# Patient Record
Sex: FEMALE | Race: WHITE | NOT HISPANIC OR LATINO | Employment: PART TIME | URBAN - METROPOLITAN AREA
[De-identification: names, ages, dates, MRNs, and addresses within clinical notes are randomized per-mention and may not be internally consistent; named-entity substitution may affect disease eponyms.]

---

## 2017-12-18 PROBLEM — R73.01 IMPAIRED FASTING GLUCOSE: Chronic | Status: ACTIVE | Noted: 2017-12-18

## 2017-12-18 PROBLEM — L40.9 PSORIASIS: Status: ACTIVE | Noted: 2017-12-18

## 2017-12-18 PROBLEM — F51.01 PRIMARY INSOMNIA: Status: ACTIVE | Noted: 2017-12-18

## 2017-12-18 PROBLEM — M10.9 GOUT: Chronic | Status: ACTIVE | Noted: 2017-12-18

## 2017-12-18 PROBLEM — L40.9 PSORIASIS: Chronic | Status: ACTIVE | Noted: 2017-12-18

## 2017-12-18 PROBLEM — I10 ESSENTIAL HYPERTENSION: Chronic | Status: ACTIVE | Noted: 2017-12-18

## 2017-12-18 PROBLEM — I63.9 CEREBROVASCULAR ACCIDENT (CMS/HCC): Chronic | Status: ACTIVE | Noted: 2017-12-18

## 2017-12-18 PROBLEM — K59.00 CONSTIPATION: Chronic | Status: ACTIVE | Noted: 2017-12-18

## 2017-12-18 PROBLEM — E87.6 HYPOKALEMIA: Chronic | Status: ACTIVE | Noted: 2017-12-18

## 2017-12-18 PROBLEM — M10.9 GOUT: Status: ACTIVE | Noted: 2017-12-18

## 2017-12-18 PROBLEM — I10 HYPERTENSION: Status: ACTIVE | Noted: 2017-04-24

## 2017-12-18 PROBLEM — I10 ESSENTIAL HYPERTENSION: Status: ACTIVE | Noted: 2017-12-18

## 2017-12-18 PROBLEM — I63.9 CEREBROVASCULAR ACCIDENT (CMS/HCC): Status: ACTIVE | Noted: 2017-12-18

## 2017-12-18 PROBLEM — E87.6 HYPOKALEMIA: Status: ACTIVE | Noted: 2017-12-18

## 2017-12-18 PROBLEM — I10 HYPERTENSION: Chronic | Status: ACTIVE | Noted: 2017-04-24

## 2017-12-18 PROBLEM — K59.00 CONSTIPATION: Status: ACTIVE | Noted: 2017-12-18

## 2017-12-18 PROBLEM — E78.5 DYSLIPIDEMIA: Chronic | Status: ACTIVE | Noted: 2017-12-18

## 2017-12-18 PROBLEM — E78.5 DYSLIPIDEMIA: Status: ACTIVE | Noted: 2017-12-18

## 2017-12-18 PROBLEM — I44.30 ATRIOVENTRICULAR BLOCK: Status: ACTIVE | Noted: 2017-12-18

## 2017-12-18 PROBLEM — R51.9 HEADACHE: Status: ACTIVE | Noted: 2017-12-18

## 2017-12-18 PROBLEM — D84.9 IMMUNOCOMPROMISED (CMS/HCC): Status: ACTIVE | Noted: 2017-12-18

## 2017-12-18 PROBLEM — I44.30 ATRIOVENTRICULAR BLOCK: Chronic | Status: ACTIVE | Noted: 2017-12-18

## 2017-12-18 PROBLEM — R73.01 IMPAIRED FASTING GLUCOSE: Status: ACTIVE | Noted: 2017-12-18

## 2018-01-03 ENCOUNTER — TELEPHONE (OUTPATIENT)
Dept: DERMATOLOGY | Facility: CLINIC | Age: 73
End: 2018-01-03

## 2018-01-03 NOTE — TELEPHONE ENCOUNTER
Called patient and stated I had faxed the form to [employer] on December 27, 2017. Patient and I agree it could be due to the back to back holidays that could be affected the process, however to be safe I refaxed the original form and asked it to be rushed.

## 2018-01-15 ENCOUNTER — APPOINTMENT (OUTPATIENT)
Dept: CARDIOLOGY | Facility: CLINIC | Age: 73
End: 2018-01-15
Payer: MEDICARE

## 2018-01-15 ENCOUNTER — TELEPHONE (OUTPATIENT)
Dept: DERMATOLOGY | Facility: CLINIC | Age: 73
End: 2018-01-15

## 2018-01-15 DIAGNOSIS — I44.0 ATRIOVENTRICULAR BLOC FIRST DEGREE: Primary | ICD-10-CM

## 2018-01-15 PROCEDURE — 93294 REM INTERROG EVL PM/LDLS PM: CPT | Performed by: INTERNAL MEDICINE

## 2018-01-30 NOTE — PROGRESS NOTES
Subjective   Return Dermatology Skin examination  Sunitha Arrington is a 72 y.o. female with a history of actinic keratoses and psoriasis and LSA who presents for a full body skin exam . Lesions of concern include: rash on legs she has not been treating the area recently.  In the past this is been thought to potentially be related to underlying LS and a.   patient is currently using Humira 40 mg every other week for her psoriasis with excellent control, clobetasol BID PRN for stubborn skin lesions, she has been given a prescription for Klaron lotion in the past for the face but was unable to afford the prescription.            Review of Systems   Constitutional: Negative for fever.   Skin: Negative for rash.   Allergic/Immunologic: Negative for environmental allergies and immunocompromised state.         Past Medical History:   Diagnosis Date   • Actinic keratosis    • Psoriasis        Current Outpatient Prescriptions on File Prior to Visit   Medication Sig Dispense Refill   • acyclovir (ZOVIRAX) 800 mg tablet Take 1 tablet five times daily for 10 days 60 tablet 0   • adalimumab (HUMIRA) 40 mg/0.8 mL syringe kit Inject 1 kit every 2 weeks by subcutaneous route for 90 days. 1 mL 3   • adalimumab (HUMIRA) 40 mg/0.8 mL syringe kit Inject 0.8 mL (40 mg total) under the skin every 14 (fourteen) days. 6 each 2   • atorvastatin (LIPITOR) 40 mg tablet Take 1 tablet every day by oral route. 90 0   • clopidogrel (PLAVIX) 75 mg tablet Take 1 tablet every day by oral route for 30 days. 90 0   • losartan (COZAAR) 50 mg tablet TAKE 1 TABLET EVERY DAY 90 tablet 1   • lysine 500 mg tablet Take 1 tablet every day by oral route in the evening.     • omeprazole (PriLOSEC) 20 mg capsule Take 1 capsule every day by oral route. 90 0   • potassium chloride (KLOR-CON) 10 mEq CR tablet  90 3   • rOPINIRole (REQUIP) 1 mg tablet Take 1 tablet twice daily by mouth 180 tablet 3   • triamcinolone (KENALOG) 0.1 % cream  30 0   • zolpidem (AMBIEN) 5 mg  "tablet Take one to two tablets by mouth at bedtime. 60 tablet 5   • clobetasol (TEMOVATE) 0.05 % ointment  60 11   • gabapentin (NEURONTIN) 600 mg tablet  270 0     No current facility-administered medications on file prior to visit.        Allergies  Apricot      Physical Examination    Vital Signs  height is 1.664 m (5' 5.5\") and weight is 81.6 kg (180 lb). Her blood pressure is 153/78 and her pulse is 87.   General: normal general appearance and in no apparent distress      Physical Exam Findings:   Senior skin type:I  A full body skin examination was performed including scalp, face, lips, ears, neck, both arms, chest, back, abdomen, buttocks, both legs, both feet                                         Trunk (including chest, abdomen, back): Stuck on tan brown papules, well demarcated tan brown macules, bright red cherry papules, psoriasis well controlled.    Arms: Stuck on tan brown papules, well demarcated tan brown macules, bright red cherry papules, few guttate papules.  buttock: No suspicious lesions noted   legs: Bilateral ingulinal creases multiple violaceous papules. Biopsy done of the Right upper thigh.   feet: No suspicious lesions noted   scalp: No suspicious lesions noted   face: Central facial telangiectatic macules. See procedure note for treated lesions         Procedure Note:  Lesion Biopsy  Date/Time: 2/7/2018 3:40 PM  Performed by: DALTON REHMAN      Consent  Verbal consent was obtained from the patient. Written consent was not obtained from the patient. Risks, benefits, and alternatives were discussed. Consent given by patient. The patient states understanding of the procedure being performed. Patient ID confirmed verbally with patient.         Biopsy 1    Location Details  Body area: lower extremity  Lower extremity site: right upper thigh [id number].    Preparation Details  Adequate anesthesia is achieved using 0.3 mL of lidocaine 2% without epinephrine in a local infiltration " method. Area cleaned and prepped with Alcohol.     Procedure Details  Shave biopsy completed with dermablade. Biopsy performed to the depth of other (dermis). Hemostasis obtained with aluminium chloride and pressure.                     Post-Procedure  Specimen was sent to Pathology. Gauze and adhesive bandage applied for dressing. Patient tolerated the procedure well with no complications.       Destruction of Lesion  Date/Time: 2/7/2018 3:41 PM  Performed by: DALTON REHMAN      Consent  Verbal consent was obtained from the patient. Consent given by patient.     Location:  2 total lesions removed from head/neck.  Head/neck location(s):  Number of head/neck lesions removed: 2Head/neck location: pretip nose, right outer cheek.    Procedure Details   Local anesthesia was not used. Lesion(s) are pre-malignant. Lesion(s) destroyed with: liquid nitrogen. Lesion(s) were frozen until an ice ball extended beyond the lesion.     Post-Procedure  Patient tolerated procedure well with no complications.     Procedure Comments  Actinic keratosis            Sunitha was seen today for follow-up.    Diagnoses and all orders for this visit:    Psoriasis    Acne rosacea  -     sulfacetamide sodium, acne, 10 % suspension; Apply to face twice daily as directed    Lentigines    Hemangioma of skin    Seborrheic keratoses    Neoplasm of uncertain behavior  -     Specimen to pathology  -     Lesion Biopsy    Actinic keratosis  -     Destruction of lesion    -The rash on her inner thighs is somewhat unclear.  My differential diagnosis does include a granulomatous eruption from her biologic agent.  Other differential diagnosis includes partially treated inverse psoriasis versus granuloma annulare I recommended no further treatment until we have firm histologic diagnosis as a pathology specimen was collected today  -In regards to her psoriasis this is under excellent control continue Humira every other week and a dose of 40 mg use  clobetasol only should she experience stubborn lesions  -We will reattempt prescription for Klaron lotion as I do feel this would benefit her rosacea greatly  -  Actinic keratoses treated as above  -  Patient offered reassurance of benign nature of lesions        I emphasized daily sunscreen use with an SPF of 30-50, broad spectrum and water resistant.  I directed reapplication every two hours.  I recommended wide brimmed hats.  Finally, we discussed danger signs of changing skin lesions including sores not healing and moles changing in size, shape or color.  Should any of these lesions occur before next appointment, I instructed patent to call sooner for evaluation.    Return in about 6 months (around 8/7/2018) for Recheck.

## 2018-02-07 ENCOUNTER — OFFICE VISIT (OUTPATIENT)
Dept: DERMATOLOGY | Facility: CLINIC | Age: 73
End: 2018-02-07
Payer: MEDICARE

## 2018-02-07 VITALS
WEIGHT: 180 LBS | DIASTOLIC BLOOD PRESSURE: 78 MMHG | BODY MASS INDEX: 28.93 KG/M2 | SYSTOLIC BLOOD PRESSURE: 153 MMHG | HEART RATE: 87 BPM | HEIGHT: 66 IN

## 2018-02-07 DIAGNOSIS — L71.9 ACNE ROSACEA: ICD-10-CM

## 2018-02-07 DIAGNOSIS — D48.9 NEOPLASM OF UNCERTAIN BEHAVIOR: ICD-10-CM

## 2018-02-07 DIAGNOSIS — D18.01 HEMANGIOMA OF SKIN: ICD-10-CM

## 2018-02-07 DIAGNOSIS — L81.4 LENTIGINES: ICD-10-CM

## 2018-02-07 DIAGNOSIS — L40.9 PSORIASIS: Primary | ICD-10-CM

## 2018-02-07 DIAGNOSIS — L57.0 ACTINIC KERATOSIS: ICD-10-CM

## 2018-02-07 DIAGNOSIS — L82.1 SEBORRHEIC KERATOSES: ICD-10-CM

## 2018-02-07 PROCEDURE — 11100 LESION BIOPSY: CPT | Mod: 59 | Performed by: DERMATOLOGY

## 2018-02-07 PROCEDURE — 17000 DESTRUCT PREMALG LESION: CPT | Performed by: DERMATOLOGY

## 2018-02-07 PROCEDURE — 99213 OFFICE O/P EST LOW 20 MIN: CPT | Mod: 25 | Performed by: DERMATOLOGY

## 2018-02-07 PROCEDURE — 17003 DESTRUCT PREMALG LES 2-14: CPT | Performed by: DERMATOLOGY

## 2018-02-07 RX ORDER — SULFACETAMIDE SODIUM 100 MG/ML
LOTION TOPICAL
Qty: 1 BOTTLE | Refills: 2 | Status: SHIPPED | OUTPATIENT
Start: 2018-02-07 | End: 2018-10-30 | Stop reason: ALTCHOICE

## 2018-02-07 ASSESSMENT — PAIN SCALES - GENERAL: PAINLEVEL: 0-NO PAIN

## 2018-02-07 ASSESSMENT — ENCOUNTER SYMPTOMS: FEVER: 0

## 2018-02-07 NOTE — PATIENT INSTRUCTIONS
WOUND CARE FOLLOWING BIOPSY    After your biopsy a dressing will be placed on the site.  You may remove bandage at end of day.  This is to minimize any bleeding that can possibly occur after surgery.  To clean the area, use Dove soap, warm water and hands, no wash cloth, once daily    Next, apply a layer of POLYSPORIN ANTIBIOTIC OINTMENT or VASELINE, daily.  Do this until healed.  We will contact you with your results when available.  Please call with questions or concerns    If you have not received results in 2 weeks, please call our office and reference your biopsy number [id number].   
no difficulties

## 2018-02-09 LAB — PATHOLOGY FINAL DIAGNOSIS: NORMAL

## 2018-02-10 NOTE — PROGRESS NOTES
No further treatment, inform patient this could be related to underlying humira therapy, but she does not need to treat

## 2018-02-12 ENCOUNTER — TELEPHONE (OUTPATIENT)
Dept: DERMATOLOGY | Facility: CLINIC | Age: 73
End: 2018-02-12

## 2018-02-14 ENCOUNTER — HOSPITAL ENCOUNTER (OUTPATIENT)
Dept: RADIOLOGY | Facility: HOSPITAL | Age: 73
Discharge: HOME | End: 2018-02-14
Attending: PHYSICIAN ASSISTANT
Payer: MEDICARE

## 2018-02-14 ENCOUNTER — OFFICE VISIT (OUTPATIENT)
Dept: URGENT CARE | Facility: CLINIC | Age: 73
End: 2018-02-14
Payer: MEDICARE

## 2018-02-14 ENCOUNTER — APPOINTMENT (OUTPATIENT)
Dept: FAMILY MEDICINE | Facility: CLINIC | Age: 73
End: 2018-02-14
Payer: MEDICARE

## 2018-02-14 ENCOUNTER — TELEPHONE (OUTPATIENT)
Dept: URGENT CARE | Facility: CLINIC | Age: 73
End: 2018-02-14

## 2018-02-14 VITALS
OXYGEN SATURATION: 98 % | SYSTOLIC BLOOD PRESSURE: 142 MMHG | HEART RATE: 65 BPM | TEMPERATURE: 98 F | DIASTOLIC BLOOD PRESSURE: 84 MMHG | RESPIRATION RATE: 16 BRPM

## 2018-02-14 DIAGNOSIS — R51.9 NONINTRACTABLE HEADACHE, UNSPECIFIED CHRONICITY PATTERN, UNSPECIFIED HEADACHE TYPE: ICD-10-CM

## 2018-02-14 DIAGNOSIS — J01.90 ACUTE SINUSITIS, RECURRENCE NOT SPECIFIED, UNSPECIFIED LOCATION: ICD-10-CM

## 2018-02-14 DIAGNOSIS — R06.02 SOB (SHORTNESS OF BREATH): Primary | ICD-10-CM

## 2018-02-14 LAB
ABO GROUP (TYPE) IN BLOOD: NORMAL
ANION GAP SERPL CALC-SCNC: 7 MMOL/L (ref 3–11)
B PERT DNA SPEC QL NAA+PROBE: NEGATIVE
BASOPHILS # BLD AUTO: 0.1 10*3/UL
BASOPHILS NFR BLD AUTO: 1 % (ref 0–2)
BNP SERPL-MCNC: 25 PG/ML (ref 0–99)
BUN SERPL-MCNC: 18 MG/DL (ref 7–25)
C PNEUM DNA SPEC QL NAA+PROBE: NEGATIVE
CALCIUM SERPL-MCNC: 9.4 MG/DL (ref 8.6–10.3)
CHLORIDE SERPL-SCNC: 103 MMOL/L (ref 98–107)
CO2 SERPL-SCNC: 26 MMOL/L (ref 21–32)
CREAT SERPL-MCNC: 0.8 MG/DL (ref 0.6–1.2)
D AG BLD QL: NORMAL
D DIMER PPP FEU-MCNC: 0.27 UG/ML FEU (ref 0–0.5)
EOSINOPHIL # BLD AUTO: 0.2 10*3/UL
EOSINOPHIL NFR BLD AUTO: 2 % (ref 0–3)
ERYTHROCYTE [DISTWIDTH] IN BLOOD BY AUTOMATED COUNT: 12.6 % (ref 11.5–14)
FLUAV AG NPH QL IA: NEGATIVE
FLUAV RNA SPEC NAA+PROBE-ACNC: NEGATIVE
FLUBV AG NPH QL IA: NEGATIVE
FLUBV RNA SPEC QL NAA+PROBE: NEGATIVE
GFR SERPL CREATININE-BSD FRML MDRD: 71 ML/MIN/1.73M*2
GLUCOSE SERPL-MCNC: 120 MG/DL (ref 70–105)
HADV DNA SPEC QL NAA+PROBE: NEGATIVE
HCOV 229E RNA SPEC QL NAA+PROBE: NEGATIVE
HCOV HKU1 RNA SPEC QL NAA+PROBE: POSITIVE
HCOV NL63 RNA SPEC QL NAA+PROBE: NEGATIVE
HCOV OC43 RNA SPEC QL NAA+PROBE: NEGATIVE
HCT VFR BLD AUTO: 43.7 % (ref 34–45)
HGB BLD-MCNC: 15 G/DL (ref 11.5–15.5)
HMPV RNA ISLT QL NAA+PROBE: NEGATIVE
HPIV1 RNA SPEC QL NAA+PROBE: NEGATIVE
HPIV2 RNA SPEC QL NAA+PROBE: NEGATIVE
HPIV3 RNA SPEC QL NAA+PROBE: NEGATIVE
HPIV4 RNA SPEC QL NAA+PROBE: NEGATIVE
LYMPHOCYTES # BLD AUTO: 1.5 10*3/UL
LYMPHOCYTES NFR BLD AUTO: 22 % (ref 11–47)
M PNEUMO DNA # SPEC NAA+PROBE: NEGATIVE {COPIES}/ML
MCH RBC QN AUTO: 33 PG (ref 28–33)
MCHC RBC AUTO-ENTMCNC: 34.4 G/DL (ref 32–36)
MCV RBC AUTO: 95.9 FL (ref 81–97)
MONOCYTES # BLD AUTO: 0.6 10*3/UL
MONOCYTES NFR BLD AUTO: 8 % (ref 3–11)
NEUTROPHILS # BLD AUTO: 4.4 10*3/UL
NEUTROPHILS NFR BLD AUTO: 66 % (ref 41–81)
PLATELET # BLD AUTO: 189 10*3/UL (ref 140–350)
PMV BLD AUTO: 7.7 FL (ref 6.9–10.8)
POTASSIUM SERPL-SCNC: 4 MMOL/L (ref 3.5–5.1)
RBC # BLD AUTO: 4.56 10*6/ΜL (ref 3.7–5.3)
RSV A RNA SPEC QL NAA+PROBE: NEGATIVE
RV+EV RNA SPEC QL NAA+PROBE: NEGATIVE
SODIUM SERPL-SCNC: 136 MMOL/L (ref 135–145)
TROPONIN I SERPL-MCNC: <0.03 NG/ML
WBC # BLD AUTO: 6.7 10*3/UL (ref 4.5–10.5)

## 2018-02-14 PROCEDURE — 99214 OFFICE O/P EST MOD 30 MIN: CPT | Mod: 25 | Performed by: PHYSICIAN ASSISTANT

## 2018-02-14 PROCEDURE — 93010 ELECTROCARDIOGRAM REPORT: CPT

## 2018-02-14 PROCEDURE — 83880 ASSAY OF NATRIURETIC PEPTIDE: CPT | Performed by: PHYSICIAN ASSISTANT

## 2018-02-14 PROCEDURE — 85379 FIBRIN DEGRADATION QUANT: CPT | Performed by: PHYSICIAN ASSISTANT

## 2018-02-14 PROCEDURE — 87804 INFLUENZA ASSAY W/OPTIC: CPT | Performed by: PHYSICIAN ASSISTANT

## 2018-02-14 PROCEDURE — 80048 BASIC METABOLIC PNL TOTAL CA: CPT | Performed by: PHYSICIAN ASSISTANT

## 2018-02-14 PROCEDURE — 71046 X-RAY EXAM CHEST 2 VIEWS: CPT

## 2018-02-14 PROCEDURE — 84484 ASSAY OF TROPONIN QUANT: CPT | Performed by: PHYSICIAN ASSISTANT

## 2018-02-14 PROCEDURE — 86900 BLOOD TYPING SEROLOGIC ABO: CPT | Performed by: PHYSICIAN ASSISTANT

## 2018-02-14 PROCEDURE — 99213 OFFICE O/P EST LOW 20 MIN: CPT | Performed by: PHYSICIAN ASSISTANT

## 2018-02-14 PROCEDURE — 99212 OFFICE O/P EST SF 10 MIN: CPT

## 2018-02-14 PROCEDURE — 87798 DETECT AGENT NOS DNA AMP: CPT | Performed by: PHYSICIAN ASSISTANT

## 2018-02-14 PROCEDURE — 85025 COMPLETE CBC W/AUTO DIFF WBC: CPT | Performed by: PHYSICIAN ASSISTANT

## 2018-02-14 PROCEDURE — 36415 COLL VENOUS BLD VENIPUNCTURE: CPT | Performed by: PHYSICIAN ASSISTANT

## 2018-02-14 PROCEDURE — 93005 ELECTROCARDIOGRAM TRACING: CPT | Performed by: PHYSICIAN ASSISTANT

## 2018-02-14 NOTE — PATIENT INSTRUCTIONS
Patient Education     Shortness of Breath  Shortness of breath means you have trouble breathing. Shortness of breath needs medical care right away.  HOME CARE   · Do not smoke.  · Avoid being around chemicals or things (paint fumes, dust) that may bother your breathing.  · Rest as needed. Slowly begin your normal activities.  · Only take medicines as told by your doctor.  · Keep all doctor visits as told.  GET HELP RIGHT AWAY IF:   · Your shortness of breath gets worse.  · You feel lightheaded, pass out (faint), or have a cough that is not helped by medicine.  · You cough up blood.  · You have pain with breathing.  · You have pain in your chest, arms, shoulders, or belly (abdomen).  · You have a fever.  · You cannot walk up stairs or exercise the way you normally do.  · You do not get better in the time expected.  · You have a hard time doing normal activities even with rest.  · You have problems with your medicines.  · You have any new symptoms.  MAKE SURE YOU:  · Understand these instructions.  · Will watch your condition.  · Will get help right away if you are not doing well or get worse.  This information is not intended to replace advice given to you by your health care provider. Make sure you discuss any questions you have with your health care provider.  Document Released: 06/05/2009 Document Revised: 12/23/2014 Document Reviewed: 03/04/2013  [employer] Interactive Patient Education © 2017 [employer]       Patient Education     General Headache Without Cause  Introduction  A headache is pain or discomfort felt around the head or neck area. There are many causes and types of headaches. In some cases, the cause may not be found.  Follow these instructions at home:  Managing pain  · Take over-the-counter and prescription medicines only as told by your doctor.  · Lie down in a dark, quiet room when you have a headache.  · If directed, apply ice to the head and neck area:  ¨ Put ice in a plastic bag.  ¨ Place a  towel between your skin and the bag.  ¨ Leave the ice on for 20 minutes, 2-3 times per day.  · Use a heating pad or hot shower to apply heat to the head and neck area as told by your doctor.  · Keep lights dim if bright lights bother you or make your headaches worse.  Eating and drinking  · Eat meals on a regular schedule.  · Lessen how much alcohol you drink.  · Lessen how much caffeine you drink, or stop drinking caffeine.  General instructions  · Keep all follow-up visits as told by your doctor. This is important.  · Keep a journal to find out if certain things bring on headaches. For example, write down:  ¨ What you eat and drink.  ¨ How much sleep you get.  ¨ Any change to your diet or medicines.  · Relax by getting a massage or doing other relaxing activities.  · Lessen stress.  · Sit up straight. Do not tighten (tense) your muscles.  · Do not use tobacco products. This includes cigarettes, chewing tobacco, or e-cigarettes. If you need help quitting, ask your doctor.  · Exercise regularly as told by your doctor.  · Get enough sleep. This often means 7-9 hours of sleep.  Contact a doctor if:  · Your symptoms are not helped by medicine.  · You have a headache that feels different than the other headaches.  · You feel sick to your stomach (nauseous) or you throw up (vomit).  · You have a fever.  Get help right away if:  · Your headache becomes really bad.  · You keep throwing up.  · You have a stiff neck.  · You have trouble seeing.  · You have trouble speaking.  · You have pain in the eye or ear.  · Your muscles are weak or you lose muscle control.  · You lose your balance or have trouble walking.  · You feel like you will pass out (faint) or you pass out.  · You have confusion.  This information is not intended to replace advice given to you by your health care provider. Make sure you discuss any questions you have with your health care provider.  Document Released: 09/26/2009 Document Revised: 05/25/2017  Document Reviewed: 04/11/2016  © 2017 [employer]

## 2018-02-14 NOTE — LETTER
February 14, 2018        To Whom it May Concern:    Sunitha Arrington was seen at [facility] on 2/14/2018     Please excuse her absence from work,  on this day due to illness.    If you have any questions or concerns, please don't hesitate to call.        Sincerely,       JOHN ARMSTRONG

## 2018-02-14 NOTE — LETTER
February 14, 2018        To Whom it May Concern:    Sunitha Arrington was seen at [facility] on 2/14/2018     Please excuse her absence from work,  on this day due to illness.    If you have any questions or concerns, please don't hesitate to call.        Sincerely,       JOHN ARMSTRONG

## 2018-02-14 NOTE — LETTER
February 14, 2018      To Whom it May Concern:    Sunitha Arrington was seen at [facility] on 2/14/2018     Please excuse her absence from work, 02/14-15/2018 due to illness.    If you have any questions or concerns, please don't hesitate to call.        Sincerely,       JOHN ARMSTRONG

## 2018-02-14 NOTE — PROGRESS NOTES
Subjective     HPI    Sunitha Arrington is a 72 y.o. female who presents for shortness of breath.  Patient presents clinic today and states that she has had some shortness of breath and states she feels like she has been hit by a [place] truck.  The symptoms have been present since Thursday, February 8, 2018 and she feels it is getting worse instead of better.  Patient does voice she has some facial fullness and postnasal drainage as well.  She has had to take off work due to feeling bad.  This morning she woke up approximately 1:30 in the morning and states that it seemed like she had to work to breathe. She voices she is having some heaviness in her chest, does not radiate into her back.  Sometimes she will feel some pain down R arm. She states that she is also having a headache on her right temporal area.  She states that she had a stroke in 2015 that affected her right side and when she gets a headache this is where she gets one this headache is the same headache that she normally has usually is relieved with Tylenol she is out of Tylenol right now she took one half of her 's tramadol and that seemed to take the edge off the headache.  She denies any new vision changes.  Her nose on the right side feels plugged up she does have some body aches nasal congestion she denies any fever but has been feeling chilled like feeling hot and cold off and on.  Denies any nausea vomiting or diarrhea.  She has recently seen Dr. Burrell dermatology for some biopsies and some cryotherapy on the lesions on her face at this time everything is benign she does have a history of lichen sclerosis.  She has not had any recent travel, no longer on hormone treatment, and she has had some previous stroke that they felt was secondary to her hormone treatment.  She denies any pain in her calf no swelling in a cast but states sometimes at night she will have some muscle cramps in the calves of bilateral legs.  Patient is wanting to know what  her blood type is.     Review of Systems    As noted in HPI.      Objective   /84 (BP Location: Left arm, Patient Position: Sitting, Cuff Size: Reg)   Pulse 65   Temp 36.7 °C (98 °F) (Oral)   Resp 16   SpO2 98%     Physical Exam   Constitutional: She is oriented to person, place, and time. She appears well-developed and well-nourished. No distress.   HENT:   Head: Normocephalic.   Right Ear: External ear and ear canal normal. Tympanic membrane is retracted. Tympanic membrane is not perforated and not erythematous. No middle ear effusion.   Left Ear: External ear and ear canal normal. Tympanic membrane is retracted. Tympanic membrane is not perforated and not erythematous.  No middle ear effusion.   Nose: Mucosal edema present. Right sinus exhibits maxillary sinus tenderness and frontal sinus tenderness.   Mouth/Throat: Uvula is midline, oropharynx is clear and moist and mucous membranes are normal. No trismus in the jaw. No oropharyngeal exudate.   Nasal mucosa is boggy bilateral without purulent drainage.   Eyes: Pupils are equal, round, and reactive to light. Right eye exhibits no discharge. Left eye exhibits no discharge. No scleral icterus.   Neck: Normal range of motion. Neck supple. No tracheal deviation present.   Complaint is some tenderness with palpation along the posterior cervical chain on the right but no palpable lymph nodes are found.   Cardiovascular: Normal rate, regular rhythm, normal heart sounds and intact distal pulses.  Exam reveals no gallop and no friction rub.    No murmur heard.  Pulmonary/Chest: Effort normal and breath sounds normal. No respiratory distress. She has no wheezes. She has no rales.   Walking pulse oximeter test she did go from 98-91% walking the length from the clinic to the lab.  She did voice feeling some shortness of breath with this at the end of the walk.   Musculoskeletal: Normal range of motion. She exhibits no edema, tenderness or deformity.    Lymphadenopathy:     She has no cervical adenopathy.   Neurological: She is alert and oriented to person, place, and time. She has normal reflexes. No cranial nerve deficit. She exhibits normal muscle tone. Coordination normal.   Romberg pronator drift, heel down shin, finger to nose are all normal.  The area at the right temple is negative for any pulsations or bruits.   Skin: Skin is warm and dry. Capillary refill takes 2 to 3 seconds. Rash noted. No erythema.   Patient has a scabbed area on the top of her nose and her right cheek from her cryotherapy done previously no signs and symptoms of infection.  She has an area on her right thigh where the biopsy was performed is scabbed without any signs and symptoms of infection.  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   Psychiatric: She has a normal mood and affect. Her behavior is normal. Judgment and thought content normal.   Nursing note and vitals reviewed.      No results found for this or any previous visit (from the past 4 hour(s)).    X-ray Chest 2 Views    Result Date: 2/14/2018  Exam: Chest x-ray, 2 views 02/14/2018    Clinical History: SOB (shortness of breath); SOB with rest and exertion Comparison(s): 7/30/2015 Findings: The lungs are clear. The heart size and pulmonary vascularity are within normal limits. Pacemaker and wires are stable in position. There are no acute appearing focal bony or soft tissue abnormalities. Pleural spaces are unremarkable with no evidence for pneumothorax or effusion.     1.  Normal chest.        Assessment/Plan   Diagnoses and all orders for this visit:    SOB (shortness of breath)  -     Basic metabolic panel Blood, Venous  -     CBC w/auto differential Blood, Venous  -     ECG 12 lead  -     D-dimer, quantitative Blood, Venous  -     Rapid influenza A/B antigens  -     X-ray chest 2 views  -     ABO/Rh        Discussion:   Discussed  x-rays and labs that were available at this time with patient and explained will call later with the remaining portion of the chest at this time everything points to a viral illness and will be treated as such.  Discussed case with Dr. Donald as well and she agrees with this treatment plan.  Humidifier, normal saline nasal spray, daily antihistamine, decongestants or mucolytics as needed for congestion and push fluids. Needs to be seen for high fever, trouble breathing or if not improving in 4-5 dys Patient verbalizes understanding of above information and will contact [facility] or [facility] with any further questions or concerns.      A voice recognition program was used to aid in medical record documentation. Sometimes words are printed not exactly as they were spoken. While efforts were made to carefully edit and correct any inaccuracies, some errors may be present. Errors should be taken within the context of the discussion.  Please contact our office if you need assistance interpreting this medical record or notice any mistakes.      JOHN ARMSTRONG

## 2018-03-28 ENCOUNTER — OFFICE VISIT (OUTPATIENT)
Dept: URGENT CARE | Facility: CLINIC | Age: 73
End: 2018-03-28
Payer: MEDICARE

## 2018-03-28 ENCOUNTER — HOSPITAL ENCOUNTER (OUTPATIENT)
Dept: RADIOLOGY | Facility: HOSPITAL | Age: 73
Discharge: HOME | End: 2018-03-28
Attending: PHYSICIAN ASSISTANT
Payer: MEDICARE

## 2018-03-28 VITALS
DIASTOLIC BLOOD PRESSURE: 70 MMHG | OXYGEN SATURATION: 97 % | RESPIRATION RATE: 16 BRPM | HEART RATE: 57 BPM | SYSTOLIC BLOOD PRESSURE: 112 MMHG | TEMPERATURE: 97.7 F

## 2018-03-28 DIAGNOSIS — R06.00 DYSPNEA, UNSPECIFIED TYPE: Primary | ICD-10-CM

## 2018-03-28 DIAGNOSIS — R07.89 CHEST TIGHTNESS: ICD-10-CM

## 2018-03-28 PROCEDURE — 71046 X-RAY EXAM CHEST 2 VIEWS: CPT

## 2018-03-28 PROCEDURE — 99213 OFFICE O/P EST LOW 20 MIN: CPT | Performed by: PHYSICIAN ASSISTANT

## 2018-03-28 PROCEDURE — 99211 OFF/OP EST MAY X REQ PHY/QHP: CPT | Performed by: PHYSICIAN ASSISTANT

## 2018-03-28 NOTE — LETTER
[facility]  [address] [id number]  [phone]  Dept: [phone]  March 28, 2018     Patient: Sunitha Arrington   YOB: 1945   Date of Visit: 3/28/2018       To Whom it May Concern:    Sunitha Arrington was seen in my clinic on  3/28/2018 at [facility]. Please excuse Sunitha for her absence from work on this day to make the appointment.    If you have any questions or concerns, please don't hesitate to call.         Sincerely,         JOHN ARMSTRONG        CC: No Recipients

## 2018-03-28 NOTE — PROGRESS NOTES
Subjective         Sunitha Arrington is a 72 y.o. female who presents with Chief Complaint of complaint of feeling tired with chest tightness and feeling achy.    HPI  Patient was seen in the office approximately 1 month ago for same complaint of feeling tired with chest tightness and achy  She is not having any chest pain  Next height  History of a CVA which affected the right side  She does have a cardiologist Dr. Kaplan who she sees routinely to check her pacemaker  She states that she does not wake up at night short of breath and have to sit up to breathe  She is able to climb the stairs without any complaints of chest pain or shortness of breath  She denies any pedal edema    Patient Active Problem List   Diagnosis   • Atrioventricular block   • Cardiac pacemaker in situ   • Cerebrovascular accident (CMS/HCC)   • Constipation   • Essential hypertension   • Dyslipidemia   • Gout   • Headache   • Hypokalemia   • Hypertension   • Impaired fasting glucose   • Psoriasis   • Primary insomnia   • Immunocompromised (CMS/HCC)       Past Medical History:   Diagnosis Date   • Actinic keratosis    • Psoriasis        Social History     Social History   • Marital status:      Spouse name: N/A   • Number of children: N/A   • Years of education: N/A     Occupational History   • Not on file.     Social History Main Topics   • Smoking status: Never Smoker   • Smokeless tobacco: Never Used   • Alcohol use No   • Drug use: No   • Sexual activity: Not on file     Other Topics Concern   • Not on file     Social History Narrative   • No narrative on file       Family History   Problem Relation Age of Onset   • Other cancer Father 32     secondary malignant neoplasm of lymph node       Allergies   Allergen Reactions   • Apricot      throat pain       Current Outpatient Prescriptions   Medication Sig Dispense Refill   • acyclovir (ZOVIRAX) 800 mg tablet Take 1 tablet five times daily for 10 days 60 tablet 0   • adalimumab (HUMIRA) 40  mg/0.8 mL syringe kit Inject 1 kit every 2 weeks by subcutaneous route for 90 days. 1 mL 3   • adalimumab (HUMIRA) 40 mg/0.8 mL syringe kit Inject 0.8 mL (40 mg total) under the skin every 14 (fourteen) days. 6 each 2   • atorvastatin (LIPITOR) 40 mg tablet Take 1 tablet every day by oral route. 90 0   • clobetasol (TEMOVATE) 0.05 % ointment  60 11   • clopidogrel (PLAVIX) 75 mg tablet Take 1 tablet every day by oral route for 30 days. 90 0   • gabapentin (NEURONTIN) 600 mg tablet  270 0   • losartan (COZAAR) 50 mg tablet TAKE 1 TABLET EVERY DAY 90 tablet 1   • lysine 500 mg tablet Take 1 tablet every day by oral route in the evening.     • omeprazole (PriLOSEC) 20 mg capsule Take 1 capsule every day by oral route. 90 0   • potassium chloride (KLOR-CON) 10 mEq CR tablet  90 3   • rOPINIRole (REQUIP) 1 mg tablet Take 1 tablet twice daily by mouth 180 tablet 3   • sulfacetamide sodium, acne, 10 % suspension Apply to face twice daily as directed 1 Bottle 2   • triamcinolone (KENALOG) 0.1 % cream  30 0   • zolpidem (AMBIEN) 5 mg tablet Take one to two tablets by mouth at bedtime. 60 tablet 5     No current facility-administered medications for this visit.        Review of Systems    Negative 12 point review of systems other than listed in HPI    Objective   /70 (BP Location: Left arm, Patient Position: Sitting, Cuff Size: Reg)   Pulse 57   Temp 36.5 °C (97.7 °F) (Oral)   Resp 16   SpO2 97%     Physical Exam   Constitutional: She is oriented to person, place, and time. She appears well-developed and well-nourished. No distress.   She is a well-appearing female who is in no acute distress at this time.   HENT:   Head: Normocephalic and atraumatic.   Right Ear: External ear normal.   Left Ear: External ear normal.   Nose: Nose normal.   Mouth/Throat: Oropharynx is clear and moist. No oropharyngeal exudate.   Eyes: Conjunctivae and EOM are normal. Pupils are equal, round, and reactive to light. No scleral icterus.    Neck: Normal range of motion. Neck supple.   Cardiovascular: Normal rate, regular rhythm, normal heart sounds and intact distal pulses.    No murmur heard.  1+ pedal edema bilateral lower extremities   Pulmonary/Chest: Effort normal and breath sounds normal. No respiratory distress.   Musculoskeletal: Normal range of motion.   Lymphadenopathy:     She has no cervical adenopathy.   Neurological: She is alert and oriented to person, place, and time. She exhibits normal muscle tone.   Skin: Skin is warm and dry. Capillary refill takes less than 2 seconds.   Psychiatric: She has a normal mood and affect. Her behavior is normal. Judgment and thought content normal.   Nursing note and vitals reviewed.        No results found for this or any previous visit (from the past 6 hour(s)).    X-ray Chest 2 Views    Result Date: 3/28/2018  Exam: 2 View CXR 03/28/2018    . Clinical history: Dyspnea  unspecified type; shortness of breath  chest tightness    Comparison: 2/14/2018 Findings: Left subclavian dual-lead cardiac pacer is in good position. Cardiac size is stable. Lungs are clear without infiltrates or pleural effusions.     Impression: No acute abnormality seen.        Assessment/Plan   Diagnoses and all orders for this visit:    Dyspnea, unspecified type  -     X-ray chest 2 views    Chest tightness  -     [facility]; Future        Discussion:   Discussed and reviewed the x-ray with her today in the office and she verbalized understanding  Discussed with patient treatment plan she will get the echocardiogram and then follow-up with Dr. Donald to discuss if she should have any problems with breathing worsening of symptoms she needs to follow-up in the emergency room return to the clinic.       A voice recognition program was used to aid in medical record documentation. Sometimes words are printed not exactly as they were spoken. While efforts were made to carefully edit and correct any inaccuracies, some errors may  be present. Errors should be taken within the context of the discussion.  Please contact our office if you need assistance interpreting this medical record or notice any mistakes.      JOHN ARMSTRONG  03/28/18

## 2018-03-29 ENCOUNTER — TELEPHONE (OUTPATIENT)
Dept: FAMILY MEDICINE | Facility: CLINIC | Age: 73
End: 2018-03-29

## 2018-03-29 NOTE — TELEPHONE ENCOUNTER
I did order an echocardiogram but was can hold off on the pulmonary function test until she sees Dr. Donald.

## 2018-04-03 ENCOUNTER — TELEPHONE (OUTPATIENT)
Dept: FAMILY MEDICINE | Facility: CLINIC | Age: 73
End: 2018-04-03

## 2018-04-03 NOTE — PROGRESS NOTES
Telephone call from patient that her note from her office visit on 3/28/2018 needed to be extended for 1 more day for her employment this note was written for her today she will keep her scheduled appointment with Dr. Donald.

## 2018-04-05 ENCOUNTER — HOSPITAL ENCOUNTER (OUTPATIENT)
Dept: ULTRASOUND IMAGING | Facility: HOSPITAL | Age: 73
Discharge: HOME | End: 2018-04-05
Attending: PHYSICIAN ASSISTANT
Payer: MEDICARE

## 2018-04-05 VITALS — BODY MASS INDEX: 29.99 KG/M2 | HEIGHT: 65 IN | WEIGHT: 180 LBS

## 2018-04-05 PROCEDURE — 93321 DOPPLER ECHO F-UP/LMTD STD: CPT | Mod: 26 | Performed by: INTERNAL MEDICINE

## 2018-04-05 PROCEDURE — 93325 DOPPLER ECHO COLOR FLOW MAPG: CPT

## 2018-04-05 PROCEDURE — 93308 TTE F-UP OR LMTD: CPT | Mod: 26 | Performed by: INTERNAL MEDICINE

## 2018-04-05 PROCEDURE — 93325 DOPPLER ECHO COLOR FLOW MAPG: CPT | Mod: 26 | Performed by: INTERNAL MEDICINE

## 2018-05-01 ENCOUNTER — ANCILLARY PROCEDURE (OUTPATIENT)
Dept: CARDIOLOGY | Facility: CLINIC | Age: 73
End: 2018-05-01
Payer: MEDICARE

## 2018-05-01 ENCOUNTER — OFFICE VISIT (OUTPATIENT)
Dept: CARDIOLOGY | Facility: CLINIC | Age: 73
End: 2018-05-01
Payer: MEDICARE

## 2018-05-01 VITALS
OXYGEN SATURATION: 97 % | WEIGHT: 180 LBS | BODY MASS INDEX: 29.99 KG/M2 | HEART RATE: 80 BPM | DIASTOLIC BLOOD PRESSURE: 82 MMHG | SYSTOLIC BLOOD PRESSURE: 144 MMHG | HEIGHT: 65 IN

## 2018-05-01 DIAGNOSIS — I44.30 AV BLOCK: ICD-10-CM

## 2018-05-01 DIAGNOSIS — I44.30 ATRIOVENTRICULAR BLOCK: Primary | Chronic | ICD-10-CM

## 2018-05-01 DIAGNOSIS — R06.00 DYSPNEA, UNSPECIFIED TYPE: ICD-10-CM

## 2018-05-01 DIAGNOSIS — I10 ESSENTIAL HYPERTENSION: Chronic | ICD-10-CM

## 2018-05-01 DIAGNOSIS — Z95.0 CARDIAC PACEMAKER IN SITU: Chronic | ICD-10-CM

## 2018-05-01 DIAGNOSIS — I63.9 CEREBROVASCULAR ACCIDENT (CVA), UNSPECIFIED MECHANISM (CMS/HCC): Chronic | ICD-10-CM

## 2018-05-01 PROCEDURE — 99214 OFFICE O/P EST MOD 30 MIN: CPT | Mod: 25 | Performed by: NURSE PRACTITIONER

## 2018-05-01 PROCEDURE — 99214 OFFICE O/P EST MOD 30 MIN: CPT | Mod: PO | Performed by: NURSE PRACTITIONER

## 2018-05-01 PROCEDURE — 93280 PM DEVICE PROGR EVAL DUAL: CPT | Mod: 26 | Performed by: INTERNAL MEDICINE

## 2018-05-01 PROCEDURE — 93280 PM DEVICE PROGR EVAL DUAL: CPT | Mod: PO

## 2018-05-01 RX ORDER — PHENYLEPHRINE HCL 10 MG
500 TABLET ORAL DAILY
COMMUNITY
End: 2018-10-30 | Stop reason: ALTCHOICE

## 2018-05-01 RX ORDER — PNV NO.95/FERROUS FUM/FOLIC AC 28MG-0.8MG
1 TABLET ORAL DAILY
COMMUNITY
End: 2019-05-23 | Stop reason: ALTCHOICE

## 2018-05-01 ASSESSMENT — ENCOUNTER SYMPTOMS
DYSPNEA ON EXERTION: 0
BRUISES/BLEEDS EASILY: 0
SHORTNESS OF BREATH: 0
NAUSEA: 0
EYES NEGATIVE: 1
DIZZINESS: 0
ENDOCRINE NEGATIVE: 1
FEVER: 0
WHEEZING: 0
ORTHOPNEA: 0
HEMATOCHEZIA: 0
SYNCOPE: 0
NEAR-SYNCOPE: 0
CHILLS: 0
HEADACHES: 0
DIARRHEA: 0
LIGHT-HEADEDNESS: 0
SNORING: 0
HEMATURIA: 0
IRREGULAR HEARTBEAT: 0
VOMITING: 0
FALLS: 0
CONSTIPATION: 0
PSYCHIATRIC NEGATIVE: 1
DIAPHORESIS: 0
COUGH: 0
MYALGIAS: 0
PALPITATIONS: 0
HEARTBURN: 0
PND: 0
ABDOMINAL PAIN: 0

## 2018-05-01 ASSESSMENT — PAIN SCALES - GENERAL: PAINLEVEL: 2

## 2018-05-01 NOTE — PROGRESS NOTES
[facility] [place]  [address] [place] [address]    Electrophysiology Outpatient Follow-up Note    Subjective     Chief Complaint  Chief Complaint   Patient presents with   • Syncope   • Hypertension       HPI  Sunitha Arrington is a 72 y.o. female presenting for follow-up for heart block, hypertension, stroke, and pacemaker.  Patient had heart block with syncope and subsequently underwent dual-chamber permanent pacemaker in 2014.  Her stroke was thought to be due to estrogen replacement.  There was no concurrent atrial fibrillation at any time around her stroke and she has been taking Plavix for this.  Patient had a device interrogation today which showed no events.     Most recent echocardiogram was on 2018 and showed normal left ventricular systolic function and wall motion, EF 65%, normal pulmonary artery pressures, and no significant valvular disease.  The echocardiogram was ordered by urgent care after patient presented with dyspnea.  Chest x-ray showed no acute abnormalities.  This was thought to be possibly viral but there is mention of considering a pulmonary function test.    She is accompanied by her  today. Patients states she gained approximately 30 lbs over the past 4 years. She reports persistent fatigue. No other associated symptoms. Denies CP, dyspnea, lightheadedness, dizziness, presyncope or syncope, or edema. No PND or orthopnea. She continues to work at [employer].     Past Medical History:   Diagnosis Date   • Actinic keratosis    • CVA (cerebral vascular accident) (CMS/[facility])    • Psoriasis        Past Surgical History:   Procedure Laterality Date   • CARDIAC CATHETERIZATION     • CARDIAC PACEMAKER PLACEMENT      Dual chamber pacemaker implantation   • CARDIAC SURGERY  2014   •  SECTION  1977    [place]   • COLONOSCOPY  2016    Rare sigmoid diverticula. Physiologic internal hemorrhoids, otherwise  normal exam   • COLONOSCOPY  01/01/2008   • COLPORRHAPHY      and rectocele repair   • TOTAL ABDOMINAL HYSTERECTOMY W/ BILATERAL SALPINGOOPHORECTOMY      [id number] [facility]       No specialty comments available.    Allergies as of 05/01/2018 - Reviewed 05/01/2018   Allergen Reaction Noted   • Apricot  06/12/2015       Current Outpatient Prescriptions   Medication Sig Dispense Refill   • acyclovir (ZOVIRAX) 800 mg tablet Take 1 tablet five times daily for 10 days 60 tablet 0   • adalimumab (HUMIRA) 40 mg/0.8 mL syringe kit Inject 0.8 mL (40 mg total) under the skin every 14 (fourteen) days. 6 each 2   • atorvastatin (LIPITOR) 40 mg tablet Take 1 tablet every day by oral route. 90 0   • calcium carbonate-vitamin D3 (CALCIUM 500 WITH D) 500 mg(1,250mg) -400 unit tablet Take 1 tablet by mouth daily.     • cinnamon bark (CINNAMON) 500 mg capsule Take 500 mg by mouth daily.     • clopidogrel (PLAVIX) 75 mg tablet Take 1 tablet every day by oral route for 30 days. 90 0   • losartan (COZAAR) 50 mg tablet TAKE 1 TABLET EVERY DAY 90 tablet 1   • lysine 500 mg tablet Take 1 tablet every day by oral route in the evening.     • multivitamin with minerals tablet Take 1 tablet by mouth daily.     • omega-3 fatty acids-fish oil (FISH OIL) 340-1,000 mg capsule Take 1 capsule by mouth 2 (two) times a day.     • omeprazole (PriLOSEC) 20 mg capsule Take 1 capsule every day by oral route. 90 0   • potassium chloride (KLOR-CON) 10 mEq CR tablet Take 10 mEq by mouth 2 (two) times a day.   90 3   • rOPINIRole (REQUIP) 1 mg tablet Take 1 tablet twice daily by mouth 180 tablet 3   • sulfacetamide sodium, acne, 10 % suspension Apply to face twice daily as directed 1 Bottle 2   • zolpidem (AMBIEN) 5 mg tablet Take one to two tablets by mouth at bedtime. 60 tablet 5   • clobetasol (TEMOVATE) 0.05 % ointment  60 11   • gabapentin (NEURONTIN) 600 mg tablet  270 0   • triamcinolone (KENALOG) 0.1 % cream  30 0     No current  facility-administered medications for this visit.        Family History   Problem Relation Age of Onset   • Stroke Mother 97   • Other cancer Father 32     secondary malignant neoplasm of lymph node       Social History   Substance Use Topics   • Smoking status: Never Smoker   • Smokeless tobacco: Never Used   • Alcohol use No       Review of Systems  Review of Systems   Constitution: Positive for malaise/fatigue. Negative for chills, diaphoresis and fever.   HENT: Negative for nosebleeds.    Eyes: Negative.    Cardiovascular: Negative for chest pain, dyspnea on exertion, irregular heartbeat, leg swelling, near-syncope, orthopnea, palpitations, paroxysmal nocturnal dyspnea and syncope.   Respiratory: Negative for cough, shortness of breath, snoring and wheezing.    Endocrine: Negative.    Hematologic/Lymphatic: Does not bruise/bleed easily.   Skin: Negative.    Musculoskeletal: Negative for falls, muscle weakness and myalgias.   Gastrointestinal: Negative for abdominal pain, constipation, diarrhea, dysphagia, heartburn, hematochezia, nausea and vomiting.   Genitourinary: Negative for hematuria.   Neurological: Negative for dizziness, headaches and light-headedness.   Psychiatric/Behavioral: Negative.        Objective     Vitals:    05/01/18 1357   BP: 144/82   Pulse: 80   SpO2: 97%     Weight: 81.6 kg (180 lb)    Physical Exam   Constitutional: She is oriented to person, place, and time. Vital signs are normal. She appears well-developed and well-nourished. No distress.   HENT:   Head: Normocephalic.   Nose: No epistaxis.   Neck: No JVD present. Carotid bruit is not present.   Cardiovascular: Normal rate, regular rhythm, normal heart sounds and intact distal pulses.    No murmur heard.  Left upper chest device site without signs of infection or erosion.    Pulmonary/Chest: Effort normal and breath sounds normal. She has no wheezes. She has no rales.   Abdominal: Soft. Bowel sounds are normal.   Musculoskeletal: She  exhibits no edema.   Neurological: She is alert and oriented to person, place, and time.   Skin: Skin is warm and dry. She is not diaphoretic.   Psychiatric: She has a normal mood and affect. Her behavior is normal.   Vitals reviewed.      Data Review:   Lab Results   Component Value Date     02/14/2018    K 4.0 02/14/2018     02/14/2018    CO2 26 02/14/2018    BUN 18 02/14/2018    CREATININE 0.8 02/14/2018    GLUCOSE 120 (H) 02/14/2018    CALCIUM 9.4 02/14/2018     Lab Results   Component Value Date    WBC 6.7 02/14/2018    HGB 15.0 02/14/2018    HCT 43.7 02/14/2018    MCV 95.9 02/14/2018     02/14/2018     Lab Results   Component Value Date    CHOL 130 06/21/2016    TRIG 73 06/21/2016    HDL 61 06/21/2016    LDLDIRECT 67 05/16/2017     Lab Results   Component Value Date    TSH 1.402 12/18/2017       Assessment/Plan   Diagnoses and all orders for this visit:    Atrioventricular block    Cerebrovascular accident (CVA), unspecified mechanism (CMS/HCC)    Essential hypertension    Cardiac pacemaker in situ      Plan  1.  Patient has a h/o heart block s/p DC PPM in 2014. Device interrogation today revealed no episodes. She rarely paces. Continue to follow in the device clinic.   2.  Patient had a CVA thought to be d/t estrogen replacement. There was no concurrent AF at any time around her stroke. She has no residual. Continue Plavix.   3.  BP is borderline controlled. Will recheck at the next appt.  4.  Patient c/o persistent fatigue. Recent echo was normal. CXR was normal. No episodes detected on device interrogation.  Will order a stress test. Can consider a sleep study. Patient has gained weight. Encouraged weight loss.  5.  Follow-up in the EP clinic in 1 year or sooner for new or worsening symptoms.        Patient Instructions   Call if experiencing symptoms or issues before next visit.  Continue current medications.  Continue efforts toward routine exercise, a good goal is 150 minutes per  week.  Continue device checks with [facility] device clinic.      Return in about 1 year (around 5/1/2019).    Patient Education: Ready to learn, no apparent learning barriers were identified; learning preference includes listening.  Explained diagnosis and treatment plan; patient expressed understanding of the content.    Electronically signed by: Kathryn Mujica CNP  5/1/2018  9:41 PM

## 2018-05-01 NOTE — PATIENT INSTRUCTIONS
Call if experiencing symptoms or issues before next visit.  Continue current medications.  Continue efforts toward routine exercise, a good goal is 150 minutes per week.  Continue device checks with [facility] device clinic.

## 2018-05-18 ENCOUNTER — ANCILLARY PROCEDURE (OUTPATIENT)
Dept: CARDIOLOGY | Facility: CLINIC | Age: 73
End: 2018-05-18
Payer: MEDICARE

## 2018-05-18 VITALS — BODY MASS INDEX: 29.99 KG/M2 | WEIGHT: 180 LBS | HEIGHT: 65 IN

## 2018-05-18 PROCEDURE — 93018 CV STRESS TEST I&R ONLY: CPT | Performed by: INTERNAL MEDICINE

## 2018-05-18 PROCEDURE — 78452 HT MUSCLE IMAGE SPECT MULT: CPT | Mod: PO

## 2018-05-18 PROCEDURE — 6360000200 HC RX 636 W HCPCS (ALT 250 FOR IP): Mod: PO | Performed by: NURSE PRACTITIONER

## 2018-05-18 PROCEDURE — 93016 CV STRESS TEST SUPVJ ONLY: CPT | Performed by: INTERNAL MEDICINE

## 2018-05-18 PROCEDURE — 78452 HT MUSCLE IMAGE SPECT MULT: CPT | Mod: 26 | Performed by: INTERNAL MEDICINE

## 2018-05-18 RX ORDER — REGADENOSON 0.08 MG/ML
0.4 INJECTION, SOLUTION INTRAVENOUS ONCE
Status: COMPLETED | OUTPATIENT
Start: 2018-05-18 | End: 2018-05-18

## 2018-05-18 RX ORDER — SODIUM CHLORIDE 0.9 % (FLUSH) 0.9 %
20 SYRINGE (ML) INJECTION AS NEEDED
Status: DISCONTINUED | OUTPATIENT
Start: 2018-05-18 | End: 2020-01-15

## 2018-05-18 RX ADMIN — REGADENOSON 0.4 MG: 0.08 INJECTION, SOLUTION INTRAVENOUS at 09:48

## 2018-05-18 RX ADMIN — Medication 20 ML: at 09:48

## 2018-05-22 ENCOUNTER — TELEPHONE (OUTPATIENT)
Dept: CARDIOLOGY | Facility: CLINIC | Age: 73
End: 2018-05-22

## 2018-05-22 NOTE — TELEPHONE ENCOUNTER
Call placed to pt, results of recent Lexiscan test were reviewed with her. Pt will follow up with her PCP regarding the chest pressure she feels intermittently in the next few months.  ----- Message from Kathryn Mujica CNP sent at 5/18/2018  6:13 PM MDT -----  Please let patient know her stress test was normal.  Thank you.

## 2018-05-29 ENCOUNTER — OFFICE VISIT (OUTPATIENT)
Dept: FAMILY MEDICINE | Facility: CLINIC | Age: 73
End: 2018-05-29
Payer: MEDICARE

## 2018-05-29 VITALS
HEIGHT: 65 IN | SYSTOLIC BLOOD PRESSURE: 140 MMHG | OXYGEN SATURATION: 97 % | WEIGHT: 180 LBS | RESPIRATION RATE: 20 BRPM | HEART RATE: 83 BPM | DIASTOLIC BLOOD PRESSURE: 82 MMHG | BODY MASS INDEX: 29.99 KG/M2

## 2018-05-29 DIAGNOSIS — M10.00 ACUTE IDIOPATHIC GOUT, UNSPECIFIED SITE: Chronic | ICD-10-CM

## 2018-05-29 DIAGNOSIS — R07.89 MID STERNAL CHEST PAIN: ICD-10-CM

## 2018-05-29 DIAGNOSIS — E78.5 HYPERLIPIDEMIA, UNSPECIFIED HYPERLIPIDEMIA TYPE: Primary | ICD-10-CM

## 2018-05-29 DIAGNOSIS — R51.9 ACUTE INTRACTABLE HEADACHE, UNSPECIFIED HEADACHE TYPE: ICD-10-CM

## 2018-05-29 DIAGNOSIS — I44.30 ATRIOVENTRICULAR BLOCK: Chronic | ICD-10-CM

## 2018-05-29 DIAGNOSIS — I10 ESSENTIAL HYPERTENSION: ICD-10-CM

## 2018-05-29 DIAGNOSIS — I10 ESSENTIAL HYPERTENSION: Chronic | ICD-10-CM

## 2018-05-29 DIAGNOSIS — E78.5 DYSLIPIDEMIA: Chronic | ICD-10-CM

## 2018-05-29 DIAGNOSIS — L40.9 PSORIASIS: Chronic | ICD-10-CM

## 2018-05-29 DIAGNOSIS — R68.84 JAW PAIN: Primary | ICD-10-CM

## 2018-05-29 DIAGNOSIS — K21.9 GASTROESOPHAGEAL REFLUX DISEASE, ESOPHAGITIS PRESENCE NOT SPECIFIED: ICD-10-CM

## 2018-05-29 DIAGNOSIS — R21 SKIN ERUPTION: ICD-10-CM

## 2018-05-29 DIAGNOSIS — I63.9 CEREBROVASCULAR ACCIDENT (CVA), UNSPECIFIED MECHANISM (CMS/HCC): Chronic | ICD-10-CM

## 2018-05-29 DIAGNOSIS — D84.9 IMMUNOCOMPROMISED (CMS/HCC): ICD-10-CM

## 2018-05-29 PROCEDURE — 99211 OFF/OP EST MAY X REQ PHY/QHP: CPT | Performed by: FAMILY MEDICINE

## 2018-05-29 PROCEDURE — 99214 OFFICE O/P EST MOD 30 MIN: CPT | Performed by: FAMILY MEDICINE

## 2018-05-29 RX ORDER — LOSARTAN POTASSIUM 50 MG/1
TABLET ORAL
Qty: 90 TABLET | Refills: 1 | Status: SHIPPED | OUTPATIENT
Start: 2018-05-29 | End: 2018-10-30 | Stop reason: SDUPTHER

## 2018-05-29 RX ORDER — ATORVASTATIN CALCIUM 40 MG/1
TABLET, FILM COATED ORAL
Qty: 90 TABLET | Refills: 3 | Status: SHIPPED | OUTPATIENT
Start: 2018-05-29 | End: 2019-05-07 | Stop reason: SDUPTHER

## 2018-05-29 RX ORDER — TRIAMCINOLONE ACETONIDE 1 MG/G
CREAM TOPICAL
Qty: 30 G | Refills: 12 | Status: SHIPPED | OUTPATIENT
Start: 2018-05-29 | End: 2020-04-03 | Stop reason: SDUPTHER

## 2018-05-29 RX ORDER — ISOSORBIDE MONONITRATE 30 MG/1
30 TABLET, EXTENDED RELEASE ORAL DAILY
Qty: 90 TABLET | Refills: 0 | Status: SHIPPED | OUTPATIENT
Start: 2018-05-29 | End: 2018-08-01 | Stop reason: SDUPTHER

## 2018-05-29 RX ORDER — OMEPRAZOLE 40 MG/1
40 CAPSULE, DELAYED RELEASE ORAL DAILY
Qty: 90 CAPSULE | Refills: 1 | Status: SHIPPED | OUTPATIENT
Start: 2018-05-29 | End: 2018-10-30 | Stop reason: ALTCHOICE

## 2018-05-29 NOTE — PROGRESS NOTES
IMPRESSION AND PLAN  Diagnoses and all orders for this visit:    Jaw pain    Mid sternal chest pain  -     isosorbide mononitrate (IMDUR) 30 mg 24 hr tablet; Take 1 tablet (30 mg total) by mouth daily.  -     omeprazole (PriLOSEC) 40 mg capsule; Take 1 capsule (40 mg total) by mouth daily.    Gastroesophageal reflux disease, esophagitis presence not specified  -     omeprazole (PriLOSEC) 40 mg capsule; Take 1 capsule (40 mg total) by mouth daily.    Acute intractable headache, unspecified headache type    Cerebrovascular accident (CVA), unspecified mechanism (CMS/HCC)    Atrioventricular block    Essential hypertension    Psoriasis    Acute idiopathic gout, unspecified site    Dyslipidemia    Immunocompromised (CMS/HCC)    Take Imdur 30  Omeprazole 40 mg instead of 20 mg  See Brianna Plascencia regarding costochondral pain  Follow-up if not improving    HPI  72-year-old female  Chief complaint is follow-up chest pain  I need my medications refilled    Patient comes in today with chest pain in the center of her chest radiates to her back at times it is associated with activity and non-activity.  There is nothing in particular that sets it off.  It is not associated with fevers or breathing.  There is no associated shortness of breath or coughing.  There is no associated hemoptysis.  The patient has not had any weight loss or anorexia.  She has had a stress test chemically was negative.  She had a CT angiogram 4 years ago which was negative for any aneurysmal dilation.  She takes omeprazole 20 mg and admits to some intermittent heartburn  I suggested she change her omeprazole to 40 mg    I also suggested maybe the patient try some Imdur 30 mg    Negative physical exam she had a lot of tension in her costochondral joints  I suggested Brianna Plascencia and she wanted to go and see her but she knows expensive and may be out of what she could afford    She has had a cardiac cyst history and she takes clopidogrel and we will refill  that  She is on a statin lowering medication  The patient takes atorvastatin    For sleep she takes zolpidem    She has restless legs and takes ropinirole    Also she has a history of hyper hypokalemia and takes potassium replacement    PROBLEM LIST  Patient Active Problem List   Diagnosis   • Atrioventricular block   • Cardiac pacemaker in situ   • Cerebrovascular accident (CMS/HCC)   • Constipation   • Essential hypertension   • Dyslipidemia   • Gout   • Headache   • Hypokalemia   • Hypertension   • Impaired fasting glucose   • Psoriasis   • Primary insomnia   • Immunocompromised (CMS/HCC)   • Mid sternal chest pain   • Gastroesophageal reflux disease         SOCIAL HX  Social History     Social History   • Marital status:      Spouse name: N/A   • Number of children: N/A   • Years of education: N/A     Occupational History   • Not on file.     Social History Main Topics   • Smoking status: Never Smoker   • Smokeless tobacco: Never Used   • Alcohol use No   • Drug use: No   • Sexual activity: Defer     Other Topics Concern   • Not on file     Social History Narrative   • No narrative on file       FAMILY HX  Family History   Problem Relation Age of Onset   • Stroke Mother 97   • Other cancer Father 32     secondary malignant neoplasm of lymph node       ROS  besides the chest discomfort she is feeling  I do note that she also has some clearing of her throat and she has been switched from benazepril over to an ARB  She also complains of temporal headache, urinary frequency, changes in her nailbeds and constipation      Physical Exam  Vitals:    05/29/18 1509   BP: 140/82   Pulse: 83   Resp: 20   SpO2: 97%        Normocephalic atraumatic memories are pink moist pharynx clear neck supple her heart regular rate and rhythm lungs are clear patient has pain with palpation along the costochondral joints    LABS AND XRAYS  Lab Results   Component Value Date    GLUCOSE 120 (H) 02/14/2018    CALCIUM 9.4 02/14/2018      02/14/2018    K 4.0 02/14/2018    CO2 26 02/14/2018     02/14/2018    BUN 18 02/14/2018    CREATININE 0.8 02/14/2018    ANIONGAP 7 02/14/2018     Lab Results   Component Value Date    WBC 6.7 02/14/2018    HGB 15.0 02/14/2018    HCT 43.7 02/14/2018    MCV 95.9 02/14/2018     02/14/2018     Lab Results   Component Value Date    TSH 1.402 12/18/2017     Lab Results   Component Value Date    HGBA1C 5.9 12/18/2017    HGBA1C 5.8 07/30/2015    HGBA1C 5.4 02/06/2015     Lab Results   Component Value Date    LDLCALC 54 06/21/2016    CREATININE 0.8 02/14/2018     Lab Results   Component Value Date    SEDRATE 8 01/05/2016     No results found for: URACSRM  No results found for: AMYLASE  No results found for: LIPASE      No results found.    MEDICATIONS    Current Outpatient Prescriptions:   •  acyclovir (ZOVIRAX) 800 mg tablet, Take 1 tablet five times daily for 10 days, Disp: 60 tablet, Rfl: 0  •  adalimumab (HUMIRA) 40 mg/0.8 mL syringe kit, Inject 0.8 mL (40 mg total) under the skin every 14 (fourteen) days., Disp: 6 each, Rfl: 2  •  atorvastatin (LIPITOR) 40 mg tablet, Take 1 tablet every day by oral route., Disp: 90, Rfl: 0  •  calcium carbonate-vitamin D3 (CALCIUM 500 WITH D) 500 mg(1,250mg) -400 unit tablet, Take 1 tablet by mouth daily., Disp: , Rfl:   •  cinnamon bark (CINNAMON) 500 mg capsule, Take 500 mg by mouth daily., Disp: , Rfl:   •  clobetasol (TEMOVATE) 0.05 % ointment, , Disp: 60, Rfl: 11  •  clopidogrel (PLAVIX) 75 mg tablet, Take 1 tablet every day by oral route for 30 days., Disp: 90, Rfl: 0  •  gabapentin (NEURONTIN) 600 mg tablet, , Disp: 270, Rfl: 0  •  losartan (COZAAR) 50 mg tablet, TAKE 1 TABLET EVERY DAY, Disp: 90 tablet, Rfl: 1  •  lysine 500 mg tablet, Take 1 tablet every day by oral route in the evening., Disp: , Rfl:   •  multivitamin with minerals tablet, Take 1 tablet by mouth daily., Disp: , Rfl:   •  omega-3 fatty acids-fish oil (FISH OIL) 340-1,000 mg capsule, Take 1  capsule by mouth 2 (two) times a day., Disp: , Rfl:   •  potassium chloride (KLOR-CON) 10 mEq CR tablet, Take 10 mEq by mouth 2 (two) times a day.  , Disp: 90, Rfl: 3  •  rOPINIRole (REQUIP) 1 mg tablet, Take 1 tablet twice daily by mouth, Disp: 180 tablet, Rfl: 3  •  sulfacetamide sodium, acne, 10 % suspension, Apply to face twice daily as directed, Disp: 1 Bottle, Rfl: 2  •  triamcinolone (KENALOG) 0.1 % cream, , Disp: 30, Rfl: 0  •  zolpidem (AMBIEN) 5 mg tablet, Take one to two tablets by mouth at bedtime., Disp: 60 tablet, Rfl: 5  No current facility-administered medications for this visit.     Facility-Administered Medications Ordered in Other Visits:   •  sodium chloride flush 20 mL, 20 mL, intravenous, PRN, Kathryn Mujica CNP, 20 mL at 05/18/18 0948      SEE ABOVE FOR IMPRESSION AND PLAN    KELTON SPEARS MD  05/29/18  3:48 PM

## 2018-06-20 DIAGNOSIS — Z95.0 CARDIAC PACEMAKER IN SITU: Primary | ICD-10-CM

## 2018-06-21 RX ORDER — CLOPIDOGREL BISULFATE 75 MG/1
TABLET ORAL
Qty: 90 TABLET | Refills: 1 | Status: SHIPPED | OUTPATIENT
Start: 2018-06-21 | End: 2018-12-03 | Stop reason: SDUPTHER

## 2018-07-25 ENCOUNTER — TELEPHONE (OUTPATIENT)
Dept: FAMILY MEDICINE | Facility: CLINIC | Age: 73
End: 2018-07-25

## 2018-07-26 ENCOUNTER — TELEPHONE (OUTPATIENT)
Dept: FAMILY MEDICINE | Facility: CLINIC | Age: 73
End: 2018-07-26

## 2018-07-30 NOTE — PROGRESS NOTES
Subjective   Return Dermatology Skin examination  Sunitha Arrington is a 72 y.o. female with a history of actinic keratoses and psoriasis who presents for a full body skin exam .  She also has a history of lichen sclerosis at atrophicus which is managed by her OB/GYN.  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  She reports lesions have not seem to spread the biopsy site has been slow to heal.  Also questions dystrophy of her toenail on the right foot.  Present since last visit.  Wondering if she does have toenail fungus.            Review of Systems   Constitutional: Negative for fever.   Skin: Negative for rash.   Allergic/Immunologic: Negative for environmental allergies and immunocompromised state.         Past Medical History:   Diagnosis Date   • Actinic keratosis    • CVA (cerebral vascular accident) (CMS/[facility]) 2014   • Psoriasis        Current Outpatient Prescriptions on File Prior to Visit   Medication Sig Dispense Refill   • acyclovir (ZOVIRAX) 800 mg tablet Take 1 tablet five times daily for 10 days 60 tablet 0   • atorvastatin (LIPITOR) 40 mg tablet TAKE 1 TABLET EVERY DAY 90 tablet 3   • calcium carbonate-vitamin D3 (CALCIUM 500 WITH D) 500 mg(1,250mg) -400 unit tablet Take 1 tablet by mouth daily.     • cinnamon bark (CINNAMON) 500 mg capsule Take 500 mg by mouth daily.     • clobetasol (TEMOVATE) 0.05 % ointment  60 11   • clopidogrel (PLAVIX) 75 mg tablet TAKE 1 TABLET EVERY DAY 90 tablet 1   • gabapentin (NEURONTIN) 600 mg tablet  270 0   • isosorbide mononitrate (IMDUR) 30 mg 24 hr tablet TAKE 1 TABLET EVERY DAY 90 tablet 2   • losartan (COZAAR) 50 mg tablet TAKE 1 TABLET EVERY DAY 90 tablet 1   • lysine 500 mg tablet Take 1 tablet every day by oral route in the evening.     • multivitamin with minerals tablet Take 1 tablet by mouth daily.     • omega-3 fatty acids-fish oil (FISH OIL) 340-1,000 mg capsule Take 1 capsule by mouth 2 (two) times a  "day.     • omeprazole (PriLOSEC) 40 mg capsule Take 1 capsule (40 mg total) by mouth daily. 90 capsule 1   • potassium chloride (KLOR-CON) 10 mEq CR tablet Take 10 mEq by mouth 2 (two) times a day.   90 3   • rOPINIRole (REQUIP) 1 mg tablet Take 1 tablet twice daily by mouth 180 tablet 3   • sulfacetamide sodium, acne, 10 % suspension Apply to face twice daily as directed 1 Bottle 2   • triamcinolone (KENALOG) 0.1 % cream APPLY A THIN LAYER TO THE AFFECTED AREA(S) TOPICALLY 2 TIMES PER DAY 30 g 12   • zolpidem (AMBIEN) 5 mg tablet Take one to two tablets by mouth at bedtime. 60 tablet 5   • [DISCONTINUED] adalimumab (HUMIRA) 40 mg/0.8 mL syringe kit Inject 0.8 mL (40 mg total) under the skin every 14 (fourteen) days. 6 each 2     Current Facility-Administered Medications on File Prior to Visit   Medication Dose Route Frequency Provider Last Rate Last Dose   • sodium chloride flush 20 mL  20 mL intravenous PRN Kathryn Mujica CNP   20 mL at 05/18/18 0948       Allergies  Apricot      Physical Examination    Vital Signs  height is 1.651 m (5' 5\"). Her blood pressure is 157/74 and her pulse is 68.   General: normal general appearance and in no apparent distress      Physical Exam Findings:   Senior skin type:II  A full body skin examination was performed including scalp, face, lips, ears, neck, both arms, chest, back, abdomen, buttocks, both legs, both feet                                         Trunk (including chest, abdomen, back): Stuck on tan brown papules, well demarcated tan brown macules, bright red cherry papules     Arms: Stuck on tan brown papules, well demarcated tan brown macules, bright red cherry papules   Medial ridging on left great finger nail    buttock: No suspicious lesions noted     legs: left outer knee, verrucous papule    feet: No suspicious lesions noted , right 2nd toenail, onychodystrophy    Scalp: No suspicious lesions noted     face: See procedure note for treated lesions "       Lesion to follow:  Left inner cheek, 6mm scared papule with adjacent 7mm telangiectasia     Procedure Note:  Destruction of Lesion  Date/Time: 8/2/2018 10:12 AM  Performed by: DALTON REHMAN      Consent  Verbal consent was obtained from the patient. Written consent was not obtained from the patient. Consent given by patient. The patient states understanding of the procedure being performed. Patient ID confirmed verbally with patient.     Location:  5 total lesions removed from head/neck.  Head/neck location(s):  Number of head/neck lesions removed: 5Head/neck location: right cheek x 2, right outer forehead, right nasal sidewall, right inner cheek.    Procedure Details   Lesion(s) are pre-malignant. Lesion(s) destroyed with: liquid nitrogen. Lesion(s) were frozen until an ice ball extended beyond the lesion.     Post-Procedure  Patient tolerated procedure well with no complications.     Procedure Comments  Actinic keratosis           Sunitha was seen today for follow-up.    Diagnoses and all orders for this visit:    History of actinic keratosis    Psoriasis  -     adalimumab (HUMIRA) 40 mg/0.8 mL syringe kit; Inject 0.8 mL (40 mg total) under the skin every 14 (fourteen) days.    Hemangioma of skin and subcutaneous tissue    Seborrheic keratoses    Lentigo    Common wart    Onychodystrophy    Keratosis, actinic  -     Destruction of lesion    Melanocytic nevus of face    High risk medication use  -     TBGOLD quantiFERON Blood, Venous; Future      -Psoriasis is well controlled continue Humira 40 mg other every other week, QuantiFERON gold ordered today for surveillance  -Granuloma annulare is stable no further treatment is indicated at this time  -Reassured patient toenail is likely secondary to wearing of her shoes I see no evidence of toenail fungus today.  Reassurance was offered.  -Discussed Mediplast plaster for common wart  -Actinic keratoses treated as above  -  Reassurance given for all benign  lesions, nevi continue To be observed, patient to call if lesion should change prior to next appointment          I emphasized daily sunscreen use with an SPF of 30-50, broad spectrum and water resistant.  I directed reapplication every two hours.  I recommended wide brimmed hats.  Finally, we discussed danger signs of changing skin lesions including sores not healing and moles changing in size, shape or color.  Should any of these lesions occur before next appointment, I instructed patent to call sooner for evaluation.    Return in about 6 months (around 2/2/2019) for follow up.

## 2018-08-01 DIAGNOSIS — R07.89 MID STERNAL CHEST PAIN: ICD-10-CM

## 2018-08-01 RX ORDER — ISOSORBIDE MONONITRATE 30 MG/1
TABLET, EXTENDED RELEASE ORAL
Qty: 90 TABLET | Refills: 2 | Status: SHIPPED | OUTPATIENT
Start: 2018-08-01 | End: 2019-01-09 | Stop reason: ALTCHOICE

## 2018-08-02 ENCOUNTER — OFFICE VISIT (OUTPATIENT)
Dept: DERMATOLOGY | Facility: CLINIC | Age: 73
End: 2018-08-02
Payer: MEDICARE

## 2018-08-02 ENCOUNTER — APPOINTMENT (OUTPATIENT)
Dept: CARDIOLOGY | Facility: CLINIC | Age: 73
End: 2018-08-02
Payer: MEDICARE

## 2018-08-02 VITALS — HEIGHT: 65 IN | SYSTOLIC BLOOD PRESSURE: 157 MMHG | DIASTOLIC BLOOD PRESSURE: 74 MMHG | HEART RATE: 68 BPM

## 2018-08-02 DIAGNOSIS — L57.0 KERATOSIS, ACTINIC: ICD-10-CM

## 2018-08-02 DIAGNOSIS — L60.3 ONYCHODYSTROPHY: ICD-10-CM

## 2018-08-02 DIAGNOSIS — L82.1 SEBORRHEIC KERATOSES: ICD-10-CM

## 2018-08-02 DIAGNOSIS — Z87.2 HISTORY OF ACTINIC KERATOSIS: Primary | ICD-10-CM

## 2018-08-02 DIAGNOSIS — L92.0 GRANULOMA ANNULARE: ICD-10-CM

## 2018-08-02 DIAGNOSIS — B07.8 COMMON WART: ICD-10-CM

## 2018-08-02 DIAGNOSIS — D22.30 MELANOCYTIC NEVUS OF FACE: ICD-10-CM

## 2018-08-02 DIAGNOSIS — D18.01 HEMANGIOMA OF SKIN AND SUBCUTANEOUS TISSUE: ICD-10-CM

## 2018-08-02 DIAGNOSIS — L81.4 LENTIGO: ICD-10-CM

## 2018-08-02 DIAGNOSIS — Z79.899 HIGH RISK MEDICATION USE: ICD-10-CM

## 2018-08-02 DIAGNOSIS — Z45.018 ENCOUNTER FOR INTERROGATION OF CARDIAC PACEMAKER: Primary | ICD-10-CM

## 2018-08-02 DIAGNOSIS — L40.9 PSORIASIS: ICD-10-CM

## 2018-08-02 PROCEDURE — 99213 OFFICE O/P EST LOW 20 MIN: CPT | Mod: 25 | Performed by: DERMATOLOGY

## 2018-08-02 PROCEDURE — 17000 DESTRUCT PREMALG LESION: CPT | Performed by: DERMATOLOGY

## 2018-08-02 PROCEDURE — 17003 DESTRUCT PREMALG LES 2-14: CPT | Performed by: DERMATOLOGY

## 2018-08-02 PROCEDURE — 93294 REM INTERROG EVL PM/LDLS PM: CPT | Performed by: INTERNAL MEDICINE

## 2018-08-02 ASSESSMENT — PAIN SCALES - GENERAL: PAINLEVEL: 0-NO PAIN

## 2018-08-02 ASSESSMENT — ENCOUNTER SYMPTOMS: FEVER: 0

## 2018-08-02 NOTE — PATIENT INSTRUCTIONS
You were treated with liquid nitrogen today. You should expect these areas to burn and once the burning subsides, the area(s)may itch. You should expect some reaction anywhere from welting of the skin to larger liquid filled blisters depending on how aggressively your lesion(s) needed to be treated. If you had treatment on your forehead, eyebrow areas or cheekbones, you could have some swelling under your eyes. This is normal and nothing to worry about. Blisters should be left intact until they pop and drain on their own. You will most likely have a clear drainage much like a blister when it pops.     The areas treated should be cared for by gentle daily cleansing with Dove soap and water and your hands. You should apply Polysporin ointment or Vaseline to the areas daily as they are healing and continue this until they are completely healed. It may take anywhere from 7-14 days for areas to completely heal. If any other area than your face was treated, it may take longer for those areas to heal.    If warts were treated, they may turn to blood blisters and may appear purple, red or black. Leave the blister intact and let it pop on its own. Keep these areas dry and clean and do not use any ointment or Vaseline to warts.    IF YOU NOTICE INCREASED SURROUNDING REDNESS, TENDERNESS OR A PUS-LIKE DRAINAGE, PLEASE CONTACT OUR OFFICE IMMEDIATELY.

## 2018-08-03 ENCOUNTER — ANCILLARY PROCEDURE (OUTPATIENT)
Dept: CARDIOLOGY | Facility: CLINIC | Age: 73
End: 2018-08-03
Payer: MEDICARE

## 2018-08-03 DIAGNOSIS — R55 SYNCOPE, UNSPECIFIED SYNCOPE TYPE: ICD-10-CM

## 2018-08-03 DIAGNOSIS — Z45.018 ENCOUNTER FOR INTERROGATION OF CARDIAC PACEMAKER: Primary | ICD-10-CM

## 2018-08-03 PROCEDURE — 93296 REM INTERROG EVL PM/IDS: CPT | Mod: PO

## 2018-08-06 ENCOUNTER — APPOINTMENT (OUTPATIENT)
Dept: FAMILY MEDICINE | Facility: CLINIC | Age: 73
End: 2018-08-06
Payer: MEDICARE

## 2018-08-06 ENCOUNTER — HOSPITAL ENCOUNTER (OUTPATIENT)
Dept: MAMMOGRAPHY | Facility: HOSPITAL | Age: 73
Discharge: HOME | End: 2018-08-06
Attending: FAMILY MEDICINE
Payer: MEDICARE

## 2018-08-06 DIAGNOSIS — Z12.39 SCREENING FOR BREAST CANCER: ICD-10-CM

## 2018-08-06 DIAGNOSIS — Z79.899 HIGH RISK MEDICATION USE: ICD-10-CM

## 2018-08-06 PROCEDURE — 86480 TB TEST CELL IMMUN MEASURE: CPT

## 2018-08-06 PROCEDURE — 36415 COLL VENOUS BLD VENIPUNCTURE: CPT

## 2018-08-06 PROCEDURE — 77067 SCR MAMMO BI INCL CAD: CPT

## 2018-08-07 LAB
M TB TUBERC IFN-G BLD QL: NEGATIVE
TBGOLD AG-NIL: 0.01 IU/ML

## 2018-08-14 DIAGNOSIS — F51.01 PRIMARY INSOMNIA: ICD-10-CM

## 2018-08-14 RX ORDER — ZOLPIDEM TARTRATE 5 MG/1
TABLET ORAL
Qty: 60 TABLET | Refills: 3 | Status: SHIPPED | OUTPATIENT
Start: 2018-08-14 | End: 2018-10-30 | Stop reason: SDUPTHER

## 2018-08-16 DIAGNOSIS — F51.01 PRIMARY INSOMNIA: ICD-10-CM

## 2018-08-16 RX ORDER — ZOLPIDEM TARTRATE 5 MG/1
TABLET ORAL
Qty: 60 TABLET | OUTPATIENT
Start: 2018-08-16

## 2018-09-04 ENCOUNTER — OFFICE VISIT (OUTPATIENT)
Dept: CARDIOLOGY | Facility: CLINIC | Age: 73
End: 2018-09-04
Payer: MEDICARE

## 2018-09-04 VITALS
WEIGHT: 178 LBS | DIASTOLIC BLOOD PRESSURE: 70 MMHG | HEART RATE: 70 BPM | HEIGHT: 65 IN | SYSTOLIC BLOOD PRESSURE: 122 MMHG | BODY MASS INDEX: 29.66 KG/M2

## 2018-09-04 DIAGNOSIS — R07.89 MID STERNAL CHEST PAIN: Primary | ICD-10-CM

## 2018-09-04 DIAGNOSIS — I44.2 COMPLETE HEART BLOCK (CMS/HCC): ICD-10-CM

## 2018-09-04 DIAGNOSIS — Z95.0 CARDIAC PACEMAKER IN SITU: Chronic | ICD-10-CM

## 2018-09-04 DIAGNOSIS — I63.9 CEREBROVASCULAR ACCIDENT (CVA), UNSPECIFIED MECHANISM (CMS/HCC): Chronic | ICD-10-CM

## 2018-09-04 DIAGNOSIS — I10 ESSENTIAL HYPERTENSION: Chronic | ICD-10-CM

## 2018-09-04 PROCEDURE — 99214 OFFICE O/P EST MOD 30 MIN: CPT | Mod: 25 | Performed by: NURSE PRACTITIONER

## 2018-09-04 PROCEDURE — 93005 ELECTROCARDIOGRAM TRACING: CPT | Performed by: NURSE PRACTITIONER

## 2018-09-04 PROCEDURE — 99214 OFFICE O/P EST MOD 30 MIN: CPT | Mod: PO | Performed by: NURSE PRACTITIONER

## 2018-09-04 PROCEDURE — 93010 ELECTROCARDIOGRAM REPORT: CPT | Performed by: INTERNAL MEDICINE

## 2018-09-04 ASSESSMENT — ENCOUNTER SYMPTOMS
HEMATOCHEZIA: 0
DIAPHORESIS: 0
HEADACHES: 0
EYES NEGATIVE: 1
BACK PAIN: 1
ABDOMINAL PAIN: 0
NEAR-SYNCOPE: 0
DIARRHEA: 0
ENDOCRINE NEGATIVE: 1
HEMATURIA: 0
FALLS: 0
SYNCOPE: 0
PALPITATIONS: 0
HEARTBURN: 0
CONSTIPATION: 0
LIGHT-HEADEDNESS: 0
MYALGIAS: 0
PND: 0
SNORING: 0
FEVER: 0
VOMITING: 0
ORTHOPNEA: 0
COUGH: 0
SHORTNESS OF BREATH: 0
DYSPNEA ON EXERTION: 0
IRREGULAR HEARTBEAT: 0
CHILLS: 0
NAUSEA: 0
DIZZINESS: 0
BRUISES/BLEEDS EASILY: 0
PSYCHIATRIC NEGATIVE: 1
WHEEZING: 0

## 2018-09-04 ASSESSMENT — PAIN SCALES - GENERAL: PAINLEVEL: 6
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
## 2018-09-04 NOTE — PATIENT INSTRUCTIONS
Call if experiencing symptoms or issues before next visit.  Continue current medications.  Continue efforts toward routine exercise, a good goal is 150 minutes per week.  Continue device checks with [facility] device clinic.  You can try taking 400 mg ibuprofen every 6 hours as needed for pain (take with food).

## 2018-09-06 ENCOUNTER — ANCILLARY ORDERS (OUTPATIENT)
Dept: CARDIOLOGY | Facility: CLINIC | Age: 73
End: 2018-09-06
Payer: MEDICARE

## 2018-09-06 DIAGNOSIS — I44.2 COMPLETE HEART BLOCK (CMS/HCC): ICD-10-CM

## 2018-10-17 ENCOUNTER — IMMUNIZATION (OUTPATIENT)
Dept: FAMILY MEDICINE | Facility: CLINIC | Age: 73
End: 2018-10-17
Payer: MEDICARE

## 2018-10-17 PROCEDURE — 90662 IIV NO PRSV INCREASED AG IM: CPT

## 2018-10-30 ENCOUNTER — OFFICE VISIT (OUTPATIENT)
Dept: FAMILY MEDICINE | Facility: CLINIC | Age: 73
End: 2018-10-30
Payer: MEDICARE

## 2018-10-30 VITALS
BODY MASS INDEX: 30.49 KG/M2 | OXYGEN SATURATION: 99 % | RESPIRATION RATE: 16 BRPM | WEIGHT: 183 LBS | HEART RATE: 75 BPM | HEIGHT: 65 IN

## 2018-10-30 DIAGNOSIS — E78.5 DYSLIPIDEMIA: Chronic | ICD-10-CM

## 2018-10-30 DIAGNOSIS — R07.89 MID STERNAL CHEST PAIN: ICD-10-CM

## 2018-10-30 DIAGNOSIS — R73.01 IMPAIRED FASTING GLUCOSE: Chronic | ICD-10-CM

## 2018-10-30 DIAGNOSIS — G25.81 RLS (RESTLESS LEGS SYNDROME): ICD-10-CM

## 2018-10-30 DIAGNOSIS — Z95.0 CARDIAC PACEMAKER IN SITU: Chronic | ICD-10-CM

## 2018-10-30 DIAGNOSIS — F51.01 PRIMARY INSOMNIA: ICD-10-CM

## 2018-10-30 DIAGNOSIS — E87.6 HYPOKALEMIA: ICD-10-CM

## 2018-10-30 DIAGNOSIS — K21.9 GASTROESOPHAGEAL REFLUX DISEASE, ESOPHAGITIS PRESENCE NOT SPECIFIED: ICD-10-CM

## 2018-10-30 DIAGNOSIS — I10 ESSENTIAL HYPERTENSION: Primary | Chronic | ICD-10-CM

## 2018-10-30 DIAGNOSIS — I63.9 CEREBROVASCULAR ACCIDENT (CVA), UNSPECIFIED MECHANISM (CMS/HCC): Chronic | ICD-10-CM

## 2018-10-30 PROBLEM — L90.0 LICHEN SCLEROSUS ET ATROPHICUS: Status: ACTIVE | Noted: 2018-10-30

## 2018-10-30 PROBLEM — B02.9 HERPES ZOSTER: Status: ACTIVE | Noted: 2018-10-30

## 2018-10-30 PROBLEM — R10.30 INGUINAL PAIN: Status: ACTIVE | Noted: 2018-10-30

## 2018-10-30 PROBLEM — N81.6 FEMALE PROCTOCELE WITHOUT UTERINE PROLAPSE: Status: ACTIVE | Noted: 2018-10-30

## 2018-10-30 PROBLEM — I63.81 THALAMIC INFARCTION (CMS/HCC): Status: ACTIVE | Noted: 2018-10-30

## 2018-10-30 PROBLEM — N81.10 FEMALE BLADDER PROLAPSE: Status: ACTIVE | Noted: 2018-10-30

## 2018-10-30 PROBLEM — I44.30 ATRIOVENTRICULAR BLOCK: Status: ACTIVE | Noted: 2018-10-30

## 2018-10-30 PROBLEM — H81.10 BENIGN PAROXYSMAL POSITIONAL VERTIGO: Status: ACTIVE | Noted: 2018-10-30

## 2018-10-30 LAB
EST. AVERAGE GLUCOSE BLD GHB EST-MCNC: 122.6 MG/DL
HBA1C MFR BLD: 5.9 % (ref 4.8–6)

## 2018-10-30 PROCEDURE — 99214 OFFICE O/P EST MOD 30 MIN: CPT | Performed by: FAMILY MEDICINE

## 2018-10-30 PROCEDURE — 36415 COLL VENOUS BLD VENIPUNCTURE: CPT | Performed by: FAMILY MEDICINE

## 2018-10-30 PROCEDURE — 83036 HEMOGLOBIN GLYCOSYLATED A1C: CPT | Performed by: FAMILY MEDICINE

## 2018-10-30 PROCEDURE — 99213 OFFICE O/P EST LOW 20 MIN: CPT

## 2018-10-30 RX ORDER — CHOLECALCIFEROL (VITAMIN D3) 125 MCG
2 CAPSULE ORAL AS NEEDED
COMMUNITY

## 2018-10-30 RX ORDER — OMEPRAZOLE 40 MG/1
40 CAPSULE, DELAYED RELEASE ORAL DAILY
Qty: 90 CAPSULE | Refills: 3 | Status: CANCELLED | OUTPATIENT
Start: 2018-10-30 | End: 2019-10-30

## 2018-10-30 RX ORDER — LOSARTAN POTASSIUM 50 MG/1
50 TABLET ORAL DAILY
Qty: 90 TABLET | Refills: 3 | Status: SHIPPED | OUTPATIENT
Start: 2018-10-30 | End: 2019-03-27 | Stop reason: ALTCHOICE

## 2018-10-30 RX ORDER — ZOLPIDEM TARTRATE 5 MG/1
5 TABLET ORAL NIGHTLY PRN
Qty: 90 TABLET | Refills: 1 | Status: SHIPPED | OUTPATIENT
Start: 2018-10-30 | End: 2019-01-02 | Stop reason: SDUPTHER

## 2018-10-30 ASSESSMENT — ENCOUNTER SYMPTOMS
EYE ITCHING: 0
NERVOUS/ANXIOUS: 0
RHINORRHEA: 0
FEVER: 0
DIFFICULTY URINATING: 0
SINUS PAIN: 0
SORE THROAT: 0
CHILLS: 0
ACTIVITY CHANGE: 0
DYSPHORIC MOOD: 0
BLOOD IN STOOL: 0
STRIDOR: 0
HALLUCINATIONS: 0
CHOKING: 0
FATIGUE: 0
HEADACHES: 0
POLYPHAGIA: 0
PHOTOPHOBIA: 0
SHORTNESS OF BREATH: 0
BACK PAIN: 0
EYE PAIN: 0
POLYDIPSIA: 0
WEAKNESS: 0
PALPITATIONS: 0
SLEEP DISTURBANCE: 0
DYSURIA: 0
NECK PAIN: 0
SEIZURES: 0
TROUBLE SWALLOWING: 0
DIAPHORESIS: 0
FACIAL SWELLING: 0
COLOR CHANGE: 0
WOUND: 0
VOMITING: 0
AGITATION: 0
NECK STIFFNESS: 0
RECTAL PAIN: 0
HEMATURIA: 0
COUGH: 0
CONFUSION: 0
JOINT SWELLING: 0
FACIAL ASYMMETRY: 0
NUMBNESS: 0
WHEEZING: 0
ARTHRALGIAS: 0
FLANK PAIN: 0
ADENOPATHY: 0
EYE DISCHARGE: 0
LIGHT-HEADEDNESS: 0
APNEA: 0
BRUISES/BLEEDS EASILY: 0
TREMORS: 0
MYALGIAS: 0
HYPERACTIVE: 0
FREQUENCY: 0
ABDOMINAL DISTENTION: 0
DIARRHEA: 0
UNEXPECTED WEIGHT CHANGE: 0
DECREASED CONCENTRATION: 0
CONSTIPATION: 0
ABDOMINAL PAIN: 0
SPEECH DIFFICULTY: 0
CHEST TIGHTNESS: 0
ANAL BLEEDING: 0
NAUSEA: 0
VOICE CHANGE: 0
APPETITE CHANGE: 0
EYE REDNESS: 0
SINUS PRESSURE: 0
DIZZINESS: 0

## 2018-10-30 NOTE — PROGRESS NOTES
"Patient Instructions   Gluten free and low carb diet  Will order lab work today and refill your prescriptions.  Patient Education     Gluten-Free Diet for Celiac Disease, Adult  The gluten-free diet includes all foods that do not contain gluten. Gluten is a protein that is found in wheat, rye, barley, and some other grains. Following the gluten-free diet is the only treatment for people with celiac disease. It helps to prevent damage to the intestines and improves or eliminates the symptoms of celiac disease.  Following the gluten-free diet requires some planning. It can be challenging at first, but it gets easier with time and practice. There are more gluten-free options available today than ever before. If you need help finding gluten-free foods or if you have questions, talk with your diet and nutrition specialist (registered dietitian) or your health care provider.  What do I need to know about a gluten-free diet?  · All fruits, vegetables, and meats are safe to eat and do not contain gluten.  · When grocery shopping, start by shopping in the produce, meat, and dairy sections. These sections are more likely to contain gluten-free foods. Then move to the aisles that contain packaged foods if you need to.  · Read all food labels. Gluten is often added to foods. Always check the ingredient list and look for warnings, such as “may contain gluten.\"  · Talk with your dietitian or health care provider before taking a gluten-free multivitamin or mineral supplement.  · Be aware of gluten-free foods having contact with foods that contain gluten (cross-contamination). This can happen at home and with any processed foods.  ? Talk with your health care provider or dietitian about how to reduce the risk of cross-contamination in your home.  ? If you have questions about how a food is processed, ask the .  What key words help to identify gluten?  Foods that list any of these key words on the label usually contain " gluten:  · Wheat, flour, enriched flour, bromated flour, white flour, durum flour, vianey flour, phosphated flour, self-rising flour, semolina, farina, barley (malt), rye, and oats.  · Starch, dextrin, modified food starch, or cereal.  · Thickening, fillers, or emulsifiers.  · Malt flavoring, malt extract, or malt syrup.  · Hydrolyzed vegetable protein.    In the [place] packaged foods that are gluten-free are required to be labeled “GF.” These foods should be easy to identify and are safe to eat. In the [place] food companies are also required to list common food allergens, including wheat, on their labels.  Recommended foods  Grains  · Amaranth, bean flours, 100% buckwheat flour, corn, millet, nut flours or nut meals, GF oats, quinoa, rice, sorghum, teff, rice wafers, pure cornmeal tortillas, popcorn, and hot cereals made from cornmeal. [place], rice, wild rice. Some Asian rice noodles or bean noodles. Arrowroot starch, corn bran, corn flour, corn germ, cornmeal, corn starch, potato flour, potato starch flour, and rice bran. Plain, brown, and sweet rice flours. Rice polish, soy flour, and tapioca starch.  Vegetables  · All plain fresh, frozen, and canned vegetables.  Fruits  · All plain fresh, frozen, canned, and dried fruits, and 100% fruit juices.  Meats and other protein foods  · All fresh beef, pork, poultry, fish, seafood, and eggs. Fish canned in water, oil, brine, or vegetable broth. Plain nuts and seeds, peanut butter. Some lunch meat and some frankfurters. Dried beans, dried peas, and lentils.  Dairy  · Fresh plain, dry, evaporated, or condensed milk. Cream, butter, sour cream, whipping cream, and most yogurts. Unprocessed cheese, most processed cheeses, some cottage cheese, some cream cheeses.  Beverages  · Coffee, tea, most herbal teas. Carbonated beverages and some root beers. Wine, sake, and distilled spirits, such as gin, vodka, and whiskey. Most hard ciders.  Fats and oils  · Butter, margarine, vegetable  oil, hydrogenated butter, olive oil, shortening, lard, cream, and some mayonnaise. Some commercial salad dressings. Olives.  Sweets and desserts  · Sugar, honey, some syrups, molasses, jelly, and jam. Plain hard candy, marshmallows, and gumdrops. Pure cocoa powder. Plain chocolate. Custard and some pudding mixes. Gelatin desserts, sorbets, frozen ice pops, and sherbet. Cake, cookies, and other desserts prepared with allowed flours. Some commercial ice creams. Cornstarch, tapioca, and rice puddings.  Seasoning and other foods  · Some canned or frozen soups. Monosodium glutamate (MSG). Cider, rice, and wine vinegar. Baking soda and baking powder. Cream of tartar. Baking and nutritional yeast. Certain soy sauces made without wheat (ask your dietitian about specific brands that are allowed). Nuts, coconut, and chocolate. Salt, pepper, herbs, spices, flavoring extracts, imitation or artificial flavorings, natural flavorings, and food colorings. Some medicines and supplements. Some lip glosses and other cosmetics. Rice syrups.  The items listed may not be a complete list. Talk with your dietitian about what dietary choices are best for you.  Foods to avoid  Grains  · Barley, bran, bulgur, couscous, cracked wheat, [place], farro, vianey, malt, matzo, semolina, wheat germ, and all wheat and rye cereals including spelt and kamut. Cereals containing malt as a flavoring, such as rice cereal. Noodles, spaghetti, macaroni, most packaged rice mixes, and all mixes containing wheat, rye, barley, or triticale.  Vegetables  · Most creamed vegetables and most vegetables canned in sauces. Some commercially prepared vegetables and salads.  Fruits  · Thickened or prepared fruits and some pie fillings. Some fruit snacks and fruit roll-ups.  Meats and other protein foods  · Any meat or meat alternative containing wheat, rye, barley, or gluten stabilizers. These are often marinated or packaged meats and lunch meats. Bread-containing  products, such as Swiss steak, croquettes, meatballs, and meatloaf. Most tuna canned in vegetable broth and turkey with hydrolyzed vegetable protein (HVP) injected as part of the basting. Seitan. Imitation fish. Eggs in sauces made from ingredients to avoid.  Dairy  · Commercial chocolate milk drinks and malted milk. Some non-dairy creamers. Any cheese product containing ingredients to avoid.  Beverages  · Certain cereal beverages. Beer, jeremías, malted milk, and some root beers. Some hard ciders. Some instant flavored coffees. Some herbal teas made with barley or with barley malt added.  Fats and oils  · Some commercial salad dressings. Sour cream containing modified food starch.  Sweets and desserts  · Some toffees. Chocolate-coated nuts (may be rolled in wheat flour) and some commercial candies and candy bars. Most cakes, cookies, donuts, pastries, and other baked goods. Some commercial ice cream. Ice cream cones. Commercially prepared mixes for cakes, cookies, and other desserts. Bread pudding and other puddings thickened with flour. Products containing brown rice syrup made with barley malt enzyme. Desserts and sweets made with malt flavoring.  Seasoning and other foods  · Some green powders, some dry seasoning mixes, some gravy extracts, some meat sauces, some ketchups, some prepared mustards, and horseradish. Certain soy sauces. Malt vinegar. Bouillon and bouillon cubes that contain HVP. Some chip dips, and some chewing gum. Yeast extract. Mckeon’s yeast. Caramel color. Some medicines and supplements. Some lip glosses and other cosmetics.  The items listed may not be a complete list. Talk with your dietitian about what dietary choices are best for you.  Summary  · Gluten is a protein that is found in wheat, rye, barley, and some other grains. The gluten-free diet includes all foods that do not contain gluten.  · If you need help finding gluten-free foods or if you have questions, talk with your diet and nutrition  "specialist (registered dietitian) or your health care provider.  · Read all food labels. Gluten is often added to foods. Always check the ingredient list and look for warnings, such as “may contain gluten.\"  This information is not intended to replace advice given to you by your health care provider. Make sure you discuss any questions you have with your health care provider.  Document Released: 12/18/2006 Document Revised: 10/02/2017 Document Reviewed: 10/02/2017  [employer] Patient Education © 2018 [employer]           Visit date not found    ASSESSMENT AND PLAN   Diagnoses and all orders for this visit:    Essential hypertension  -     losartan (COZAAR) 50 mg tablet; Take 1 tablet (50 mg total) by mouth daily.    Gastroesophageal reflux disease, esophagitis presence not specified    Cerebrovascular accident (CVA), unspecified mechanism (CMS/HCC) (HCC)    Dyslipidemia    Mid sternal chest pain    Primary insomnia  -     zolpidem (AMBIEN) 5 mg tablet; Take 1 tablet (5 mg total) by mouth nightly as needed for sleep.    Impaired fasting glucose  -     Hemoglobin A1c (glycosylated) Blood, Venous    Hypokalemia    RLS (restless legs syndrome)    Cardiac pacemaker in situ        Patient overall seems to be doing well.  I am not going to change medications other than drop her reflux medicine back to 20 mg    Continue her isosorbide because it seems to be working well    Advised that she get rid of the Naprosyn and only use it on a as needed basis because of the increased risk of cardiovascular death with it    Chief Complaint  Sunitha is a 72 y.o. female who presents for    Chief Complaint   Patient presents with   • Chronic Conditions         HPI  Patient comes in today for follow-up of her essential hypertension reflux CVA dyslipidemia and history of mid central chest pain.  Pleasant female who lives here locally with her  Devendra.  He suffers from chronic health conditions.  And Sunitha has become less " active because of that.  She is noticed that her weight has become elevated.  She has poor energy.  She wants to talk about whether I think she could be gluten sensitive.  We talked about gluten we talked about celiac we talked about cutting out the carbohydrates in her diet along with gluten.  She has a history of cardiac pacemaker in situ is working well and she is not having any problems.    She had a history of midsternal chest pain which was cured with the combination of adding a long-acting nitroglycerin in addition to some intermittent Naprosyn and going up from omeprazole 20-40.  We are now going to take her off the 40 of omeprazole down to 20 and I am going to advise that she not take Naprosyn every day and I am going to see if the midsternal chest pain recurs.    Patient psoriasis is stable she is on immune modulating agent    Blood pressure is well controlled    HISTORY  Past Medical History:   Diagnosis Date   • Actinic keratosis    • CVA (cerebral vascular accident) (CMS/HCC) (HCC)    • High degree atrioventricular block     with syncope   • Hyperlipidemia    • Hypertension    • Hypertriglyceridemia    • Psoriasis      Past Surgical History:   Procedure Laterality Date   • CARDIAC CATHETERIZATION     • CARDIAC PACEMAKER PLACEMENT      Dual chamber pacemaker implantation   • CARDIAC SURGERY  2014   •  SECTION  1977    [place]   • COLONOSCOPY  2016    Rare sigmoid diverticula. Physiologic internal hemorrhoids, otherwise normal exam   • COLONOSCOPY  2008   • COLPORRHAPHY      and rectocele repair   • TOTAL ABDOMINAL HYSTERECTOMY W/ BILATERAL SALPINGOOPHORECTOMY      [id number] [facility]     Family History   Problem Relation Age of Onset   • Stroke Mother 97   • Hypertension Mother    • Dementia Mother    • Other cancer Father 32        secondary malignant neoplasm of lymph node     Social History     Socioeconomic History   • Marital status:       Spouse name: None   • Number of children: None   • Years of education: None   • Highest education level: None   Social Needs   • Financial resource strain: None   • Food insecurity - worry: None   • Food insecurity - inability: None   • Transportation needs - medical: None   • Transportation needs - non-medical: None   Occupational History     Comment: [employer]   Tobacco Use   • Smoking status: Never Smoker   • Smokeless tobacco: Never Used   Substance and Sexual Activity   • Alcohol use: No   • Drug use: No   • Sexual activity: Defer   Other Topics Concern   • None   Social History Narrative   • None       ALLERGIES AND MEDICATIONS  Allergies   Allergen Reactions   • Apricot      throat pain     Current Outpatient Medications   Medication Sig Dispense Refill   • adalimumab (HUMIRA) 40 mg/0.8 mL syringe kit Inject 0.8 mL (40 mg total) under the skin every 14 (fourteen) days. 6 each 4   • atorvastatin (LIPITOR) 40 mg tablet TAKE 1 TABLET EVERY DAY 90 tablet 3   • BIOTIN ORAL Take 1 capsule by mouth daily.     • calcium carbonate-vitamin D3 (CALCIUM 500 WITH D) 500 mg(1,250mg) -400 unit tablet Take 1 tablet by mouth daily.     • clopidogrel (PLAVIX) 75 mg tablet TAKE 1 TABLET EVERY DAY 90 tablet 1   • isosorbide mononitrate (IMDUR) 30 mg 24 hr tablet TAKE 1 TABLET EVERY DAY 90 tablet 2   • losartan (COZAAR) 50 mg tablet TAKE 1 TABLET EVERY DAY 90 tablet 1   • lysine 500 mg tablet Take 1 tablet every day by oral route in the evening.     • multivitamin with minerals tablet Take 1 tablet by mouth daily.     • naproxen sodium 220 mg capsule Take 2 tablets by mouth 2 (two) times a day.     • omega-3 fatty acids-fish oil (FISH OIL) 340-1,000 mg capsule Take 1 capsule by mouth 2 (two) times a day.     • potassium chloride (KLOR-CON) 10 mEq CR tablet Take 10 mEq by mouth 2 (two) times a day.   90 3   • rOPINIRole (REQUIP) 1 mg tablet Take 1 tablet twice daily by mouth 180 tablet 3   • triamcinolone  (KENALOG) 0.1 % cream APPLY A THIN LAYER TO THE AFFECTED AREA(S) TOPICALLY 2 TIMES PER DAY 30 g 12   • zolpidem (AMBIEN) 5 mg tablet TAKE ONE TABLET OR TAKE TWO TABLETS BY MOUTH AT BEDTIME 60 tablet 3   • acyclovir (ZOVIRAX) 800 mg tablet Take 1 tablet five times daily for 10 days (Patient not taking: Reported on 10/30/2018 ) 60 tablet 0   • clobetasol (TEMOVATE) 0.05 % ointment  60 11     No current facility-administered medications for this visit.      Facility-Administered Medications Ordered in Other Visits   Medication Dose Route Frequency Provider Last Rate Last Dose   • sodium chloride flush 20 mL  20 mL intravenous PRN Kathryn Mujica CNP   20 mL at 05/18/18 0948       Review of Systems   Constitutional: Negative for activity change, appetite change, chills, diaphoresis, fatigue, fever and unexpected weight change.   HENT: Negative for congestion, dental problem, drooling, ear discharge, ear pain, facial swelling, hearing loss, mouth sores, nosebleeds, postnasal drip, rhinorrhea, sinus pressure, sinus pain, sneezing, sore throat, tinnitus, trouble swallowing and voice change.    Eyes: Negative for photophobia, pain, discharge, redness, itching and visual disturbance.   Respiratory: Negative for apnea, cough, choking, chest tightness, shortness of breath, wheezing and stridor.    Cardiovascular: Negative for chest pain, palpitations and leg swelling.   Gastrointestinal: Negative for abdominal distention, abdominal pain, anal bleeding, blood in stool, constipation, diarrhea, nausea, rectal pain and vomiting.   Endocrine: Negative for cold intolerance, heat intolerance, polydipsia, polyphagia and polyuria.   Genitourinary: Negative for decreased urine volume, difficulty urinating, dysuria, enuresis, flank pain, frequency, genital sores, hematuria and urgency.   Musculoskeletal: Negative for arthralgias, back pain, gait problem, joint swelling, myalgias, neck pain and neck stiffness.   Skin: Negative for color  "change, pallor, rash and wound.   Allergic/Immunologic: Negative for environmental allergies, food allergies and immunocompromised state.   Neurological: Negative for dizziness, tremors, seizures, syncope, facial asymmetry, speech difficulty, weakness, light-headedness, numbness and headaches.   Hematological: Negative for adenopathy. Does not bruise/bleed easily.   Psychiatric/Behavioral: Negative for agitation, behavioral problems, confusion, decreased concentration, dysphoric mood, hallucinations, self-injury, sleep disturbance and suicidal ideas. The patient is not nervous/anxious and is not hyperactive.    All other systems reviewed and are negative.      OBJECTIVE  Vitals:    10/30/18 1343   Pulse: 75   Resp: 16   SpO2: 99%   Weight: 83 kg (183 lb)   Height: 1.651 m (5' 5\")     BP Readings from Last 3 Encounters:   09/04/18 122/70   08/02/18 157/74   05/29/18 140/82       Body mass index is 30.45 kg/m².  Wt Readings from Last 3 Encounters:   10/30/18 83 kg (183 lb)   09/04/18 80.7 kg (178 lb)   05/29/18 81.6 kg (180 lb)       Physical Exam  Normocephalic atraumatic memories pink moist pharynx clear  Heart regular rate and rhythm with no murmurs gallops rubs noted lungs clear to auscultation without wheezes rales rhonchi  The patient is alert and oriented x3  Abdomen is soft nontender without organomegaly    Lab Results   Component Value Date    GLUCOSE 120 (H) 02/14/2018    CALCIUM 9.4 02/14/2018     02/14/2018    K 4.0 02/14/2018    CO2 26 02/14/2018     02/14/2018    BUN 18 02/14/2018    CREATININE 0.8 02/14/2018    ANIONGAP 7 02/14/2018     Lab Results   Component Value Date    WBC 6.7 02/14/2018    HGB 15.0 02/14/2018    HCT 43.7 02/14/2018    MCV 95.9 02/14/2018     02/14/2018     Lab Results   Component Value Date    TSH 1.402 12/18/2017     Lab Results   Component Value Date    HGBA1C 5.9 12/18/2017    HGBA1C 5.8 07/30/2015    HGBA1C 5.4 02/06/2015      Lab Results   Component Value " "Date    LDLCALC 54 06/21/2016    CREATININE 0.8 02/14/2018     Lab Results   Component Value Date    SEDRATE 8 01/05/2016     No results found for: URACSRM  No results found for: AMYLASE  No results found for: LIPASE      No results found.             Portions of this note was documented by Cristiane Machado LPN as I performed the exam and collected the information from Sunitha Arrington. I attest that I have reviewed the information as documented, verified the accuracy of the documentation and added additional information as needed.       Leon Donahue MD wrote,   \"When you run out of doctor things to do for the sick person, see if there are any human things you can offer.\"     Thank you for intrusting us with your care. We understand this can be a very scary time and you can feel quite vulnerable.    You, and your health, are very important to me and my healthcare team.   If you have not signed up for [email or web address], please do so. It can dramatically improve your care.   I want you to update us by sending communication in [email or web address].  If you cannot do that then  call   [phone] or drop a line at [address] [facility] [place] [address]    We wish you the best.  Please let us know.    My Best,    Hilda Donald MD      Electronically signed by Hilda Donald MD on 10/30/2018 at 2:17 PM  "

## 2018-10-30 NOTE — PATIENT INSTRUCTIONS
"Gluten free and low carb diet  Will order lab work today and refill your prescriptions.  Patient Education     Gluten-Free Diet for Celiac Disease, Adult  The gluten-free diet includes all foods that do not contain gluten. Gluten is a protein that is found in wheat, rye, barley, and some other grains. Following the gluten-free diet is the only treatment for people with celiac disease. It helps to prevent damage to the intestines and improves or eliminates the symptoms of celiac disease.  Following the gluten-free diet requires some planning. It can be challenging at first, but it gets easier with time and practice. There are more gluten-free options available today than ever before. If you need help finding gluten-free foods or if you have questions, talk with your diet and nutrition specialist (registered dietitian) or your health care provider.  What do I need to know about a gluten-free diet?  · All fruits, vegetables, and meats are safe to eat and do not contain gluten.  · When grocery shopping, start by shopping in the produce, meat, and dairy sections. These sections are more likely to contain gluten-free foods. Then move to the aisles that contain packaged foods if you need to.  · Read all food labels. Gluten is often added to foods. Always check the ingredient list and look for warnings, such as “may contain gluten.\"  · Talk with your dietitian or health care provider before taking a gluten-free multivitamin or mineral supplement.  · Be aware of gluten-free foods having contact with foods that contain gluten (cross-contamination). This can happen at home and with any processed foods.  ? Talk with your health care provider or dietitian about how to reduce the risk of cross-contamination in your home.  ? If you have questions about how a food is processed, ask the .  What key words help to identify gluten?  Foods that list any of these key words on the label usually contain gluten:  · Wheat, flour, " enriched flour, bromated flour, white flour, durum flour, vianey flour, phosphated flour, self-rising flour, semolina, farina, barley (malt), rye, and oats.  · Starch, dextrin, modified food starch, or cereal.  · Thickening, fillers, or emulsifiers.  · Malt flavoring, malt extract, or malt syrup.  · Hydrolyzed vegetable protein.    In the [place] packaged foods that are gluten-free are required to be labeled “GF.” These foods should be easy to identify and are safe to eat. In the [place] food companies are also required to list common food allergens, including wheat, on their labels.  Recommended foods  Grains  · Amaranth, bean flours, 100% buckwheat flour, corn, millet, nut flours or nut meals, GF oats, quinoa, rice, sorghum, teff, rice wafers, pure cornmeal tortillas, popcorn, and hot cereals made from cornmeal. [place], rice, wild rice. Some Asian rice noodles or bean noodles. Arrowroot starch, corn bran, corn flour, corn germ, cornmeal, corn starch, potato flour, potato starch flour, and rice bran. Plain, brown, and sweet rice flours. Rice polish, soy flour, and tapioca starch.  Vegetables  · All plain fresh, frozen, and canned vegetables.  Fruits  · All plain fresh, frozen, canned, and dried fruits, and 100% fruit juices.  Meats and other protein foods  · All fresh beef, pork, poultry, fish, seafood, and eggs. Fish canned in water, oil, brine, or vegetable broth. Plain nuts and seeds, peanut butter. Some lunch meat and some frankfurters. Dried beans, dried peas, and lentils.  Dairy  · Fresh plain, dry, evaporated, or condensed milk. Cream, butter, sour cream, whipping cream, and most yogurts. Unprocessed cheese, most processed cheeses, some cottage cheese, some cream cheeses.  Beverages  · Coffee, tea, most herbal teas. Carbonated beverages and some root beers. Wine, sake, and distilled spirits, such as gin, vodka, and whiskey. Most hard ciders.  Fats and oils  · Butter, margarine, vegetable oil, hydrogenated  butter, olive oil, shortening, lard, cream, and some mayonnaise. Some commercial salad dressings. Olives.  Sweets and desserts  · Sugar, honey, some syrups, molasses, jelly, and jam. Plain hard candy, marshmallows, and gumdrops. Pure cocoa powder. Plain chocolate. Custard and some pudding mixes. Gelatin desserts, sorbets, frozen ice pops, and sherbet. Cake, cookies, and other desserts prepared with allowed flours. Some commercial ice creams. Cornstarch, tapioca, and rice puddings.  Seasoning and other foods  · Some canned or frozen soups. Monosodium glutamate (MSG). Cider, rice, and wine vinegar. Baking soda and baking powder. Cream of tartar. Baking and nutritional yeast. Certain soy sauces made without wheat (ask your dietitian about specific brands that are allowed). Nuts, coconut, and chocolate. Salt, pepper, herbs, spices, flavoring extracts, imitation or artificial flavorings, natural flavorings, and food colorings. Some medicines and supplements. Some lip glosses and other cosmetics. Rice syrups.  The items listed may not be a complete list. Talk with your dietitian about what dietary choices are best for you.  Foods to avoid  Grains  · Barley, bran, bulgur, couscous, cracked wheat, [place], farro, vianey, malt, matzo, semolina, wheat germ, and all wheat and rye cereals including spelt and kamut. Cereals containing malt as a flavoring, such as rice cereal. Noodles, spaghetti, macaroni, most packaged rice mixes, and all mixes containing wheat, rye, barley, or triticale.  Vegetables  · Most creamed vegetables and most vegetables canned in sauces. Some commercially prepared vegetables and salads.  Fruits  · Thickened or prepared fruits and some pie fillings. Some fruit snacks and fruit roll-ups.  Meats and other protein foods  · Any meat or meat alternative containing wheat, rye, barley, or gluten stabilizers. These are often marinated or packaged meats and lunch meats. Bread-containing products, such as Swiss  steak, croquettes, meatballs, and meatloaf. Most tuna canned in vegetable broth and turkey with hydrolyzed vegetable protein (HVP) injected as part of the basting. Seitan. Imitation fish. Eggs in sauces made from ingredients to avoid.  Dairy  · Commercial chocolate milk drinks and malted milk. Some non-dairy creamers. Any cheese product containing ingredients to avoid.  Beverages  · Certain cereal beverages. Beer, jeremías, malted milk, and some root beers. Some hard ciders. Some instant flavored coffees. Some herbal teas made with barley or with barley malt added.  Fats and oils  · Some commercial salad dressings. Sour cream containing modified food starch.  Sweets and desserts  · Some toffees. Chocolate-coated nuts (may be rolled in wheat flour) and some commercial candies and candy bars. Most cakes, cookies, donuts, pastries, and other baked goods. Some commercial ice cream. Ice cream cones. Commercially prepared mixes for cakes, cookies, and other desserts. Bread pudding and other puddings thickened with flour. Products containing brown rice syrup made with barley malt enzyme. Desserts and sweets made with malt flavoring.  Seasoning and other foods  · Some green powders, some dry seasoning mixes, some gravy extracts, some meat sauces, some ketchups, some prepared mustards, and horseradish. Certain soy sauces. Malt vinegar. Bouillon and bouillon cubes that contain HVP. Some chip dips, and some chewing gum. Yeast extract. Mckeon’s yeast. Caramel color. Some medicines and supplements. Some lip glosses and other cosmetics.  The items listed may not be a complete list. Talk with your dietitian about what dietary choices are best for you.  Summary  · Gluten is a protein that is found in wheat, rye, barley, and some other grains. The gluten-free diet includes all foods that do not contain gluten.  · If you need help finding gluten-free foods or if you have questions, talk with your diet and nutrition specialist (registered  "dietitian) or your health care provider.  · Read all food labels. Gluten is often added to foods. Always check the ingredient list and look for warnings, such as “may contain gluten.\"  This information is not intended to replace advice given to you by your health care provider. Make sure you discuss any questions you have with your health care provider.  Document Released: 12/18/2006 Document Revised: 10/02/2017 Document Reviewed: 10/02/2017  Elsevier Interactive Patient Education © 2018 [employer]       "

## 2018-11-05 ENCOUNTER — APPOINTMENT (OUTPATIENT)
Dept: CARDIOLOGY | Facility: CLINIC | Age: 73
End: 2018-11-05
Payer: MEDICARE

## 2018-11-05 DIAGNOSIS — Z45.018 ENCOUNTER FOR INTERROGATION OF CARDIAC PACEMAKER: Primary | ICD-10-CM

## 2018-11-05 PROCEDURE — 93296 REM INTERROG EVL PM/IDS: CPT | Mod: PO

## 2018-11-05 PROCEDURE — 93294 REM INTERROG EVL PM/LDLS PM: CPT | Performed by: INTERNAL MEDICINE

## 2018-12-01 ENCOUNTER — HOSPITAL ENCOUNTER (EMERGENCY)
Facility: HOSPITAL | Age: 73
Discharge: HOME | End: 2018-12-01
Payer: MEDICARE

## 2018-12-01 VITALS
OXYGEN SATURATION: 96 % | HEART RATE: 70 BPM | TEMPERATURE: 98 F | DIASTOLIC BLOOD PRESSURE: 90 MMHG | SYSTOLIC BLOOD PRESSURE: 160 MMHG | RESPIRATION RATE: 20 BRPM

## 2018-12-01 DIAGNOSIS — R21 RASH: Primary | ICD-10-CM

## 2018-12-01 DIAGNOSIS — I10 ESSENTIAL HYPERTENSION: ICD-10-CM

## 2018-12-01 PROCEDURE — 96372 THER/PROPH/DIAG INJ SC/IM: CPT

## 2018-12-01 PROCEDURE — 99282 EMERGENCY DEPT VISIT SF MDM: CPT

## 2018-12-01 PROCEDURE — 99283 EMERGENCY DEPT VISIT LOW MDM: CPT | Performed by: FAMILY MEDICINE

## 2018-12-01 PROCEDURE — 6360000200 HC RX 636 W HCPCS (ALT 250 FOR IP): Performed by: FAMILY MEDICINE

## 2018-12-01 RX ORDER — PREDNISONE 10 MG/1
1 TABLET ORAL ONCE
Status: COMPLETED | OUTPATIENT
Start: 2018-12-01 | End: 2018-12-01

## 2018-12-01 RX ADMIN — PREDNISONE 1 PACKAGE: 10 TABLET ORAL at 20:00

## 2018-12-01 RX ADMIN — METHYLPREDNISOLONE SODIUM SUCCINATE 80 MG: 125 INJECTION, POWDER, FOR SOLUTION INTRAMUSCULAR; INTRAVENOUS at 19:58

## 2018-12-02 NOTE — ED PROVIDER NOTES
EMERGENCY NOTE    Chief Complaint:  Chief Complaint   Patient presents with   • Rash     started yesterday, on cheek, now onto arms and back         HPI:  Sunitha Arrington is a 73 y.o. female who has a history of psoriasis on immunosuppressive therapy with Humira who presents to the emergency department for a rash that started yesterday.  Patient states this rash started on her left cheek and has subsequently spread down into her back extensor surfaces of her arms and somewhat on her thighs as well.  She denies any pain of this rash.  She denies any purulent drainage.  She states that it is very itchy.  She is not sure what could have precipitated this rash.  She does follow with Dr. Rowan Cates of dermatology.  She states that she is of an appointment with her potentially on Tuesday.    HISTORY:  Past Medical History:   Diagnosis Date   • Actinic keratosis    • CVA (cerebral vascular accident) (CMS/HCC) (HCC)    • High degree atrioventricular block     with syncope   • Hyperlipidemia    • Hypertension    • Hypertriglyceridemia    • Psoriasis        Past Surgical History:   Procedure Laterality Date   • CARDIAC CATHETERIZATION     • CARDIAC PACEMAKER PLACEMENT      Dual chamber pacemaker implantation   • CARDIAC SURGERY  2014   •  SECTION  1977    [place]   • COLONOSCOPY  2016    Rare sigmoid diverticula. Physiologic internal hemorrhoids, otherwise normal exam   • COLONOSCOPY  2008   • COLPORRHAPHY      and rectocele repair   • TOTAL ABDOMINAL HYSTERECTOMY W/ BILATERAL SALPINGOOPHORECTOMY      [id number] [facility]       Family History   Problem Relation Age of Onset   • Stroke Mother 97   • Hypertension Mother    • Dementia Mother    • Other cancer Father 32        secondary malignant neoplasm of lymph node       Social History     Tobacco Use   • Smoking status: Never Smoker   • Smokeless tobacco: Never Used   Substance Use Topics   • Alcohol use:  No   • Drug use: No       Allergies   Allergen Reactions   • Apricot      throat pain         Current Outpatient Medications:   •  acyclovir (ZOVIRAX) 800 mg tablet, Take 1 tablet five times daily for 10 days (Patient not taking: Reported on 10/30/2018 ), Disp: 60 tablet, Rfl: 0  •  adalimumab (HUMIRA) 40 mg/0.8 mL syringe kit, Inject 0.8 mL (40 mg total) under the skin every 14 (fourteen) days., Disp: 6 each, Rfl: 4  •  atorvastatin (LIPITOR) 40 mg tablet, TAKE 1 TABLET EVERY DAY, Disp: 90 tablet, Rfl: 3  •  BIOTIN ORAL, Take 1 capsule by mouth daily., Disp: , Rfl:   •  calcium carbonate-vitamin D3 (CALCIUM 500 WITH D) 500 mg(1,250mg) -400 unit tablet, Take 1 tablet by mouth daily., Disp: , Rfl:   •  clobetasol (TEMOVATE) 0.05 % ointment, , Disp: 60, Rfl: 11  •  clopidogrel (PLAVIX) 75 mg tablet, TAKE 1 TABLET EVERY DAY, Disp: 90 tablet, Rfl: 1  •  isosorbide mononitrate (IMDUR) 30 mg 24 hr tablet, TAKE 1 TABLET EVERY DAY, Disp: 90 tablet, Rfl: 2  •  losartan (COZAAR) 50 mg tablet, Take 1 tablet (50 mg total) by mouth daily., Disp: 90 tablet, Rfl: 3  •  lysine 500 mg tablet, Take 1 tablet every day by oral route in the evening., Disp: , Rfl:   •  multivitamin with minerals tablet, Take 1 tablet by mouth daily., Disp: , Rfl:   •  naproxen sodium 220 mg capsule, Take 2 tablets by mouth 2 (two) times a day., Disp: , Rfl:   •  omega-3 fatty acids-fish oil (FISH OIL) 340-1,000 mg capsule, Take 1 capsule by mouth 2 (two) times a day., Disp: , Rfl:   •  potassium chloride (KLOR-CON) 10 mEq CR tablet, Take 10 mEq by mouth 2 (two) times a day.  , Disp: 90, Rfl: 3  •  rOPINIRole (REQUIP) 1 mg tablet, Take 1 tablet twice daily by mouth, Disp: 180 tablet, Rfl: 3  •  triamcinolone (KENALOG) 0.1 % cream, APPLY A THIN LAYER TO THE AFFECTED AREA(S) TOPICALLY 2 TIMES PER DAY, Disp: 30 g, Rfl: 12  •  zolpidem (AMBIEN) 5 mg tablet, Take 1 tablet (5 mg total) by mouth nightly as needed for sleep., Disp: 90 tablet, Rfl:  1      ROS:  Review of Systems   12 point review of systems performed that was negative unless otherwise mentioned per HPI.    PE:  ED Triage Vitals   Temp Heart Rate Resp BP SpO2   12/01/18 1940 12/01/18 1933 12/01/18 1933 12/01/18 1933 12/01/18 1933   36.7 °C (98 °F) 72 20 (!) 203/98 96 %      Temp Source Heart Rate Source Patient Position BP Location FiO2 (%)   12/01/18 1933 -- 12/01/18 1933 -- --   Oral  Sitting         Physical Exam   Constitutional: She is oriented to person, place, and time. No distress.   HENT:   Head: Normocephalic and atraumatic.   Right Ear: External ear normal.   Left Ear: External ear normal.   Mouth/Throat: Oropharynx is clear and moist.   Eyes: EOM are normal.   Neck: Neck supple.   Cardiovascular: Normal rate, regular rhythm and normal heart sounds.   Pulmonary/Chest: Effort normal and breath sounds normal. No stridor. No respiratory distress.   Abdominal: Soft. Bowel sounds are normal. There is no tenderness.   Neurological: She is alert and oriented to person, place, and time.   Skin: Skin is warm. Capillary refill takes less than 2 seconds. Rash (Erythematous maculopapular rash on patient's left cheek upper mid back right below her neck, extensor surfaces of bilateral arms and dorsal surfaces of bilateral hands.  Few small scattered lesions on bilateral thighs no oral lesions) noted.   Psychiatric: She has a normal mood and affect.   Nursing note and vitals reviewed.        ED LABS:  Labs Reviewed - No data to display    ED IMAGES:  No orders to display       PROCEDURE    Procedures      ED MEDS  Medications   predniSONE (DELTASONE) tablet - ED DOSE PACK 1 Package (not administered)   methylPREDNISolone sod suc(PF) (Solu-MEDROL) injection 80 mg (80 mg intramuscular Given 12/1/18 1958)       ED DIAGNOSIS  Final diagnoses:   [R21] Rash   [I10] Essential hypertension           ED COURSE:  Sunitha is a nice 73-year-old woman who presents to the emergency department for evaluation of  rash that has progressively worsened over the last 2 days.  She has a history of psoriasis on immunosuppressive therapy with Humira.  She follows with dermatology.  Upon arrival to the emergency department, patient was in no acute distress.  Vital signs were notable for elevated blood pressure with systolic in the 200s.  She is endorsing itching or rash that is scattered throughout her body most predominantly on the left side of her cheek, upper mid back and extensor surfaces of her bilateral arms and dorsal surfaces of bilateral hands.  Does not appear to be psoriatic flare.  Given the redness and itching, I did give the patient IM steroids.  She was discharged with a steroid Dose jet.  She will follow-up with her dermatologist Tuesday.  Repeat blood pressure did improve somewhat.  Did  the patient that she should check it at home and follow-up in the clinic on Monday for blood pressure check.  She endorsed good understanding.      Aries Condon MD  12/01/18    A voice recognition program was used to aid in medical record documentation. Sometimes words are printed not exactly as they were spoken. While efforts were made to carefully edit and correct any inaccuracies, some errors may be present. Errors should be taken within the context of the discussion.  Please contact our office if you need assistance interpreting this medical record or notice any mistakes       Aries Condon MD  12/01/18 2005

## 2018-12-02 NOTE — DISCHARGE INSTRUCTIONS
You are given some IV steroids here for your rash. You will be discharged home with some oral steroids to take at home.  Starting tomorrow, you should take 4 tablets of prednisone per day for 3 days, followed by 3 tablets of prednisone per day for 3 days, then 2 tabs of prednisone per day for 3 days, and finally 1 tab of prednisone per day for 3 days.  Take this medicine with food to avoid stomach upset.  You can use 25 mg of Benadryl up to 3 times a day to help with the itching.  Benadryl can make you sleepy or drowsy.  Follow-up with your dermatologist on Tuesday  Follow-up in the clinic on Monday for a blood pressure check.  You should also check it at home to make sure it is not elevated.

## 2018-12-03 ENCOUNTER — CLINICAL SUPPORT (OUTPATIENT)
Dept: FAMILY MEDICINE | Facility: CLINIC | Age: 73
End: 2018-12-03
Payer: MEDICARE

## 2018-12-03 VITALS
SYSTOLIC BLOOD PRESSURE: 152 MMHG | RESPIRATION RATE: 16 BRPM | DIASTOLIC BLOOD PRESSURE: 70 MMHG | HEART RATE: 63 BPM | OXYGEN SATURATION: 95 %

## 2018-12-03 DIAGNOSIS — Z95.0 CARDIAC PACEMAKER IN SITU: ICD-10-CM

## 2018-12-03 DIAGNOSIS — I10 ESSENTIAL HYPERTENSION: Primary | ICD-10-CM

## 2018-12-03 PROCEDURE — G0463 HOSPITAL OUTPT CLINIC VISIT: HCPCS

## 2018-12-03 NOTE — PROGRESS NOTES
Last Visit - 8/2/2018 at which time she had a full body skin exam.  History of actinic keratoses and psoriasis.  Is here today specifically for itchy skin.   Is on Humira 40 mg every other week.    Problem: itchy skin   Location: upper back, face, arms and hands   Duration: 2 weeks   Changing/Worsening/Improving: spreading  Itching or bleeding: Yes   Other Significant History: Patient reports that the lesions began as a few itchy bumps mainly localized on her left cheek.  This started approximately last Friday.  The bumps then spread to involve more of her cheek and slowly then spread to involve her fullface.  This took approximately 2 days.  At that time she did proceed to be seen in [place] at urgent care where she was started on a Medrol Dosepak and given a prednisone injection.  She has noticed that she has now involvement of her arms and hands as well as upper back she reports that these areas seem to be improving with the prednisone Dosepak.  Prior to the improvement they were red scaly and slightly pruritic.  Other significant history includes negative history of internal malignancy.  Does report that she has had some increased fatigue recently as well as difficulty holding her pencil she has dropped it on variety of occasions.  This is new in onset.  She denies any possible photosensitivity but does report she has not been outside often enough to notice a difference in her rash.  She has continued with her Humira every other week without issues and has been on this for several years for good psoriasis management.  Previous history of skin cancer: No   Review of Systems   Constitutional: Negative for fever.   Skin: Negative for rash.   Allergic/Immunologic: Negative for environmental allergies and immunocompromised state.   All other systems reviewed and are negative.    Exam:  BP (!) 184/74 (BP Location: Right arm, Patient Position: Sitting, Cuff Size: Reg)   Pulse 70      Areas Examined: upper back,  forearms    Lesion in Question: Left upper Back, multiple erythematous , scaly papules (shave)        Lesions to follow:  Left inner cheek, 6mm scared papule with adjacent 7mm telangiectasia   - not examined today, please add to next appointment               Procedure:Lesion Biopsy  Date/Time: 12/4/2018 10:10 AM  Performed by: Rowan Estrada MD      Consent  Verbal consent was obtained from the patient. Written consent was not obtained from the patient. Risks, benefits, and alternatives were discussed. Consent given by patient. The patient states understanding of the procedure being performed. Patient ID confirmed verbally with patient.         Biopsy 1    Location Details  Body area: trunk  Trunk site: left upper back, [id number].      Preparation Details  Adequate anesthesia is achieved using 0.2 mL of lidocaine 1% with epinephrine (0.2cc's) in a local infiltration method. Area cleaned and prepped with Alcohol.     Procedure Details  Shave biopsy completed with dermablade. Biopsy performed to the depth of other (dermis). Hemostasis obtained with aluminium chloride and pressure.                     Post-Procedure  Specimen was sent to Pathology. Gauze and adhesive bandage applied for dressing. Patient tolerated the procedure well with no complications.           Sunitha was seen today for other.    Diagnoses and all orders for this visit:    History of actinic keratoses    Psoriasis    Xerosis of skin  -     YEYO IFA w/REFLEX Blood, Venous  -     Pathology specimen (Histology)  -     Lesion Biopsy    Allergic dermatitis  -     YEYO IFA w/REFLEX Blood, Venous  -     Pathology specimen (Histology)      -Given her distribution of her eruption I am concerned for possible underlying mixed connective tissue disease versus a biologic induced cutaneous appearing mixed connective tissue disease.  Also in my possible differential diagnosis includes dermatomyositis given distribution of hands upper back as well as slight  involvement of a single outer thigh.  The history of dropping her pencil could also be part of this diagnosis.  Given my concern I did perform a biopsy of the upper back to rule out underlying mixed connective tissue disease.  I will also check an underlying YEOY but expect this will likely be positive given diagnosis of psoriasis and other autoimmune issues.  I am not going to treat her skin at this point given she is currently being treated with Medrol Dosepak.  Once we do have firm histologic information we will proceed with further recommendations including possible referral to rheumatology if necessary.  In the meantime I did recommend dry skin care recommendations including cooler showers Dove soap and thick moisturizers I also recommended strict photoprotection.

## 2018-12-04 ENCOUNTER — APPOINTMENT (OUTPATIENT)
Dept: FAMILY MEDICINE | Facility: CLINIC | Age: 73
End: 2018-12-04
Payer: MEDICARE

## 2018-12-04 ENCOUNTER — OFFICE VISIT (OUTPATIENT)
Dept: DERMATOLOGY | Facility: CLINIC | Age: 73
End: 2018-12-04
Payer: MEDICARE

## 2018-12-04 VITALS — DIASTOLIC BLOOD PRESSURE: 74 MMHG | SYSTOLIC BLOOD PRESSURE: 184 MMHG | HEART RATE: 70 BPM

## 2018-12-04 DIAGNOSIS — L85.3 XEROSIS OF SKIN: ICD-10-CM

## 2018-12-04 DIAGNOSIS — Z87.2 HISTORY OF ACTINIC KERATOSES: Primary | ICD-10-CM

## 2018-12-04 DIAGNOSIS — L23.9 ALLERGIC DERMATITIS: ICD-10-CM

## 2018-12-04 DIAGNOSIS — L40.9 PSORIASIS: ICD-10-CM

## 2018-12-04 PROCEDURE — 99213 OFFICE O/P EST LOW 20 MIN: CPT | Mod: 25 | Performed by: DERMATOLOGY

## 2018-12-04 PROCEDURE — 86038 ANTINUCLEAR ANTIBODIES: CPT | Performed by: DERMATOLOGY

## 2018-12-04 PROCEDURE — 36415 COLL VENOUS BLD VENIPUNCTURE: CPT | Performed by: DERMATOLOGY

## 2018-12-04 PROCEDURE — 11100 LESION BIOPSY: CPT | Performed by: DERMATOLOGY

## 2018-12-04 RX ORDER — CLOPIDOGREL BISULFATE 75 MG/1
TABLET ORAL
Qty: 90 TABLET | Refills: 1 | Status: SHIPPED | OUTPATIENT
Start: 2018-12-04 | End: 2019-05-07 | Stop reason: SDUPTHER

## 2018-12-04 ASSESSMENT — ENCOUNTER SYMPTOMS: FEVER: 0

## 2018-12-04 ASSESSMENT — PAIN SCALES - GENERAL: PAINLEVEL: 0-NO PAIN

## 2018-12-04 NOTE — PATIENT INSTRUCTIONS
WOUND CARE FOLLOWING BIOPSY    After your biopsy a dressing will be placed on the site.  You may remove bandage at end of day.  This is to minimize any bleeding that can possibly occur after surgery.  To clean the area, use Dove soap, warm water and hands, no wash cloth, once daily    Next, apply a layer of POLYSPORIN ANTIBIOTIC OINTMENT or VASELINE, daily.  Do this until healed.  We will contact you with your results when available.  Please call with questions or concerns    If you have not received results in 2 weeks, please call our office and reference your biopsy number [id number].     Vanicream cream

## 2018-12-06 LAB
ANA PATTERN 1: ABNORMAL
ANA TITER 1: ABNORMAL
ANTI-NUCLEAR ANTIBODY (ANA): POSITIVE

## 2018-12-07 NOTE — PROGRESS NOTES
Let's place a rheumatology referral and order the following labs  DS-DNA  Antihistone antibodies  Ssa, ssb  antismith  ALYSA  Chavarria myositis panel  CK  Aldolase    Most of these should be saved in my preferences under lab.  Please patient know initial lab values suggest possible autoimmune disease but we are trying to rule out whether or not this is drug indced. Let's start her on TMC ointment for body lesions and continue 2.5% hydrocotisone ointment for face

## 2018-12-10 ENCOUNTER — TELEPHONE (OUTPATIENT)
Dept: DERMATOLOGY | Facility: CLINIC | Age: 73
End: 2018-12-10

## 2018-12-10 ENCOUNTER — LAB (OUTPATIENT)
Dept: FAMILY MEDICINE | Facility: CLINIC | Age: 73
End: 2018-12-10
Payer: MEDICARE

## 2018-12-10 DIAGNOSIS — M35.9 AUTOIMMUNE DISEASE (CMS/HCC): ICD-10-CM

## 2018-12-10 LAB — CK SERPL-CCNC: 58 U/L (ref 26–192)

## 2018-12-10 PROCEDURE — 82085 ASSAY OF ALDOLASE: CPT

## 2018-12-10 PROCEDURE — 86235 NUCLEAR ANTIGEN ANTIBODY: CPT

## 2018-12-10 PROCEDURE — 84999 UNLISTED CHEMISTRY PROCEDURE: CPT

## 2018-12-10 PROCEDURE — 36415 COLL VENOUS BLD VENIPUNCTURE: CPT

## 2018-12-10 PROCEDURE — 82550 ASSAY OF CK (CPK): CPT

## 2018-12-10 PROCEDURE — 83516 IMMUNOASSAY NONANTIBODY: CPT

## 2018-12-11 DIAGNOSIS — R76.8 POSITIVE ANA (ANTINUCLEAR ANTIBODY): Primary | ICD-10-CM

## 2018-12-11 LAB
DSDNA IGG SERPL IA-ACNC: 19 IU/ML
ENA SM+RNP AB SER-ACNC: <0.2 AI
ENA SS-A AB SER-ACNC: <0.2 AI
ENA SS-B AB SER-ACNC: <0.2 AI

## 2018-12-12 ENCOUNTER — TELEPHONE (OUTPATIENT)
Dept: DERMATOLOGY | Facility: CLINIC | Age: 73
End: 2018-12-12

## 2018-12-12 LAB
ALDOLASE SERPL-CCNC: 4.3 U/L
HISTONE AB SER IA-ACNC: <0.5 U

## 2018-12-13 NOTE — TELEPHONE ENCOUNTER
Patient called and Marlene let her know that we do not have results on blood work or biopsy yet, let her know that we will call her once we get them

## 2018-12-18 ENCOUNTER — TELEPHONE (OUTPATIENT)
Dept: DERMATOLOGY | Facility: CLINIC | Age: 73
End: 2018-12-18

## 2018-12-18 DIAGNOSIS — G25.81 RESTLESS LEGS SYNDROME: ICD-10-CM

## 2018-12-19 ENCOUNTER — TELEPHONE (OUTPATIENT)
Dept: DERMATOLOGY | Facility: CLINIC | Age: 73
End: 2018-12-19

## 2018-12-19 RX ORDER — ROPINIROLE 1 MG/1
TABLET, FILM COATED ORAL
Qty: 180 TABLET | Refills: 3 | Status: SHIPPED | OUTPATIENT
Start: 2018-12-19 | End: 2019-12-30

## 2018-12-19 NOTE — TELEPHONE ENCOUNTER
I put a note in already to let her know her results are suggestive of a possible systemic autoimmune disease.  We do not have a pure diagnosis based on the biopsies alone.  We plan to continue her topical steroids for her skin disease as well as sun protection.  A referral to rheumatology has been placed so they can further evaluate her laboratory findings and give her a firm diagnosis and possible further treatment recommendations.  I have also looped in her PCP.

## 2018-12-21 LAB — MISC MAYO RESULT(REF): NORMAL

## 2018-12-24 DIAGNOSIS — E87.6 HYPOKALEMIA: Primary | Chronic | ICD-10-CM

## 2018-12-26 RX ORDER — POTASSIUM CHLORIDE 750 MG/1
TABLET, EXTENDED RELEASE ORAL
Qty: 180 TABLET | Refills: 3 | Status: SHIPPED | OUTPATIENT
Start: 2018-12-26 | End: 2019-10-10 | Stop reason: SDUPTHER

## 2018-12-31 ENCOUNTER — TELEPHONE (OUTPATIENT)
Dept: FAMILY MEDICINE | Facility: CLINIC | Age: 73
End: 2018-12-31

## 2019-01-02 ENCOUNTER — TELEPHONE (OUTPATIENT)
Dept: DERMATOLOGY | Facility: CLINIC | Age: 74
End: 2019-01-02

## 2019-01-02 DIAGNOSIS — F51.01 PRIMARY INSOMNIA: ICD-10-CM

## 2019-01-02 NOTE — TELEPHONE ENCOUNTER
Spoke with tiana, informed her that the labs she's inquiring on are interpreted by rheumatology; Dr. Estrada just orders them based on Rheumatology's recommendation but she does not interpret the results.

## 2019-01-03 RX ORDER — ZOLPIDEM TARTRATE 5 MG/1
5 TABLET ORAL NIGHTLY PRN
Qty: 90 TABLET | Refills: 1 | Status: SHIPPED | OUTPATIENT
Start: 2019-01-03 | End: 2019-08-06 | Stop reason: SDUPTHER

## 2019-01-09 ENCOUNTER — OFFICE VISIT (OUTPATIENT)
Dept: URGENT CARE | Facility: CLINIC | Age: 74
End: 2019-01-09
Payer: MEDICARE

## 2019-01-09 VITALS
DIASTOLIC BLOOD PRESSURE: 94 MMHG | RESPIRATION RATE: 20 BRPM | OXYGEN SATURATION: 94 % | SYSTOLIC BLOOD PRESSURE: 160 MMHG | HEART RATE: 87 BPM

## 2019-01-09 DIAGNOSIS — K62.5 RECTAL BLEEDING: Primary | ICD-10-CM

## 2019-01-09 DIAGNOSIS — I63.9 CEREBROVASCULAR ACCIDENT (CVA), UNSPECIFIED MECHANISM (CMS/HCC): ICD-10-CM

## 2019-01-09 DIAGNOSIS — R11.0 NAUSEA: ICD-10-CM

## 2019-01-09 PROCEDURE — 99213 OFFICE O/P EST LOW 20 MIN: CPT | Performed by: FAMILY MEDICINE

## 2019-01-09 PROCEDURE — G0463 HOSPITAL OUTPT CLINIC VISIT: HCPCS | Performed by: FAMILY MEDICINE

## 2019-01-09 RX ORDER — ISOSORBIDE MONONITRATE 30 MG/1
30 TABLET, EXTENDED RELEASE ORAL DAILY
COMMUNITY
End: 2019-06-07 | Stop reason: SDUPTHER

## 2019-01-09 ASSESSMENT — ENCOUNTER SYMPTOMS
CONSTIPATION: 0
COLOR CHANGE: 0
BLOOD IN STOOL: 0
HALLUCINATIONS: 0
EYE DISCHARGE: 0
CHILLS: 0
NECK PAIN: 0
FREQUENCY: 0
ADENOPATHY: 0
VOMITING: 0
EYE REDNESS: 0
HEADACHES: 0
CHEST TIGHTNESS: 0
RECTAL PAIN: 0
FACIAL SWELLING: 0
ABDOMINAL PAIN: 0
EYE ITCHING: 0
VOICE CHANGE: 0
APNEA: 0
ANAL BLEEDING: 1
DIARRHEA: 0
FEVER: 0
SORE THROAT: 0
DECREASED CONCENTRATION: 0
UNEXPECTED WEIGHT CHANGE: 0
SINUS PRESSURE: 0
SEIZURES: 0
SPEECH DIFFICULTY: 0
EYE PAIN: 0
NERVOUS/ANXIOUS: 0
BACK PAIN: 0
HEMATURIA: 0
DIZZINESS: 0
NECK STIFFNESS: 0
FACIAL ASYMMETRY: 0
ARTHRALGIAS: 0
SINUS PAIN: 0
JOINT SWELLING: 0
ACTIVITY CHANGE: 0
PALPITATIONS: 0
SHORTNESS OF BREATH: 0
POLYPHAGIA: 0
RHINORRHEA: 0
CHOKING: 0
PHOTOPHOBIA: 0
TROUBLE SWALLOWING: 0
AGITATION: 0
APPETITE CHANGE: 0
TREMORS: 0
LIGHT-HEADEDNESS: 0
DIAPHORESIS: 0
NAUSEA: 1
BRUISES/BLEEDS EASILY: 0
ABDOMINAL DISTENTION: 0
COUGH: 0
FLANK PAIN: 0
FATIGUE: 0
SLEEP DISTURBANCE: 0
STRIDOR: 0
DYSPHORIC MOOD: 0
POLYDIPSIA: 0
DYSURIA: 0
HYPERACTIVE: 0
WHEEZING: 0
CONFUSION: 0
NUMBNESS: 0
WOUND: 0
MYALGIAS: 0
WEAKNESS: 0
DIFFICULTY URINATING: 0

## 2019-01-09 ASSESSMENT — PAIN SCALES - GENERAL: PAINLEVEL: 0-NO PAIN

## 2019-01-09 NOTE — PROGRESS NOTES
Patient Instructions   Stop your Plavix for 4 days  You are bleeding from was called a fissure  I will give you some information on that but keeping her bowel movements small and waiting for them rather than pushing his most important thing.    Warm baths off work today  Expect that there will be some bleeding      Visit date not found    ASSESSMENT AND PLAN   Diagnoses and all orders for this visit:    Rectal bleeding    Nausea    Cerebrovascular accident (CVA), unspecified mechanism (CMS/HCC) (HCC)            Chief Complaint  Sunitha is a 73 y.o. female who presents for    Chief Complaint   Patient presents with   • Rectal Bleeding   • Nausea         HPI  This is a 73-year-old  She reports that she has a history of rectal bleeding.  Started this morning.  She is on Plavix.  Patient reports that she had bright red blood in the stool.  She has not had a history of difficulties with this in the past.  She has had diverticulosis diagnosed on colonoscopy 2 years ago.  I am scheduling a anoscopy but even before I did that there is a very obvious fissure at the entrance  And it is the bleeding source is actively bleeding right now    She had a history of a stroke so she needs to be on the Plavix but was suspended for about 4 days    HISTORY  Past Medical History:   Diagnosis Date   • Actinic keratosis    • CVA (cerebral vascular accident) (CMS/HCC) ([facility])    • High degree atrioventricular block     with syncope   • Hyperlipidemia    • Hypertension    • Hypertriglyceridemia    • Psoriasis      Past Surgical History:   Procedure Laterality Date   • CARDIAC CATHETERIZATION     • CARDIAC PACEMAKER PLACEMENT      Dual chamber pacemaker implantation   • CARDIAC SURGERY  2014   •  SECTION  1977    [place]   • COLONOSCOPY  2016    Rare sigmoid diverticula. Physiologic internal hemorrhoids, otherwise normal exam   • COLONOSCOPY  2008   • COLPORRHAPHY      and rectocele repair   • TOTAL  ABDOMINAL HYSTERECTOMY W/ BILATERAL SALPINGOOPHORECTOMY      [id number] [facility]     Family History   Problem Relation Age of Onset   • Stroke Mother 97   • Hypertension Mother    • Dementia Mother    • Other cancer Father 32        secondary malignant neoplasm of lymph node     Social History     Socioeconomic History   • Marital status:      Spouse name: Not on file   • Number of children: Not on file   • Years of education: Not on file   • Highest education level: Not on file   Social Needs   • Financial resource strain: Not on file   • Food insecurity - worry: Not on file   • Food insecurity - inability: Not on file   • Transportation needs - medical: Not on file   • Transportation needs - non-medical: Not on file   Occupational History     Comment: [employer]   Tobacco Use   • Smoking status: Never Smoker   • Smokeless tobacco: Never Used   Substance and Sexual Activity   • Alcohol use: No   • Drug use: No   • Sexual activity: Defer   Other Topics Concern   • Not on file   Social History Narrative   • Not on file       ALLERGIES AND MEDICATIONS  Allergies   Allergen Reactions   • Apricot      throat pain     Current Outpatient Medications   Medication Sig Dispense Refill   • adalimumab (HUMIRA) 40 mg/0.8 mL syringe kit Inject 0.8 mL (40 mg total) under the skin every 14 (fourteen) days. 6 each 4   • atorvastatin (LIPITOR) 40 mg tablet TAKE 1 TABLET EVERY DAY 90 tablet 3   • BIOTIN ORAL Take 1 capsule by mouth daily.     • calcium carbonate-vitamin D3 (CALCIUM 500 WITH D) 500 mg(1,250mg) -400 unit tablet Take 1 tablet by mouth daily.     • clopidogrel (PLAVIX) 75 mg tablet TAKE 1 TABLET EVERY DAY 90 tablet 1   • losartan (COZAAR) 50 mg tablet Take 1 tablet (50 mg total) by mouth daily. 90 tablet 3   • lysine 500 mg tablet Take 1 tablet every day by oral route in the evening.     • multivitamin with minerals tablet Take 1 tablet by mouth daily.     • naproxen sodium 220 mg capsule  Take 2 tablets by mouth 2 (two) times a day.     • omega-3 fatty acids-fish oil (FISH OIL) 340-1,000 mg capsule Take 1 capsule by mouth 2 (two) times a day.     • potassium chloride (K-DUR,KLOR-CON) 10 mEq CR tablet TAKE 2 TABLETS EVERY  tablet 3   • rOPINIRole (REQUIP) 1 mg tablet TAKE 1 TABLET TWICE DAILY 180 tablet 3   • triamcinolone (KENALOG) 0.1 % cream APPLY A THIN LAYER TO THE AFFECTED AREA(S) TOPICALLY 2 TIMES PER DAY 30 g 12   • zolpidem (AMBIEN) 5 mg tablet Take 1 tablet (5 mg total) by mouth nightly as needed for sleep 90 tablet 1     No current facility-administered medications for this visit.      Facility-Administered Medications Ordered in Other Visits   Medication Dose Route Frequency Provider Last Rate Last Dose   • sodium chloride flush 20 mL  20 mL intravenous PRN Kathrynnatalee Mujica, CNP   20 mL at 05/18/18 0948       Review of Systems   Constitutional: Negative for activity change, appetite change, chills, diaphoresis, fatigue, fever and unexpected weight change.   HENT: Negative for congestion, dental problem, drooling, ear discharge, ear pain, facial swelling, hearing loss, mouth sores, nosebleeds, postnasal drip, rhinorrhea, sinus pressure, sinus pain, sneezing, sore throat, tinnitus, trouble swallowing and voice change.    Eyes: Negative for photophobia, pain, discharge, redness, itching and visual disturbance.   Respiratory: Negative for apnea, cough, choking, chest tightness, shortness of breath, wheezing and stridor.    Cardiovascular: Negative for chest pain, palpitations and leg swelling.   Gastrointestinal: Positive for anal bleeding and nausea. Negative for abdominal distention, abdominal pain, blood in stool, constipation, diarrhea, rectal pain and vomiting.   Endocrine: Negative for cold intolerance, heat intolerance, polydipsia, polyphagia and polyuria.   Genitourinary: Negative for decreased urine volume, difficulty urinating, dysuria, enuresis, flank pain, frequency, genital  sores, hematuria and urgency.   Musculoskeletal: Negative for arthralgias, back pain, gait problem, joint swelling, myalgias, neck pain and neck stiffness.   Skin: Negative for color change, pallor, rash and wound.   Allergic/Immunologic: Negative for environmental allergies, food allergies and immunocompromised state.   Neurological: Negative for dizziness, tremors, seizures, syncope, facial asymmetry, speech difficulty, weakness, light-headedness, numbness and headaches.   Hematological: Negative for adenopathy. Does not bruise/bleed easily.   Psychiatric/Behavioral: Negative for agitation, behavioral problems, confusion, decreased concentration, dysphoric mood, hallucinations, self-injury, sleep disturbance and suicidal ideas. The patient is not nervous/anxious and is not hyperactive.        OBJECTIVE  Vitals:    01/09/19 1157   BP: 160/94   BP Location: Left arm   Pulse: 87   Resp: 20   SpO2: 94%   PainSc: 0-No pain     BP Readings from Last 3 Encounters:   01/09/19 160/94   12/04/18 (!) 184/74   12/03/18 152/70       There is no height or weight on file to calculate BMI.  Wt Readings from Last 3 Encounters:   10/30/18 83 kg (183 lb)   09/04/18 80.7 kg (178 lb)   05/29/18 81.6 kg (180 lb)       Physical Exam  Normocephalic atraumatic memories pink moist pharynx clear  Heart regular rate and rhythm with no murmurs gallops rubs noted lungs clear to auscultation without wheezes rales rhonchi  The patient is alert and oriented x3  Abdomen is soft nontender without organomegaly  Anal area shows a fissure that is quite long and deep.  He is actively bleeding right now but collapses upon itself.      Lab Results   Component Value Date    GLUCOSE 120 (H) 02/14/2018    CALCIUM 9.4 02/14/2018     02/14/2018    K 4.0 02/14/2018    CO2 26 02/14/2018     02/14/2018    BUN 18 02/14/2018    CREATININE 0.8 02/14/2018    ANIONGAP 7 02/14/2018     Lab Results   Component Value Date    WBC 6.7 02/14/2018    HGB 15.0  "02/14/2018    HCT 43.7 02/14/2018    MCV 95.9 02/14/2018     02/14/2018     Lab Results   Component Value Date    TSH 1.402 12/18/2017     Lab Results   Component Value Date    HGBA1C 5.9 10/30/2018    HGBA1C 5.9 12/18/2017    HGBA1C 5.8 07/30/2015      Lab Results   Component Value Date    LDLCALC 54 06/21/2016    CREATININE 0.8 02/14/2018     Lab Results   Component Value Date    SEDRATE 8 01/05/2016            Portions of this note was documented by Yinka Villeda MA as I performed the exam and collected the information from Sunitha Arrington. I attest that I have reviewed the information as documented, verified the accuracy of the documentation and added additional information as needed.       Leon Donahue MD wrote,   \"When you run out of doctor things to do for the sick person, see if there are any human things you can offer.\"     Thank you for intrusting us with your care. We understand this can be a very scary time and you can feel quite vulnerable.    You, and your health, are very important to me and my healthcare team.   If you have not signed up for [employer], please do so. It can dramatically improve your care.   I want you to update us by sending communication in [employer].  If you cannot do that then  call   [phone] or drop a line at [address] [place] [address]    We wish you the best.  Please let us know.    My Best,    Hilda Donald MD      Electronically signed by Hilda Donald MD on 1/9/2019 at 1:05 PM  "

## 2019-01-09 NOTE — LETTER
[facility]  [address] [place] [address]  [phone]  Dept: [phone]  January 9, 2019     Hilda Donald MD  [address] [place] [address]      Patient: Sunitha Arrington   YOB: 1945   Date of Visit: 1/9/2019       To Whom it May Concern:    Sunitha Arrington was seen in my clinic on  1/9/2019 at [facility]. Please excuse Sunitha for her absence from work on this day to make the appointment.    If you have any questions or concerns, please don't hesitate to call.         Sincerely,         HILDA DONALD MD        CC: No Recipients

## 2019-01-09 NOTE — PATIENT INSTRUCTIONS
Stop your Plavix for 4 days  You are bleeding from was called a fissure  I will give you some information on that but keeping her bowel movements small and waiting for them rather than pushing his most important thing.    Warm baths off work today  Expect that there will be some bleeding

## 2019-02-04 ENCOUNTER — ANCILLARY PROCEDURE (OUTPATIENT)
Dept: CARDIOLOGY | Facility: CLINIC | Age: 74
End: 2019-02-04
Payer: MEDICARE

## 2019-02-04 DIAGNOSIS — Z45.018 ENCOUNTER FOR INTERROGATION OF CARDIAC PACEMAKER: ICD-10-CM

## 2019-02-04 PROCEDURE — 93296 REM INTERROG EVL PM/IDS: CPT | Mod: NC

## 2019-02-04 PROCEDURE — 93294 REM INTERROG EVL PM/LDLS PM: CPT | Mod: NCNR | Performed by: INTERNAL MEDICINE

## 2019-02-04 PROCEDURE — 93296 REM INTERROG EVL PM/IDS: CPT | Mod: PO

## 2019-02-07 ENCOUNTER — OFFICE VISIT (OUTPATIENT)
Dept: DERMATOLOGY | Facility: CLINIC | Age: 74
End: 2019-02-07
Payer: MEDICARE

## 2019-02-07 VITALS
HEIGHT: 65 IN | DIASTOLIC BLOOD PRESSURE: 77 MMHG | WEIGHT: 183 LBS | HEART RATE: 74 BPM | SYSTOLIC BLOOD PRESSURE: 178 MMHG | BODY MASS INDEX: 30.49 KG/M2

## 2019-02-07 DIAGNOSIS — L57.0 KERATOSIS, ACTINIC: ICD-10-CM

## 2019-02-07 DIAGNOSIS — D18.01 HEMANGIOMA OF SKIN: ICD-10-CM

## 2019-02-07 DIAGNOSIS — Z79.899 HIGH RISK MEDICATION USE: ICD-10-CM

## 2019-02-07 DIAGNOSIS — L82.1 SEBORRHEIC KERATOSES: ICD-10-CM

## 2019-02-07 DIAGNOSIS — L40.9 PSORIASIS: ICD-10-CM

## 2019-02-07 DIAGNOSIS — L81.4 LENTIGINES: ICD-10-CM

## 2019-02-07 DIAGNOSIS — Z87.2 HISTORY OF ACTINIC KERATOSES: Primary | ICD-10-CM

## 2019-02-07 PROCEDURE — 17003 DESTRUCT PREMALG LES 2-14: CPT | Performed by: DERMATOLOGY

## 2019-02-07 PROCEDURE — 99213 OFFICE O/P EST LOW 20 MIN: CPT | Mod: 25 | Performed by: DERMATOLOGY

## 2019-02-07 PROCEDURE — 17000 DESTRUCT PREMALG LESION: CPT | Performed by: DERMATOLOGY

## 2019-02-07 ASSESSMENT — PAIN SCALES - GENERAL: PAINLEVEL: 0-NO PAIN

## 2019-02-07 ASSESSMENT — ENCOUNTER SYMPTOMS: FEVER: 0

## 2019-02-07 NOTE — PATIENT INSTRUCTIONS
"You were treated with liquid nitrogen today. You should expect these areas to burn and once the burning subsides, the area(s)may itch. You should expect some reaction anywhere from welting of the skin to larger liquid filled blisters depending on how aggressively your lesion(s) needed to be treated. If you had treatment on your forehead, eyebrow areas or cheekbones, you could have some swelling under your eyes. This is normal and nothing to worry about. Blisters should be left intact until they pop and drain on their own. You will most likely have a clear drainage much like a blister when it pops.     The areas treated should be cared for by gentle daily cleansing with Dove soap and water and your hands. You should apply Polysporin ointment or Vaseline to the areas daily as they are healing and continue this until they are completely healed. It may take anywhere from 7-14 days for areas to completely heal. If any other area than your face was treated, it may take longer for those areas to heal.    If warts were treated, they may turn to blood blisters and may appear purple, red or black. Leave the blister intact and let it pop on its own. Keep these areas dry and clean and do not use any ointment or Vaseline to warts.    IF YOU NOTICE INCREASED SURROUNDING REDNESS, TENDERNESS OR A PUS-LIKE DRAINAGE, PLEASE CONTACT OUR OFFICE IMMEDIATELY.     Avoid sunlight between the hours of 10am-2pm, wear a broad spectrum, water resistant sunscreen with SPF of 30-50 year year round. Reapply sunscreen every 2 hours and after exercising or swimming. Wear a 6\" broad brimmed hat. Use a lip sunblock of SPF of 30-50 year round. Sun protective clothing suggested.    Neutrogena Ultra Sheer Dry Touch SPF 50 or higher, Neutrogena Sheer Zinc Dry Touch SPF 50, Vanicream sunblocks containing Titanium Dioxide or Zinc Oxide with SPF 50 or higher.    Vanicream sunscreen of SPF 50 is sold here in the clinic, as well as a few tinted sunscreens. We " also do have one lip balm product and lip gloss product with SPF. [employer] is a store located in [facility] that sells a brand of hat called Sunday Afternoon that is highly recommended.      [employer] is our survey company. You may be receiving a survey about your care. Your identity is kept confidential so please be honest! Your care is important to us and we are always looking for opportunities to improve your experience.

## 2019-02-07 NOTE — PROGRESS NOTES
Subjective   Return Dermatology Skin examination  Sunitha Arrington is a 73 y.o. female with a history of actinic keratoses and psoriasis who presents for a full body skin exam . Lesions of concern include: several.  History of lichen sclerosus managed by her OB/GYN.  Is on Humira 40 mg every other week.    Also has a history of an undiagnosed mixed connective tissue disease.  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  This was in a sun exposed area.  I did do a workup for possible dermatomyositis she has a negative male myositis panel and negative aldolase negative SMRNP antibodies, a normal CK level, positive double-stranded DNA, negative SSA SSB antibodies.  She was started on topical steroids and photoprotection which she has been doing with almost complete resolve of her rash.  She does have upcoming appointment with rheumatology in a couple of weeks for further evaluation of the above blood work findings.  In regards to her psoriasis this is well controlled on her Humira.    Review of Systems   Constitutional: Negative for fever.   Skin: Positive for rash.   Allergic/Immunologic: Negative for environmental allergies and immunocompromised state.   All other systems reviewed and are negative.        Past Medical History:   Diagnosis Date   • Actinic keratosis    • CVA (cerebral vascular accident) (CMS/HCC) ([facility]) 2014   • High degree atrioventricular block     with syncope   • Hyperlipidemia    • Hypertension    • Hypertriglyceridemia    • Psoriasis        Current Outpatient Medications on File Prior to Visit   Medication Sig Dispense Refill   • adalimumab (HUMIRA) 40 mg/0.8 mL syringe kit Inject 0.8 mL (40 mg total) under the skin every 14 (fourteen) days. 6 each 4   • atorvastatin (LIPITOR) 40 mg tablet TAKE 1 TABLET EVERY DAY 90 tablet 3   • calcium carbonate-vitamin D3 (CALCIUM 500 WITH D) 500 mg(1,250mg) -400 unit tablet Take 1 tablet by mouth daily.     •  "clopidogrel (PLAVIX) 75 mg tablet TAKE 1 TABLET EVERY DAY 90 tablet 1   • losartan (COZAAR) 50 mg tablet Take 1 tablet (50 mg total) by mouth daily. 90 tablet 3   • lysine 500 mg tablet Take 1 tablet every day by oral route in the evening.     • multivitamin with minerals tablet Take 1 tablet by mouth daily.     • omega-3 fatty acids-fish oil (FISH OIL) 340-1,000 mg capsule Take 1 capsule by mouth 2 (two) times a day.     • potassium chloride (K-DUR,KLOR-CON) 10 mEq CR tablet TAKE 2 TABLETS EVERY  tablet 3   • rOPINIRole (REQUIP) 1 mg tablet TAKE 1 TABLET TWICE DAILY 180 tablet 3   • triamcinolone (KENALOG) 0.1 % cream APPLY A THIN LAYER TO THE AFFECTED AREA(S) TOPICALLY 2 TIMES PER DAY 30 g 12   • zolpidem 5 mg tablet, sublingual Place 1 tab/cap under the tongue nightly as needed     • BIOTIN ORAL Take 1 capsule by mouth daily.     • isosorbide mononitrate (IMDUR) 30 mg 24 hr tablet Take 30 mg by mouth daily     • naproxen sodium 220 mg capsule Take 2 tablets by mouth 2 (two) times a day.       Current Facility-Administered Medications on File Prior to Visit   Medication Dose Route Frequency Provider Last Rate Last Dose   • sodium chloride flush 20 mL  20 mL intravenous PRN Kathryn Mujica CNP   20 mL at 05/18/18 0948       Allergies  Apricot      Physical Examination    Vital Signs  height is 1.651 m (5' 5\") and weight is 83 kg (183 lb). Her blood pressure is 178/77 and her pulse is 74.   General: normal general appearance and in no apparent distress      Physical Exam Findings:   Senior skin type:II  A full body skin examination was performed including scalp, face, lips, ears, neck, both arms, chest, back, abdomen, buttocks, both legs, both feet                                         Trunk (including chest, abdomen, back): Stuck on tan brown papules, well demarcated tan brown macules, bright red cherry papules.  Psoriasis well controlled.    Arms: Stuck on tan brown papules, well demarcated tan brown " macules, bright red cherry papules.    buttock: No suspicious lesions noted.    legs: No suspicious lesions noted.    feet: No suspicious lesions noted.    scalp: No suspicious lesions noted  face: See nevi list.  See procedure note for treated lesions.    Lesions to follow:  Left inner cheek, 6mm scared papule with adjacent 7 mm telangiectasia   -All lesions unchanged from previous exam         Procedure Note:   Destruction of Lesion  Date/Time: 2/7/2019 11:30 AM  Performed by: Rowan Estrada MD      Consent  Verbal consent was obtained from the patient. Written consent was not obtained from the patient. Consent given by patient. The patient states understanding of the procedure being performed. Patient ID confirmed verbally with patient.     Location:  2 total lesions removed from head/neck.  Head/neck location(s):  Number of head/neck lesions removed: 2Head/neck location: left forehead, right forehead.    Procedure Details   Local anesthesia was not used. Lesion(s) are pre-malignant. Lesion(s) destroyed with: liquid nitrogen. Number of freeze/thaw cycles was 1. Lesion(s) were frozen until an ice ball extended beyond the lesion.     Post-Procedure  Patient tolerated procedure well with no complications.     Procedure Comments  Actinic keratosis            Sunitha was seen today for follow-up.    Diagnoses and all orders for this visit:    History of actinic keratoses    Psoriasis    Seborrheic keratoses    Lentigines    Hemangioma of skin    High risk medication use    Keratosis, actinic  -     Destruction of Lesion          -  Actinic keratoses treated as above  -  Reassurance given for all benign lesions, nevi continue To be observed, patient to call if lesion should change prior to next appointment  -Patient psoriasis is well controlled on Humira we will continue with a dose of 40 mg every other week we will plan to recheck her TB test this summer.   -In regards to her positive double-stranded DNA and  lichenoid interface dermatitis I will appreciate rheumatology recommendations.  She does have upcoming appointment with them in a couple of weeks.  In the meantime she will continue with sun protection as well as topical steroids as needed for the associated dermatitis.    I emphasized daily sunscreen use with an SPF of 30-50, broad spectrum and water resistant.  I directed reapplication every two hours.  I recommended wide brimmed hats.  Finally, we discussed danger signs of changing skin lesions including sores not healing and moles changing in size, shape or color.  Should any of these lesions occur before next appointment, I instructed patent to call sooner for evaluation.    Return in about 6 months (around 8/7/2019).

## 2019-02-08 ENCOUNTER — HOSPITAL ENCOUNTER (OUTPATIENT)
Dept: RADIOLOGY | Facility: HOSPITAL | Age: 74
Discharge: HOME | End: 2019-02-08
Attending: PHYSICIAN ASSISTANT
Payer: MEDICARE

## 2019-02-08 ENCOUNTER — OFFICE VISIT (OUTPATIENT)
Dept: URGENT CARE | Facility: CLINIC | Age: 74
End: 2019-02-08
Payer: MEDICARE

## 2019-02-08 VITALS
WEIGHT: 182.5 LBS | TEMPERATURE: 99.4 F | BODY MASS INDEX: 30.41 KG/M2 | OXYGEN SATURATION: 97 % | DIASTOLIC BLOOD PRESSURE: 82 MMHG | SYSTOLIC BLOOD PRESSURE: 184 MMHG | HEIGHT: 65 IN | HEART RATE: 110 BPM | RESPIRATION RATE: 24 BRPM

## 2019-02-08 DIAGNOSIS — R68.89 FLU-LIKE SYMPTOMS: Primary | ICD-10-CM

## 2019-02-08 DIAGNOSIS — R51.9 CHRONIC NONINTRACTABLE HEADACHE, UNSPECIFIED HEADACHE TYPE: ICD-10-CM

## 2019-02-08 DIAGNOSIS — R06.2 WHEEZING: ICD-10-CM

## 2019-02-08 DIAGNOSIS — G89.29 CHRONIC NONINTRACTABLE HEADACHE, UNSPECIFIED HEADACHE TYPE: ICD-10-CM

## 2019-02-08 LAB
BASOPHILS # BLD AUTO: 0 10*3/UL
BASOPHILS NFR BLD AUTO: 1 % (ref 0–2)
CRP SERPL-MCNC: 1.1 MG/L
EOSINOPHIL # BLD AUTO: 0.1 10*3/UL
EOSINOPHIL NFR BLD AUTO: 1 % (ref 0–3)
ERYTHROCYTE [DISTWIDTH] IN BLOOD BY AUTOMATED COUNT: 12.6 % (ref 11.5–14)
ERYTHROCYTE [SEDIMENTATION RATE] IN BLOOD: 5 MM/HR
FLUAV AG NPH QL IA: NEGATIVE
FLUBV AG NPH QL IA: NEGATIVE
HCT VFR BLD AUTO: 42.6 % (ref 34–45)
HGB BLD-MCNC: 14.8 G/DL (ref 11.5–15.5)
LYMPHOCYTES # BLD AUTO: 0.5 10*3/UL
LYMPHOCYTES NFR BLD AUTO: 9 % (ref 11–47)
MCH RBC QN AUTO: 33.3 PG (ref 28–33)
MCHC RBC AUTO-ENTMCNC: 34.7 G/DL (ref 32–36)
MCV RBC AUTO: 96.1 FL (ref 81–97)
MONOCYTES # BLD AUTO: 0.8 10*3/UL
MONOCYTES NFR BLD AUTO: 14 % (ref 3–11)
NEUTROPHILS # BLD AUTO: 4.1 10*3/UL
NEUTROPHILS NFR BLD AUTO: 75 % (ref 41–81)
PLATELET # BLD AUTO: 165 10*3/UL (ref 140–350)
PMV BLD AUTO: 7.5 FL (ref 6.9–10.8)
RBC # BLD AUTO: 4.43 10*6/ΜL (ref 3.7–5.3)
WBC # BLD AUTO: 5.5 10*3/UL (ref 4.5–10.5)

## 2019-02-08 PROCEDURE — 71046 X-RAY EXAM CHEST 2 VIEWS: CPT

## 2019-02-08 PROCEDURE — 99213 OFFICE O/P EST LOW 20 MIN: CPT | Mod: GF | Performed by: PHYSICIAN ASSISTANT

## 2019-02-08 PROCEDURE — 36415 COLL VENOUS BLD VENIPUNCTURE: CPT | Performed by: PHYSICIAN ASSISTANT

## 2019-02-08 PROCEDURE — 86140 C-REACTIVE PROTEIN: CPT | Performed by: PHYSICIAN ASSISTANT

## 2019-02-08 PROCEDURE — 85025 COMPLETE CBC W/AUTO DIFF WBC: CPT | Performed by: PHYSICIAN ASSISTANT

## 2019-02-08 PROCEDURE — G0463 HOSPITAL OUTPT CLINIC VISIT: HCPCS

## 2019-02-08 PROCEDURE — 87804 INFLUENZA ASSAY W/OPTIC: CPT | Mod: QW | Performed by: PHYSICIAN ASSISTANT

## 2019-02-08 PROCEDURE — 85652 RBC SED RATE AUTOMATED: CPT | Performed by: PHYSICIAN ASSISTANT

## 2019-02-08 NOTE — PATIENT INSTRUCTIONS
Flonase nasal spray 2 sprays each nostril twice daily for no more than 10 days  Zyrtec or Claritin 10 mg twice daily for no more than 10 days  Increase your fluid intake you can take 75-80 ounces of water a day  Of cool mist humidifier at your bedside would be very helpful  Would recommend resting Motrin ibuprofen Tylenol for the body aches  Follow-up in the clinic if not improved in the next 4-5 days or if your condition should worsen  Patient Education     Cough, Adult  A cough helps to clear your throat and lungs. A cough may last only 2-3 weeks (acute), or it may last longer than 8 weeks (chronic). Many different things can cause a cough. A cough may be a sign of an illness or another medical condition.  Follow these instructions at home:  · Pay attention to any changes in your cough.  · Take medicines only as told by your doctor.  ? If you were prescribed an antibiotic medicine, take it as told by your doctor. Do not stop taking it even if you start to feel better.  ? Talk with your doctor before you try using a cough medicine.  · Drink enough fluid to keep your pee (urine) clear or pale yellow.  · If the air is dry, use a cold steam vaporizer or humidifier in your home.  · Stay away from things that make you cough at work or at home.  · If your cough is worse at night, try using extra pillows to raise your head up higher while you sleep.  · Do not smoke, and try not to be around smoke. If you need help quitting, ask your doctor.  · Do not have caffeine.  · Do not drink alcohol.  · Rest as needed.  Contact a doctor if:  · You have new problems (symptoms).  · You cough up yellow fluid (pus).  · Your cough does not get better after 2-3 weeks, or your cough gets worse.  · Medicine does not help your cough and you are not sleeping well.  · You have pain that gets worse or pain that is not helped with medicine.  · You have a fever.  · You are losing weight and you do not know why.  · You have night sweats.  Get help  right away if:  · You cough up blood.  · You have trouble breathing.  · Your heartbeat is very fast.  This information is not intended to replace advice given to you by your health care provider. Make sure you discuss any questions you have with your health care provider.  Document Released: 08/30/2012 Document Revised: 05/25/2017 Document Reviewed: 02/24/2016  [employer] Interactive Patient Education © 2018 [employer]       Patient Education     Viral Respiratory Infection  A viral respiratory infection is an illness that affects parts of the body used for breathing, like the lungs, nose, and throat. It is caused by a germ called a virus.  Some examples of this kind of infection are:  · A cold.  · The flu (influenza).  · A respiratory syncytial virus (RSV) infection.    How do I know if I have this infection?  Most of the time this infection causes:  · A stuffy or runny nose.  · Yellow or green fluid in the nose.  · A cough.  · Sneezing.  · Tiredness (fatigue).  · Achy muscles.  · A sore throat.  · Sweating or chills.  · A fever.  · A headache.    How is this infection treated?  If the flu is diagnosed early, it may be treated with an antiviral medicine. This medicine shortens the length of time a person has symptoms. Symptoms may be treated with over-the-counter and prescription medicines, such as:  · Expectorants. These make it easier to cough up mucus.  · Decongestant nasal sprays.    Doctors do not prescribe antibiotic medicines for viral infections. They do not work with this kind of infection.  How do I know if I should stay home?  To keep others from getting sick, stay home if you have:  · A fever.  · A lasting cough.  · A sore throat.  · A runny nose.  · Sneezing.  · Muscles aches.  · Headaches.  · Tiredness.  · Weakness.  · Chills.  · Sweating.  · An upset stomach (nausea).    Follow these instructions at home:  · Rest as much as possible.  · Take over-the-counter and prescription medicines only as told  by your doctor.  · Drink enough fluid to keep your pee (urine) clear or pale yellow.  · Gargle with salt water. Do this 3-4 times per day or as needed. To make a salt-water mixture, dissolve ½-1 tsp of salt in 1 cup of warm water. Make sure the salt dissolves all the way.  · Use nose drops made from salt water. This helps with stuffiness (congestion). It also helps soften the skin around your nose.  · Do not drink alcohol.  · Do not use tobacco products, including cigarettes, chewing tobacco, and e-cigarettes. If you need help quitting, ask your doctor.  Get help if:  · Your symptoms last for 10 days or longer.  · Your symptoms get worse over time.  · You have a fever.  · You have very bad pain in your face or forehead.  · Parts of your jaw or neck become very swollen.  Get help right away if:  · You feel pain or pressure in your chest.  · You have shortness of breath.  · You faint or feel like you will faint.  · You keep throwing up (vomiting).  · You feel confused.  This information is not intended to replace advice given to you by your health care provider. Make sure you discuss any questions you have with your health care provider.  Document Released: 11/30/2009 Document Revised: 05/25/2017 Document Reviewed: 05/25/2016  [employer] Interactive Patient Education © 2018 [employer]

## 2019-02-08 NOTE — PROGRESS NOTES
Subjective     Sunitha Arrington is a 73 y.o. female who presents with Chief Complaint of cough, shortness of breath and headache    HPI      Patient presents clinic today with a complaint of having difficulty breathing at times since she started having this cough last night  She states that sometimes she can feel her lungs make a whistling sound when she takes a deep breath in  She did not feel like she was feverish at home but does have a low-grade fever here  She is concerned because she has had this headache in her right temporal area since the summertime she is not really sure it ever goes away completely  Patient is also complaining about some body aches she has had a small amount of nausea but no vomiting and no diarrhea  She has had this chest heaviness since the summertime she did go to [place] and had a chemical stress test done which was negative but is never really changed since January 2018  Patient denies any vision changes however she did recently get some new glasses and does not feel that they work as well for her as they should  There is some concern that she may have a new autoimmune disease and she is seeing Dr. Connie Dueñas in [place] on February 22, 2019  She does have a history of red nods syndrome and she feels that is getting worse now it occurs not only outside but inside  She does have a productive cough  She has used lozenges which seems to help somewhat with the sore throat but does not get rid of the cough altogether    Patient Active Problem List   Diagnosis   • Atrioventricular block   • Cardiac pacemaker in situ   • Cerebrovascular accident (CMS/[facility]) ([facility])   • Constipation   • Essential hypertension   • Dyslipidemia   • Gout   • Headache   • Hypokalemia   • Hypertension   • Impaired fasting glucose   • Psoriasis   • Primary insomnia   • Immunocompromised (CMS/[facility]) ([facility])   • Mid sternal chest pain   • Gastroesophageal reflux disease   • Benign paroxysmal positional vertigo   •  Female bladder prolapse   • 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   • Herpes zoster   • Hyperlipidemia   • Inguinal pain   • Lichen sclerosus et atrophicus   • Atrioventricular block   • Thalamic infarction (CMS/[facility]) ([facility])   • Cerebrovascular accident (CMS/[facility]) ([facility])   • RLS (restless legs syndrome)       Past Medical History:   Diagnosis Date   • Actinic keratosis    • CVA (cerebral vascular accident) (CMS/[facility]) ([facility]) 2014   • High degree atrioventricular block     with syncope   • Hyperlipidemia    • Hypertension    • Hypertriglyceridemia    • Psoriasis        Social History     Socioeconomic History   • Marital status:      Spouse name: Not on file   • Number of children: Not on file   • Years of education: Not on file   • Highest education level: Not on file   Social Needs   • Financial resource strain: Not on file   • Food insecurity - worry: Not on file   • Food insecurity - inability: Not on file   • Transportation needs - medical: Not on file   • Transportation needs - non-medical: Not on file   Occupational History     Comment: [employer]   Tobacco Use   • Smoking status: Never Smoker   • Smokeless tobacco: Never Used   Substance and Sexual Activity   • Alcohol use: No   • Drug use: No   • Sexual activity: Defer   Other Topics Concern   • Not on file   Social History Narrative   • Not on file       Family History   Problem Relation Age of Onset   • Stroke Mother 97   • Hypertension Mother    • Dementia Mother    • Other cancer Father 32        secondary malignant neoplasm of lymph node       Allergies   Allergen Reactions   • Apricot      throat pain       Current Outpatient Medications   Medication Sig Dispense Refill   • adalimumab (HUMIRA) 40 mg/0.8 mL syringe kit Inject 0.8 mL (40 mg total) under the skin every 14 (fourteen) days. 6 each 4   • calcium carbonate-vitamin D3 (CALCIUM 500 WITH D) 500 mg(1,250mg) -400 unit tablet Take 1 tablet by mouth daily.     • clopidogrel (PLAVIX) 75 mg  "tablet TAKE 1 TABLET EVERY DAY 90 tablet 1   • isosorbide mononitrate (IMDUR) 30 mg 24 hr tablet Take 30 mg by mouth daily     • losartan (COZAAR) 50 mg tablet Take 1 tablet (50 mg total) by mouth daily. 90 tablet 3   • lysine 500 mg tablet Take 1 tablet every day by oral route in the evening.     • multivitamin with minerals tablet Take 1 tablet by mouth daily.     • omega-3 fatty acids-fish oil (FISH OIL) 340-1,000 mg capsule Take 1 capsule by mouth 2 (two) times a day.     • potassium chloride (K-DUR,KLOR-CON) 10 mEq CR tablet TAKE 2 TABLETS EVERY  tablet 3   • rOPINIRole (REQUIP) 1 mg tablet TAKE 1 TABLET TWICE DAILY 180 tablet 3   • triamcinolone (KENALOG) 0.1 % cream APPLY A THIN LAYER TO THE AFFECTED AREA(S) TOPICALLY 2 TIMES PER DAY 30 g 12   • zolpidem 5 mg tablet, sublingual Place 1 tab/cap under the tongue nightly as needed     • atorvastatin (LIPITOR) 40 mg tablet TAKE 1 TABLET EVERY DAY (Patient not taking: Reported on 2/8/2019) 90 tablet 3   • BIOTIN ORAL Take 1 capsule by mouth daily.     • naproxen sodium 220 mg capsule Take 2 tablets by mouth 2 (two) times a day.       No current facility-administered medications for this visit.      Facility-Administered Medications Ordered in Other Visits   Medication Dose Route Frequency Provider Last Rate Last Dose   • sodium chloride flush 20 mL  20 mL intravenous PRN Kathryn Mujica CNP   20 mL at 05/18/18 0948         Review of Systems  12 point review of systems is negative except as discussed in HPI    Objective   BP (!) 184/82 Comment: just took morning medications  Pulse 110   Temp 37.4 °C (99.4 °F)   Resp 24   Ht 1.651 m (5' 5\")   Wt 82.8 kg (182 lb 8 oz)   SpO2 97%   BMI 30.37 kg/m²     Patient's blood pressure is elevated because she forgot to take her morning meds this morning when she went to town she took her evening meds with her instead of her morning meds so she did not get her blood pressure medication until later this " afternoon    Physical Exam   Constitutional: She is oriented to person, place, and time. She appears well-developed and well-nourished. No distress.   HENT:   Head: Normocephalic and atraumatic.   Right Ear: Tympanic membrane, external ear and ear canal normal.   Left Ear: Tympanic membrane, external ear and ear canal normal.   Nose: Nose normal. Right sinus exhibits no maxillary sinus tenderness and no frontal sinus tenderness. Left sinus exhibits no maxillary sinus tenderness and no frontal sinus tenderness.   Mouth/Throat: Uvula is midline, oropharynx is clear and moist and mucous membranes are normal. No oral lesions. No oropharyngeal exudate, posterior oropharyngeal edema or posterior oropharyngeal erythema. Tonsils are 0 on the right. Tonsils are 0 on the left. No tonsillar exudate.   Pulsations on the temporal arteries bilaterally are equal  No tenderness with palpation over the temporal arteries  No warmth is noted  No bruits are auscultated   Eyes: Conjunctivae and EOM are normal. Pupils are equal, round, and reactive to light. No scleral icterus.   Neck: Normal range of motion. Neck supple.   No carotid bruits are noted   Cardiovascular: Normal rate, regular rhythm and normal heart sounds.   No murmur heard.  Pulmonary/Chest: Effort normal and breath sounds normal. No respiratory distress.   Abdominal: Soft. Bowel sounds are normal. She exhibits no distension and no mass. There is no tenderness.   Musculoskeletal: Normal range of motion. She exhibits no edema.   Lymphadenopathy:     She has no cervical adenopathy.   Neurological: She is alert and oriented to person, place, and time. She exhibits normal muscle tone.   Skin: Skin is warm and dry. Capillary refill takes less than 2 seconds. She is not diaphoretic.   Psychiatric: She has a normal mood and affect. Her behavior is normal. Judgment and thought content normal.   Nursing note and vitals reviewed.        Recent Results (from the past 8 hour(s))    Rapid influenza A/B    Collection Time: 02/08/19  2:01 PM   Result Value Ref Range    Influenza A Ag, EIA Negative Negative    Influenza B Ag, EIA Negative Negative   CBC w/auto differential Blood, Venous    Collection Time: 02/08/19  2:40 PM   Result Value Ref Range    WBC 5.5 4.5 - 10.5 10*3/uL    RBC 4.43 3.70 - 5.30 10*6/µL    Hemoglobin 14.8 11.5 - 15.5 g/dL    Hematocrit 42.6 34.0 - 45.0 %    MCV 96.1 81.0 - 97.0 fL    MCH 33.3 (H) 28.0 - 33.0 pg    MCHC 34.7 32.0 - 36.0 g/dL    RDW 12.6 11.5 - 14.0 %    Platelets 165 140 - 350 10*3/uL    MPV 7.5 6.9 - 10.8 fL    Neutrophils% 75 41 - 81 %    Lymphocytes% 9 (L) 11 - 47 %    Monocytes% 14 (H) 3 - 11 %    Eosinophils% 1 0 - 3 %    Basophils% 1 0 - 2 %    Neutrophils Absolute 4.10 10*3/uL    Lymphocytes Absolute 0.50 10*3/uL    Monocytes Absolute 0.80 10*3/uL    Eosinophils Absolute 0.10 10*3/uL    Basophils Absolute 0.00 10*3/uL   C-reactive protein (Inflammation) Blood, Venous    Collection Time: 02/08/19  2:40 PM   Result Value Ref Range    CRP 1.1 <=10.0 mg/L   Sedimentation rate, automated Blood, Venous    Collection Time: 02/08/19  2:40 PM   Result Value Ref Range    Sed Rate 5 <=20 mm/hr       X-ray Chest 2 Views    Result Date: 2/8/2019  EXAM: Chest X-ray, two views, Upright PA and Lateral from 02/08/2019 . CLINICAL HISTORY: Wheezing; wheezing COMPARISON: Available FINDINGS: There is clear aeration of the lungs with a normal inspiratory level. There is a normal size of the heart and pulmonary vessels. Patient has a dual chamber pacemaker which appears stable in position when compared with 3/28/2018. There is no pleural fluid.     IMPRESSION: No acute abnormality.         Assessment/Plan   Diagnoses and all orders for this visit:    Flu-like symptoms  -     Rapid influenza A/B    Chronic nonintractable headache, unspecified headache type  -     CBC w/auto differential Blood, Venous  -     C-reactive protein (Inflammation) Blood, Venous  -      Sedimentation rate, automated Blood, Venous    Wheezing  -     X-ray chest 2 views        Discussion/Plan:  Discussed x-ray and lab results with patient she verbalized understanding  Inflammatory markers are negative  Encouraged her to follow-up with her primary care provider Dr. Donald to discuss if not showing improvement  Would like for her to follow-up with Dr. Donald to discuss his right temporal artery discomfort that she seems to be having  Flonase nasal spray 2 sprays each nostril twice daily for no more than 10 days  Zyrtec or Claritin 10 mg twice daily for no more than 10 days  Increase your fluid intake you can take 75-80 ounces of water a day  Of cool mist humidifier at your bedside would be very helpful  Would recommend resting Motrin ibuprofen Tylenol for the body aches  Follow-up in the clinic if not improved in the next 4-5 days or if your condition should worsen      A voice recognition program was used to aid in medical record documentation. Sometimes words are printed not exactly as they were spoken. While efforts were made to carefully edit and correct any inaccuracies, some errors may be present. Errors should be taken within the context of the discussion.  Please contact our office if you need assistance interpreting this medical record or notice any mistakes.      JOHN Paulino  02/08/19

## 2019-02-21 NOTE — PROGRESS NOTES
Sunitha is a 73-year-old female with history of positive YEYO was found to have negative SSA and SSB.  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  CK is normal.  SMRNP was normal.  Myomarker 3 panel was normal.  Aldolase was negative.  Antihistone antibodies were also normal.  Patient did have a biopsy performed of lesion over her upper back which showed vascular interface dermatitis with increased mucin.  This could raise possibility of a connective tissue disorder.  The patient does have a history of psoriasis and had been treated with Humira.  She develops lesions initially in December with bumps along her cheek that then spread to her back.  She had good control of her symptoms with Humira and has been on it for several years. Sunitha has had raynauds symptoms for  20+ years, she does wear gloves. She has not noticed raynauds symptoms anywhere else.  She does have a chest tightness that she has had several months. She did complete a chemical stress test which came back normal.  Echocardiogram was also completed which was essentially unremarkable.  The past couple of nights she is noticing more chest pressure. This is something she notices mainly at night. Exertion does not make a difference with this at all. She does get pain in her hands along the second metacarpal in the left hand.  She is unable to describe any particular activity this occurs with.  She does not note actual joint swelling.  She has also had some difficulty with the right knee just starting today and does note some chronic pain over second and third toes after her stroke.  No troubles swallowing, she does occasionally have problems with heartburn. Constipation is a problem for her. Her right knee did start hurting the other day. No problems with pregnancy in the past or getting pregnant. It feels tight when she tries to take a deep breath. No kidney problems that she knows of. She had itching when she got this rash and was  given prednisone which she feels really helped with the itching.  She has not had previous history of blood clots other than history of stroke.  She has not noted recurrence of photosensitive rash.  She does have a history of psoriasis but has not had other rash.  She does not note mouth sores or canker sores.  Occasionally does note dry eyes.      allergies:  Apricot    History:  Past Medical History:   Diagnosis Date   • Actinic keratosis    • CVA (cerebral vascular accident) (CMS/HCC) ([facility])    • High degree atrioventricular block     with syncope   • Hyperlipidemia    • Hypertension    • Hypertriglyceridemia    • Psoriasis      Past Surgical History:   Procedure Laterality Date   • CARDIAC CATHETERIZATION     • CARDIAC PACEMAKER PLACEMENT      Dual chamber pacemaker implantation   • CARDIAC SURGERY  2014   •  SECTION  1977    [place]   • COLONOSCOPY  2016    Rare sigmoid diverticula. Physiologic internal hemorrhoids, otherwise normal exam   • COLONOSCOPY  2008   • COLPORRHAPHY      and rectocele repair   • TOTAL ABDOMINAL HYSTERECTOMY W/ BILATERAL SALPINGOOPHORECTOMY      [id number] [facility]     Social History     Socioeconomic History   • Marital status:      Spouse name: Not on file   • Number of children: Not on file   • Years of education: Not on file   • Highest education level: Not on file   Social Needs   • Financial resource strain: Not on file   • Food insecurity - worry: Not on file   • Food insecurity - inability: Not on file   • Transportation needs - medical: Not on file   • Transportation needs - non-medical: Not on file   Occupational History     Comment: [employer]   Tobacco Use   • Smoking status: Never Smoker   • Smokeless tobacco: Never Used   Substance and Sexual Activity   • Alcohol use: No   • Drug use: No   • Sexual activity: Defer   Other Topics Concern   • Not on file   Social History Narrative   • Not on file        Review of Systems   Constitutional: Positive for chills, fatigue and fever.   HENT: Positive for sore throat. Negative for ear pain.         Dry mouth    Eyes: Negative for pain and visual disturbance.        Dry eyes   Respiratory: Positive for cough, chest tightness, shortness of breath and wheezing.    Cardiovascular: Positive for chest pain. Negative for palpitations.   Gastrointestinal: Positive for abdominal distention and constipation. Negative for abdominal pain and vomiting.        Heartburn   Endocrine: Positive for cold intolerance.   Genitourinary: Positive for difficulty urinating. Negative for hematuria.   Musculoskeletal: Positive for back pain and neck stiffness. Negative for arthralgias.   Skin: Negative for color change and rash.   Allergic/Immunologic: Positive for immunocompromised state.   Neurological: Positive for weakness, numbness and headaches. Negative for seizures and syncope.   Psychiatric/Behavioral: Positive for confusion and decreased concentration.   All other systems reviewed and are negative.    14 point ROS otherwise -.  Objective:  Vitals:    02/22/19 1451   BP: 175/77   BP Location: Left arm   Patient Position: Sitting   Cuff Size: Regular   Pulse: 75   Resp: 12   Weight: 82.2 kg (181 lb 3.2 oz)       Physical Exam   Constitutional: She is oriented to person, place, and time. She appears well-developed and well-nourished.   HENT:   Head: Normocephalic.   Nose: Nose normal.   Mouth/Throat: Oropharynx is clear and moist.   No lesions, teeth,lips and gums normal.   Eyes: Pupils are equal, round, and reactive to light. Conjunctivae are normal.   Fundoscopic exam benign    Neck: Normal range of motion. Neck supple.   No thyromegaly or lymphadenopathy    Cardiovascular: Normal rate, regular rhythm, normal heart sounds and intact distal pulses.   No murmur heard.  Pulmonary/Chest: Effort normal and breath sounds normal.   Abdominal: Soft. Bowel sounds are normal.   No  hepatosplenomegaly noted.    Musculoskeletal: Normal range of motion. She exhibits edema (trace bilaterally ).   Shoulders, elbows, wrists, MCPs, PIPs were unremarkable without tenderness, synovitis or limitation range of motion.  There is no tenderness on palpation of the greater trochanters.  Hips show good range of motion.  Knees ankles and MTPs were unremarkable without synovitis, tenderness or limitation range of motion.  There is no tenderness on palpation along thoracic or lumbar spine.  There is no tenderness on palpation over SI joints.      Exceptions include- Tenderness over 1st MCP left hand. Heberdens and bouchards nodes present right hand. Tender to squeeze across MTPs left foot. Tender over medial joint line on the right. Crepitus present left knee.    Neurological: She is alert and oriented to person, place, and time.   Deep tendon reflexes present at knees and ankles.   Positive tinels and phalens right hand.   Positive tinels and phalens left hand.    Skin: Skin is warm and dry.   Couple telangiectasia present on face. Few psoriatic patches present over forearms.    Psychiatric: She has a normal mood and affect. Her behavior is normal.     Joint Exam     Not documented       Medications:  Current Outpatient Medications on File Prior to Visit   Medication Sig Dispense Refill   • adalimumab (HUMIRA) 40 mg/0.8 mL syringe kit Inject 0.8 mL (40 mg total) under the skin every 14 (fourteen) days. 6 each 4   • atorvastatin (LIPITOR) 40 mg tablet TAKE 1 TABLET EVERY DAY (Patient not taking: Reported on 2/8/2019) 90 tablet 3   • BIOTIN ORAL Take 1 capsule by mouth daily.     • calcium carbonate-vitamin D3 (CALCIUM 500 WITH D) 500 mg(1,250mg) -400 unit tablet Take 1 tablet by mouth daily.     • clopidogrel (PLAVIX) 75 mg tablet TAKE 1 TABLET EVERY DAY 90 tablet 1   • isosorbide mononitrate (IMDUR) 30 mg 24 hr tablet Take 30 mg by mouth daily     • losartan (COZAAR) 50 mg tablet Take 1 tablet (50 mg total) by  mouth daily. 90 tablet 3   • lysine 500 mg tablet Take 1 tablet every day by oral route in the evening.     • multivitamin with minerals tablet Take 1 tablet by mouth daily.     • naproxen sodium 220 mg capsule Take 2 tablets by mouth 2 (two) times a day.     • omega-3 fatty acids-fish oil (FISH OIL) 340-1,000 mg capsule Take 1 capsule by mouth 2 (two) times a day.     • potassium chloride (K-DUR,KLOR-CON) 10 mEq CR tablet TAKE 2 TABLETS EVERY  tablet 3   • rOPINIRole (REQUIP) 1 mg tablet TAKE 1 TABLET TWICE DAILY 180 tablet 3   • triamcinolone (KENALOG) 0.1 % cream APPLY A THIN LAYER TO THE AFFECTED AREA(S) TOPICALLY 2 TIMES PER DAY 30 g 12   • zolpidem 5 mg tablet, sublingual Place 1 tab/cap under the tongue nightly as needed       Current Facility-Administered Medications on File Prior to Visit   Medication Dose Route Frequency Provider Last Rate Last Dose   • sodium chloride flush 20 mL  20 mL intravenous PRN Kathryn Mujica CNP   20 mL at 05/18/18 0948         Labs:  CBC:     Lab Results   Component Value Date    WBC 5.5 02/08/2019    RBC 4.43 02/08/2019    HGB 14.8 02/08/2019    HCT 42.6 02/08/2019     02/08/2019     CMP:   Lab Results   Component Value Date     02/14/2018    K 4.0 02/14/2018     02/14/2018    CO2 26 02/14/2018    GLUCOSE 120 (H) 02/14/2018    CREATININE 0.8 02/14/2018    CALCIUM 9.4 02/14/2018    ALBUMIN 4.1 12/18/2017    ALKPHOS 67 12/18/2017    BILITOT 0.49 12/18/2017    ALT 20 12/18/2017    AST 20 12/18/2017    BUN 18 02/14/2018    ANIONGAP 7 02/14/2018     ESR/CRP:   Lab Results   Component Value Date    SEDRATE 5 02/08/2019    CRP 1.1 02/08/2019     Lab Results   Component Value Date    TBGOLD Negative 08/06/2018       Assessment/Plan:    Diagnoses and all orders for this visit:    Psoriasis    Positive YEYO (antinuclear antibody)  -     Ambulatory referral to Rheumatology  -     C3 complement Blood, Venous; Future  -     C4 complement Blood, Venous; Future  -      Urinalysis w/microscopic, reflex culture Urine, Clean Catch; Future  -     Extractable Nuclear Antigens (ALYSA) 11 PANEL Blood, Venous; Future  -     Thyroid peroxidase antibody Blood, Venous; Future  -     Phospholipid (Cardiolipin) antibody, IgG & IgM Blood, Venous; Future    Fatigue, unspecified type  -     Thyroid Stimulating Hormone, Ultrasensitive Blood, Venous; Future    Medication management    Discussed with Sunitha that with Humira a lot of times you will see a positive YEYO and even borderline positive double-stranded DNA.  This is common with this particular medication.. Reviewed recent labs done by dermatology. She does not have a lot of other symptoms that suggest she has anything else autoimmune going on in the rash that was biopsied now seems to have resolved.  We did go ahead and look for any additional labs.. At this time I think she can continue to Humira at current dose.  That Humira has been effective for her and she is not noted other adverse effects.    She does have some degenerative or wear and tear changes in her hands, hip and knee. As long as she does not have a lot of pain I would not recommend doing anything at this time. Discussed that weight reduction would be helpful. Pool therapy would also be beneficial.     She does note sometimes she feels like memory is not as good.  We discussed the importance of making sure that she has good blood flow to the brain.  We discussed the importance of exercise, controlling cholesterol, controlling blood sugar and blood pressure.  Ira blood pressure was elevated today. Would recommend she discuss this with Dr. Donald if it continues to remain elevated. She has been having problems remembering things, discussed that anything that effects blood flow to the brain can cause these problems.     I would like to get some additional labs today to include a C3, C4, ALYSA and UA.     Follow up with Dr. Dueñas as needed at this time.  We did discuss that she  has additional positive lab test we may suggest further evaluation here    Discussed all in detail patient voiced understanding and is in agreement with plan.    A voice recognition program was used to aid in documentation of this record.  Sometimes words are not printed exactly as they were spoken.  While efforts were made to carefully edit and correct any inaccuracies, some errors may be present; please take these into context.  Please contact the provider if areas are identified.    Scribed by:   Caroline Padgett LPN    3:08 PM, 2/22/2019

## 2019-02-22 ENCOUNTER — OFFICE VISIT (OUTPATIENT)
Dept: RHEUMATOLOGY | Facility: CLINIC | Age: 74
End: 2019-02-22
Payer: MEDICARE

## 2019-02-22 ENCOUNTER — APPOINTMENT (OUTPATIENT)
Dept: LAB | Facility: CLINIC | Age: 74
End: 2019-02-22
Payer: MEDICARE

## 2019-02-22 VITALS
RESPIRATION RATE: 12 BRPM | BODY MASS INDEX: 30.15 KG/M2 | SYSTOLIC BLOOD PRESSURE: 175 MMHG | WEIGHT: 181.2 LBS | HEART RATE: 75 BPM | DIASTOLIC BLOOD PRESSURE: 77 MMHG

## 2019-02-22 DIAGNOSIS — L40.9 PSORIASIS: Primary | Chronic | ICD-10-CM

## 2019-02-22 DIAGNOSIS — R53.83 FATIGUE, UNSPECIFIED TYPE: ICD-10-CM

## 2019-02-22 DIAGNOSIS — Z79.899 MEDICATION MANAGEMENT: ICD-10-CM

## 2019-02-22 DIAGNOSIS — R76.8 POSITIVE ANA (ANTINUCLEAR ANTIBODY): ICD-10-CM

## 2019-02-22 LAB
BACTERIA #/AREA URNS HPF: ABNORMAL /HPF
BILIRUB UR QL: NEGATIVE
C3 SERPL-MCNC: 119 MG/DL (ref 87–200)
C4 SERPL-MCNC: 18 MG/DL (ref 19–52)
CLARITY UR: CLEAR
COLOR UR: YELLOW
GLUCOSE UR QL: NEGATIVE MG/DL
HGB UR QL: ABNORMAL
KETONES UR-MCNC: NEGATIVE MG/DL
LEUKOCYTE ESTERASE UR QL STRIP: ABNORMAL
NITRITE UR QL: NEGATIVE
PH UR: 5.5 PH
PROT UR STRIP-MCNC: NEGATIVE MG/DL
RBC #/AREA URNS HPF: ABNORMAL /HPF
SP GR UR: 1.01 (ref 1–1.03)
SQUAMOUS #/AREA URNS HPF: ABNORMAL /HPF
THYROPEROXIDASE AB SERPL-ACNC: 1.1 IU/ML
TSH SERPL DL<=0.05 MIU/L-ACNC: 1.44 UIU/ML (ref 0.34–4.82)
UROBILINOGEN UR-MCNC: 0.2 E.U./DL
WBC #/AREA URNS HPF: ABNORMAL /HPF

## 2019-02-22 PROCEDURE — 86160 COMPLEMENT ANTIGEN: CPT

## 2019-02-22 PROCEDURE — G0463 HOSPITAL OUTPT CLINIC VISIT: HCPCS | Mod: PO | Performed by: INTERNAL MEDICINE

## 2019-02-22 PROCEDURE — 36415 COLL VENOUS BLD VENIPUNCTURE: CPT | Mod: PO

## 2019-02-22 PROCEDURE — 86147 CARDIOLIPIN ANTIBODY EA IG: CPT

## 2019-02-22 PROCEDURE — 86376 MICROSOMAL ANTIBODY EACH: CPT

## 2019-02-22 PROCEDURE — 86235 NUCLEAR ANTIGEN ANTIBODY: CPT

## 2019-02-22 PROCEDURE — 99204 OFFICE O/P NEW MOD 45 MIN: CPT | Performed by: INTERNAL MEDICINE

## 2019-02-22 PROCEDURE — 87088 URINE BACTERIA CULTURE: CPT

## 2019-02-22 PROCEDURE — 84443 ASSAY THYROID STIM HORMONE: CPT | Mod: PO

## 2019-02-22 PROCEDURE — 81001 URINALYSIS AUTO W/SCOPE: CPT | Mod: PO

## 2019-02-22 ASSESSMENT — ENCOUNTER SYMPTOMS
WHEEZING: 1
VOMITING: 0
SEIZURES: 0
EYE PAIN: 0
PALPITATIONS: 0
ABDOMINAL DISTENTION: 1
CONFUSION: 1
CONSTIPATION: 1
HEADACHES: 1
NECK STIFFNESS: 1
CHEST TIGHTNESS: 1
SHORTNESS OF BREATH: 1
FATIGUE: 1
HEMATURIA: 0
ABDOMINAL PAIN: 0
DIFFICULTY URINATING: 1
ROS GI COMMENTS: HEARTBURN
BACK PAIN: 1
CHILLS: 1
COUGH: 1
COLOR CHANGE: 0
FEVER: 1
NUMBNESS: 1
ARTHRALGIAS: 0
SORE THROAT: 1
WEAKNESS: 1
DECREASED CONCENTRATION: 1

## 2019-02-22 ASSESSMENT — PAIN SCALES - GENERAL: PAINLEVEL: 5

## 2019-02-22 NOTE — LETTER
MEDICAL CLINIC RHEUMATOLOGY  [address] [facility] [place] [id number] [phone]  Dept: [phone]  February 22, 2019     Rowan Estrada MD  [address] [place] [address]    Patient: Sunitha Arrington   YOB: 1945   Date of Visit: 2/22/2019       To:  Rowan Estrada MD    Our mutual patient, Sunitha Arrington, was seen in my office on 2/22/2019. Below are my notes.     Connie Dueñas MD  2/22/2019  5:05 PM  Signed  Sunitha is a 73-year-old female with history of positive YEYO was found to have negative SSA and SSB.  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  CK is normal.  SMRNP was normal.  Myomarker 3 panel was normal.  Aldolase was negative.  Antihistone antibodies were also normal.  Patient did have a biopsy performed of lesion over her upper back which showed vascular interface dermatitis with increased mucin.  This could raise possibility of a connective tissue disorder.  The patient does have a history of psoriasis and had been treated with Humira.  She develops lesions initially in December with bumps along her cheek that then spread to her back.  She had good control of her symptoms with Humira and has been on it for several years. Sunitha has had raynauds symptoms for  20+ years, she does wear gloves. She has not noticed raynauds symptoms anywhere else.  She does have a chest tightness that she has had several months. She did complete a chemical stress test which came back normal.  Echocardiogram was also completed which was essentially unremarkable.  The past couple of nights she is noticing more chest pressure. This is something she notices mainly at night. Exertion does not make a difference with this at all. She does get pain in her hands along the second metacarpal in the left hand.  She is unable to describe any particular activity this occurs with.  She does not note actual joint swelling.  She has also had some difficulty with the right  knee just starting today and does note some chronic pain over second and third toes after her stroke.  No troubles swallowing, she does occasionally have problems with heartburn. Constipation is a problem for her. Her right knee did start hurting the other day. No problems with pregnancy in the past or getting pregnant. It feels tight when she tries to take a deep breath. No kidney problems that she knows of. She had itching when she got this rash and was given prednisone which she feels really helped with the itching.  She has not had previous history of blood clots other than history of stroke.  She has not noted recurrence of photosensitive rash.  She does have a history of psoriasis but has not had other rash.  She does not note mouth sores or canker sores.  Occasionally does note dry eyes.      allergies:  Apricot    History:  Past Medical History:   Diagnosis Date   • Actinic keratosis    • CVA (cerebral vascular accident) (CMS/HCC) ([facility])    • High degree atrioventricular block     with syncope   • Hyperlipidemia    • Hypertension    • Hypertriglyceridemia    • Psoriasis      Past Surgical History:   Procedure Laterality Date   • CARDIAC CATHETERIZATION     • CARDIAC PACEMAKER PLACEMENT      Dual chamber pacemaker implantation   • CARDIAC SURGERY  2014   •  SECTION  1977    [place]   • COLONOSCOPY  2016    Rare sigmoid diverticula. Physiologic internal hemorrhoids, otherwise normal exam   • COLONOSCOPY  2008   • COLPORRHAPHY      and rectocele repair   • TOTAL ABDOMINAL HYSTERECTOMY W/ BILATERAL SALPINGOOPHORECTOMY      [id number] [facility]     Social History     Socioeconomic History   • Marital status:      Spouse name: Not on file   • Number of children: Not on file   • Years of education: Not on file   • Highest education level: Not on file   Social Needs   • Financial resource strain: Not on file   • Food insecurity - worry: Not on  file   • Food insecurity - inability: Not on file   • Transportation needs - medical: Not on file   • Transportation needs - non-medical: Not on file   Occupational History     Comment: [employer]   Tobacco Use   • Smoking status: Never Smoker   • Smokeless tobacco: Never Used   Substance and Sexual Activity   • Alcohol use: No   • Drug use: No   • Sexual activity: Defer   Other Topics Concern   • Not on file   Social History Narrative   • Not on file       Review of Systems   Constitutional: Positive for chills, fatigue and fever.   HENT: Positive for sore throat. Negative for ear pain.         Dry mouth    Eyes: Negative for pain and visual disturbance.        Dry eyes   Respiratory: Positive for cough, chest tightness, shortness of breath and wheezing.    Cardiovascular: Positive for chest pain. Negative for palpitations.   Gastrointestinal: Positive for abdominal distention and constipation. Negative for abdominal pain and vomiting.        Heartburn   Endocrine: Positive for cold intolerance.   Genitourinary: Positive for difficulty urinating. Negative for hematuria.   Musculoskeletal: Positive for back pain and neck stiffness. Negative for arthralgias.   Skin: Negative for color change and rash.   Allergic/Immunologic: Positive for immunocompromised state.   Neurological: Positive for weakness, numbness and headaches. Negative for seizures and syncope.   Psychiatric/Behavioral: Positive for confusion and decreased concentration.   All other systems reviewed and are negative.    14 point ROS otherwise -.  Objective:  Vitals:    02/22/19 1451   BP: 175/77   BP Location: Left arm   Patient Position: Sitting   Cuff Size: Regular   Pulse: 75   Resp: 12   Weight: 82.2 kg (181 lb 3.2 oz)       Physical Exam   Constitutional: She is oriented to person, place, and time. She appears well-developed and well-nourished.   HENT:   Head: Normocephalic.   Nose: Nose normal.   Mouth/Throat: Oropharynx is clear and moist.    No lesions, teeth,lips and gums normal.   Eyes: Pupils are equal, round, and reactive to light. Conjunctivae are normal.   Fundoscopic exam benign    Neck: Normal range of motion. Neck supple.   No thyromegaly or lymphadenopathy    Cardiovascular: Normal rate, regular rhythm, normal heart sounds and intact distal pulses.   No murmur heard.  Pulmonary/Chest: Effort normal and breath sounds normal.   Abdominal: Soft. Bowel sounds are normal.   No hepatosplenomegaly noted.    Musculoskeletal: Normal range of motion. She exhibits edema (trace bilaterally ).   Shoulders, elbows, wrists, MCPs, PIPs were unremarkable without tenderness, synovitis or limitation range of motion.  There is no tenderness on palpation of the greater trochanters.  Hips show good range of motion.  Knees ankles and MTPs were unremarkable without synovitis, tenderness or limitation range of motion.  There is no tenderness on palpation along thoracic or lumbar spine.  There is no tenderness on palpation over SI joints.      Exceptions include- Tenderness over 1st MCP left hand. Heberdens and bouchards nodes present right hand. Tender to squeeze across MTPs left foot. Tender over medial joint line on the right. Crepitus present left knee.    Neurological: She is alert and oriented to person, place, and time.   Deep tendon reflexes present at knees and ankles.   Positive tinels and phalens right hand.   Positive tinels and phalens left hand.    Skin: Skin is warm and dry.   Couple telangiectasia present on face. Few psoriatic patches present over forearms.    Psychiatric: She has a normal mood and affect. Her behavior is normal.     Joint Exam     Not documented       Medications:  Current Outpatient Medications on File Prior to Visit   Medication Sig Dispense Refill   • adalimumab (HUMIRA) 40 mg/0.8 mL syringe kit Inject 0.8 mL (40 mg total) under the skin every 14 (fourteen) days. 6 each 4   • atorvastatin (LIPITOR) 40 mg tablet TAKE 1 TABLET  EVERY DAY (Patient not taking: Reported on 2/8/2019) 90 tablet 3   • BIOTIN ORAL Take 1 capsule by mouth daily.     • calcium carbonate-vitamin D3 (CALCIUM 500 WITH D) 500 mg(1,250mg) -400 unit tablet Take 1 tablet by mouth daily.     • clopidogrel (PLAVIX) 75 mg tablet TAKE 1 TABLET EVERY DAY 90 tablet 1   • isosorbide mononitrate (IMDUR) 30 mg 24 hr tablet Take 30 mg by mouth daily     • losartan (COZAAR) 50 mg tablet Take 1 tablet (50 mg total) by mouth daily. 90 tablet 3   • lysine 500 mg tablet Take 1 tablet every day by oral route in the evening.     • multivitamin with minerals tablet Take 1 tablet by mouth daily.     • naproxen sodium 220 mg capsule Take 2 tablets by mouth 2 (two) times a day.     • omega-3 fatty acids-fish oil (FISH OIL) 340-1,000 mg capsule Take 1 capsule by mouth 2 (two) times a day.     • potassium chloride (K-DUR,KLOR-CON) 10 mEq CR tablet TAKE 2 TABLETS EVERY  tablet 3   • rOPINIRole (REQUIP) 1 mg tablet TAKE 1 TABLET TWICE DAILY 180 tablet 3   • triamcinolone (KENALOG) 0.1 % cream APPLY A THIN LAYER TO THE AFFECTED AREA(S) TOPICALLY 2 TIMES PER DAY 30 g 12   • zolpidem 5 mg tablet, sublingual Place 1 tab/cap under the tongue nightly as needed       Current Facility-Administered Medications on File Prior to Visit   Medication Dose Route Frequency Provider Last Rate Last Dose   • sodium chloride flush 20 mL  20 mL intravenous PRN Kathryn Mujica CNP   20 mL at 05/18/18 0948         Labs:  CBC:     Lab Results   Component Value Date    WBC 5.5 02/08/2019    RBC 4.43 02/08/2019    HGB 14.8 02/08/2019    HCT 42.6 02/08/2019     02/08/2019     CMP:   Lab Results   Component Value Date     02/14/2018    K 4.0 02/14/2018     02/14/2018    CO2 26 02/14/2018    GLUCOSE 120 (H) 02/14/2018    CREATININE 0.8 02/14/2018    CALCIUM 9.4 02/14/2018    ALBUMIN 4.1 12/18/2017    ALKPHOS 67 12/18/2017    BILITOT 0.49 12/18/2017    ALT 20 12/18/2017    AST 20 12/18/2017    BUN 18  02/14/2018    ANIONGAP 7 02/14/2018     ESR/CRP:   Lab Results   Component Value Date    SEDRATE 5 02/08/2019    CRP 1.1 02/08/2019     Lab Results   Component Value Date    TBGOLD Negative 08/06/2018       Assessment/Plan:    Diagnoses and all orders for this visit:    Psoriasis    Positive YEYO (antinuclear antibody)  -     Ambulatory referral to Rheumatology  -     C3 complement Blood, Venous; Future  -     C4 complement Blood, Venous; Future  -     Urinalysis w/microscopic, reflex culture Urine, Clean Catch; Future  -     Extractable Nuclear Antigens (ALYSA) 11 PANEL Blood, Venous; Future  -     Thyroid peroxidase antibody Blood, Venous; Future  -     Phospholipid (Cardiolipin) antibody, IgG & IgM Blood, Venous; Future    Fatigue, unspecified type  -     Thyroid Stimulating Hormone, Ultrasensitive Blood, Venous; Future    Medication management    Discussed with Sunitha that with Humira a lot of times you will see a positive YEYO and even borderline positive double-stranded DNA.  This is common with this particular medication.. Reviewed recent labs done by dermatology. She does not have a lot of other symptoms that suggest she has anything else autoimmune going on in the rash that was biopsied now seems to have resolved.  We did go ahead and look for any additional labs.. At this time I think she can continue to Humira at current dose.  That Humira has been effective for her and she is not noted other adverse effects.    She does have some degenerative or wear and tear changes in her hands, hip and knee. As long as she does not have a lot of pain I would not recommend doing anything at this time. Discussed that weight reduction would be helpful. Pool therapy would also be beneficial.     She does note sometimes she feels like memory is not as good.  We discussed the importance of making sure that she has good blood flow to the brain.  We discussed the importance of exercise, controlling cholesterol, controlling blood  sugar and blood pressure.  Ira blood pressure was elevated today. Would recommend she discuss this with Dr. Donald if it continues to remain elevated. She has been having problems remembering things, discussed that anything that effects blood flow to the brain can cause these problems.     I would like to get some additional labs today to include a C3, C4, ALYSA and UA.     Follow up with Dr. Dueñas as needed at this time.  We did discuss that she has additional positive lab test we may suggest further evaluation here    Discussed all in detail patient voiced understanding and is in agreement with plan.    A voice recognition program was used to aid in documentation of this record.  Sometimes words are not printed exactly as they were spoken.  While efforts were made to carefully edit and correct any inaccuracies, some errors may be present; please take these into context.  Please contact the provider if areas are identified.    Scribed by:   Caroline Padgett LPN    3:08 PM, 2/22/2019                       If you have questions, please do not hesitate to call me. I look forward to following your patient along with you.         Sincerely,        Connie Dueñas MD        CC: Hilda Donald MD

## 2019-02-22 NOTE — PATIENT INSTRUCTIONS
Continue Humira at current dose. We will get some additional labs today. Monitor blood pressure, if it remains elevated discuss with your primary care provider. At this time follow up with Dr. Dueñas as needed.

## 2019-02-23 LAB
CENTROMERE B AB SER-ACNC: <0.2 AI
CHROMATIN AB, MIA, INDEX: <0.2 AI
DSDNA IGG SERPL IA-ACNC: 24 IU/ML
ENA JO1 IGG SER-ACNC: <0.2 AI
ENA RNP IGG SER-ACNC: <0.2 AI
ENA SCL70 IGG SER IA-ACNC: <0.2 AI
ENA SM IGG SER-ACNC: <0.2 AI
ENA SM+RNP AB SER-ACNC: <0.2 AI
ENA SS-A AB SER-ACNC: <0.2 AI
ENA SS-B AB SER-ACNC: <0.2 AI
RIBOSOMAL P IGG SER-ACNC: <0.2 AI

## 2019-02-24 LAB — BACTERIA UR CULT: NORMAL

## 2019-02-25 ENCOUNTER — TELEPHONE (OUTPATIENT)
Dept: RHEUMATOLOGY | Facility: CLINIC | Age: 74
End: 2019-02-25

## 2019-02-25 DIAGNOSIS — Z51.81 MEDICATION MONITORING ENCOUNTER: ICD-10-CM

## 2019-02-25 DIAGNOSIS — L40.9 PSORIASIS: ICD-10-CM

## 2019-02-25 DIAGNOSIS — R76.8 POSITIVE ANA (ANTINUCLEAR ANTIBODY): Primary | ICD-10-CM

## 2019-02-25 NOTE — TELEPHONE ENCOUNTER
----- Message from Connie Dueñas MD sent at 2/25/2019  8:10 AM [facility] -----  Please schedule for a 6 month follow up

## 2019-02-27 LAB
CARDIOLIPIN IGG SER IA-ACNC: 12.9 GPL
CARDIOLIPIN IGM SER IA-ACNC: <9.4 MPL

## 2019-03-19 ENCOUNTER — TELEPHONE (OUTPATIENT)
Dept: FAMILY MEDICINE | Facility: CLINIC | Age: 74
End: 2019-03-19

## 2019-03-27 ENCOUNTER — OFFICE VISIT (OUTPATIENT)
Dept: URGENT CARE | Facility: CLINIC | Age: 74
End: 2019-03-27
Payer: MEDICARE

## 2019-03-27 VITALS
OXYGEN SATURATION: 98 % | RESPIRATION RATE: 16 BRPM | WEIGHT: 181 LBS | SYSTOLIC BLOOD PRESSURE: 170 MMHG | HEART RATE: 77 BPM | BODY MASS INDEX: 30.12 KG/M2 | DIASTOLIC BLOOD PRESSURE: 80 MMHG

## 2019-03-27 DIAGNOSIS — L90.0 LICHEN SCLEROSUS ET ATROPHICUS: ICD-10-CM

## 2019-03-27 DIAGNOSIS — I63.9 CEREBROVASCULAR ACCIDENT (CVA), UNSPECIFIED MECHANISM (CMS/HCC): ICD-10-CM

## 2019-03-27 DIAGNOSIS — R35.0 URINARY FREQUENCY: ICD-10-CM

## 2019-03-27 DIAGNOSIS — N81.10 FEMALE BLADDER PROLAPSE: ICD-10-CM

## 2019-03-27 DIAGNOSIS — I10 ESSENTIAL HYPERTENSION: Primary | Chronic | ICD-10-CM

## 2019-03-27 DIAGNOSIS — Z95.0 CARDIAC PACEMAKER IN SITU: Chronic | ICD-10-CM

## 2019-03-27 LAB
BACTERIA #/AREA URNS HPF: NORMAL /HPF
BILIRUB UR QL: NEGATIVE
BILIRUBIN, POC: NEGATIVE
BLOOD URINE, POC: NEGATIVE
CLARITY UR: CLEAR
COLOR UR: YELLOW
GLUCOSE UR QL: NEGATIVE MG/DL
GLUCOSE URINE, POC: NEGATIVE MG/DL
HGB UR QL: NEGATIVE
KETONES UR-MCNC: NEGATIVE MG/DL
KETONES, POC: NEGATIVE MG/DL
LEUKOCYTE EST, POC: NEGATIVE
LEUKOCYTE ESTERASE UR QL STRIP: NEGATIVE
NITRITE UR QL: NEGATIVE
NITRITE, POC: NEGATIVE
PH UR: 6 PH
POC PH URINE: 6 PH (ref 5–9)
POC SPECIFIC GRAVITY: 1.01 (ref 1–1.03)
PROT UR STRIP-MCNC: NEGATIVE MG/DL
PROTEIN, POC: NEGATIVE MG/DL
RBC #/AREA URNS HPF: NORMAL /HPF
SP GR UR: 1.01 (ref 1–1.03)
SQUAMOUS #/AREA URNS HPF: NORMAL /HPF
UROBILINOGEN UR-MCNC: 0.2 E.U./DL
UROBILINOGEN, POC: 0.2
WBC #/AREA URNS HPF: NORMAL /HPF

## 2019-03-27 PROCEDURE — 99213 OFFICE O/P EST LOW 20 MIN: CPT | Performed by: FAMILY MEDICINE

## 2019-03-27 PROCEDURE — 81001 URINALYSIS AUTO W/SCOPE: CPT | Performed by: FAMILY MEDICINE

## 2019-03-27 PROCEDURE — G0463 HOSPITAL OUTPT CLINIC VISIT: HCPCS

## 2019-03-27 PROCEDURE — 81002 URINALYSIS NONAUTO W/O SCOPE: CPT | Performed by: FAMILY MEDICINE

## 2019-03-27 RX ORDER — LOSARTAN POTASSIUM 100 MG/1
100 TABLET ORAL DAILY
Qty: 90 TABLET | Refills: 3 | Status: SHIPPED | OUTPATIENT
Start: 2019-03-27 | End: 2019-12-30

## 2019-03-27 RX ORDER — TRIAMCINOLONE ACETONIDE 1 MG/G
OINTMENT TOPICAL
COMMUNITY
Start: 2019-03-01 | End: 2019-04-03 | Stop reason: SDUPTHER

## 2019-03-27 ASSESSMENT — ENCOUNTER SYMPTOMS
BACK PAIN: 1
FREQUENCY: 1
FATIGUE: 1

## 2019-03-27 ASSESSMENT — PAIN SCALES - GENERAL: PAINLEVEL: 3

## 2019-03-27 NOTE — PROGRESS NOTES
Patient Instructions   Pay attention to your diet.  Increase your Losartan to 100mg daily  Patient Education     Interstitial Cystitis  Interstitial cystitis is a condition that causes inflammation of the bladder. The bladder is a hollow organ in the lower part of your abdomen. It stores urine after the urine is made by your kidneys. With interstitial cystitis, you may have pain in the bladder area. You may also have a frequent and urgent need to urinate.  The severity of interstitial cystitis can vary from person to person. You may have flare-ups of the condition, and then it may go away for a while. For many people who have this condition, it becomes a long-term problem.  What are the causes?  The cause of this condition is not known.  What increases the risk?  This condition is more likely to develop in women.  What are the signs or symptoms?  Symptoms of interstitial cystitis vary, and they can change over time. Symptoms may include:  · Discomfort or pain in the bladder area. This can range from mild to severe. The pain may change in intensity as the bladder fills with urine or as it empties.  · Pelvic pain.  · An urgent need to urinate.  · Frequent urination.  · Pain during sexual intercourse.  · Pinpoint bleeding on the bladder wall.    For women, the symptoms often get worse during menstruation.  How is this diagnosed?  This condition is diagnosed by evaluating your symptoms and ruling out other causes. A physical exam will be done. Various tests may be done to rule out other conditions. Common tests include:  · Urine tests.  · Cystoscopy. In this test, a tool that is like a very thin telescope is used to look into your bladder.  · Biopsy. This involves taking a sample of tissue from the bladder wall to be examined under a microscope.    How is this treated?  There is no cure for interstitial cystitis, but treatment methods are available to control your symptoms. Work closely with your health care provider to  find the treatments that will be most effective for you. Treatment options may include:  · Medicines to relieve pain and to help reduce the number of times that you feel the need to urinate.  · Bladder training. This involves learning ways to control when you urinate, such as:  ? Urinating at scheduled times.  ? Training yourself to delay urination.  ? Doing exercises (Kegel exercises) to strengthen the muscles that control urine flow.  · Lifestyle changes, such as changing your diet or taking steps to control stress.  · Use of a device that provides electrical stimulation in order to reduce pain.  · A procedure that stretches your bladder by filling it with air or fluid.  · Surgery. This is rare. It is only done for extreme cases if other treatments do not help.    Follow these instructions at home:  · Take medicines only as directed by your health care provider.  · Use bladder training techniques as directed.  ? Keep a bladder diary to find out which foods, liquids, or activities make your symptoms worse.  ? Use your bladder diary to schedule bathroom trips. If you are away from home, plan to be near a bathroom at each of your scheduled times.  ? Make sure you urinate just before you leave the house and just before you go to bed.  · Do Kegel exercises as directed by your health care provider.  · Do not drink alcohol.  · Do not use any tobacco products, including cigarettes, chewing tobacco, or electronic cigarettes. If you need help quitting, ask your health care provider.  · Make dietary changes as directed by your health care provider. You may need to avoid spicy foods and foods that contain a high amount of potassium.  · Limit your drinking of beverages that stimulate urination. These include soda, coffee, and tea.  · Keep all follow-up visits as directed by your health care provider. This is important.  Contact a health care provider if:  · Your symptoms do not get better after treatment.  · Your pain and  discomfort are getting worse.  · You have more frequent urges to urinate.  · You have a fever.  Get help right away if:  · You are not able to control your bladder at all.  This information is not intended to replace advice given to you by your health care provider. Make sure you discuss any questions you have with your health care provider.  Document Released: 08/18/2005 Document Revised: 05/25/2017 Document Reviewed: 08/25/2015  [email or web address] Interactive Patient Education © 2018 [employer]           Visit date not found    ASSESSMENT AND PLAN   Diagnoses and all orders for this visit:    Essential hypertension  -     Basic metabolic panel Blood, Venous  -     losartan (COZAAR) 100 mg tablet; Take 1 tablet (100 mg total) by mouth daily    Cardiac pacemaker in situ    Urinary frequency  -     POCT urinalysis, dipstick manual  -     Urinalysis w/microscopic, reflex culture Urine, Clean Catch  -     Urinalysis, dipstick Urine, Clean Catch  -     Urinalysis, microscopic Urine, Clean Catch    Female bladder prolapse    Lichen sclerosus et atrophicus    Cerebrovascular accident (CVA), unspecified mechanism (CMS/HCC) ([facility])        Patient's blood pressure is a bit high so were going to increase the losartan to 100 mg.  She is having urinary frequency but no evidence of a UTI and this could be diet related so we will give her a diet for interstitial cystitis type of foods that can irritate her but of asked her to cut out all her soda pops.  She has no protein in her urine.  No diagnosis of possibly having lupus positive double-stranded DNA and YEYO and the patient follows up with rheumatology in July.  Will have a follow-up before then  Previous stroke on Plavix    Chief Complaint  Sunitha is a 73 y.o. female who presents for urinary frequency and inability to empty bladder and hypertension      HPI  Patient is here because she is having bladder issues she also has high blood pressures.  Her bladder seems like it is constantly  having to go the patient reports that she has difficulties with holding her bladder she is also seeing Dr. Landin for this the patient and I discussed postmenopausal state in addition to neurogenic bladder.  In addition to that we discussed things like glucose in her urine which she does not have and we discussed infections which I do not see any evidence of    Patient has recently been working with rheumatology.  She has a positive YEYO and a positive double-stranded DNA and she is working on what medication she would take for that.    We are seeing her in the walk-in today but will get her set up for a longer appointment in my regular clinic    HISTORY  Past Medical History:   Diagnosis Date   • Actinic keratosis    • CVA (cerebral vascular accident) (CMS/HCC) ([facility])    • High degree atrioventricular block     with syncope   • Hyperlipidemia    • Hypertension    • Hypertriglyceridemia    • Psoriasis      Past Surgical History:   Procedure Laterality Date   • CARDIAC CATHETERIZATION     • CARDIAC PACEMAKER PLACEMENT      Dual chamber pacemaker implantation   • CARDIAC SURGERY  2014   •  SECTION  1977    [place]   • COLONOSCOPY  2016    Rare sigmoid diverticula. Physiologic internal hemorrhoids, otherwise normal exam   • COLONOSCOPY  2008   • COLPORRHAPHY      and rectocele repair   • TOTAL ABDOMINAL HYSTERECTOMY W/ BILATERAL SALPINGOOPHORECTOMY      [id number] [facility]     Family History   Problem Relation Age of Onset   • Stroke Mother 97   • Hypertension Mother    • Dementia Mother    • Other cancer Father 32        secondary malignant neoplasm of lymph node     Social History     Socioeconomic History   • Marital status:      Spouse name: None   • Number of children: None   • Years of education: None   • Highest education level: None   Social Needs   • Financial resource strain: None   • Food insecurity - worry: None   • Food insecurity -  inability: None   • Transportation needs - medical: None   • Transportation needs - non-medical: None   Occupational History     Comment: [employer]   Tobacco Use   • Smoking status: Never Smoker   • Smokeless tobacco: Never Used   Substance and Sexual Activity   • Alcohol use: No   • Drug use: No   • Sexual activity: Defer   Other Topics Concern   • None   Social History Narrative   • None       ALLERGIES AND MEDICATIONS  Allergies   Allergen Reactions   • Apricot      throat pain     Current Outpatient Medications   Medication Sig Dispense Refill   • triamcinolone (KENALOG) 0.1 % ointment      • zolpidem 5 mg tablet, sublingual Place 1 tab/cap under the tongue nightly as needed     • isosorbide mononitrate (IMDUR) 30 mg 24 hr tablet Take 30 mg by mouth daily     • potassium chloride (K-DUR,KLOR-CON) 10 mEq CR tablet TAKE 2 TABLETS EVERY  tablet 3   • rOPINIRole (REQUIP) 1 mg tablet TAKE 1 TABLET TWICE DAILY 180 tablet 3   • clopidogrel (PLAVIX) 75 mg tablet TAKE 1 TABLET EVERY DAY 90 tablet 1   • naproxen sodium 220 mg capsule Take 2 tablets by mouth 2 (two) times a day.     • adalimumab (HUMIRA) 40 mg/0.8 mL syringe kit Inject 0.8 mL (40 mg total) under the skin every 14 (fourteen) days. 6 each 4   • triamcinolone (KENALOG) 0.1 % cream APPLY A THIN LAYER TO THE AFFECTED AREA(S) TOPICALLY 2 TIMES PER DAY 30 g 12   • atorvastatin (LIPITOR) 40 mg tablet TAKE 1 TABLET EVERY DAY 90 tablet 3   • omega-3 fatty acids-fish oil (FISH OIL) 340-1,000 mg capsule Take 1 capsule by mouth 2 (two) times a day.     • multivitamin with minerals tablet Take 1 tablet by mouth daily.     • calcium carbonate-vitamin D3 (CALCIUM 500 WITH D) 500 mg(1,250mg) -400 unit tablet Take 1 tablet by mouth daily.     • lysine 500 mg tablet Take 1 tablet every day by oral route in the evening.     • losartan (COZAAR) 100 mg tablet Take 1 tablet (100 mg total) by mouth daily 90 tablet 3     No current facility-administered medications for  this visit.      Facility-Administered Medications Ordered in Other Visits   Medication Dose Route Frequency Provider Last Rate Last Dose   • sodium chloride flush 20 mL  20 mL intravenous PRN Kathryn Mujica CNP   20 mL at 05/18/18 0948       Review of Systems   Constitutional: Positive for fatigue.   Respiratory:        Chest discomfort   Endocrine:        Inability to regulate temperature   Genitourinary: Positive for frequency.        Night time urination and Inability to empty bladder   Musculoskeletal: Positive for back pain.       OBJECTIVE  Vitals:    03/27/19 1207   BP: 170/80   Cuff Size: Regular   Pulse: 77   Resp: 16   SpO2: 98%   Weight: 82.1 kg (181 lb)   PainSc:   3   PainLoc: Abdomen     BP Readings from Last 3 Encounters:   03/27/19 170/80   02/22/19 175/77   02/08/19 (!) 184/82       Body mass index is 30.12 kg/m².  Wt Readings from Last 3 Encounters:   03/27/19 82.1 kg (181 lb)   02/22/19 82.2 kg (181 lb 3.2 oz)   02/08/19 82.8 kg (182 lb 8 oz)       Physical Exam  Normocephalic atraumatic membranes pink moist pharynx clear  Heart regular rate and rhythm with no murmurs gallops rubs noted lungs clear to auscultation without wheezes rales rhonchi  The patient is alert and oriented x3  Abdomen is soft nontender without organomegaly    Lab Results   Component Value Date    GLUCOSE 120 (H) 02/14/2018    CALCIUM 9.4 02/14/2018     02/14/2018    K 4.0 02/14/2018    CO2 26 02/14/2018     02/14/2018    BUN 18 02/14/2018    CREATININE 0.8 02/14/2018    ANIONGAP 7 02/14/2018     Lab Results   Component Value Date    WBC 5.5 02/08/2019    HGB 14.8 02/08/2019    HCT 42.6 02/08/2019    MCV 96.1 02/08/2019     02/08/2019     Lab Results   Component Value Date    TSH 1.435 02/22/2019     Lab Results   Component Value Date    HGBA1C 5.9 10/30/2018    HGBA1C 5.9 12/18/2017    HGBA1C 5.8 07/30/2015      Lab Results   Component Value Date    LDLCALC 54 06/21/2016    CREATININE 0.8 02/14/2018  "    Lab Results   Component Value Date    SEDRATE 5 02/08/2019     Portions of this note was documented by Cristiane Machado LPN as I performed the exam and collected the information from Sunitha Arrington. I attest that I have reviewed the information as documented, verified the accuracy of the documentation and added additional information as needed.       Leon Donahue MD wrote,   \"When you run out of doctor things to do for the sick person, see if there are any human things you can offer.\"     Thank you for intrusting us with your care. We understand this can be a very scary time and you can feel quite vulnerable.    You, and your health, are very important to me and my healthcare team.   If you have not signed up for [employer], please do so. It can dramatically improve your care.   I want you to update us by sending communication in [employer].  If you cannot do that then  call   [phone] or drop a line at [address] [place] [address]    We wish you the best.  Please let us know.    My Best,    Hilda Donald MD      Electronically signed by Hilda Donald MD on 3/27/2019 at 3:48 PM  "

## 2019-03-27 NOTE — LETTER
[facility]  [address] [place] [address]  [phone]  Dept: [phone]  March 27, 2019     Hilda Donald MD  [address] [place] [address]      Patient: Sunitha Arrington   YOB: 1945   Date of Visit: 3/27/2019       To Whom it May Concern:    Sunitha Arrington was seen in my clinic on  3/27/2019 at [facility]. Please excuse Sunitha for her absence from work on this day to make the appointment.    If you have any questions or concerns, please don't hesitate to call.         Sincerely,         HILDA DONALD MD        CC: No Recipients

## 2019-03-27 NOTE — PATIENT INSTRUCTIONS
Pay attention to your diet.  Increase your Losartan to 100mg daily  Patient Education     Interstitial Cystitis  Interstitial cystitis is a condition that causes inflammation of the bladder. The bladder is a hollow organ in the lower part of your abdomen. It stores urine after the urine is made by your kidneys. With interstitial cystitis, you may have pain in the bladder area. You may also have a frequent and urgent need to urinate.  The severity of interstitial cystitis can vary from person to person. You may have flare-ups of the condition, and then it may go away for a while. For many people who have this condition, it becomes a long-term problem.  What are the causes?  The cause of this condition is not known.  What increases the risk?  This condition is more likely to develop in women.  What are the signs or symptoms?  Symptoms of interstitial cystitis vary, and they can change over time. Symptoms may include:  · Discomfort or pain in the bladder area. This can range from mild to severe. The pain may change in intensity as the bladder fills with urine or as it empties.  · Pelvic pain.  · An urgent need to urinate.  · Frequent urination.  · Pain during sexual intercourse.  · Pinpoint bleeding on the bladder wall.    For women, the symptoms often get worse during menstruation.  How is this diagnosed?  This condition is diagnosed by evaluating your symptoms and ruling out other causes. A physical exam will be done. Various tests may be done to rule out other conditions. Common tests include:  · Urine tests.  · Cystoscopy. In this test, a tool that is like a very thin telescope is used to look into your bladder.  · Biopsy. This involves taking a sample of tissue from the bladder wall to be examined under a microscope.    How is this treated?  There is no cure for interstitial cystitis, but treatment methods are available to control your symptoms. Work closely with your health care provider to find the treatments  that will be most effective for you. Treatment options may include:  · Medicines to relieve pain and to help reduce the number of times that you feel the need to urinate.  · Bladder training. This involves learning ways to control when you urinate, such as:  ? Urinating at scheduled times.  ? Training yourself to delay urination.  ? Doing exercises (Kegel exercises) to strengthen the muscles that control urine flow.  · Lifestyle changes, such as changing your diet or taking steps to control stress.  · Use of a device that provides electrical stimulation in order to reduce pain.  · A procedure that stretches your bladder by filling it with air or fluid.  · Surgery. This is rare. It is only done for extreme cases if other treatments do not help.    Follow these instructions at home:  · Take medicines only as directed by your health care provider.  · Use bladder training techniques as directed.  ? Keep a bladder diary to find out which foods, liquids, or activities make your symptoms worse.  ? Use your bladder diary to schedule bathroom trips. If you are away from home, plan to be near a bathroom at each of your scheduled times.  ? Make sure you urinate just before you leave the house and just before you go to bed.  · Do Kegel exercises as directed by your health care provider.  · Do not drink alcohol.  · Do not use any tobacco products, including cigarettes, chewing tobacco, or electronic cigarettes. If you need help quitting, ask your health care provider.  · Make dietary changes as directed by your health care provider. You may need to avoid spicy foods and foods that contain a high amount of potassium.  · Limit your drinking of beverages that stimulate urination. These include soda, coffee, and tea.  · Keep all follow-up visits as directed by your health care provider. This is important.  Contact a health care provider if:  · Your symptoms do not get better after treatment.  · Your pain and discomfort are getting  worse.  · You have more frequent urges to urinate.  · You have a fever.  Get help right away if:  · You are not able to control your bladder at all.  This information is not intended to replace advice given to you by your health care provider. Make sure you discuss any questions you have with your health care provider.  Document Released: 08/18/2005 Document Revised: 05/25/2017 Document Reviewed: 08/25/2015  [employer] Interactive Patient Education © 2018 [employer]

## 2019-04-03 ENCOUNTER — OFFICE VISIT (OUTPATIENT)
Dept: URGENT CARE | Facility: CLINIC | Age: 74
End: 2019-04-03
Payer: MEDICARE

## 2019-04-03 VITALS
RESPIRATION RATE: 16 BRPM | DIASTOLIC BLOOD PRESSURE: 82 MMHG | OXYGEN SATURATION: 97 % | SYSTOLIC BLOOD PRESSURE: 126 MMHG | HEART RATE: 75 BPM

## 2019-04-03 DIAGNOSIS — I10 ESSENTIAL HYPERTENSION: Chronic | ICD-10-CM

## 2019-04-03 DIAGNOSIS — J34.89 LESION OF NOSE: ICD-10-CM

## 2019-04-03 DIAGNOSIS — I63.9 CEREBROVASCULAR ACCIDENT (CVA), UNSPECIFIED MECHANISM (CMS/HCC): Primary | ICD-10-CM

## 2019-04-03 LAB
ANION GAP SERPL CALC-SCNC: 7 MMOL/L (ref 3–11)
BUN SERPL-MCNC: 19 MG/DL (ref 7–25)
CALCIUM SERPL-MCNC: 9.6 MG/DL (ref 8.6–10.3)
CHLORIDE SERPL-SCNC: 105 MMOL/L (ref 98–107)
CO2 SERPL-SCNC: 25 MMOL/L (ref 21–32)
CREAT SERPL-MCNC: 0.88 MG/DL (ref 0.6–1.2)
GFR SERPL CREATININE-BSD FRML MDRD: 66 ML/MIN/1.73M*2
GLUCOSE SERPL-MCNC: 120 MG/DL (ref 70–105)
POTASSIUM SERPL-SCNC: 4.6 MMOL/L (ref 3.5–5.1)
SODIUM SERPL-SCNC: 137 MMOL/L (ref 135–145)

## 2019-04-03 PROCEDURE — 99212 OFFICE O/P EST SF 10 MIN: CPT | Performed by: FAMILY MEDICINE

## 2019-04-03 PROCEDURE — 80048 BASIC METABOLIC PNL TOTAL CA: CPT | Performed by: FAMILY MEDICINE

## 2019-04-03 PROCEDURE — G0463 HOSPITAL OUTPT CLINIC VISIT: HCPCS | Performed by: FAMILY MEDICINE

## 2019-04-03 PROCEDURE — 36415 COLL VENOUS BLD VENIPUNCTURE: CPT | Performed by: FAMILY MEDICINE

## 2019-04-03 ASSESSMENT — ENCOUNTER SYMPTOMS
VOMITING: 0
SPEECH DIFFICULTY: 0
APPETITE CHANGE: 0
FLANK PAIN: 0
FATIGUE: 1
HEMATURIA: 0
DYSURIA: 0
ABDOMINAL DISTENTION: 0
DIZZINESS: 0
CONSTIPATION: 0
JOINT SWELLING: 0
ACTIVITY CHANGE: 0
RHINORRHEA: 0
FREQUENCY: 1
DIFFICULTY URINATING: 0
BLOOD IN STOOL: 0
EYE REDNESS: 0
SINUS PAIN: 0
ABDOMINAL PAIN: 0
NUMBNESS: 1
DECREASED CONCENTRATION: 0
FACIAL ASYMMETRY: 0
DIAPHORESIS: 0
BACK PAIN: 0
HEADACHES: 0
MYALGIAS: 0
PHOTOPHOBIA: 0
FEVER: 0
ANAL BLEEDING: 0
WOUND: 0
APNEA: 0
VOICE CHANGE: 0
RECTAL PAIN: 0
CHOKING: 0
HYPERACTIVE: 0
DYSPHORIC MOOD: 0
COUGH: 0
SINUS PRESSURE: 0
EYE ITCHING: 0
CHILLS: 0
WEAKNESS: 0
CHEST TIGHTNESS: 0
WHEEZING: 0
ARTHRALGIAS: 0
SEIZURES: 0
POLYPHAGIA: 0
ADENOPATHY: 0
STRIDOR: 0
EYE DISCHARGE: 0
BRUISES/BLEEDS EASILY: 0
HALLUCINATIONS: 0
POLYDIPSIA: 0
CONFUSION: 0
TREMORS: 0
LIGHT-HEADEDNESS: 0
SLEEP DISTURBANCE: 0
UNEXPECTED WEIGHT CHANGE: 0
PALPITATIONS: 0
AGITATION: 0
DIARRHEA: 0
NERVOUS/ANXIOUS: 0
FACIAL SWELLING: 0
NECK PAIN: 0
SORE THROAT: 0
EYE PAIN: 0
TROUBLE SWALLOWING: 0
NAUSEA: 0
COLOR CHANGE: 0
SHORTNESS OF BREATH: 0
NECK STIFFNESS: 0

## 2019-04-03 NOTE — PROGRESS NOTES
Patient Instructions   Nasal saline spray 2 times a day   Bacitracin ointment 3 times a day.   Humidifier daily      5/20/2019    ASSESSMENT AND PLAN   Diagnoses and all orders for this visit:    Cerebrovascular accident (CVA), unspecified mechanism (CMS/HCC) (HCC)    Essential hypertension    Lesion of nose  Patient has epistaxis from a small area that is cracked on the inside of her nose.  And rather than cauterize this I think that we should hydrated with some saline spray and then cover it with some bacitracin and get a humidifier in her room and see if we get it to stop bleeding.  If it will not stop bleeding we could chemically cauterize it I did discuss ins and outs of chemical cautery and that it can make it worse because of thinning of that area.  Patient states understanding and she will go forward with this treatment first      Chief Complaint  Sunitha is a 73 y.o. female who presents for  sore in nose on right side        HPI  This a patient with a history of CVA on antiplatelet therapy who is had some nosebleeds from the right side of her nose.  She called her rheumatologist and they advised she come in to see primary care physician  Today she has a lesion noted on the inside of her right nostril it is not bleeding right now it is well controlled and I have suggested that rather than do some chemical cautery that could stir things up and make it worse maybe we should consider doing a treatment to make it less likely to bleed.  I showed her what scabs are like when they get dry and crusted why they crack and bleed.  I advised absolutely no nose picking or moving her nose or rubbing her nose because this will crack this plaque and make it bleed.  I discussed with her hydration and humidifying the air.  And I discussed with her the importance of daily application several times a day of bacitracin.    I personally reviewed her past medical history surgical history social history and family history in the  chart.    HISTORY  Past Medical History:   Diagnosis Date   • Actinic keratosis    • CVA (cerebral vascular accident) (CMS/HCC) (HCC)    • High degree atrioventricular block     with syncope   • Hyperlipidemia    • Hypertension    • Hypertriglyceridemia    • Psoriasis      Past Surgical History:   Procedure Laterality Date   • CARDIAC CATHETERIZATION     • CARDIAC PACEMAKER PLACEMENT      Dual chamber pacemaker implantation   • CARDIAC SURGERY  2014   •  SECTION  1977    [place]   • COLONOSCOPY  2016    Rare sigmoid diverticula. Physiologic internal hemorrhoids, otherwise normal exam   • COLONOSCOPY  2008   • COLPORRHAPHY      and rectocele repair   • TOTAL ABDOMINAL HYSTERECTOMY W/ BILATERAL SALPINGOOPHORECTOMY      [id number] [facility]     Family History   Problem Relation Age of Onset   • Stroke Mother 97   • Hypertension Mother    • Dementia Mother    • Other cancer Father 32        secondary malignant neoplasm of lymph node     Social History     Socioeconomic History   • Marital status:      Spouse name: None   • Number of children: None   • Years of education: None   • Highest education level: None   Social Needs   • Financial resource strain: None   • Food insecurity - worry: None   • Food insecurity - inability: None   • Transportation needs - medical: None   • Transportation needs - non-medical: None   Occupational History     Comment: [employer]   Tobacco Use   • Smoking status: Never Smoker   • Smokeless tobacco: Never Used   Substance and Sexual Activity   • Alcohol use: No   • Drug use: No   • Sexual activity: Defer   Other Topics Concern   • None   Social History Narrative   • None       ALLERGIES AND MEDICATIONS  Allergies   Allergen Reactions   • Apricot      throat pain     Current Outpatient Medications   Medication Sig Dispense Refill   • losartan (COZAAR) 100 mg tablet Take 1 tablet (100 mg total) by mouth daily 90  tablet 3   • zolpidem 5 mg tablet, sublingual Place 1 tab/cap under the tongue nightly as needed     • isosorbide mononitrate (IMDUR) 30 mg 24 hr tablet Take 30 mg by mouth daily     • potassium chloride (K-DUR,KLOR-CON) 10 mEq CR tablet TAKE 2 TABLETS EVERY  tablet 3   • rOPINIRole (REQUIP) 1 mg tablet TAKE 1 TABLET TWICE DAILY 180 tablet 3   • clopidogrel (PLAVIX) 75 mg tablet TAKE 1 TABLET EVERY DAY 90 tablet 1   • naproxen sodium 220 mg capsule Take 2 tablets by mouth 2 (two) times a day.     • adalimumab (HUMIRA) 40 mg/0.8 mL syringe kit Inject 0.8 mL (40 mg total) under the skin every 14 (fourteen) days. 6 each 4   • triamcinolone (KENALOG) 0.1 % cream APPLY A THIN LAYER TO THE AFFECTED AREA(S) TOPICALLY 2 TIMES PER DAY 30 g 12   • atorvastatin (LIPITOR) 40 mg tablet TAKE 1 TABLET EVERY DAY 90 tablet 3   • omega-3 fatty acids-fish oil (FISH OIL) 340-1,000 mg capsule Take 1 capsule by mouth 2 (two) times a day.     • multivitamin with minerals tablet Take 1 tablet by mouth daily.     • lysine 500 mg tablet Take 1 tablet every day by oral route in the evening.     • calcium carbonate-vitamin D3 (CALCIUM 500 WITH D) 500 mg(1,250mg) -400 unit tablet Take 1 tablet by mouth daily.       No current facility-administered medications for this visit.      Facility-Administered Medications Ordered in Other Visits   Medication Dose Route Frequency Provider Last Rate Last Dose   • sodium chloride flush 20 mL  20 mL intravenous PRN Kathryn Mujica CNP   20 mL at 05/18/18 0948       Review of Systems   Constitutional: Positive for fatigue. Negative for activity change, appetite change, chills, diaphoresis, fever and unexpected weight change.   HENT: Negative for congestion, dental problem, drooling, ear discharge, ear pain, facial swelling, hearing loss, mouth sores, nosebleeds, postnasal drip, rhinorrhea, sinus pressure, sinus pain, sneezing, sore throat, tinnitus, trouble swallowing and voice change.    Eyes:  Negative for photophobia, pain, discharge, redness, itching and visual disturbance.   Respiratory: Negative for apnea, cough, choking, chest tightness, shortness of breath, wheezing and stridor.    Cardiovascular: Negative for chest pain, palpitations and leg swelling.   Gastrointestinal: Negative for abdominal distention, abdominal pain, anal bleeding, blood in stool, constipation, diarrhea, nausea, rectal pain and vomiting.   Endocrine: Negative for cold intolerance, heat intolerance, polydipsia, polyphagia and polyuria.   Genitourinary: Positive for frequency and urgency. Negative for decreased urine volume, difficulty urinating, dyspareunia, dysuria, enuresis, flank pain, genital sores, hematuria, menstrual problem, pelvic pain, vaginal bleeding, vaginal discharge and vaginal pain.   Musculoskeletal: Negative for arthralgias, back pain, gait problem, joint swelling, myalgias, neck pain and neck stiffness.   Skin: Negative for color change, pallor, rash and wound.   Allergic/Immunologic: Negative for environmental allergies, food allergies and immunocompromised state.   Neurological: Positive for numbness. Negative for dizziness, tremors, seizures, syncope, facial asymmetry, speech difficulty, weakness, light-headedness and headaches.   Hematological: Negative for adenopathy. Does not bruise/bleed easily.   Psychiatric/Behavioral: Negative for agitation, behavioral problems, confusion, decreased concentration, dysphoric mood, hallucinations, self-injury, sleep disturbance and suicidal ideas. The patient is not nervous/anxious and is not hyperactive.        OBJECTIVE  Vitals:    04/03/19 1534   BP: 126/82   Pulse: 75   Resp: 16   SpO2: 97%     BP Readings from Last 3 Encounters:   04/03/19 126/82   03/27/19 170/80   02/22/19 175/77       There is no height or weight on file to calculate BMI.  Wt Readings from Last 3 Encounters:   03/27/19 82.1 kg (181 lb)   02/22/19 82.2 kg (181 lb 3.2 oz)   02/08/19 82.8 kg (182  "lb 8 oz)       Physical Exam  Normocephalic atraumatic memories pink moist pharynx clear  Heart regular rate and rhythm with no murmurs gallops rubs noted lungs clear to auscultation without wheezes rales rhonchi  The patient is alert and oriented x3  She has a small lesion on the right inside of her nose which is just a dry cracked area but not a suspicious lesion for any type of cancer or other abnormalities.  It has dried blood on it right now    Lab Results   Component Value Date    GLUCOSE 120 (H) 04/03/2019    CALCIUM 9.6 04/03/2019     04/03/2019    K 4.6 04/03/2019    CO2 25 04/03/2019     04/03/2019    BUN 19 04/03/2019    CREATININE 0.88 04/03/2019    ANIONGAP 7 04/03/2019     Lab Results   Component Value Date    WBC 5.5 02/08/2019    HGB 14.8 02/08/2019    HCT 42.6 02/08/2019    MCV 96.1 02/08/2019     02/08/2019     Lab Results   Component Value Date    TSH 1.435 02/22/2019     Lab Results   Component Value Date    HGBA1C 5.9 10/30/2018    HGBA1C 5.9 12/18/2017    HGBA1C 5.8 07/30/2015      Lab Results   Component Value Date    LDLCALC 54 06/21/2016    CREATININE 0.88 04/03/2019     Lab Results   Component Value Date    SEDRATE 5 02/08/2019     No results found for: URACSRM  No results found for: AMYLASE  No results found for: LIPASE      No results found.      Allergies  Meds  Problems  Med Hx  Surg Hx  Fam Hx         Portions of this note was documented by Saritha Lux CMA as I performed the exam and collected the information from Sunitha Arrington. I attest that I have reviewed the information as documented, verified the accuracy of the documentation and added additional information as needed.       Leon Donahue MD wrote,   \"When you run out of doctor things to do for the sick person, see if there are any human things you can offer.\"     Thank you for intrusting us with your care. We understand this can be a very scary time and you can feel quite vulnerable.    You, and your " health, are very important to me and my healthcare team.   If you have not signed up for [employer], please do so. It can dramatically improve your care.   I want you to update us by sending communication in [employer].  If you cannot do that then  call   [phone] or drop a line at [address] [facility] [place] [address]    We wish you the best.  Please let us know.    My Best,    Hilda Donald MD      Electronically signed by Hilda Donald MD on 4/3/2019 at 6:02 PM

## 2019-05-03 ENCOUNTER — TELEPHONE (OUTPATIENT)
Dept: RHEUMATOLOGY | Facility: CLINIC | Age: 74
End: 2019-05-03

## 2019-05-03 NOTE — TELEPHONE ENCOUNTER
Please let me know if she would like us to go ahead and make a referral to see dermatology for further evaluation of the spots.  If so please send a referral for question of vitiligo in patient with long-standing psoriasis

## 2019-05-03 NOTE — TELEPHONE ENCOUNTER
Regarding: FW: Non-Urgent Medical Question  Contact: [phone]      ----- Message -----  From: Sunitha Arrington  Sent: 5/3/2019   8:59 AM  To: , #  Subject: Non-Urgent Medical Question                      ----- Message from Mychart, Generic sent at 5/3/2019  8:59 AM MDT -----    In the past few days I have noticed what I think is vitilago with two small spots: under my hair on my forehead, and on the top of my nose.  The spots are small.

## 2019-05-03 NOTE — TELEPHONE ENCOUNTER
Contacted patient and she notes that she already sees Dr. Estrada.  Advised patient to contact their office for follow up appointment.

## 2019-05-06 ENCOUNTER — OFFICE VISIT (OUTPATIENT)
Dept: DERMATOLOGY | Facility: CLINIC | Age: 74
End: 2019-05-06
Payer: MEDICARE

## 2019-05-06 ENCOUNTER — ANCILLARY PROCEDURE (OUTPATIENT)
Dept: CARDIOLOGY | Facility: CLINIC | Age: 74
End: 2019-05-06
Payer: MEDICARE

## 2019-05-06 VITALS — DIASTOLIC BLOOD PRESSURE: 75 MMHG | HEART RATE: 71 BPM | SYSTOLIC BLOOD PRESSURE: 187 MMHG

## 2019-05-06 DIAGNOSIS — L81.4 LENTIGINES: ICD-10-CM

## 2019-05-06 DIAGNOSIS — I78.1 TELANGIECTASIA: ICD-10-CM

## 2019-05-06 DIAGNOSIS — Z87.2 HISTORY OF ACTINIC KERATOSES: ICD-10-CM

## 2019-05-06 DIAGNOSIS — L71.9 ACNE ROSACEA: ICD-10-CM

## 2019-05-06 DIAGNOSIS — Z45.018 ENCOUNTER FOR INTERROGATION OF CARDIAC PACEMAKER: ICD-10-CM

## 2019-05-06 DIAGNOSIS — L94.0 MORPHEA: Primary | ICD-10-CM

## 2019-05-06 DIAGNOSIS — D48.9 NEOPLASM OF UNCERTAIN BEHAVIOR: ICD-10-CM

## 2019-05-06 DIAGNOSIS — L85.1 STUCCO KERATOSES: ICD-10-CM

## 2019-05-06 DIAGNOSIS — L81.9 DEPIGMENTATION OF SKIN: ICD-10-CM

## 2019-05-06 DIAGNOSIS — L85.3 XEROSIS OF SKIN: ICD-10-CM

## 2019-05-06 PROCEDURE — 11102 TANGNTL BX SKIN SINGLE LES: CPT | Performed by: DERMATOLOGY

## 2019-05-06 PROCEDURE — 99213 OFFICE O/P EST LOW 20 MIN: CPT | Mod: 25 | Performed by: DERMATOLOGY

## 2019-05-06 PROCEDURE — 88305 TISSUE EXAM BY PATHOLOGIST: CPT | Mod: 26 | Performed by: DERMATOLOGY

## 2019-05-06 PROCEDURE — 93294 REM INTERROG EVL PM/LDLS PM: CPT | Performed by: INTERNAL MEDICINE

## 2019-05-06 PROCEDURE — 93296 REM INTERROG EVL PM/IDS: CPT | Mod: PO

## 2019-05-06 RX ORDER — CLINDAMYCIN PHOSPHATE 10 UG/ML
LOTION TOPICAL
Qty: 60 ML | Refills: 2 | Status: SHIPPED | OUTPATIENT
Start: 2019-05-06 | End: 2019-05-26

## 2019-05-06 ASSESSMENT — ENCOUNTER SYMPTOMS: FEVER: 0

## 2019-05-06 ASSESSMENT — PAIN SCALES - GENERAL: PAINLEVEL: 0-NO PAIN

## 2019-05-06 NOTE — PATIENT INSTRUCTIONS
New medication: clindamycin 1% lotion to face twice daily    WOUND CARE FOLLOWING BIOPSY    After your biopsy a dressing will be placed on the site.  You may remove bandage at end of day.  This is to minimize any bleeding that can possibly occur after surgery.  To clean the area, use Dove soap, warm water and hands, no wash cloth, once daily    Next, apply a layer of POLYSPORIN ANTIBIOTIC OINTMENT or VASELINE, daily.  Do this until healed.  We will contact you with your results when available.  Please call with questions or concerns    If you have not received results in 2 weeks, please call our office and reference your biopsy number [phone].

## 2019-05-06 NOTE — PROGRESS NOTES
Last Visit - 2/7/2019 at which time she had a full body skin exam.  History of actinic keratoses and psoriasis.  Is here today for white spots on her nose and at the edge of hairline, near areas that were previously treated with liquid nitrogen.    Using clobetasol for lichen sclerosus       Problem: White spots on tip of nose and left forehead. Redness on left cheek. Spot also in upper back.    Location: Nose, forehead, cheek, and upper back.   Duration: Nose and cheek two days ago, Patient states the spot on back has been there for roughly 10 years.   Changing/Worsening/Improving: Patient denies any changing   Itching or bleeding: No   Other Significant History: Has been treated for liquid nitrogen on nose before.  Previous history of skin cancer: No     Review of Systems   Constitutional: Negative for fever.   Allergic/Immunologic: Negative for environmental allergies and immunocompromised state.     Exam:    Lesion in question upper back,   O: depressed hypopigmented with follicular plugging 1.0 cm x 8 mm lichen sclerosus plaque at about C6  A: lichen sclerosus  P: 0.1% triamcinolone 2-3 times daily    Lesion in question   O: depressed sclerotic scaly 3.0 cm erythematous plaque left of T1  A: questions morphea   P: 0.1% triamcinolone cream 2-3 times daily      Back, diffuse xerosis plan: Discussed good hydration measures in detail    Lesion in question forehead  O: Depigmented patch left of 's peak  A: depigmentation probably as result of liquid nitrogen treatment  P: reassurance    To the left of the above, blanchable telangiectasia left frontal scalp    Lesion in question nose  O: depigmented patch pretip nose from previous LN2  A:   P:    Right pretip nose, 4 mm telangiectatic papule  Precisely 6 mm superior to tip nose, and 6 mm to right of midline  P: shave biopsy, rsk read, See procedure note for treated lesions     Lesion in question cheek  O: Left cheek, telangiectasias   A: acne rosacea  P: cleocin  T lotion BID to all of face    Arms & hands: lentigo   Legs: lentigos, stucco keratoses      Lesions to follow:  Left inner cheek, 6mm scarred papule with adjacent 7 mm telangiectasia           Procedure:Lesion Biopsy  Date/Time: 5/6/2019 10:41 AM  Performed by: Duy Michel MD      Consent  Verbal consent was obtained from the patient. Written consent was not obtained from the patient. Risks, benefits, and alternatives were discussed. Consent given by patient. The patient states understanding of the procedure being performed. Patient ID confirmed verbally with patient.         Biopsy 1    Location Details  Body area: head/neck  Head/neck site: right pretip nose, RSK 19/250.  Lesion size: 0.4 cm.     Preparation Details  Adequate anesthesia is achieved using 0.2 mL of lidocaine 1% with epinephrine (administered by) in a local infiltration method. Area cleaned and prepped with Alcohol.     Procedure Details  Shave biopsy completed with dermablade. Biopsy performed to the depth of other (dermis). Hemostasis obtained with aluminium chloride and pressure.                     Post-Procedure  Specimen was sent to Pathology. Gauze and adhesive bandage applied for dressing. Patient tolerated the procedure well with no complications.               Sunitha was seen today for suspicious skin lesion.    Diagnoses and all orders for this visit:    Morphea    Xerosis of skin    History of actinic keratoses    Telangiectasia    Depigmentation of skin    Neoplasm of uncertain behavior  -     Lesion Biopsy  -     Dermatology Clinic Pathology    Acne rosacea  -     clindamycin (CLEOCIN T) 1 % lotion; Apply twice daily to all of face    Lentigines    Stucco keratoses

## 2019-05-06 NOTE — LETTER
[facility]  [address] [place] [id number]  [phone]  Dept: [phone]  May 6, 2019     Sunitha Arrington      Patient: Sunitha Arrington   YOB: 1945   Date of Visit: 5/6/2019       To Whom it May Concern:    Sunitha Arrington was seen in my clinic on  5/6/2019 at [facility]. Please excuse Sunitha for her absence from work on this day to make the appointment. She had a biopsy on her nose and has an increased risk of bleeding so to prevent any worsening wounds, I recommend she stay home from work all day on the 6th of May.    If you have any questions or concerns, please don't hesitate to call.         Sincerely,         EVA CLARKE MD        CC: No Recipients

## 2019-05-06 NOTE — Clinical Note
I believe the neck lesion is most consistent with lichen sclerosus and the depressed plaque inferior to the above is most consistent with morphea both of which she will be treated with the triamcinolone as discussedI will let you know the results of the biopsy once availableThank youRoger

## 2019-05-07 DIAGNOSIS — Z95.0 CARDIAC PACEMAKER IN SITU: ICD-10-CM

## 2019-05-07 DIAGNOSIS — E78.5 HYPERLIPIDEMIA, UNSPECIFIED HYPERLIPIDEMIA TYPE: ICD-10-CM

## 2019-05-08 RX ORDER — CLOPIDOGREL BISULFATE 75 MG/1
TABLET ORAL
Qty: 90 TABLET | Refills: 1 | Status: SHIPPED | OUTPATIENT
Start: 2019-05-08 | End: 2019-12-02 | Stop reason: SDUPTHER

## 2019-05-08 RX ORDER — ATORVASTATIN CALCIUM 40 MG/1
TABLET, FILM COATED ORAL
Qty: 90 TABLET | Refills: 3 | Status: SHIPPED | OUTPATIENT
Start: 2019-05-08 | End: 2020-01-15 | Stop reason: SDUPTHER

## 2019-05-10 LAB — DERM CLINIC PATH RESULTS: NORMAL

## 2019-05-15 ENCOUNTER — TELEPHONE (OUTPATIENT)
Dept: DERMATOLOGY | Facility: CLINIC | Age: 74
End: 2019-05-15

## 2019-05-15 NOTE — TELEPHONE ENCOUNTER
Returned patient's call, discussed biopsy results and recommended MOHS. Procedure scheduled. Patient has history of pacemaker and bad heart valve; she is going to contact her PCP to see if it's ok if she stops the plavix 3 days prior to procedure. Rx for duricef sent to [facility] mail delivery.

## 2019-05-17 ENCOUNTER — TELEPHONE (OUTPATIENT)
Dept: RHEUMATOLOGY | Facility: CLINIC | Age: 74
End: 2019-05-17

## 2019-05-17 ENCOUNTER — TELEPHONE (OUTPATIENT)
Dept: DERMATOLOGY | Facility: CLINIC | Age: 74
End: 2019-05-17

## 2019-05-17 NOTE — TELEPHONE ENCOUNTER
Please ask if these are the same lesions that she has recently shown to Dr. Michel?   Also appears lots of ongoing issues and I would recommend making a follow up appointment to reevaluate.

## 2019-05-17 NOTE — TELEPHONE ENCOUNTER
Sunitha returned a call back to the office requesting a call back to [phone].    I was able to reach Sunitha back at the number she left for contact. I advised her of Dr. Dueñas's request below. We scheduled to patient to see Dr. Dueñas on Thursday 5/23/19 at 2:20 pm.

## 2019-05-17 NOTE — TELEPHONE ENCOUNTER
----- Message from Aziza Krutz MA sent at 5/17/2019 10:27 AM MDT -----  Regarding: FW: Non-Urgent Medical Question  Contact: [phone]      ----- Message -----  From: Sunitha Arrington  Sent: 5/17/2019  10:17 AM  To: , #  Subject: Non-Urgent Medical Question                      I just noticed these spots.  I have been applying sunscreen.  Just thought you would like to see these. Slightly itchy

## 2019-05-17 NOTE — TELEPHONE ENCOUNTER
Attempted to call patient at [phone]. Unable to reach patient and left voicemail for patient to call back.

## 2019-05-20 ENCOUNTER — OFFICE VISIT (OUTPATIENT)
Dept: FAMILY MEDICINE | Facility: CLINIC | Age: 74
End: 2019-05-20
Payer: MEDICARE

## 2019-05-20 VITALS
DIASTOLIC BLOOD PRESSURE: 88 MMHG | RESPIRATION RATE: 16 BRPM | BODY MASS INDEX: 29.95 KG/M2 | OXYGEN SATURATION: 97 % | SYSTOLIC BLOOD PRESSURE: 128 MMHG | HEART RATE: 72 BPM | WEIGHT: 180 LBS

## 2019-05-20 DIAGNOSIS — E78.5 DYSLIPIDEMIA: ICD-10-CM

## 2019-05-20 DIAGNOSIS — I10 ESSENTIAL HYPERTENSION: ICD-10-CM

## 2019-05-20 DIAGNOSIS — D84.9 IMMUNOCOMPROMISED (CMS/HCC): ICD-10-CM

## 2019-05-20 DIAGNOSIS — R76.8 POSITIVE ANA (ANTINUCLEAR ANTIBODY): ICD-10-CM

## 2019-05-20 DIAGNOSIS — K59.04 CHRONIC IDIOPATHIC CONSTIPATION: ICD-10-CM

## 2019-05-20 DIAGNOSIS — I63.9 CEREBROVASCULAR ACCIDENT (CVA), UNSPECIFIED MECHANISM (CMS/HCC): ICD-10-CM

## 2019-05-20 DIAGNOSIS — L40.9 PSORIASIS: ICD-10-CM

## 2019-05-20 DIAGNOSIS — R07.89 MID STERNAL CHEST PAIN: ICD-10-CM

## 2019-05-20 DIAGNOSIS — L85.3 XEROSIS OF SKIN: Primary | ICD-10-CM

## 2019-05-20 DIAGNOSIS — G25.81 RLS (RESTLESS LEGS SYNDROME): ICD-10-CM

## 2019-05-20 LAB
CRP SERPL-MCNC: <1 MG/L
ERYTHROCYTE [SEDIMENTATION RATE] IN BLOOD: 6 MM/HR

## 2019-05-20 PROCEDURE — 36415 COLL VENOUS BLD VENIPUNCTURE: CPT | Performed by: FAMILY MEDICINE

## 2019-05-20 PROCEDURE — 85652 RBC SED RATE AUTOMATED: CPT | Performed by: FAMILY MEDICINE

## 2019-05-20 PROCEDURE — 86140 C-REACTIVE PROTEIN: CPT | Performed by: FAMILY MEDICINE

## 2019-05-20 PROCEDURE — 80299 QUANTITATIVE ASSAY DRUG: CPT | Performed by: FAMILY MEDICINE

## 2019-05-20 PROCEDURE — G0463 HOSPITAL OUTPT CLINIC VISIT: HCPCS | Performed by: FAMILY MEDICINE

## 2019-05-20 PROCEDURE — 99214 OFFICE O/P EST MOD 30 MIN: CPT | Performed by: FAMILY MEDICINE

## 2019-05-20 PROCEDURE — 86038 ANTINUCLEAR ANTIBODIES: CPT | Performed by: FAMILY MEDICINE

## 2019-05-20 ASSESSMENT — ENCOUNTER SYMPTOMS
NERVOUS/ANXIOUS: 0
SHORTNESS OF BREATH: 0
ACTIVITY CHANGE: 0
DYSPHORIC MOOD: 0
PHOTOPHOBIA: 0
FEVER: 0
CONFUSION: 0
LIGHT-HEADEDNESS: 0
NAUSEA: 0
NUMBNESS: 0
APPETITE CHANGE: 0
DIARRHEA: 0
BRUISES/BLEEDS EASILY: 0
DIAPHORESIS: 0
COLOR CHANGE: 1
ANAL BLEEDING: 0
POLYPHAGIA: 0
UNEXPECTED WEIGHT CHANGE: 0
ARTHRALGIAS: 0
SINUS PAIN: 0
RHINORRHEA: 0
FACIAL ASYMMETRY: 0
ABDOMINAL DISTENTION: 1
STRIDOR: 0
EYE DISCHARGE: 0
FLANK PAIN: 0
HYPERACTIVE: 0
SORE THROAT: 0
EYE REDNESS: 0
AGITATION: 0
SLEEP DISTURBANCE: 0
VOMITING: 0
MYALGIAS: 0
DIZZINESS: 0
ABDOMINAL PAIN: 1
DIFFICULTY URINATING: 0
DECREASED CONCENTRATION: 0
FACIAL SWELLING: 0
WEAKNESS: 0
DYSURIA: 0
SPEECH DIFFICULTY: 0
HALLUCINATIONS: 0
NECK STIFFNESS: 0
CONSTIPATION: 1
CHEST TIGHTNESS: 0
NECK PAIN: 0
RECTAL PAIN: 0
EYE PAIN: 0
COUGH: 0
HEMATURIA: 0
CHILLS: 0
WHEEZING: 0
VOICE CHANGE: 0
BLOOD IN STOOL: 0
TREMORS: 0
CHOKING: 0
BACK PAIN: 0
FREQUENCY: 0
POLYDIPSIA: 0
SEIZURES: 0
APNEA: 0
PALPITATIONS: 0
EYE ITCHING: 0
ADENOPATHY: 0
FATIGUE: 0
JOINT SWELLING: 0
SINUS PRESSURE: 0
TROUBLE SWALLOWING: 0
HEADACHES: 0
WOUND: 0

## 2019-05-20 NOTE — PROGRESS NOTES
There are no Patient Instructions on file for this visit.    Visit date not found    ASSESSMENT AND PLAN   Diagnoses and all orders for this visit:    Xerosis of skin    Psoriasis  -     Adalimumab Quantitative with Reflex to Antibody Blood, Venous; Future    Essential hypertension    Dyslipidemia    RLS (restless legs syndrome)    Mid sternal chest pain    Cerebrovascular accident (CVA), unspecified mechanism (CMS/HCC) (HCC)    Immunocompromised (CMS/HCC) (HCC)  -     Adalimumab Quantitative with Reflex to Antibody Blood, Venous; Future  -     Sedimentation rate, automated Blood, Venous  -     C-reactive protein (Inflammation) Blood, Venous; Future  -     EYYO Screen w/o reflex JUANIS (Bioplex) Blood, Venous    Chronic idiopathic constipation    Positive YEYO (antinuclear antibody)  -     Sedimentation rate, automated Blood, Venous  -     C-reactive protein (Inflammation) Blood, Venous; Future  -     YEYO Screen w/o reflex JUANIS (Bioplex) Blood, Venous        This is a patient with an extensive medical history who has psoriasis and is on immune modulating agents.  Recently has look like she might have some autoimmune disorders had a positive YEYO and has a referral to Dr. Dueñas that will be accomplished tomorrow.  Unfortunately she is developed macular papular rash along her cheeks but not over the bridge of her nose.  It also involves a very small area on her arms bilaterally in her legs.  These were not sun exposed areas.  She is on medications that can give lupus-like syndrome including her biologic agents.  The patient has previously had a pacemaker and a stroke which she wonders if it could be related to autoimmune disorder.    I have asked that she see what Dr. Dueñas think about her rash and I have asked her to apply some triamcinolone but held off on any type of prednisone until Dr. Dueñas assesses the patient.  Talked about the possibilities of this being related to a lupus-like syndrome.  It could also be that is  medication related.  She has been on her biologic agent for quite some time now she might of form some antibodies against that she could also have a drug reaction.  She also applied clindamycin cream that could given her allergic reaction in that area.  She seen Dr. Rowan Burrell for similar rash in the past.  At this undetermined whether this is similar to that rash enough that it could be the same thing and Dr. Burrell is 1 the requested autoimmune work-up.  It is obvious that Dr. Ashanti Dueñas and myself will need to work together in regards to just resolving this issue.  It does not look related to her psoriasis  She is considered immune compromised because of her medication she is on  Like I say she has a pacemaker which is been stable she has a history of essential hypertension and she is had a previous history of stroke with no residual deficit      Chief Complaint  Sunitha is a 73 y.o. female who presents for  chronic conditions        HPI  See above    Past medical history surgical history social history reviewed in her chart  Medication list reviewed    HISTORY  Past Medical History:   Diagnosis Date   • Actinic keratosis    • Cancer (CMS/[facility]) ([facility])     Skin CA on nose   • CVA (cerebral vascular accident) (CMS/HCC) ([facility])     Numbness/tingling of RUE but functional and large right toe numbness.   • High degree atrioventricular block     with syncope   • Hyperlipidemia    • Hypertension    • Hypertriglyceridemia    • Neurologic disorder    • Presence of heart assist device (CMS/[facility]) ([facility])     Dual chamber pacemeaker   • Psoriasis    • Sclerosis of the skin     Vaginal     Past Surgical History:   Procedure Laterality Date   • CARDIAC CATHETERIZATION     • CARDIAC PACEMAKER PLACEMENT      Dual chamber pacemaker implantation   • CARDIAC SURGERY  2014   •  SECTION  1977    [place]   • COLONOSCOPY  2016    Rare sigmoid diverticula. Physiologic internal hemorrhoids,  otherwise normal exam   • COLONOSCOPY  01/01/2008   • COLPORRHAPHY      and rectocele repair   • PERIPHERAL NERVE EVALUATION N/A 4/23/2019    Procedure: PERIPHERAL NERVE EVALUATION;  Surgeon: Jacqueline Landin MD;  Location: [facility] OR;  Service: Urology;  Laterality: N/A;   • TOTAL ABDOMINAL HYSTERECTOMY W/ BILATERAL SALPINGOOPHORECTOMY      [id number] [facility]     Family History   Problem Relation Age of Onset   • Stroke Mother 97   • Hypertension Mother    • Dementia Mother    • Other cancer Father 32        secondary malignant neoplasm of lymph node     Social History     Socioeconomic History   • Marital status:      Spouse name: Not on file   • Number of children: Not on file   • Years of education: Not on file   • Highest education level: Not on file   Occupational History     Comment: [employer]   Social Needs   • Financial resource strain: Not on file   • Food insecurity:     Worry: Not on file     Inability: Not on file   • Transportation needs:     Medical: Not on file     Non-medical: Not on file   Tobacco Use   • Smoking status: Never Smoker   • Smokeless tobacco: Never Used   Substance and Sexual Activity   • Alcohol use: No   • Drug use: No   • Sexual activity: Defer   Lifestyle   • Physical activity:     Days per week: Not on file     Minutes per session: Not on file   • Stress: Not on file   Relationships   • Social connections:     Talks on phone: Not on file     Gets together: Not on file     Attends Confucianism service: Not on file     Active member of club or organization: Not on file     Attends meetings of clubs or organizations: Not on file     Relationship status: Not on file   Other Topics Concern   • Not on file   Social History Narrative   • Not on file       ALLERGIES AND MEDICATIONS  Allergies   Allergen Reactions   • Apricot      throat pain     Current Outpatient Medications   Medication Sig Dispense Refill   • [START ON 6/18/2019] cefadroxil (DURICEF) 500 mg  capsule Morning of MOHS surgery, take 2 pills by mouth. Starting day after, take 1 pill every 12 hours until complete 14 capsule 0   • clopidogrel (PLAVIX) 75 mg tablet TAKE 1 TABLET EVERY DAY 90 tablet 1   • atorvastatin (LIPITOR) 40 mg tablet TAKE 1 TABLET EVERY DAY 90 tablet 3   • clindamycin (CLEOCIN T) 1 % lotion Apply twice daily to all of face 60 mL 2   • losartan (COZAAR) 100 mg tablet Take 1 tablet (100 mg total) by mouth daily 90 tablet 3   • isosorbide mononitrate (IMDUR) 30 mg 24 hr tablet Take 30 mg by mouth daily     • potassium chloride (K-DUR,KLOR-CON) 10 mEq CR tablet TAKE 2 TABLETS EVERY  tablet 3   • rOPINIRole (REQUIP) 1 mg tablet TAKE 1 TABLET TWICE DAILY 180 tablet 3   • naproxen sodium 220 mg capsule Take 2 tablets by mouth 2 (two) times a day.     • adalimumab (HUMIRA) 40 mg/0.8 mL syringe kit Inject 0.8 mL (40 mg total) under the skin every 14 (fourteen) days. 6 each 4   • triamcinolone (KENALOG) 0.1 % cream APPLY A THIN LAYER TO THE AFFECTED AREA(S) TOPICALLY 2 TIMES PER DAY 30 g 12   • omega-3 fatty acids-fish oil (FISH OIL) 340-1,000 mg capsule Take 1 capsule by mouth 2 (two) times a day.     • multivitamin with minerals tablet Take 1 tablet by mouth daily.     • calcium carbonate-vitamin D3 (CALCIUM 500 WITH D) 500 mg(1,250mg) -400 unit tablet Take 1 tablet by mouth daily.     • lysine 500 mg tablet Take 1 tablet every day by oral route in the evening.       No current facility-administered medications for this visit.      Facility-Administered Medications Ordered in Other Visits   Medication Dose Route Frequency Provider Last Rate Last Dose   • sodium chloride flush 20 mL  20 mL intravenous PRN Kathryn Mujica CNP   20 mL at 05/18/18 0948       Review of Systems   Constitutional: Negative for activity change, appetite change, chills, diaphoresis, fatigue, fever and unexpected weight change.   HENT: Negative for congestion, dental problem, drooling, ear discharge, ear pain, facial  swelling, hearing loss, mouth sores, nosebleeds, postnasal drip, rhinorrhea, sinus pressure, sinus pain, sneezing, sore throat, tinnitus, trouble swallowing and voice change.    Eyes: Negative for photophobia, pain, discharge, redness, itching and visual disturbance.   Respiratory: Negative for apnea, cough, choking, chest tightness, shortness of breath, wheezing and stridor.    Cardiovascular: Negative for chest pain, palpitations and leg swelling.   Gastrointestinal: Positive for abdominal distention, abdominal pain and constipation. Negative for anal bleeding, blood in stool, diarrhea, nausea, rectal pain and vomiting.   Endocrine: Negative for cold intolerance, heat intolerance, polydipsia, polyphagia and polyuria.   Genitourinary: Negative for decreased urine volume, difficulty urinating, dysuria, enuresis, flank pain, frequency, genital sores, hematuria and urgency.   Musculoskeletal: Negative for arthralgias, back pain, gait problem, joint swelling, myalgias, neck pain and neck stiffness.   Skin: Positive for color change and rash. Negative for pallor and wound.   Allergic/Immunologic: Positive for immunocompromised state. Negative for environmental allergies and food allergies.   Neurological: Negative for dizziness, tremors, seizures, syncope, facial asymmetry, speech difficulty, weakness, light-headedness, numbness and headaches.   Hematological: Negative for adenopathy. Does not bruise/bleed easily.   Psychiatric/Behavioral: Negative for agitation, behavioral problems, confusion, decreased concentration, dysphoric mood, hallucinations, self-injury, sleep disturbance and suicidal ideas. The patient is not nervous/anxious and is not hyperactive.        OBJECTIVE  Vitals:    05/20/19 1521   BP: 128/88   Pulse: 72   Resp: 16   SpO2: 97%   Weight: 81.6 kg (180 lb)     BP Readings from Last 3 Encounters:   05/20/19 128/88   05/06/19 (!) 187/75   04/23/19 141/60       Body mass index is 29.95 kg/m².  Wt Readings  from Last 3 Encounters:   05/20/19 81.6 kg (180 lb)   04/23/19 82.1 kg (181 lb)   03/27/19 82.1 kg (181 lb)       Physical Exam  Normocephalic atraumatic membranes pink moist pharynx clear  Her cheeks have these large maculopapular areas over the cheek area on each side but not over the bridge of her nose her chin and her forehead are not affected has may be 2 or 3 maculopapular lesions on her right and left arm but they look different than the ones on her face  Pictures are in the chart and available        Lab Results   Component Value Date    GLUCOSE 120 (H) 04/03/2019    CALCIUM 9.6 04/03/2019     04/03/2019    K 4.6 04/03/2019    CO2 25 04/03/2019     04/03/2019    BUN 19 04/03/2019    CREATININE 0.88 04/03/2019    ANIONGAP 7 04/03/2019     Lab Results   Component Value Date    WBC 5.5 02/08/2019    HGB 14.8 02/08/2019    HCT 42.6 02/08/2019    MCV 96.1 02/08/2019     02/08/2019     Lab Results   Component Value Date    TSH 1.435 02/22/2019     Lab Results   Component Value Date    HGBA1C 5.9 10/30/2018    HGBA1C 5.9 12/18/2017    HGBA1C 5.8 07/30/2015      Lab Results   Component Value Date    LDLCALC 54 06/21/2016    CREATININE 0.88 04/03/2019     Lab Results   Component Value Date    SEDRATE 6 05/20/2019     Tasha   is positive  Complements are essentially normal although C4 is just 1 level low  Sed rate is normal  Myositis panel looked to be normal so the dermatomyositis would be unusual  Pathology from a nose lesion showed Banks's  Double-stranded DNA was positive      Fl Fluoro Charge    Result Date: 4/23/2019  Narrative: NOTE: This study was stored without submission for formal interpretation.          Portions of this note was documented by Yinka Villeda MA as I performed the exam and collected the information from Sunitha Arrington. I attest that I have reviewed the information as documented, verified the accuracy of the documentation and added additional information as needed.       Leon  "MD Godfrey wrote,   \"When you run out of doctor things to do for the sick person, see if there are any human things you can offer.\"     Thank you for intrusting us with your care. We understand this can be a very scary time and you can feel quite vulnerable.    You, and your health, are very important to me and my healthcare team.   If you have not signed up for [facility], please do so. It can dramatically improve your care.   I want you to update us by sending communication in [facility].  If you cannot do that then  call   [phone] or drop a line at [address] [facility] [place] [address]    We wish you the best.  Please let us know.    My Best,    Hilda Donald MD      Electronically signed by Hilda Donald MD on 5/21/2019 at 5:12 PM  "

## 2019-05-21 LAB — ANTI-NUCLEAR ANTIBODY (ANA), MIA: POSITIVE

## 2019-05-21 NOTE — PROGRESS NOTES
Sunitha is a 73-year-old female with history of positive YEYO was found to have negative SSA and SSB.  Double-stranded DNA positive at 19 with normal less than 5.  CK is normal.  SMRNP was normal.  Myomarker 3 panel was normal.  Aldolase was negative.  Antihistone antibodies were also normal.  Patient did have a biopsy performed of lesion over her upper back which showed vascular interface dermatitis with increased mucin.  This could raise possibility of a connective tissue disorder.  The patient does have a history of psoriasis and had been treated with Humira.  She develops lesions initially in December with bumps along her cheek that then spread to her back.  She had good control of her symptoms with Humira and has been on it for several years. Sunitha has had raynauds symptoms for  20+ years, she does wear gloves. She has not noticed raynauds symptoms anywhere else.  She does have a chest tightness that she has had several months. She did complete a chemical stress test which came back normal.  Echocardiogram was also completed which was essentially unremarkable.  The past couple of nights she is noticing more chest pressure. This is something she notices mainly at night. Exertion does not make a difference with this at all. She does get pain in her hands along the second metacarpal in the left hand.  She is unable to describe any particular activity this occurs with.  She does not note actual joint swelling.  She has also had some difficulty with the right knee just starting today and does note some chronic pain over second and third toes after her stroke.  No troubles swallowing, she does occasionally have problems with heartburn. Constipation is a problem for her. Her right knee did start hurting the other day. No problems with pregnancy in the past or getting pregnant. It feels tight when she tries to take a deep breath. No kidney problems that she knows of. She had itching when she got this rash and was given  prednisone which she feels really helped with the itching.  She has not had previous history of blood clots other than history of stroke.  She has not noted recurrence of photosensitive rash.  She does have a history of psoriasis but has not had other rash.  She does not note mouth sores or canker sores.  Occasionally does note dry eyes.  Additional labs were drawn that showed a borderline positive double-stranded DNA and also slightly low C4 at 18.  Since her initial evaluation in February I have received multiple communications from Sunitha regarding skin lesions.  She has sent us many different pictures which are available through her communications on my chart.  She also has followed up with dermatology.  She recently has seen Dr. Michel who was questioned about the possibility of lichen sclerosis plaque of her upper back as well as possible morphea.  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 and Mohs excision recommended.    Sunitha reports that she did start to experience a rash on Friday. Denies sun exposure with start of rash. Patient reports that her PCP did recommend she use clobetasol cream on her face so she immediately stopped once rash started as this was a cream she recently started using. Sunitha reports that the Clobetasol cream was only used 3 times on her face and does think that it lightened the rash. Sunitha complains of a tightness in her back and the right side of the abdomen that does not hurt and is intermittent. She confirms that her weight is stable. Patient does complain of mouth and nose sores. Sunitha does experience discoloration in the fingers with the cold but denies ulcers on the ends of the fingers. Patient does report a small amount of swelling in bilateral lower extremities. No noting pleuritic pain. No  other arthritis complaints.Has also been given clindamycin cream to use recently. Does not feel rash related or worsened with sun exposure.Has not discussed this with dermatology although has recently seen Dr. Michel.    Allergies:  Apricot    History:  Past Medical History:   Diagnosis Date   • Actinic keratosis    • Cancer (CMS/[facility]) ([facility])     Skin CA on nose   • CVA (cerebral vascular accident) (CMS/HCC) ([facility])     Numbness/tingling of RUE but functional and large right toe numbness.   • High degree atrioventricular block     with syncope   • Hyperlipidemia    • Hypertension    • Hypertriglyceridemia    • Neurologic disorder    • Presence of heart assist device (CMS/[facility]) ([facility])     Dual chamber pacemeaker   • Psoriasis    • Sclerosis of the skin     Vaginal     Past Surgical History:   Procedure Laterality Date   • CARDIAC CATHETERIZATION     • CARDIAC PACEMAKER PLACEMENT      Dual chamber pacemaker implantation   • CARDIAC SURGERY  2014   •  SECTION  1977    [place]   • COLONOSCOPY  2016    Rare sigmoid diverticula. Physiologic internal hemorrhoids, otherwise normal exam   • COLONOSCOPY  2008   • COLPORRHAPHY      and rectocele repair   • PERIPHERAL NERVE EVALUATION N/A 2019    Procedure: PERIPHERAL NERVE EVALUATION;  Surgeon: Jacqueline Landin MD;  Location: [facility];  Service: Urology;  Laterality: N/A;   • TOTAL ABDOMINAL HYSTERECTOMY W/ BILATERAL SALPINGOOPHORECTOMY      [id number] [facility]     Social History     Socioeconomic History   • Marital status:      Spouse name: Not on file   • Number of children: Not on file   • Years of education: Not on file   • Highest education level: Not on file   Occupational History     Comment: [employer]   Social Needs   • Financial resource strain: Not on file   • Food insecurity:     Worry: Not on file     Inability: Not on file   • Transportation needs:     Medical: Not on file      Non-medical: Not on file   Tobacco Use   • Smoking status: Never Smoker   • Smokeless tobacco: Never Used   Substance and Sexual Activity   • Alcohol use: No   • Drug use: No   • Sexual activity: Defer   Lifestyle   • Physical activity:     Days per week: Not on file     Minutes per session: Not on file   • Stress: Not on file   Relationships   • Social connections:     Talks on phone: Not on file     Gets together: Not on file     Attends L[employer] day service: Not on file     Active member of club or organization: Not on file     Attends meetings of clubs or organizations: Not on file     Relationship status: Not on file   • Intimate partner violence:     Fear of current or ex partner: Not on file     Emotionally abused: Not on file     Physically abused: Not on file     Forced sexual activity: Not on file   Other Topics Concern   • Not on file   Social History Narrative   • Not on file       Review of Systems   Constitutional: Positive for fatigue. Negative for chills and fever.   HENT: Positive for mouth sores and nosebleeds. Negative for ear pain and sore throat.         Dry mouth   Eyes: Positive for pain. Negative for visual disturbance.        Dry eyes   Respiratory: Positive for chest tightness. Negative for cough and shortness of breath.    Cardiovascular: Negative for chest pain and palpitations.   Gastrointestinal: Positive for constipation. Negative for abdominal pain and vomiting.   Endocrine: Positive for cold intolerance.   Genitourinary: Negative for dysuria and hematuria.   Musculoskeletal: Negative for arthralgias and back pain.   Skin: Negative for color change and rash.   Allergic/Immunologic: Positive for immunocompromised state.   Neurological: Positive for headaches. Negative for seizures and syncope.   Hematological: Bruises/bleeds easily.   All other systems reviewed and are negative.    14 point ROS otherwise -.  Objective:  Vitals:    05/23/19 1436 05/23/19 1437   BP: 180/75    BP Location:  Left arm    Patient Position: Sitting    Cuff Size: Regular    Pulse: 78    Resp: 16    SpO2:  99%   Weight: 79.8 kg (176 lb)        Physical Exam   Constitutional: She is oriented to person, place, and time. She appears well-developed and well-nourished.   Musculoskeletal:   Shoulders, elbows, wrists, MCPs, PIPs were unremarkable without tenderness, synovitis or limitation range of motion.  There is no tenderness on palpation of the greater trochanters.  Hips show good range of motion.  Knees ankles and MTPs were unremarkable without synovitis, tenderness or limitation range of motion.  There is no tenderness on palpation along thoracic or lumbar spine.  There is no tenderness on palpation over SI joints.     Neurological: She is alert and oriented to person, place, and time.   Skin: Skin is warm and dry. Rash noted.   Erythematous macular papular rash over forearms, face and scattered along thighs. Occasional scaling   Psychiatric: She has a normal mood and affect. Her behavior is normal.     Joint Exam     Not documented       Medications:  Current Outpatient Medications on File Prior to Visit   Medication Sig Dispense Refill   • [START ON 2019] cefadroxil (DURICEF) 500 mg capsule Morning of MOHS surgery, take 2 pills by mouth. Starting day after, take 1 pill every 12 hours until complete 14 capsule 0   • clopidogrel (PLAVIX) 75 mg tablet TAKE 1 TABLET EVERY DAY 90 tablet 1   • atorvastatin (LIPITOR) 40 mg tablet TAKE 1 TABLET EVERY DAY 90 tablet 3   • clindamycin (CLEOCIN T) 1 % lotion Apply twice daily to all of face 60 mL 2   • losartan (COZAAR) 100 mg tablet Take 1 tablet (100 mg total) by mouth daily 90 tablet 3   • isosorbide mononitrate (IMDUR) 30 mg 24 hr tablet Take 30 mg by mouth daily     • [] zolpidem (AMBIEN) 5 mg tablet Take 1 tablet (5 mg total) by mouth nightly as needed for sleep 90 tablet 1   • potassium chloride (K-DUR,KLOR-CON) 10 mEq CR tablet TAKE 2 TABLETS EVERY  tablet 3    • rOPINIRole (REQUIP) 1 mg tablet TAKE 1 TABLET TWICE DAILY 180 tablet 3   • naproxen sodium 220 mg capsule Take 2 tablets by mouth 2 (two) times a day.     • adalimumab (HUMIRA) 40 mg/0.8 mL syringe kit Inject 0.8 mL (40 mg total) under the skin every 14 (fourteen) days. 6 each 4   • triamcinolone (KENALOG) 0.1 % cream APPLY A THIN LAYER TO THE AFFECTED AREA(S) TOPICALLY 2 TIMES PER DAY 30 g 12   • omega-3 fatty acids-fish oil (FISH OIL) 340-1,000 mg capsule Take 1 capsule by mouth 2 (two) times a day.     • multivitamin with minerals tablet Take 1 tablet by mouth daily.     • calcium carbonate-vitamin D3 (CALCIUM 500 WITH D) 500 mg(1,250mg) -400 unit tablet Take 1 tablet by mouth daily.     • lysine 500 mg tablet Take 1 tablet every day by oral route in the evening.       Current Facility-Administered Medications on File Prior to Visit   Medication Dose Route Frequency Provider Last Rate Last Dose   • sodium chloride flush 20 mL  20 mL intravenous PRN Kathryn Mujica CNP   20 mL at 05/18/18 0948         Labs:  CBC:     Lab Results   Component Value Date    WBC 5.5 02/08/2019    RBC 4.43 02/08/2019    HGB 14.8 02/08/2019    HCT 42.6 02/08/2019     02/08/2019     CMP:   Lab Results   Component Value Date     04/03/2019    K 4.6 04/03/2019     04/03/2019    CO2 25 04/03/2019    GLUCOSE 120 (H) 04/03/2019    CREATININE 0.88 04/03/2019    CALCIUM 9.6 04/03/2019    ALBUMIN 4.1 12/18/2017    ALKPHOS 67 12/18/2017    BILITOT 0.49 12/18/2017    ALT 20 12/18/2017    AST 20 12/18/2017    BUN 19 04/03/2019    ANIONGAP 7 04/03/2019     ESR/CRP:   Lab Results   Component Value Date    SEDRATE 6 05/20/2019    CRP <1.0 05/20/2019     Lab Results   Component Value Date    TBGOLD Negative 08/06/2018       Assessment/Plan:    Diagnoses and all orders for this visit:    YEYO positive    Psoriasis    Psoriatic arthritis (CMS/HCC) ([facility])  -     secukinumab 150 mg/mL pen injector; Inject 300 mg once weekly at weeks 0,  1, 2, 3, and 4.  -     secukinumab 150 mg/mL pen injector; Inject 300 mg every 4 weeks (28 days).    Recommended Sunitha not use the Clobetasol on her face as the cream can cause thinning of the skin and can cause scarring. She will go back to using OTC corticosteroid cream.  Recommended Sunitha discontinue Humira and Clindamycin as rash could be a possible reaction to a medication. Both exacerbation of psoriasis with a paradoxical reaction and association with autoimmune disease and drug induced lupus like illness have been reported. Reviewed and discussed previous lab results today. Advised Sunitha that it would not be recommended she try another TNF inhibitor with possible reaction to Humira. Discussed Cosentyx in detail today including benefits and side effects on the medication. Information on the Cosentyx provided in AVS today. Advised Sunitha not to start new medication until rash has resolved. Cosentyx filled to the [facility] so they can start authorization through patient insurance on new medication. Will need also to start working on patient assistance program as this is how she receives Humira. She is up to date on lab work at this time and recent labs and prebiologic workup reviewed. Sunitha will follow up as scheduled in August 2019 at this time.     Discussed all in detail patient voiced understanding and is in agreement with plan.    A voice recognition program was used to aid in documentation of this record.  Sometimes words are not printed exactly as they were spoken.  While efforts were made to carefully edit and correct any inaccuracies, some errors may be present; please take these into context.  Please contact the provider if areas are identified.    Scribed by  Eva Morton RN    2:52 PM, 5/23/2019

## 2019-05-22 DIAGNOSIS — D04.39 SQUAMOUS CELL CARCINOMA IN SITU (SCCIS) OF SKIN OF NOSE: ICD-10-CM

## 2019-05-22 LAB — ADALIMUMAB SERPL IA-MCNC: 11.8 MCG/ML

## 2019-05-22 RX ORDER — CEFADROXIL 500 MG/1
CAPSULE ORAL
Qty: 14 CAPSULE | Refills: 0 | Status: SHIPPED | OUTPATIENT
Start: 2019-05-22 | End: 2019-05-28 | Stop reason: HOSPADM

## 2019-05-23 ENCOUNTER — OFFICE VISIT (OUTPATIENT)
Dept: RHEUMATOLOGY | Facility: CLINIC | Age: 74
End: 2019-05-23
Payer: MEDICARE

## 2019-05-23 VITALS
SYSTOLIC BLOOD PRESSURE: 180 MMHG | WEIGHT: 176 LBS | RESPIRATION RATE: 16 BRPM | DIASTOLIC BLOOD PRESSURE: 75 MMHG | BODY MASS INDEX: 29.29 KG/M2 | HEART RATE: 78 BPM | OXYGEN SATURATION: 99 %

## 2019-05-23 DIAGNOSIS — L40.9 PSORIASIS: ICD-10-CM

## 2019-05-23 DIAGNOSIS — R76.8 ANA POSITIVE: Primary | ICD-10-CM

## 2019-05-23 DIAGNOSIS — L40.50 PSORIATIC ARTHRITIS (CMS/HCC): ICD-10-CM

## 2019-05-23 DIAGNOSIS — R35.0 URINARY FREQUENCY: Primary | ICD-10-CM

## 2019-05-23 PROCEDURE — 99214 OFFICE O/P EST MOD 30 MIN: CPT | Performed by: INTERNAL MEDICINE

## 2019-05-23 PROCEDURE — G0463 HOSPITAL OUTPT CLINIC VISIT: HCPCS | Mod: PO | Performed by: INTERNAL MEDICINE

## 2019-05-23 RX ORDER — CLOBETASOL PROPIONATE 0.5 MG/G
1 OINTMENT TOPICAL 2 TIMES DAILY
Status: ON HOLD | COMMUNITY
End: 2019-05-28 | Stop reason: ALTCHOICE

## 2019-05-23 ASSESSMENT — ENCOUNTER SYMPTOMS
CONSTIPATION: 1
COUGH: 0
COLOR CHANGE: 0
SEIZURES: 0
CHEST TIGHTNESS: 1
HEADACHES: 1
DYSURIA: 0
EYE PAIN: 1
BACK PAIN: 0
SORE THROAT: 0
FEVER: 0
PALPITATIONS: 0
CHILLS: 0
ABDOMINAL PAIN: 0
VOMITING: 0
BRUISES/BLEEDS EASILY: 1
FATIGUE: 1
ARTHRALGIAS: 0
SHORTNESS OF BREATH: 0
HEMATURIA: 0

## 2019-05-23 ASSESSMENT — PAIN SCALES - GENERAL: PAINLEVEL: 6

## 2019-05-23 NOTE — PATIENT INSTRUCTIONS
Patient Education   Secukinumab injection  What is this medicine?  SECUKINUMAB (sek ue KIN ue mab) is used to treat psoriasis. It is also used to treat psoriatic arthritis and ankylosing spondylitis.  This medicine may be used for other purposes; ask your health care provider or pharmacist if you have questions.  COMMON BRAND NAME(S): Cosentyx  What should I tell my health care provider before I take this medicine?  They need to know if you have any of these conditions:  -Crohn's disease, ulcerative colitis, or other inflammatory bowel disease  -infection or history of infection  -other conditions affecting the immune system  -recently received or are scheduled to receive a vaccine  -tuberculosis, a positive skin test for tuberculosis, or have recently been in close contact with someone who has tuberculosis  -an unusual or allergic reaction to secukinumab, other medicines, latex, rubber, foods, dyes, or preservatives  -pregnant or trying to get pregnant  -breast-feeding  How should I use this medicine?  This medicine is for injection under the skin. It may be administered by a healthcare professional in a hospital or clinic setting or at home. If you get this medicine at home, you will be taught how to prepare and give this medicine. Use exactly as directed. Take your medicine at regular intervals. Do not take your medicine more often than directed.  It is important that you put your used needles and syringes in a special sharps container. Do not put them in a trash can. If you do not have a sharps container, call your pharmacist or healthcare provider to get one.  A special MedGuide will be given to you by the pharmacist with each prescription and refill. Be sure to read this information carefully each time.  Talk to your pediatrician regarding the use of this medicine in children. Special care may be needed.  Overdosage: If you think you have taken too much of this medicine contact a poison control center or  emergency room at once.  NOTE: This medicine is only for you. Do not share this medicine with others.  What if I miss a dose?  It is important not to miss your dose. Call your doctor of health care professional if you are unable to keep an appointment. If you give yourself the medicine and you miss a dose, take it as soon as you can. If it is almost time for your next dose, take only that dose. Do not take double or extra doses.  What may interact with this medicine?  Do not take this medicine with any of the following medications:  -live virus vaccines  This medicine may also interact with the following medications:  -cyclosporine  -inactivated vaccines  -warfarin  This list may not describe all possible interactions. Give your health care provider a list of all the medicines, herbs, non-prescription drugs, or dietary supplements you use. Also tell them if you smoke, drink alcohol, or use illegal drugs. Some items may interact with your medicine.  What should I watch for while using this medicine?  Tell your doctor or healthcare professional if your symptoms do not start to get better or if they get worse.  You will be tested for tuberculosis (TB) before you start this medicine. If your doctor prescribes any medicine for TB, you should start taking the TB medicine before starting this medicine. Make sure to finish the full course of TB medicine.  Call your doctor or healthcare professional for advice if you get a fever, chills or sore throat, or other symptoms of a cold or flu. Do not treat yourself. This drug decreases your body's ability to fight infections. Try to avoid being around people who are sick.  This medicine can decrease the response to a vaccine. If you need to get vaccinated, tell your healthcare professional if you have received this medicine within the last 6 months. Extra booster doses may be needed. Talk to your doctor to see if a different vaccination schedule is needed.  What side effects may I  notice from receiving this medicine?  Side effects that you should report to your doctor or health care professional as soon as possible:  -allergic reactions like skin rash, itching or hives, swelling of the face, lips, or tongue  -signs and symptoms of infection like fever or chills; cough; sore throat; pain or trouble passing urine  Side effects that usually do not require medical attention (report to your doctor or health care professional if they continue or are bothersome):  -diarrhea  This list may not describe all possible side effects. Call your doctor for medical advice about side effects. You may report side effects to FDA at 2-819-DKV-3017.  Where should I keep my medicine?  Keep out of the reach of children.  Store the prefilled syringe or injection pen in a refrigerator between 2 to 8 degrees C (36 to 46 degrees F). Keep the syringe or the pen in the original carton until ready for use. Protect from light. Do not freeze. Do not shake. Prior to use, remove the syringe or pen from the refrigerator and use within 1 hour. Throw away any unused medicine after the expiration date on the label.  NOTE: This sheet is a summary. It may not cover all possible information. If you have questions about this medicine, talk to your doctor, pharmacist, or health care provider.  © 2019 [employer]vier/Gold Standard (2017-01-19 11:48:31)

## 2019-05-24 DIAGNOSIS — R21 RASH: Primary | ICD-10-CM

## 2019-05-30 RX ORDER — HYDROCORTISONE 25 MG/G
CREAM TOPICAL
Qty: 60 G | Refills: 2 | Status: SHIPPED | OUTPATIENT
Start: 2019-05-30

## 2019-06-07 DIAGNOSIS — I10 ESSENTIAL HYPERTENSION: Primary | Chronic | ICD-10-CM

## 2019-06-10 RX ORDER — ISOSORBIDE MONONITRATE 30 MG/1
TABLET, EXTENDED RELEASE ORAL
Qty: 90 TABLET | Refills: 2 | Status: SHIPPED | OUTPATIENT
Start: 2019-06-10 | End: 2019-12-30

## 2019-06-10 NOTE — PROGRESS NOTES
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  Indication: to preserve function, tissue-sparing excision required, located in area with high incidence of recurrence.  Date/Time: 6/11/2019 7:54 AM  Performed by: Duy Michel MD  Post-op diagnosis:post-op dx: squamous cell carcinoma in situ  Pre-op size: 0.4 cm x 0.4 cm  Post-op size: 1 cm x 1 cm  Procedure: Mohs surgery    Time Out  Time out occurred at 6/11/2019 8:00 AM. At this time, the correct patient, correct site, correct procedure and correct laterality was confirmed. Site was marked and visible.     Stage 1 Description  Lidocaine 1% with epinephrine ([id number]) (1.1 cc's administered by Dr. Michel) used for local anesthetic. The area was prepped with Alcohol. A specimen was excised and divided into 3 blocks utilizing standard Mohs micrographic techniques, in which the Mohs surgeon acted in two integrated, but distinct capabilities as surgeon and pathologist.   Size: 1 cm X 1 cm  Invasion depth: subcutaneous fat  Tumor free margins were obtained.       Closure Details  It was felt this defect could best be closed with an O to T advancement flap harvested from medial/lateral aspects to the defect. Therefore, an O to T closure was outlined with a marking pen and after sterile prep with 6 Betadine swabs and 3.0cc's of local anesthesia with 1% Lidocaine with Epinephrine, administered by Dr. Michel, the flaps were lifted in the subcutis plane and advanced into place. After meticulous hemostasis had been obtained with Birtcher hyfrecation, the wound was closed with 5-0 vicryl interrupted sutures deep and 5-0 nylon running and interrupted sutures in the skin, taking  great care to darin the edges. Vaseline, Telfa, and pressure dressing applied.   Flap size: 1.5cm x 2.6cm    Post-Procedure  Blood loss was minimal (< 5 cc's). Patient tolerated the procedure well with no complications. Patient was given wound care instructions.     Procedure Comments  After hemostasis was noted, vaseline was liberally applied to wound.  A pressure dressing consisting of telfa, gauze and hypafix tape was then applied.

## 2019-06-11 ENCOUNTER — PROCEDURE VISIT (OUTPATIENT)
Dept: DERMATOLOGY | Facility: CLINIC | Age: 74
End: 2019-06-11
Payer: MEDICARE

## 2019-06-11 ENCOUNTER — TELEPHONE (OUTPATIENT)
Dept: DERMATOLOGY | Facility: CLINIC | Age: 74
End: 2019-06-11

## 2019-06-11 VITALS
HEIGHT: 65 IN | DIASTOLIC BLOOD PRESSURE: 76 MMHG | BODY MASS INDEX: 29.99 KG/M2 | WEIGHT: 180 LBS | SYSTOLIC BLOOD PRESSURE: 162 MMHG | HEART RATE: 64 BPM

## 2019-06-11 DIAGNOSIS — D04.30 BOWEN'S DISEASE OF FACE: Primary | ICD-10-CM

## 2019-06-11 DIAGNOSIS — D04.39 SQUAMOUS CELL CARCINOMA IN SITU (SCCIS) OF SKIN OF NOSE: ICD-10-CM

## 2019-06-11 PROCEDURE — 14060 TIS TRNFR E/N/E/L 10 SQ CM/<: CPT | Performed by: DERMATOLOGY

## 2019-06-11 PROCEDURE — 17311 MOHS 1 STAGE H/N/HF/G: CPT | Mod: 51 | Performed by: DERMATOLOGY

## 2019-06-11 ASSESSMENT — PAIN SCALES - GENERAL
PAINLEVEL: 0-NO PAIN
PAINLEVEL: 0-NO PAIN

## 2019-06-11 NOTE — PATIENT INSTRUCTIONS
WOUND CARE AFTER A SURGERY      **IF YOU HAD A SKIN GRAFT DONE, DO NOT REMOVE THE BANDAGE, KEEP IT ON, DRY AND DO NOT CHANGE  IT. THE FOLLOWING ORDERS WILL PERTAIN ONLY TO YOUR DONOR SITE (THE AREA THE SKIN WAS TAKEN FROM FOR THE GRAFT)    IF YOUR STITCHES ARE ON YOUR FACE, ABSOLUTELY NO SHAVING!!! WHATSOEVER UNLESS THE WOUND IS COVERED WITH A BANDAGE FIRST.  ELECTRIC RAZORS WILL TAKE OUT SUTURES JUST AS THEY WILL WHISKERS!!    After your surgical procedure, a pressure dressing will be placed on your surgery site(s).  Unless otherwise instructed by your physician, you will leave this tighter pressure bandage on for 2 days (48 hours) after your procedure, OR, until you return to see us.  This is to minimize any bleeding that can possibly occur after surgery.  After the specified time, you may remove the bandage and clean the area and sutures with:      • Dove soap, warm water and hands, no wash cloth once daily. Unless otherwise instructed by your physician. DO  NOT use alcohol or hydrogen peroxide.    • Apply a thin layer or POLYSPORIN ANTIBIOTIC OINTMENT OR VASELINE with a q-tip 2-3 times daily. Massage ointment into sutures. Use only enough ointment to make wound slightly greasy and wet looking.      • For the next 48 hours, avoid ANY activity that could increase your heartrate. This will increase your risk of bleeding. Avoid housework, yardwork, exercise, climbing stairs multiple times or carrying anything heavier than a book. If surgery was done on your face, avoid bending over so your head does not go below your heart. You may sit, sleep, watch TV, read or be on a computer.     • Cover wound with a bandaid or not stick bandage such as Telfa. The wound needs to be covered at bedtime, if you leave your home, if clothing or glasses rest or rub over your surgical site. Your wound may be left uncovered during the day if you are inside your home as long as clothing or glasses do not rub or rest on your surgical  site.    • Take Tylenol for any discomfort. If Tylenol doesn’t relieve the pain, please let us know. Your physician may suggest other medications for pain control also.    ** IF REDNESS, UNEXPECTED SWELLING, DRAINAGE OR INCREASED PAIN DEVELOPS, PLEASE CALL OUR OFFICE AS SOON AS POSSIBLE. IF IT IS EMERGENT SUCH AS BLEEDING, OUR PHYSICIANS CAN BE REACHED THROUGH OUR ANSWERING SERVICE.  [facility] DERMATOLOGY  [phone]

## 2019-06-12 ENCOUNTER — TELEPHONE (OUTPATIENT)
Dept: DERMATOLOGY | Facility: CLINIC | Age: 74
End: 2019-06-12

## 2019-06-12 NOTE — TELEPHONE ENCOUNTER
Returned patient's call. She is wondering how often she is supposed to apply imiquimod and for how long. I let her know that I didn't see any documentation of that by dr. Michel so I'm going to check with him and call her back.

## 2019-06-21 NOTE — PROGRESS NOTES
SUBJECTIVE: 73 y.o. female here for removal of sutures.   Procedure performed:  Mohs Surgery   Date of procedure 06/11/2019.   Site:Right pre tip nose, [id number]  No problems since then.   OBJECTIVE: Wound healed well without signs of infection.  Suture line intact. Assessed by EZIO, Stickali taken out by Bennie LOPEZ student.   ASSESSMENT: Sutures to be removed today  PLAN: Follow up as needed.  Continue local wound care for 7 days. Call with further concerns.

## 2019-06-24 ENCOUNTER — CLINICAL SUPPORT (OUTPATIENT)
Dept: DERMATOLOGY | Facility: CLINIC | Age: 74
End: 2019-06-24
Payer: MEDICARE

## 2019-06-24 VITALS — HEART RATE: 67 BPM | SYSTOLIC BLOOD PRESSURE: 154 MMHG | DIASTOLIC BLOOD PRESSURE: 73 MMHG

## 2019-06-24 DIAGNOSIS — L40.50 PSORIATIC ARTHRITIS (CMS/HCC): ICD-10-CM

## 2019-06-24 DIAGNOSIS — Z48.02 VISIT FOR SUTURE REMOVAL: ICD-10-CM

## 2019-06-24 PROCEDURE — 99024 POSTOP FOLLOW-UP VISIT: CPT | Performed by: DERMATOLOGY

## 2019-06-24 ASSESSMENT — PAIN SCALES - GENERAL: PAINLEVEL: 0-NO PAIN

## 2019-07-05 ENCOUNTER — HOSPITAL ENCOUNTER (EMERGENCY)
Facility: HOSPITAL | Age: 74
Discharge: HOME | End: 2019-07-05
Attending: FAMILY MEDICINE
Payer: MEDICARE

## 2019-07-05 VITALS
SYSTOLIC BLOOD PRESSURE: 194 MMHG | OXYGEN SATURATION: 95 % | RESPIRATION RATE: 18 BRPM | TEMPERATURE: 99.5 F | HEART RATE: 69 BPM | DIASTOLIC BLOOD PRESSURE: 82 MMHG

## 2019-07-05 DIAGNOSIS — J00 NASOPHARYNGITIS ACUTE: Primary | ICD-10-CM

## 2019-07-05 PROCEDURE — 87798 DETECT AGENT NOS DNA AMP: CPT | Performed by: FAMILY MEDICINE

## 2019-07-05 PROCEDURE — 99283 EMERGENCY DEPT VISIT LOW MDM: CPT | Performed by: FAMILY MEDICINE

## 2019-07-05 PROCEDURE — 99283 EMERGENCY DEPT VISIT LOW MDM: CPT

## 2019-07-06 LAB
B PARAP IS1001 DNA NPH QL NAA+NON-PROBE: NEGATIVE
B PERT.PT PRMT NPH QL NAA+NON-PROBE: NEGATIVE
C PNEUM DNA NPH QL NAA+NON-PROBE: NEGATIVE
FLUAV RNA NPH QL NAA+NON-PROBE: NEGATIVE
FLUBV RNA NPH QL NAA+NON-PROBE: NEGATIVE
HADV DNA NPH QL NAA+NON-PROBE: NEGATIVE
HCOV 229E RNA NPH QL NAA+NON-PROBE: NEGATIVE
HCOV HKU1 RNA NPH QL NAA+NON-PROBE: NEGATIVE
HCOV NL63 RNA NPH QL NAA+NON-PROBE: NEGATIVE
HCOV OC43 RNA NPH QL NAA+NON-PROBE: NEGATIVE
HMPV RNA NPH QL NAA+NON-PROBE: NEGATIVE
HPIV1 RNA NPH QL NAA+NON-PROBE: NEGATIVE
HPIV2 RNA NPH QL NAA+NON-PROBE: NEGATIVE
HPIV3 RNA NPH QL NAA+NON-PROBE: POSITIVE
HPIV4 RNA NPH QL NAA+NON-PROBE: NEGATIVE
M PNEUMO DNA NPH QL NAA+NON-PROBE: NEGATIVE
RSV RNA NPH QL NAA+NON-PROBE: NEGATIVE
RV+EV RNA NPH QL NAA+NON-PROBE: NEGATIVE

## 2019-07-06 ASSESSMENT — ENCOUNTER SYMPTOMS
RHINORRHEA: 1
LIGHT-HEADEDNESS: 0
COUGH: 1
SINUS PAIN: 0
TROUBLE SWALLOWING: 0
DIZZINESS: 0
EYE DISCHARGE: 0
EYE ITCHING: 1
EYE PAIN: 0
SORE THROAT: 1
HEADACHES: 1

## 2019-07-06 NOTE — ED PROVIDER NOTES
HPI:  Chief Complaint   Patient presents with   • Cough   • Nasal Congestion       73-year-old female who presents today with acute cough and nasal congestion x 2 days.  Patient reports associated right eye pruritus.  Patient reports that her symptoms have progressively worsened.  She is unsure if this may potentially be a allergic reaction to a new medication she started called Cosentyx for her psoriasis 4 days ago.  She is also concerned if this is potentially a infectious viral disease versus seasonal allergies.  Patient reports that she does work at a high tourist attraction site as [employer], but she denies any notable sick contacts.  Patient has no significant history or issues with seasonal allergies but she has attempted over-the-counter medications with antihistamines with no significant improvement.  She currently has a headache at this time but it is common for her and not the worst headache of her life.  She has baseline numbness in the right upper extremity as well as right lower extremity.          HISTORY:  Past Medical History:   Diagnosis Date   • Actinic keratosis    • Cancer (CMS/HCC) ([facility])     Skin CA on nose   • CVA (cerebral vascular accident) (CMS/HCC) ([facility])     Numbness/tingling of RUE but functional and large right toe numbness.   • High degree atrioventricular block     with syncope   • Hyperlipidemia    • Hypertension    • Hypertriglyceridemia    • Neurologic disorder    • Presence of heart assist device (CMS/HCC) ([facility])     Dual chamber pacemaker   • Psoriasis    • Sclerosis of the skin     Vaginal   • Squamous cell skin cancer 2019    in situ right pretip nose       Past Surgical History:   Procedure Laterality Date   • CARDIAC CATHETERIZATION     • CARDIAC PACEMAKER PLACEMENT      Dual chamber pacemaker implantation   • CARDIAC SURGERY  2014   •  SECTION  1977    [place], [place]   • COLONOSCOPY  2016    Rare sigmoid diverticula. Physiologic  internal hemorrhoids, otherwise normal exam   • COLONOSCOPY  01/01/2008   • COLPORRHAPHY      and rectocele repair   • MOHS SURGERY Right 06/11/2019    right pretip nose Banks's   • PERIPHERAL NERVE EVALUATION N/A 4/23/2019    Procedure: PERIPHERAL NERVE EVALUATION;  Surgeon: Jacqueline Landin MD;  Location: [facility] OR;  Service: Urology;  Laterality: N/A;   • SACRAL NERVE STIMULATOR PLACEMENT N/A 5/28/2019    Procedure: FULL IMPLANT INTERSTIM;  Surgeon: Jacqueline Landin MD;  Location: [facility] OR;  Service: Urology;  Laterality: N/A;   • TOTAL ABDOMINAL HYSTERECTOMY W/ BILATERAL SALPINGOOPHORECTOMY      [id number] [facility]       Family History   Problem Relation Age of Onset   • Stroke Mother 97   • Hypertension Mother    • Dementia Mother    • Other cancer Father 32        secondary malignant neoplasm of lymph node       Social History     Tobacco Use   • Smoking status: Never Smoker   • Smokeless tobacco: Never Used   Substance Use Topics   • Alcohol use: No   • Drug use: No         ROS:  Review of Systems   HENT: Positive for congestion, postnasal drip, rhinorrhea, sneezing and sore throat. Negative for ear discharge, ear pain, sinus pain and trouble swallowing.    Eyes: Positive for itching. Negative for pain, discharge and visual disturbance.   Respiratory: Positive for cough.    Neurological: Positive for headaches. Negative for dizziness and light-headedness.       PE:  ED Triage Vitals   Temp Heart Rate Resp BP SpO2   07/05/19 2125 07/05/19 2125 07/05/19 2125 07/05/19 2125 07/05/19 2125   37.3 °C (99.2 °F) 80 16 (!) 185/86 94 %      Temp Source Heart Rate Source Patient Position BP Location FiO2 (%)   07/05/19 2125 -- 07/05/19 2219 -- --   Oral  Head of bed 30 degrees or higher         Physical Exam   Constitutional: She is oriented to person, place, and time. She appears well-developed and well-nourished. No distress.   HENT:   Head: Normocephalic and atraumatic.   Right Ear: External ear normal.    Left Ear: External ear normal.   Mouth/Throat: Oropharynx is clear and moist. No oropharyngeal exudate.   Eyes: Pupils are equal, round, and reactive to light. Conjunctivae and EOM are normal.   Neck: Normal range of motion.   Cardiovascular: Normal rate, regular rhythm and normal heart sounds. Exam reveals no gallop and no friction rub.   No murmur heard.  Pulmonary/Chest: Effort normal and breath sounds normal. No stridor. No respiratory distress. She has no wheezes.   Neurological: She is alert and oriented to person, place, and time. No cranial nerve deficit.   Skin: Skin is warm.   Psychiatric: She has a normal mood and affect.   Nursing note and vitals reviewed.    ED PROCEDURES:  Procedures    ED COURSE:   Patient was hemodynamically stable with no airway compromise throughout ED evaluation.  Presenting history as well as physical exam consistent with nasopharyngitis.  Unsure of etiology at this time as it could be possibly seasonal allergies versus drug reaction versus URI.  Given that patient recently started the medication and her symptoms started 1 to 2 days thereafter, could possibly be related as nasopharyngitis is listed under a common reaction.  However, given that patient just recently start this medication as an alternative for Humira, discussed with her that is not unreasonable to continue to monitor her symptoms and rule out possible seasonal allergies versus URI.  With shared decision making, patient wished to pursue further evaluation with a respiratory viral pathogen to rule out URI.  In regards to possible seasonal allergies, recommend the patient continue on with over-the-counter antihistamines as well as consideration of using a [place] pot for nasal irrigation to help her symptoms.  At this time, patient was in agreement to continue conservative therapies and to follow-up with her rheumatologist if her symptoms do not improve to discuss discontinuing her medication and finding another  alternative.  Discussed with patient that overall her exam was normal along with her vital signs other than her elevated blood pressures for which she was not concerned as she denied any new symptoms and reported that her blood pressures are typically high like this when she is being evaluated.  Patient and has been felt safe for discharge home and will follow up with her PCP and rheumatologist if symptoms do not improve.  Discussed signs and symptoms that would warrant further medical evaluation, further information was placed under patient instructions.  Patient and  were in agreement with plan and had no further questions or concerns.         MDM:  ADIS    Final diagnoses:   [J00] Nasopharyngitis acute        Priscila Dubon MD  07/06/19 0103

## 2019-07-08 ENCOUNTER — TELEPHONE (OUTPATIENT)
Dept: FAMILY MEDICINE | Facility: CLINIC | Age: 74
End: 2019-07-08

## 2019-07-11 NOTE — PROGRESS NOTES
This is similar to the common cold  Just use simple handwashing  But there is no requirement for her to be off work

## 2019-07-29 ENCOUNTER — LAB (OUTPATIENT)
Dept: FAMILY MEDICINE | Facility: CLINIC | Age: 74
End: 2019-07-29
Payer: MEDICARE

## 2019-07-29 DIAGNOSIS — R35.0 URINARY FREQUENCY: ICD-10-CM

## 2019-07-29 DIAGNOSIS — L40.9 PSORIASIS: ICD-10-CM

## 2019-07-29 DIAGNOSIS — Z51.81 MEDICATION MONITORING ENCOUNTER: ICD-10-CM

## 2019-07-29 LAB
ALBUMIN SERPL-MCNC: 4.3 G/DL (ref 3.5–5.3)
ALP SERPL-CCNC: 61 U/L (ref 55–142)
ALT SERPL-CCNC: 22 U/L (ref 0–52)
ANION GAP SERPL CALC-SCNC: 9 MMOL/L (ref 3–11)
AST SERPL-CCNC: 23 U/L (ref 0–39)
BASOPHILS # BLD AUTO: 0 10*3/UL
BASOPHILS NFR BLD AUTO: 1 % (ref 0–2)
BILIRUB SERPL-MCNC: 0.93 MG/DL (ref 0–1.4)
BUN SERPL-MCNC: 15 MG/DL (ref 7–25)
CALCIUM ALBUM COR SERPL-MCNC: 8.8 MG/DL (ref 8.6–10.3)
CALCIUM SERPL-MCNC: 9 MG/DL (ref 8.6–10.3)
CHLORIDE SERPL-SCNC: 106 MMOL/L (ref 98–107)
CO2 SERPL-SCNC: 23 MMOL/L (ref 21–32)
CREAT SERPL-MCNC: 0.93 MG/DL (ref 0.6–1.2)
CREAT UR-MCNC: 108.8 MG/DL
CRP SERPL-MCNC: <1 MG/L
EOSINOPHIL # BLD AUTO: 0.2 10*3/UL
EOSINOPHIL NFR BLD AUTO: 2 % (ref 0–3)
ERYTHROCYTE [DISTWIDTH] IN BLOOD BY AUTOMATED COUNT: 12.5 % (ref 11.5–14)
GFR SERPL CREATININE-BSD FRML MDRD: 61 ML/MIN/1.73M*2
GLUCOSE SERPL-MCNC: 90 MG/DL (ref 70–105)
HCT VFR BLD AUTO: 41.1 % (ref 34–45)
HGB BLD-MCNC: 14.2 G/DL (ref 11.5–15.5)
LYMPHOCYTES # BLD AUTO: 1.3 10*3/UL
LYMPHOCYTES NFR BLD AUTO: 21 % (ref 11–47)
MCH RBC QN AUTO: 33.7 PG (ref 28–33)
MCHC RBC AUTO-ENTMCNC: 34.5 G/DL (ref 32–36)
MCV RBC AUTO: 97.9 FL (ref 81–97)
MICROALBUMIN UR-MCNC: 15.1 MG/L (ref 0–30)
MICROALBUMIN/CREAT UR: 13.9 MG/G CREAT (ref 0–34)
MONOCYTES # BLD AUTO: 0.6 10*3/UL
MONOCYTES NFR BLD AUTO: 9 % (ref 3–11)
NEUTROPHILS # BLD AUTO: 4.3 10*3/UL
NEUTROPHILS NFR BLD AUTO: 67 % (ref 41–81)
PLATELET # BLD AUTO: 161 10*3/UL (ref 140–350)
PMV BLD AUTO: 7.3 FL (ref 6.9–10.8)
POTASSIUM SERPL-SCNC: 4.1 MMOL/L (ref 3.5–5.1)
PROT SERPL-MCNC: 7 G/DL (ref 6–8.3)
RBC # BLD AUTO: 4.2 10*6/ΜL (ref 3.7–5.3)
SODIUM SERPL-SCNC: 138 MMOL/L (ref 135–145)
WBC # BLD AUTO: 6.5 10*3/UL (ref 4.5–10.5)

## 2019-07-29 PROCEDURE — 82570 ASSAY OF URINE CREATININE: CPT

## 2019-07-29 PROCEDURE — 85025 COMPLETE CBC W/AUTO DIFF WBC: CPT

## 2019-07-29 PROCEDURE — 80053 COMPREHEN METABOLIC PANEL: CPT

## 2019-07-29 PROCEDURE — 86140 C-REACTIVE PROTEIN: CPT

## 2019-07-29 PROCEDURE — 36415 COLL VENOUS BLD VENIPUNCTURE: CPT

## 2019-08-05 ENCOUNTER — TELEPHONE (OUTPATIENT)
Dept: RHEUMATOLOGY | Facility: CLINIC | Age: 74
End: 2019-08-05

## 2019-08-05 DIAGNOSIS — R76.8 POSITIVE ANA (ANTINUCLEAR ANTIBODY): Primary | ICD-10-CM

## 2019-08-05 NOTE — TELEPHONE ENCOUNTER
----- Message from Connie Dueñas MD sent at 8/5/2019  4:31 PM MDT -----  Patient can ALYSA, CRP, C3 and C4 are to her appointment.  If she can get it done the next couple of days that should be done on Wednesday when she is seen.  Diagnosis is positive YEYO

## 2019-08-05 NOTE — PROGRESS NOTES
73-year-old female with history of positive YEYO was found to have negative SSA and SSB.  Double-stranded DNA positive at 19 with normal less than 5.  CK is normal.  SMRNP was normal.  Myomarker 3 panel was normal.  Aldolase was negative.  Antihistone antibodies were also normal.  Patient did have a biopsy performed of lesion over her upper back which showed vascular interface dermatitis with increased mucin.  This could raise possibility of a connective tissue disorder.  The patient does have a history of psoriasis and had been treated with Humira.  She develops lesions initially in December with bumps along her cheek that then spread to her back.  She had good control of her symptoms with Humira and has been on it for several years. Sunitha has had raynauds symptoms for  20+ years, she does wear gloves. She has not noticed raynauds symptoms anywhere else.  She does have a chest tightness that she has had several months. She did complete a chemical stress test which came back normal.  Echocardiogram was also completed which was essentially unremarkable.  The past couple of nights she is noticing more chest pressure. This is something she notices mainly at night. Exertion does not make a difference with this at all. She does get pain in her hands along the second metacarpal in the left hand.  She is unable to describe any particular activity this occurs with.  She does not note actual joint swelling.  She has also had some difficulty with the right knee just starting today and does note some chronic pain over second and third toes after her stroke.  No troubles swallowing, she does occasionally have problems with heartburn. Constipation is a problem for her. Her right knee did start hurting the other day. No problems with pregnancy in the past or getting pregnant. It feels tight when she tries to take a deep breath. No kidney problems that she knows of. She had itching when she got this rash and was given prednisone  which she feels really helped with the itching.  She has not had previous history of blood clots other than history of stroke.  She has not noted recurrence of photosensitive rash.  She does have a history of psoriasis but has not had other rash.  She does not note mouth sores or canker sores.  Occasionally does note dry eyes.  Additional labs were drawn that showed a borderline positive double-stranded DNA and also slightly low C4 at 18.  Since her initial evaluation in February I have received multiple communications from Sunitha regarding skin lesions.  She has sent us many different pictures which are available through her communications on my chart.  She also has followed up with dermatology.  She recently has seen Dr. Michel who was questioned about the possibility of lichen sclerosis plaque of her upper back as well as possible morphea.  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 and Mohs excision recommended.  Patient was seen in May with worsening rash and we suggested discontinue Humira and she was started with Cosentyx.    Sunitha does complain of pain today 3/10 in her right upper extremity and medial side of the right hand. Sunitha recently started Cosentyx injections and confirms that she is due for her last loading dose injection tonight. She reports that she does get a small amount of bruising with the injections but denies pain or other side effects from the medication. States is very happy with the medication and notes no rash.  She confirms that her mouth sores have all subsided. Denies fevers, chills, or infections. Patient does complain of feeling fatigued. Sunitha confirms that she does sleep well at night although does take Ambien 5 mg PRN at bedtime. Denies chest pain or shortness of breath. She does experience a  flutter every once in awhile but they do monitor her pacemaker often. Sunitha does have an elevated blood pressure medication this morning. She reports that she has not taken her blood pressure medication yet today as she wanted to fast for her appointment today. Does not monitor at home.  Patient does complain of a pain in a gland on the right side of the jaw. She complains of a pain in her temple waking her up in the morning. Sunitha does question further recommendations on treating constipation.    Allergies:  Apricot and Clindamycin phosphate    History:  Past Medical History:   Diagnosis Date   • Actinic keratosis    • Cancer (CMS/HCC) ([facility])     Skin CA on nose   • CVA (cerebral vascular accident) (CMS/HCC) ([facility])     Numbness/tingling of RUE but functional and large right toe numbness.   • High degree atrioventricular block     with syncope   • Hyperlipidemia    • Hypertension    • Hypertriglyceridemia    • Neurologic disorder    • Presence of heart assist device (CMS/HCC) ([facility])     Dual chamber pacemaker   • Psoriasis    • Sclerosis of the skin     Vaginal   • Squamous cell skin cancer 2019    in situ right pretip nose     Past Surgical History:   Procedure Laterality Date   • CARDIAC CATHETERIZATION     • CARDIAC PACEMAKER PLACEMENT      Dual chamber pacemaker implantation   • CARDIAC SURGERY  2014   •  SECTION  1977    [place], [place]   • COLONOSCOPY  2016    Rare sigmoid diverticula. Physiologic internal hemorrhoids, otherwise normal exam   • COLONOSCOPY  2008   • COLPORRHAPHY      and rectocele repair   • MOHS SURGERY Right 2019    right pretip nose Banks's   • PERIPHERAL NERVE EVALUATION N/A 2019    Procedure: PERIPHERAL NERVE EVALUATION;  Surgeon: Jacqueline Landin MD;  Location: [facility] OR;  Service: Urology;  Laterality: N/A;   • SACRAL NERVE STIMULATOR PLACEMENT N/A 2019    Procedure: FULL IMPLANT INTERSTIM;  Surgeon: Jacqueline Landin MD;   Location: [facility] OR;  Service: Urology;  Laterality: N/A;   • TOTAL ABDOMINAL HYSTERECTOMY W/ BILATERAL SALPINGOOPHORECTOMY      [id number] [facility]     Social History     Socioeconomic History   • Marital status:      Spouse name: Not on file   • Number of children: Not on file   • Years of education: Not on file   • Highest education level: Not on file   Occupational History     Comment: [employer]   Social Needs   • Financial resource strain: Not on file   • Food insecurity:     Worry: Not on file     Inability: Not on file   • Transportation needs:     Medical: Not on file     Non-medical: Not on file   Tobacco Use   • Smoking status: Never Smoker   • Smokeless tobacco: Never Used   Substance and Sexual Activity   • Alcohol use: No   • Drug use: No   • Sexual activity: Defer   Lifestyle   • Physical activity:     Days per week: Not on file     Minutes per session: Not on file   • Stress: Not on file   Relationships   • Social connections:     Talks on phone: Not on file     Gets together: Not on file     Attends [employer] service: Not on file     Active member of club or organization: Not on file     Attends meetings of clubs or organizations: Not on file     Relationship status: Not on file   • Intimate partner violence:     Fear of current or ex partner: Not on file     Emotionally abused: Not on file     Physically abused: Not on file     Forced sexual activity: Not on file   Other Topics Concern   • Not on file   Social History Narrative   • Not on file       Review of Systems   Constitutional: Positive for fatigue. Negative for chills and fever.   HENT: Negative for ear pain and sore throat.         Dry mouth     Eyes: Negative for pain and visual disturbance.        Dry eyes   Respiratory: Negative for cough and shortness of breath.    Cardiovascular: Negative for chest pain and palpitations.   Gastrointestinal: Positive for abdominal distention and constipation. Negative for  abdominal pain and vomiting.   Endocrine: Positive for cold intolerance and heat intolerance.   Genitourinary: Negative for dysuria and hematuria.   Musculoskeletal: Negative for arthralgias and back pain.   Skin: Negative for color change and rash.   Allergic/Immunologic: Positive for immunocompromised state.   Neurological: Negative for seizures and syncope.   Hematological: Bruises/bleeds easily.   All other systems reviewed and are negative.    14 point ROS otherwise -.  Objective:  Vitals:    08/07/19 0841 08/07/19 0844   BP: (!) 209/78    BP Location: Left arm    Patient Position: Sitting    Cuff Size: Regular    Pulse: 60    SpO2:  100%   Weight: 80.8 kg (178 lb 3.2 oz)        Physical Exam   Constitutional: She is oriented to person, place, and time. She appears well-developed and well-nourished.   HENT:   Mouth/Throat: Oropharynx is clear and moist.   Eyes: Conjunctivae are normal.   Cardiovascular: Normal rate, regular rhythm and normal heart sounds.   No murmur heard.  Pulmonary/Chest: Effort normal and breath sounds normal.   Musculoskeletal:   Shoulders, elbows, wrists, MCPs, PIPs were unremarkable without tenderness, synovitis or limitation range of motion.  There is no tenderness on palpation of the greater trochanters.  Hips show good range of motion.  Knees ankles and MTPs were unremarkable without synovitis, tenderness or limitation range of motion.  There is no tenderness on palpation along thoracic or lumbar spine.  There is no tenderness on palpation over SI joints.      Exceptions include crepitus over the knee.    Neurological: She is alert and oriented to person, place, and time.   Tinel's positive at the right elbow.    Skin: Skin is warm and dry. No rash noted.   Psychiatric: She has a normal mood and affect. Her behavior is normal.     Joint Exam     Not documented       Medications:  Current Outpatient Medications on File Prior to Visit   Medication Sig Dispense Refill   • zolpidem 5 mg  tablet, sublingual      • secukinumab 150 mg/mL pen injector Inject 300 mg every 4 weeks (28 days). 6 pen 3   • isosorbide mononitrate (IMDUR) 30 mg 24 hr tablet TAKE 1 TABLET EVERY DAY 90 tablet 2   • hydrocortisone 2.5 % cream Apply three times daily as needed for rash on face 60 g 2   • CLOBETASOL PROPIONATE, BULK, MISC daily     • [] HYDROcodone-acetaminophen (NORCO) 5-325 mg per tablet Take 1 tablet by mouth every 6 (six) hours as needed for pain scale 8-10/10 for up to 10 days Max Daily Amount: 4 tablets 5 tablet 0   • CALCIUM ORAL Take by mouth     • secukinumab 150 mg/mL pen injector Inject 300 mg once weekly at weeks 0, 1, 2, 3, and 4. 10 pen 0   • clopidogrel (PLAVIX) 75 mg tablet TAKE 1 TABLET EVERY DAY 90 tablet 1   • atorvastatin (LIPITOR) 40 mg tablet TAKE 1 TABLET EVERY DAY 90 tablet 3   • losartan (COZAAR) 100 mg tablet Take 1 tablet (100 mg total) by mouth daily 90 tablet 3   • [] zolpidem (AMBIEN) 5 mg tablet Take 1 tablet (5 mg total) by mouth nightly as needed for sleep 90 tablet 1   • potassium chloride (K-DUR,KLOR-CON) 10 mEq CR tablet TAKE 2 TABLETS EVERY  tablet 3   • rOPINIRole (REQUIP) 1 mg tablet TAKE 1 TABLET TWICE DAILY 180 tablet 3   • naproxen sodium 220 mg capsule Take 2 tablets by mouth 2 (two) times a day.     • triamcinolone (KENALOG) 0.1 % cream APPLY A THIN LAYER TO THE AFFECTED AREA(S) TOPICALLY 2 TIMES PER DAY 30 g 12   • omega-3 fatty acids-fish oil (FISH OIL) 340-1,000 mg capsule Take 1 capsule by mouth 2 (two) times a day.     • multivitamin with minerals tablet Take 1 tablet by mouth daily.     • lysine 500 mg tablet Take 1 tablet every day by oral route in the evening.       Current Facility-Administered Medications on File Prior to Visit   Medication Dose Route Frequency Provider Last Rate Last Dose   • sodium chloride flush 20 mL  20 mL intravenous PRN Kathryn Mujica CNP   20 mL at 18 0948         Labs:  CBC:     Lab Results   Component Value  Date    WBC 6.5 07/29/2019    RBC 4.20 07/29/2019    HGB 14.2 07/29/2019    HCT 41.1 07/29/2019     07/29/2019     CMP:   Lab Results   Component Value Date     07/29/2019    K 4.1 07/29/2019     07/29/2019    CO2 23 07/29/2019    GLUCOSE 90 07/29/2019    CREATININE 0.93 07/29/2019    CALCIUM 9.0 07/29/2019    ALBUMIN 4.3 07/29/2019    ALKPHOS 61 07/29/2019    BILITOT 0.93 07/29/2019    ALT 22 07/29/2019    AST 23 07/29/2019    BUN 15 07/29/2019    ANIONGAP 9 07/29/2019     ESR/CRP:   Lab Results   Component Value Date    SEDRATE 6 05/20/2019    CRP <1.0 07/29/2019     Lab Results   Component Value Date    TBGOLD Negative 08/06/2018       Assessment/Plan:    Diagnoses and all orders for this visit:    Psoriasis    Immunocompromised (CMS/HCC) (HCC)    YEYO positive  -     CBC w/auto differential Blood, Venous; Future  -     Comprehensive metabolic panel Blood, Venous; Future  -     C-reactive protein (Inflammation) Blood, Venous; Future  -     C3 complement Blood, Venous; Future  -     C4 complement Blood, Venous; Future  -     Extractable Nuclear Antigens (ALYSA) 11 PANEL Blood, Venous; Future  -     Urinalysis w/microscopic, reflex culture Urine, Clean Catch; Future    Medication monitoring encounter  -     CBC w/auto differential Blood, Venous; Future  -     Comprehensive metabolic panel Blood, Venous; Future  -     C-reactive protein (Inflammation) Blood, Venous; Future    Recommended Sunitha monitor blood pressure at home multiple times daily to see if blood pressure medication needs to be altered, if remains elevated she will follow up with PCP.     Advised patient that Miralax or metamucil as recommended, drinking plenty of fluids, increasing fiber in her diet, and increasing exercise will help with constipation.     Advised Sunitha that pain on the right side of the face/jaw is from a muscle--she has some tightness and tenderness over SCM muscle. Advised patient that massage, stretches, icing, and  sometimes physical therapy can help with muscle pain.     Reviewed recent lab results with Sunitha that were completed yesterday as well as lab work completed on 7/29/19.  Continues to have positive borderline double-stranded DNA. C4 continues also to be slightly low.  We had recommended continuing to monitor this.      Recommended Sunitha continue Cosentyx as prescribed.  Psoriasis is well controlled.      Recommended Sunitha try and sleep with right arm straight to help with the symptoms in her right arm.  I suspect she may have some ulnar neuropathy as well.  She describes that ever since her stroke she had some difficulty with pain over the upper arm with sensation of tightness like a blood pressure cuff.  Now she started to have occasional numbness tingling over fourth and fifth finger into the right hand and some able to let us perform.  She does have a positive Tinel's at the elbow.      Sunitha will follow up in 6 months with lab work prior. Lab orders placed.     Discussed all in detail patient voiced understanding and is in agreement with plan.    A voice recognition program was used to aid in documentation of this record.  Sometimes words are not printed exactly as they were spoken.  While efforts were made to carefully edit and correct any inaccuracies, some errors may be present; please take these into context.  Please contact the provider if areas are identified.    Scribed by  Eva Morton RN    8:53 AM, 8/7/2019

## 2019-08-05 NOTE — TELEPHONE ENCOUNTER
Staff spoke verbally with Dr. Dueñas, patient had a CRP completed on 7/29/19, this test does not need to be repeated.

## 2019-08-06 ENCOUNTER — TELEPHONE (OUTPATIENT)
Dept: FAMILY MEDICINE | Facility: CLINIC | Age: 74
End: 2019-08-06

## 2019-08-06 ENCOUNTER — APPOINTMENT (OUTPATIENT)
Dept: FAMILY MEDICINE | Facility: CLINIC | Age: 74
End: 2019-08-06
Payer: MEDICARE

## 2019-08-06 DIAGNOSIS — F51.01 PRIMARY INSOMNIA: ICD-10-CM

## 2019-08-06 DIAGNOSIS — R76.8 POSITIVE ANA (ANTINUCLEAR ANTIBODY): ICD-10-CM

## 2019-08-06 LAB
C3 SERPL-MCNC: 124 MG/DL (ref 87–200)
C4 SERPL-MCNC: 18 MG/DL (ref 19–52)
CENTROMERE B AB SER-ACNC: <0.2 AI
CHROMATIN AB, MIA, INDEX: <0.2 AI
DSDNA IGG SERPL IA-ACNC: 16 IU/ML
ENA JO1 IGG SER-ACNC: <0.2 AI
ENA RNP IGG SER-ACNC: 0.4 AI
ENA SCL70 IGG SER IA-ACNC: <0.2 AI
ENA SM IGG SER-ACNC: <0.2 AI
ENA SM+RNP AB SER-ACNC: <0.2 AI
ENA SS-A AB SER-ACNC: <0.2 AI
ENA SS-B AB SER-ACNC: <0.2 AI
RIBOSOMAL P IGG SER-ACNC: <0.2 AI

## 2019-08-06 PROCEDURE — 86160 COMPLEMENT ANTIGEN: CPT | Mod: 59

## 2019-08-06 PROCEDURE — 86235 NUCLEAR ANTIGEN ANTIBODY: CPT

## 2019-08-06 PROCEDURE — 36415 COLL VENOUS BLD VENIPUNCTURE: CPT

## 2019-08-06 RX ORDER — ZOLPIDEM TARTRATE 5 MG/1
5 TABLET ORAL NIGHTLY PRN
Qty: 90 TABLET | Refills: 1 | Status: SHIPPED | OUTPATIENT
Start: 2019-08-06 | End: 2020-01-15 | Stop reason: SDUPTHER

## 2019-08-06 NOTE — PROGRESS NOTES
SUBJECTIVE:   Sunitha Arrington is a 73 y.o. female who presents for wound check s/Lolat pre tip nose, [id number]/0250 on 6/11/2019.  Skin examination at wound site: Well healed surgical site  PLAN:   Discontinue all wound care.    An Actinic keratosis also noted on the left temple. See procedure note for treatment.    Destruction of Lesion  Date/Time: 8/7/2019 10:38 AM  Performed by: Duy Michel MD      Consent  Verbal consent was obtained from the patient. Written consent was not obtained from the patient. Consent given by patient. The patient states understanding of the procedure being performed. Patient ID confirmed verbally with patient.     Location:  1 total lesions removed from head/neck.  Head/neck location(s):  Number of head/neck lesions removed: 1Head/neck location: left temple.    Procedure Details   Local anesthesia was not used. Lesion(s) are pre-malignant. Lesion(s) destroyed with: liquid nitrogen. Number of freeze/thaw cycles was 1. Lesion(s) were frozen until an ice ball extended beyond the lesion.     Post-Procedure  Patient tolerated procedure well with no complications.     Procedure Comments  Actinic keratosis

## 2019-08-06 NOTE — TELEPHONE ENCOUNTER
Sunitha would like to get a refill on her Zolpidem and have it sent to [facility] pharmacy // If it is easier to sent to [facility], please let her know where, thank you

## 2019-08-07 ENCOUNTER — ANCILLARY PROCEDURE (OUTPATIENT)
Dept: CARDIOLOGY | Facility: CLINIC | Age: 74
End: 2019-08-07
Payer: MEDICARE

## 2019-08-07 ENCOUNTER — OFFICE VISIT (OUTPATIENT)
Dept: RHEUMATOLOGY | Facility: CLINIC | Age: 74
End: 2019-08-07
Payer: MEDICARE

## 2019-08-07 ENCOUNTER — OFFICE VISIT (OUTPATIENT)
Dept: DERMATOLOGY | Facility: CLINIC | Age: 74
End: 2019-08-07
Payer: MEDICARE

## 2019-08-07 VITALS
BODY MASS INDEX: 29.65 KG/M2 | HEART RATE: 60 BPM | DIASTOLIC BLOOD PRESSURE: 78 MMHG | OXYGEN SATURATION: 100 % | WEIGHT: 178.2 LBS | SYSTOLIC BLOOD PRESSURE: 209 MMHG

## 2019-08-07 VITALS — SYSTOLIC BLOOD PRESSURE: 159 MMHG | HEART RATE: 71 BPM | DIASTOLIC BLOOD PRESSURE: 68 MMHG

## 2019-08-07 DIAGNOSIS — L57.0 ACTINIC KERATOSIS: ICD-10-CM

## 2019-08-07 DIAGNOSIS — L40.9 PSORIASIS: ICD-10-CM

## 2019-08-07 DIAGNOSIS — R76.8 ANA POSITIVE: ICD-10-CM

## 2019-08-07 DIAGNOSIS — Z45.018 ENCOUNTER FOR INTERROGATION OF CARDIAC PACEMAKER: ICD-10-CM

## 2019-08-07 DIAGNOSIS — L40.9 PSORIASIS: Primary | Chronic | ICD-10-CM

## 2019-08-07 DIAGNOSIS — Z87.2 HISTORY OF ACTINIC KERATOSES: Primary | ICD-10-CM

## 2019-08-07 DIAGNOSIS — Z51.81 MEDICATION MONITORING ENCOUNTER: ICD-10-CM

## 2019-08-07 DIAGNOSIS — D84.9 IMMUNOCOMPROMISED (CMS/HCC): ICD-10-CM

## 2019-08-07 PROCEDURE — 93296 REM INTERROG EVL PM/IDS: CPT | Mod: PO

## 2019-08-07 PROCEDURE — G0463 HOSPITAL OUTPT CLINIC VISIT: HCPCS | Mod: PO | Performed by: INTERNAL MEDICINE

## 2019-08-07 PROCEDURE — 93294 REM INTERROG EVL PM/LDLS PM: CPT | Performed by: INTERNAL MEDICINE

## 2019-08-07 PROCEDURE — 17000 DESTRUCT PREMALG LESION: CPT | Mod: 79 | Performed by: DERMATOLOGY

## 2019-08-07 PROCEDURE — 99214 OFFICE O/P EST MOD 30 MIN: CPT | Performed by: INTERNAL MEDICINE

## 2019-08-07 ASSESSMENT — ENCOUNTER SYMPTOMS
SEIZURES: 0
COLOR CHANGE: 0
CONSTIPATION: 1
COUGH: 0
FEVER: 0
BACK PAIN: 0
FATIGUE: 1
SORE THROAT: 0
ABDOMINAL PAIN: 0
ARTHRALGIAS: 0
VOMITING: 0
ABDOMINAL DISTENTION: 1
SHORTNESS OF BREATH: 0
HEMATURIA: 0
PALPITATIONS: 0
EYE PAIN: 0
BRUISES/BLEEDS EASILY: 1
DYSURIA: 0
CHILLS: 0

## 2019-08-07 ASSESSMENT — PAIN SCALES - GENERAL
PAINLEVEL: 3
PAINLEVEL: 0-NO PAIN

## 2019-08-07 NOTE — PATIENT INSTRUCTIONS
"You were treated with liquid nitrogen today. You should expect these areas to burn and once the burning subsides, the area(s)may itch. You should expect some reaction anywhere from welting of the skin to larger liquid filled blisters depending on how aggressively your lesion(s) needed to be treated. If you had treatment on your forehead, eyebrow areas or cheekbones, you could have some swelling under your eyes. This is normal and nothing to worry about. Blisters should be left intact until they pop and drain on their own. You will most likely have a clear drainage much like a blister when it pops.     The areas treated should be cared for by gentle daily cleansing with Dove soap and water and your hands. You should apply Polysporin ointment or Vaseline to the areas daily as they are healing and continue this until they are completely healed. It may take anywhere from 7-14 days for areas to completely heal. If any other area than your face was treated, it may take longer for those areas to heal.  IF YOU NOTICE INCREASED SURROUNDING REDNESS, TENDERNESS OR A PUS-LIKE DRAINAGE, PLEASE CONTACT OUR OFFICE IMMEDIATELY.     Avoid sunlight between the hours of 10am-2pm, wear a broad spectrum, water resistant sunscreen with SPF of 30-50 year round. Reapply sunscreen every 2 hours and after exercising or swimming. Wear a 6\" broad brimmed hat. Use a lip sunblock of SPF of 30-50 year round. Sun protective clothing suggested. Should any moles change in shape or color, or itch, bleed or burn, contact our office for evaluation.     Sunscreens recommended: Neutrogena Ultra Sheer Dry Touch SPF 50 or higher, Neutrogena Sheer Zinc Dry Touch SPF 50, Vanicream sunblocks containing Titanium Dioxide or Zinc Oxide with SPF 50 or higher.    Vanicream sunscreen of SPF 50 is highly recommended however only found at [employer]. We do sell a few tinted sunscreens here in the clinic. Ask about our powdered brush on sunscreen! We also do have one " lip balm product and lip gloss product with SPF. [employer] is a store located in [facility] that sells a brand of hat called Sunday Afternoon that is highly recommended.      [employer] is our survey company. You may be receiving a survey about your care. Your identity is kept confidential so please be honest! Your care is important to us and we are always looking for opportunities to improve your experience.

## 2019-09-05 DIAGNOSIS — Z45.018 ENCOUNTER FOR INTERROGATION OF CARDIAC PACEMAKER: Primary | ICD-10-CM

## 2019-09-06 ENCOUNTER — ANCILLARY PROCEDURE (OUTPATIENT)
Dept: CARDIOLOGY | Facility: CLINIC | Age: 74
End: 2019-09-06
Payer: MEDICARE

## 2019-09-06 ENCOUNTER — OFFICE VISIT (OUTPATIENT)
Dept: CARDIOLOGY | Facility: CLINIC | Age: 74
End: 2019-09-06
Payer: MEDICARE

## 2019-09-06 VITALS
HEART RATE: 68 BPM | BODY MASS INDEX: 29.57 KG/M2 | WEIGHT: 177.5 LBS | OXYGEN SATURATION: 98 % | HEIGHT: 65 IN | SYSTOLIC BLOOD PRESSURE: 120 MMHG | DIASTOLIC BLOOD PRESSURE: 62 MMHG

## 2019-09-06 DIAGNOSIS — I10 ESSENTIAL HYPERTENSION: Chronic | ICD-10-CM

## 2019-09-06 DIAGNOSIS — Z95.0 CARDIAC PACEMAKER IN SITU: Chronic | ICD-10-CM

## 2019-09-06 DIAGNOSIS — I44.30 ATRIOVENTRICULAR BLOCK: Primary | ICD-10-CM

## 2019-09-06 DIAGNOSIS — Z45.018 ENCOUNTER FOR INTERROGATION OF CARDIAC PACEMAKER: ICD-10-CM

## 2019-09-06 PROCEDURE — 93005 ELECTROCARDIOGRAM TRACING: CPT | Mod: PO | Performed by: NURSE PRACTITIONER

## 2019-09-06 PROCEDURE — 93280 PM DEVICE PROGR EVAL DUAL: CPT | Mod: PO

## 2019-09-06 PROCEDURE — 99214 OFFICE O/P EST MOD 30 MIN: CPT | Mod: 25 | Performed by: NURSE PRACTITIONER

## 2019-09-06 PROCEDURE — G0463 HOSPITAL OUTPT CLINIC VISIT: HCPCS | Mod: PO | Performed by: NURSE PRACTITIONER

## 2019-09-06 PROCEDURE — 93010 ELECTROCARDIOGRAM REPORT: CPT | Mod: 59 | Performed by: INTERNAL MEDICINE

## 2019-09-06 PROCEDURE — 93280 PM DEVICE PROGR EVAL DUAL: CPT | Mod: 26 | Performed by: INTERNAL MEDICINE

## 2019-09-06 ASSESSMENT — ENCOUNTER SYMPTOMS
ORTHOPNEA: 0
COUGH: 0
NEAR-SYNCOPE: 0
ENDOCRINE NEGATIVE: 1
ABDOMINAL PAIN: 0
EYES NEGATIVE: 1
LIGHT-HEADEDNESS: 0
CHILLS: 0
CONSTIPATION: 0
WHEEZING: 0
DIZZINESS: 1
DIAPHORESIS: 0
SYNCOPE: 0
FEVER: 0
MYALGIAS: 0
PALPITATIONS: 0
PND: 0
FALLS: 0
HEARTBURN: 0
DIARRHEA: 0
PSYCHIATRIC NEGATIVE: 1
NAUSEA: 0
SHORTNESS OF BREATH: 0
HEMATURIA: 0
DYSPNEA ON EXERTION: 0
HEMATOCHEZIA: 0
HEADACHES: 0
IRREGULAR HEARTBEAT: 0
NUMBNESS: 1
BRUISES/BLEEDS EASILY: 0
SNORING: 0
VOMITING: 0

## 2019-09-06 ASSESSMENT — PAIN SCALES - GENERAL: PAINLEVEL: 4

## 2019-09-06 NOTE — PATIENT INSTRUCTIONS
Follow-up with your PCP regarding the numbness in your right lower arm/fingers.    Call if experiencing symptoms or issues before next visit.  Continue current medications.  Continue efforts toward routine exercise, a good goal is 150 minutes per week.  Continue device checks with [facility] device clinic.

## 2019-09-06 NOTE — PROGRESS NOTES
[facility]  [address] [place] [id number]    Electrophysiology Outpatient Follow-up Note    Subjective     Chief Complaint  Chief Complaint   Patient presents with   • Hypertension       HPI  Sunitha Arrington is a 73 y.o. female presenting for follow-up of complete heart block with syncope status post dual-chamber permanent pacemaker in 12/2014, stroke thought to be due to estrogen replacement, and hypertension.  Of note, there was no concurrent atrial fibrillation at any time around the timing of her stroke.  She was initiated on Plavix and atorvastatin.  Patient had reported a syncopal episode in July 2018 when she thought she might have passed out while driving.  No arrhythmias were noted on her device interrogation at that time.  She thought she might have fallen asleep at the wheel since she was working long hours.  Patient was last seen in the EP clinic on 9/4/2018 at which time it was recommended she continue current regimen.  Since last visit, patient followed up with her PCP on 3/27/2019 and was noted to have elevated blood pressures, so losartan was increased.    Patient states she is feeling well today.  She has not had any further syncopal or presyncopal episodes.  Her device was interrogated today which shows no arrhythmias.  She denies any chest pain, dyspnea, lightheadedness, palpitations, or edema.  She occasionally feels dizzy but this is mostly with position changes.  No PND or orthopnea.  She denies signs or symptoms of bleeding.  She does report numbness in her right arm that radiates to her ring finger and pinky finger.  She reports she has some numbness in her right arm in the upper shoulder area with her stroke in the past and is worried it could be a symptom of another stroke.  This numbness and radiation is intermittent.    Most recent echocardiogram on 4/5/2018 showed EF 65%, normal wall motion, and no significant valvular disease.  Patient had bilateral  carotid artery duplex in 2016 which showed mild plaque in both carotid bifurcations but no significant stenosis.  She had an overnight pulse oximetry test which was prescribed by her PCP and was negative for desaturations.  Most recent stress test was completed on 2018 and was negative for ischemia.  EKG today shows sinus rhythm at 60 bpm, [place] interval 160 ms, QRS duration 94 ms, and QTcB 398 ms.    Past Medical History:   Diagnosis Date   • Actinic keratosis    • Cancer (CMS/HCC) ([facility])     Skin CA on nose   • CVA (cerebral vascular accident) (CMS/HCC) ([facility])     Numbness/tingling of RUE but functional and large right toe numbness.   • High degree atrioventricular block     with syncope   • Hyperlipidemia    • Hypertension    • Hypertriglyceridemia    • Neurologic disorder    • Presence of heart assist device (CMS/HCC) ([facility])     Dual chamber pacemaker   • Psoriasis    • Sclerosis of the skin     Vaginal   • Squamous cell skin cancer 2019    in situ right pretip nose       Past Surgical History:   Procedure Laterality Date   • CARDIAC CATHETERIZATION     • CARDIAC PACEMAKER PLACEMENT      Dual chamber pacemaker implantation   • CARDIAC SURGERY  2014   •  SECTION  1977    [place]   • COLONOSCOPY  2016    Rare sigmoid diverticula. Physiologic internal hemorrhoids, otherwise normal exam   • COLONOSCOPY  2008   • COLPORRHAPHY      and rectocele repair   • MOHS SURGERY Right 2019    right pretip nose Banks's   • PERIPHERAL NERVE EVALUATION N/A 2019    Procedure: PERIPHERAL NERVE EVALUATION;  Surgeon: Jacqueline Landin MD;  Location: [facility] OR;  Service: Urology;  Laterality: N/A;   • SACRAL NERVE STIMULATOR PLACEMENT N/A 2019    Procedure: FULL IMPLANT INTERSTIM;  Surgeon: Jacqueline Landin MD;  Location: [facility] OR;  Service: Urology;  Laterality: N/A;   • TOTAL ABDOMINAL HYSTERECTOMY W/ BILATERAL SALPINGOOPHORECTOMY      [id number] [facility]       Last updated by Edwige Mujica CNP on 9/4/2018:    CARDIAC HISTORY:  ARRHYTHMIA:  1. Syncope  2. Complete Heart Block [PPM] - 12/2014    PVD:  1. Ischemic stroke    RISK FACTORS:  1. Hypertension  2. Tobacco Use: No/never    CARDIOVASCULAR PROCEDURES:  Echo (Normal EF) -4/5/2018    EKG (NSR; High degree AV block) - 12/4/2014    Carotid Duplex (both < 50%) -2016    Head CT (Negative for acute intracranial abnormalities) - 6/8/2015    Lexiscan stress test-negative for ischemia-5/18/2018    Allergies as of 09/06/2019 - Reviewed 09/06/2019   Allergen Reaction Noted   • Apricot  06/12/2015   • Clindamycin phosphate Rash 05/26/2019       Current Outpatient Medications   Medication Sig Dispense Refill   • zolpidem (AMBIEN) 5 mg tablet Take 1 tablet (5 mg total) by mouth nightly as needed for sleep 90 tablet 1   • secukinumab 150 mg/mL pen injector Inject 300 mg every 4 weeks (28 days). 6 pen 3   • isosorbide mononitrate (IMDUR) 30 mg 24 hr tablet TAKE 1 TABLET EVERY DAY 90 tablet 2   • hydrocortisone 2.5 % cream Apply three times daily as needed for rash on face 60 g 2   • CLOBETASOL PROPIONATE, BULK, MISC daily     • secukinumab 150 mg/mL pen injector Inject 300 mg once weekly at weeks 0, 1, 2, 3, and 4. 10 pen 0   • clopidogrel (PLAVIX) 75 mg tablet TAKE 1 TABLET EVERY DAY 90 tablet 1   • atorvastatin (LIPITOR) 40 mg tablet TAKE 1 TABLET EVERY DAY 90 tablet 3   • losartan (COZAAR) 100 mg tablet Take 1 tablet (100 mg total) by mouth daily 90 tablet 3   • potassium chloride (K-DUR,KLOR-CON) 10 mEq CR tablet TAKE 2 TABLETS EVERY  tablet 3   • rOPINIRole (REQUIP) 1 mg tablet TAKE 1 TABLET TWICE DAILY 180 tablet 3   • naproxen sodium 220 mg capsule Take 2 tablets by mouth 2 (two) times a day.     • triamcinolone (KENALOG) 0.1 % cream APPLY A THIN LAYER TO THE AFFECTED AREA(S) TOPICALLY 2 TIMES PER DAY 30 g 12   • omega-3 fatty acids-fish oil (FISH OIL) 340-1,000 mg capsule Take 1 capsule by mouth 2 (two)  times a day.     • multivitamin with minerals tablet Take 1 tablet by mouth daily.     • lysine 500 mg tablet Take 1 tablet every day by oral route in the evening.       No current facility-administered medications for this visit.      Facility-Administered Medications Ordered in Other Visits   Medication Dose Route Frequency Provider Last Rate Last Dose   • sodium chloride flush 20 mL  20 mL intravenous PRN Kathryn Mujica, CNP   20 mL at 05/18/18 0948       Family History   Problem Relation Age of Onset   • Stroke Mother 97   • Hypertension Mother    • Dementia Mother    • Other cancer Father 32        secondary malignant neoplasm of lymph node       Social History     Tobacco Use   • Smoking status: Never Smoker   • Smokeless tobacco: Never Used   Substance Use Topics   • Alcohol use: No   • Drug use: No       Review of Systems  Review of Systems   Constitution: Negative for chills, diaphoresis, fever and malaise/fatigue.   HENT: Negative for nosebleeds.    Eyes: Negative.    Cardiovascular: Negative for chest pain, dyspnea on exertion, irregular heartbeat, leg swelling, near-syncope, orthopnea, palpitations, paroxysmal nocturnal dyspnea and syncope.   Respiratory: Negative for cough, shortness of breath, snoring and wheezing.    Endocrine: Negative.    Hematologic/Lymphatic: Does not bruise/bleed easily.   Skin: Negative.    Musculoskeletal: Negative for falls, muscle weakness and myalgias.   Gastrointestinal: Negative for abdominal pain, constipation, diarrhea, dysphagia, heartburn, hematochezia, nausea and vomiting.   Genitourinary: Negative for hematuria.   Neurological: Positive for dizziness ( At times) and numbness. Negative for headaches and light-headedness.   Psychiatric/Behavioral: Negative.        Objective     Vitals:    09/06/19 1226   BP: 120/62   Pulse: 68   SpO2: 98%     Weight: 80.5 kg (177 lb 8 oz)    Physical Exam   Constitutional: She is oriented to person, place, and time. Vital signs are  normal. She appears well-developed and well-nourished. No distress.   HENT:   Head: Normocephalic.   Nose: No epistaxis.   Neck: No JVD present. Carotid bruit is not present.   Cardiovascular: Normal rate, regular rhythm, normal heart sounds and intact distal pulses.   No murmur heard.  Left upper chest device site without signs of infection or erosion.   Pulmonary/Chest: Effort normal and breath sounds normal. She has no wheezes. She has no rales.   Abdominal: Soft. Bowel sounds are normal. Musculoskeletal:         General: No edema.     Neurological: She is alert and oriented to person, place, and time.   Skin: Skin is warm and dry. She is not diaphoretic.   Psychiatric: She has a normal mood and affect. Her behavior is normal.   Vitals reviewed.      Data Review:   Sodium   Date Value Ref Range Status   07/29/2019 138 135 - 145 mmol/L Final     Potassium   Date Value Ref Range Status   07/29/2019 4.1 3.5 - 5.1 mmol/L Final     Chloride   Date Value Ref Range Status   07/29/2019 106 98 - 107 mmol/L Final     CO2   Date Value Ref Range Status   07/29/2019 23 21 - 32 mmol/L Final     BUN   Date Value Ref Range Status   07/29/2019 15 7 - 25 mg/dL Final     Creatinine   Date Value Ref Range Status   07/29/2019 0.93 0.60 - 1.20 mg/dL Final     Glucose   Date Value Ref Range Status   07/29/2019 90 70 - 105 mg/dL Final     Calcium   Date Value Ref Range Status   07/29/2019 9.0 8.6 - 10.3 mg/dL Final     WBC   Date Value Ref Range Status   07/29/2019 6.5 4.5 - 10.5 10*3/uL Final     Hemoglobin   Date Value Ref Range Status   07/29/2019 14.2 11.5 - 15.5 g/dL Final     Hematocrit   Date Value Ref Range Status   07/29/2019 41.1 34.0 - 45.0 % Final     MCV   Date Value Ref Range Status   07/29/2019 97.9 (H) 81.0 - 97.0 fL Final     Platelets   Date Value Ref Range Status   07/29/2019 161 140 - 350 10*3/uL Final     Cholesterol   Date Value Ref Range Status   06/21/2016 130 <200 mg/dL      Triglycerides   Date Value Ref Range  Status   06/21/2016 73 <150 mg/dL      HDL   Date Value Ref Range Status   06/21/2016 61 40 - 60 mg/dL      LDL Direct   Date Value Ref Range Status   05/16/2017 67 <100 mg/dL      Lab Results   Component Value Date    TSH 1.435 02/22/2019       Assessment/Plan   Diagnoses and all orders for this visit:    Atrioventricular block  -     ECG 12 lead -Normal, Today    Essential hypertension    Cardiac pacemaker in situ        Plan  1.  Patient has a history of complete heart block status post dual-chamber permanent pacemaker implanted in 2014.  Her device was interrogated today and shows no episodes.  She rarely paces.  Continue to follow the device clinic.  2.  The pressure is well controlled.  Continue current regimen.  3.  Patient had a CVA thought to be due to estrogen replacement.  There was no concurrent atrial fibrillation at any time around the timing of her stroke.  She has no residual.  Continue Plavix and atorvastatin.  4.  Patient reports right lower arm numbness with radiation to her ring finger and pinky finger.  This is different than when she had her stroke in the past but she did have numbness with her stroke so she is concerned.  This numbness and radiation and occurs intermittently.  I am suspicious that it is a pinched nerve based on her description of her symptoms.  It is unlikely it is a TIA/stroke since she has no other symptoms and has been routinely taking her Plavix and atorvastatin as prescribed.  I recommended she follow-up with her PCP for further evaluation.  5.  Follow-up in the EP clinic in 1 year or sooner for new or worsening symptoms.        Patient Instructions   Follow-up with your PCP regarding the numbness in your right lower arm/fingers.    Call if experiencing symptoms or issues before next visit.  Continue current medications.  Continue efforts toward routine exercise, a good goal is 150 minutes per week.  Continue device checks with [facility] device clinic.      Return in about 1  year (around 9/6/2020).    Patient Education: Ready to learn, no apparent learning barriers were identified; learning preference includes listening.  Explained diagnosis and treatment plan; patient expressed understanding of the content.    Electronically signed by: Kathryn Mujica CNP  9/6/2019  3:12 PM      A voice recognition program was used to aid in documentation of this record.  Sometimes words are not printed exactly as they were spoken.  While efforts were made to carefully edit and correct any inaccuracies, some errors may be present; please take these into context.  Please contact the provider if errors are identified.

## 2019-09-09 LAB — BSA FOR ECHO PROCEDURE: 1.92 M2

## 2019-10-10 DIAGNOSIS — E87.6 HYPOKALEMIA: Chronic | ICD-10-CM

## 2019-10-11 RX ORDER — POTASSIUM CHLORIDE 750 MG/1
TABLET, EXTENDED RELEASE ORAL
Qty: 180 TABLET | Refills: 3 | Status: SHIPPED | OUTPATIENT
Start: 2019-10-11 | End: 2020-08-10

## 2019-11-06 ENCOUNTER — ANCILLARY PROCEDURE (OUTPATIENT)
Dept: CARDIOLOGY | Facility: CLINIC | Age: 74
End: 2019-11-06
Payer: MEDICARE

## 2019-11-06 DIAGNOSIS — Z45.018 ENCOUNTER FOR INTERROGATION OF CARDIAC PACEMAKER: ICD-10-CM

## 2019-11-06 PROCEDURE — 93294 REM INTERROG EVL PM/LDLS PM: CPT | Performed by: INTERNAL MEDICINE

## 2019-11-06 PROCEDURE — 93296 REM INTERROG EVL PM/IDS: CPT | Mod: PO

## 2019-12-02 DIAGNOSIS — Z95.0 CARDIAC PACEMAKER IN SITU: ICD-10-CM

## 2019-12-03 RX ORDER — CLOPIDOGREL BISULFATE 75 MG/1
TABLET ORAL
Qty: 90 TABLET | Refills: 1 | Status: SHIPPED | OUTPATIENT
Start: 2019-12-03 | End: 2020-01-15 | Stop reason: SDUPTHER

## 2019-12-26 ENCOUNTER — MEDICATION ACCESS (OUTPATIENT)
Dept: RHEUMATOLOGY | Facility: CLINIC | Age: 74
End: 2019-12-26

## 2019-12-26 NOTE — PROGRESS NOTES
Call from pt on 01/13/2020-she received a letter [employer] was missing information, however, there were no details as to what was missing, and Nirmal was unable to tell me what was missing.  [facility] had spoken with the patient regarding her refill, and apparently everything was reviewed and all info is in place.    A refill of Cosentyx was shipped to pt on 01/10/2020.  I was unable to obtain tracking info on this shipment.      Pt is not approved yet, but her application is in review.    Medication:  Cosentyx     Prescriber:  Leana    Patient Status:  Part D with high copay  PAP paperwork prepared  [place] [facility]   Printed to RHEUM for Dr. Dueñas's signature 12.26.2019  PAP submitted to  date:  1.4.2020  [employer] [facility]   Phone:  [phone]   FAX:  [phone]   [employer] fax  PAP paperwork uploaded to patient's chart  Yes  Follow up with      Yes--All information is in place at [facility] according to Nirmal.        Approval:       Dates of approval    Denial:      Reason for denial     Patient is LIS/Extra Help eligible      Form mailed to patient to return to local DSS Office    Patient notified of outcome     Yes     No

## 2019-12-27 ENCOUNTER — HOSPITAL ENCOUNTER (OUTPATIENT)
Dept: MAMMOGRAPHY | Facility: HOSPITAL | Age: 74
Discharge: HOME | End: 2019-12-27
Attending: FAMILY MEDICINE
Payer: MEDICARE

## 2019-12-27 DIAGNOSIS — G25.81 RESTLESS LEGS SYNDROME: ICD-10-CM

## 2019-12-27 DIAGNOSIS — Z12.31 BREAST CANCER SCREENING BY MAMMOGRAM: ICD-10-CM

## 2019-12-27 DIAGNOSIS — I10 ESSENTIAL HYPERTENSION: Chronic | ICD-10-CM

## 2019-12-27 PROCEDURE — 77067 SCR MAMMO BI INCL CAD: CPT

## 2019-12-30 RX ORDER — ROPINIROLE 1 MG/1
TABLET, FILM COATED ORAL
Qty: 180 TABLET | Refills: 3 | Status: SHIPPED | OUTPATIENT
Start: 2019-12-30 | End: 2020-12-03

## 2019-12-30 RX ORDER — LOSARTAN POTASSIUM 100 MG/1
TABLET ORAL
Qty: 90 TABLET | Refills: 3 | Status: SHIPPED | OUTPATIENT
Start: 2019-12-30 | End: 2020-01-15 | Stop reason: SDUPTHER

## 2019-12-30 RX ORDER — ISOSORBIDE MONONITRATE 30 MG/1
TABLET, EXTENDED RELEASE ORAL
Qty: 90 TABLET | Refills: 2 | Status: SHIPPED | OUTPATIENT
Start: 2019-12-30 | End: 2020-07-27

## 2020-01-09 ENCOUNTER — TELEPHONE (OUTPATIENT)
Dept: FAMILY MEDICINE | Facility: CLINIC | Age: 75
End: 2020-01-09

## 2020-01-09 NOTE — TELEPHONE ENCOUNTER
Sunitha advised that she received a message wanting her to move her Monday the 13th appointment to Wednesday.  Unfortunately she is unable to since she has coverage scheduled for that day.  If you would like to talk to her please call her at work [phone] or on her cell.

## 2020-01-15 ENCOUNTER — TELEPHONE (OUTPATIENT)
Dept: FAMILY MEDICINE | Facility: CLINIC | Age: 75
End: 2020-01-15

## 2020-01-15 ENCOUNTER — OFFICE VISIT (OUTPATIENT)
Dept: FAMILY MEDICINE | Facility: CLINIC | Age: 75
End: 2020-01-15
Payer: MEDICARE

## 2020-01-15 VITALS
RESPIRATION RATE: 16 BRPM | HEART RATE: 64 BPM | WEIGHT: 180 LBS | DIASTOLIC BLOOD PRESSURE: 78 MMHG | BODY MASS INDEX: 29.95 KG/M2 | SYSTOLIC BLOOD PRESSURE: 142 MMHG

## 2020-01-15 DIAGNOSIS — B02.8 HERPES ZOSTER WITH COMPLICATION: ICD-10-CM

## 2020-01-15 DIAGNOSIS — K59.04 CHRONIC IDIOPATHIC CONSTIPATION: ICD-10-CM

## 2020-01-15 DIAGNOSIS — I63.9 CEREBROVASCULAR ACCIDENT (CVA), UNSPECIFIED MECHANISM (CMS/HCC): ICD-10-CM

## 2020-01-15 DIAGNOSIS — Z13.9 SCREENING PROCEDURE: ICD-10-CM

## 2020-01-15 DIAGNOSIS — E78.5 HYPERLIPIDEMIA, UNSPECIFIED HYPERLIPIDEMIA TYPE: ICD-10-CM

## 2020-01-15 DIAGNOSIS — R51.9 ACUTE INTRACTABLE HEADACHE, UNSPECIFIED HEADACHE TYPE: ICD-10-CM

## 2020-01-15 DIAGNOSIS — R76.8 POSITIVE ANA (ANTINUCLEAR ANTIBODY): ICD-10-CM

## 2020-01-15 DIAGNOSIS — R73.01 IMPAIRED FASTING GLUCOSE: ICD-10-CM

## 2020-01-15 DIAGNOSIS — F51.01 PRIMARY INSOMNIA: ICD-10-CM

## 2020-01-15 DIAGNOSIS — K21.9 GASTROESOPHAGEAL REFLUX DISEASE, ESOPHAGITIS PRESENCE NOT SPECIFIED: ICD-10-CM

## 2020-01-15 DIAGNOSIS — N81.6 FEMALE PROCTOCELE WITHOUT UTERINE PROLAPSE: ICD-10-CM

## 2020-01-15 DIAGNOSIS — L85.1 STUCCO KERATOSES: ICD-10-CM

## 2020-01-15 DIAGNOSIS — R71.8 MICROCYTOSIS: ICD-10-CM

## 2020-01-15 DIAGNOSIS — I44.30 ATRIOVENTRICULAR BLOCK: ICD-10-CM

## 2020-01-15 DIAGNOSIS — Z95.0 CARDIAC PACEMAKER IN SITU: ICD-10-CM

## 2020-01-15 DIAGNOSIS — Z87.2 HISTORY OF ACTINIC KERATOSES: ICD-10-CM

## 2020-01-15 DIAGNOSIS — I10 ESSENTIAL HYPERTENSION: ICD-10-CM

## 2020-01-15 DIAGNOSIS — G25.81 RLS (RESTLESS LEGS SYNDROME): ICD-10-CM

## 2020-01-15 DIAGNOSIS — D84.9 IMMUNOCOMPROMISED (CMS/HCC): ICD-10-CM

## 2020-01-15 DIAGNOSIS — I73.00 RAYNAUD'S PHENOMENON WITHOUT GANGRENE: ICD-10-CM

## 2020-01-15 DIAGNOSIS — L40.9 PSORIASIS: ICD-10-CM

## 2020-01-15 DIAGNOSIS — L85.3 XEROSIS OF SKIN: ICD-10-CM

## 2020-01-15 DIAGNOSIS — E78.5 DYSLIPIDEMIA: Primary | ICD-10-CM

## 2020-01-15 DIAGNOSIS — E87.6 HYPOKALEMIA: ICD-10-CM

## 2020-01-15 DIAGNOSIS — Z23 IMMUNIZATION DUE: ICD-10-CM

## 2020-01-15 PROBLEM — R07.89 MID STERNAL CHEST PAIN: Status: RESOLVED | Noted: 2018-05-29 | Resolved: 2020-01-15

## 2020-01-15 PROBLEM — R35.0 URINARY FREQUENCY: Status: RESOLVED | Noted: 2019-03-27 | Resolved: 2020-01-15

## 2020-01-15 PROBLEM — H81.10 BENIGN PAROXYSMAL POSITIONAL VERTIGO: Status: RESOLVED | Noted: 2018-10-30 | Resolved: 2020-01-15

## 2020-01-15 PROBLEM — D04.39 SQUAMOUS CELL CARCINOMA IN SITU (SCCIS) OF SKIN OF NOSE: Status: RESOLVED | Noted: 2019-06-11 | Resolved: 2020-01-15

## 2020-01-15 PROBLEM — R10.30 INGUINAL PAIN: Status: RESOLVED | Noted: 2018-10-30 | Resolved: 2020-01-15

## 2020-01-15 PROCEDURE — 90471 IMMUNIZATION ADMIN: CPT

## 2020-01-15 PROCEDURE — 90750 HZV VACC RECOMBINANT IM: CPT | Performed by: FAMILY MEDICINE

## 2020-01-15 PROCEDURE — G0463 HOSPITAL OUTPT CLINIC VISIT: HCPCS

## 2020-01-15 PROCEDURE — 90472 IMMUNIZATION ADMIN EACH ADD: CPT | Performed by: FAMILY MEDICINE

## 2020-01-15 PROCEDURE — 99215 OFFICE O/P EST HI 40 MIN: CPT | Performed by: FAMILY MEDICINE

## 2020-01-15 RX ORDER — BENAZEPRIL HYDROCHLORIDE AND HYDROCHLOROTHIAZIDE 20; 25 MG/1; MG/1
1 TABLET ORAL DAILY
Qty: 90 TABLET | Refills: 3 | Status: CANCELLED | OUTPATIENT
Start: 2020-01-15 | End: 2021-01-14

## 2020-01-15 RX ORDER — OMEPRAZOLE 20 MG/1
CAPSULE, DELAYED RELEASE ORAL EVERY 24 HOURS
COMMUNITY
End: 2020-01-15 | Stop reason: SDUPTHER

## 2020-01-15 RX ORDER — BENAZEPRIL HYDROCHLORIDE AND HYDROCHLOROTHIAZIDE 20; 25 MG/1; MG/1
TABLET ORAL EVERY 24 HOURS
COMMUNITY
Start: 2017-04-24 | End: 2020-01-15 | Stop reason: ALTCHOICE

## 2020-01-15 RX ORDER — OMEPRAZOLE 20 MG/1
20 CAPSULE, DELAYED RELEASE ORAL DAILY
Qty: 90 CAPSULE | Refills: 3 | Status: SHIPPED | OUTPATIENT
Start: 2020-01-15 | End: 2020-11-30

## 2020-01-15 RX ORDER — GABAPENTIN 100 MG/1
100 CAPSULE ORAL 2 TIMES DAILY
Refills: 0 | COMMUNITY
Start: 2019-10-24 | End: 2021-02-08 | Stop reason: SDUPTHER

## 2020-01-15 RX ORDER — ADALIMUMAB 40MG/0.8ML
KIT SUBCUTANEOUS
COMMUNITY
End: 2020-01-15 | Stop reason: ALTCHOICE

## 2020-01-15 RX ORDER — ACYCLOVIR 800 MG/1
800 TABLET ORAL 3 TIMES DAILY
Qty: 21 TABLET | Refills: 2 | Status: SHIPPED | OUTPATIENT
Start: 2020-01-15 | End: 2020-01-22

## 2020-01-15 RX ORDER — CLOPIDOGREL BISULFATE 75 MG/1
75 TABLET ORAL DAILY
Qty: 90 TABLET | Refills: 3 | Status: SHIPPED | OUTPATIENT
Start: 2020-01-15 | End: 2021-02-08 | Stop reason: SDUPTHER

## 2020-01-15 RX ORDER — ACYCLOVIR 800 MG/1
TABLET ORAL EVERY 4 HOURS
COMMUNITY
End: 2020-01-15 | Stop reason: SDUPTHER

## 2020-01-15 RX ORDER — ZOLPIDEM TARTRATE 5 MG/1
5 TABLET ORAL NIGHTLY PRN
Qty: 90 TABLET | Refills: 1 | Status: SHIPPED | OUTPATIENT
Start: 2020-01-15 | End: 2020-07-13

## 2020-01-15 RX ORDER — ATORVASTATIN CALCIUM 40 MG/1
40 TABLET, FILM COATED ORAL DAILY
Qty: 90 TABLET | Refills: 3 | Status: SHIPPED | OUTPATIENT
Start: 2020-01-15 | End: 2021-01-11

## 2020-01-15 RX ORDER — OMEGA-3 FATTY ACIDS/FISH OIL 340-1000MG
1 CAPSULE ORAL 2 TIMES DAILY
Qty: 240 CAPSULE | Refills: 3 | Status: SHIPPED | OUTPATIENT
Start: 2020-01-15 | End: 2021-01-14

## 2020-01-15 RX ORDER — LOSARTAN POTASSIUM 100 MG/1
100 TABLET ORAL 2 TIMES DAILY
Qty: 180 TABLET | Refills: 3 | Status: SHIPPED | OUTPATIENT
Start: 2020-01-15 | End: 2020-01-15 | Stop reason: SDUPTHER

## 2020-01-15 RX ORDER — BENAZEPRIL HYDROCHLORIDE AND HYDROCHLOROTHIAZIDE 20; 12.5 MG/1; MG/1
2 TABLET ORAL DAILY
Qty: 180 TABLET | Refills: 3 | Status: SHIPPED | OUTPATIENT
Start: 2020-01-15 | End: 2020-03-09 | Stop reason: ALTCHOICE

## 2020-01-15 RX ORDER — GLUCOSAM/CHONDRO/HERB 149/HYAL 750-100 MG
TABLET ORAL
COMMUNITY
End: 2020-01-15 | Stop reason: SDUPTHER

## 2020-01-15 ASSESSMENT — ENCOUNTER SYMPTOMS
ABDOMINAL DISTENTION: 0
EYE ITCHING: 0
POLYPHAGIA: 0
CONSTIPATION: 1
APPETITE CHANGE: 0
BRUISES/BLEEDS EASILY: 0
POLYDIPSIA: 0
HEADACHES: 0
AGITATION: 0
RHINORRHEA: 0
SPEECH DIFFICULTY: 0
VOMITING: 0
SORE THROAT: 0
BACK PAIN: 0
SINUS PRESSURE: 0
FLANK PAIN: 0
SHORTNESS OF BREATH: 0
EYE PAIN: 0
WOUND: 0
ABDOMINAL PAIN: 0
CONFUSION: 0
TREMORS: 0
BLOOD IN STOOL: 0
STRIDOR: 0
DIZZINESS: 0
DIFFICULTY URINATING: 0
SEIZURES: 0
NUMBNESS: 0
FACIAL SWELLING: 0
HEMATURIA: 0
COUGH: 0
APNEA: 0
EYE DISCHARGE: 0
DIAPHORESIS: 0
JOINT SWELLING: 0
RECTAL PAIN: 0
FACIAL ASYMMETRY: 0
ANAL BLEEDING: 0
TROUBLE SWALLOWING: 0
SINUS PAIN: 0
EYE REDNESS: 0
WHEEZING: 0
ARTHRALGIAS: 0
FATIGUE: 0
CHOKING: 0
DYSURIA: 0
CHILLS: 0
FREQUENCY: 0
ACTIVITY CHANGE: 0
PHOTOPHOBIA: 0
HYPERACTIVE: 0
PALPITATIONS: 0
VOICE CHANGE: 0
HALLUCINATIONS: 0
ADENOPATHY: 0
NAUSEA: 0
DIARRHEA: 0
DYSPHORIC MOOD: 0
SLEEP DISTURBANCE: 0
NECK PAIN: 0
COLOR CHANGE: 0
MYALGIAS: 0
DECREASED CONCENTRATION: 0
WEAKNESS: 0
NECK STIFFNESS: 0
CHEST TIGHTNESS: 0
FEVER: 0
UNEXPECTED WEIGHT CHANGE: 0
LIGHT-HEADEDNESS: 0
NERVOUS/ANXIOUS: 0

## 2020-01-15 ASSESSMENT — PAIN SCALES - GENERAL: PAINLEVEL: 0-NO PAIN

## 2020-01-15 NOTE — PROGRESS NOTES
Called to discuss pathology. Left VM with return number to call back.    Pathologic Diagnosis :     Skin, scalp, excisional biopsy:     - Nodular and cystic hidradenoma.     Patient Instructions   You benazepril/hctz will be changed from 20/25 one tab a day to 20/12.5 two tabs per day      Visit date not found    ASSESSMENT AND PLAN   Diagnoses and all orders for this visit:    Dyslipidemia    Cerebrovascular accident (CVA), unspecified mechanism (CMS/HCC) ([facility])    RLS (restless legs syndrome)    Gastroesophageal reflux disease, esophagitis presence not specified  -     omeprazole (PriLOSEC) 20 mg capsule; Take 1 capsule (20 mg total) by mouth daily    Immunocompromised (CMS/HCC) ([facility])  -     acyclovir (ZOVIRAX) 800 mg tablet; Take 1 tablet (800 mg total) by mouth 3 (three) times a day for 7 days    Primary insomnia  -     zolpidem (AMBIEN) 5 mg tablet; Take 1 tablet (5 mg total) by mouth nightly as needed for sleep    Essential hypertension    Change out of the benazepril hydrochlorothiazide to benazepril 40 and hydrochlorothiazide 25  Blood pressures need slightly better control    Cardiac pacemaker in situ  -     clopidogreL (PLAVIX) 75 mg tablet; Take 1 tablet (75 mg total) by mouth daily    Hypokalemia    Impaired fasting glucose      Chronic idiopathic constipation    Psoriasis.  Currently on Biologics    Hyperlipidemia, unspecified hyperlipidemia type  -     omega-3 fatty acids-fish oil (Fish Oil) 340-1,000 mg capsule; Take 1 capsule by mouth 2 (two) times a day  -     atorvastatin (LIPITOR) 40 mg tablet; Take 1 tablet (40 mg total) by mouth daily  -     Lipid panel Blood, Venous; Future  -     Magnesium Blood, Venous; Future    Immunization due  -     Flu Vaccine High-Dose (PF) greater than or equal to 65 years old IM  -     Varicella-zoster vaccine (Shingrix) IM  -     Varicella-zoster vaccine (Shingrix) IM; Future    Herpes zoster with complication  -     acyclovir (ZOVIRAX) 800 mg tablet; Take 1 tablet (800 mg total) by mouth 3 (three) times a day for 7 days    Atrioventricular block    Xerosis of skin    Stucco keratoses    Raynaud's phenomenon without gangrene  -      Vitamin B12 Blood, Venous  -     Folate Blood, Venous    Positive YEYO (antinuclear antibody).  I wonder about lupus in this patient with a complete heart block and several other things that could give her the diagnosis of lupus.  Dr. Dueñas is on her case.    History of actinic keratoses    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    Screening procedure  -     Hepatitis C antibody Blood, Venous; Future    Microcytosis.  Check an iron panel      Chief Complaint  Sunitha is a 74 y.o. female who presents for  Med Refill        HPI  Complex medical patient who comes in the first thing she would like to discuss is a list of things that she has questions regarding  First question she has taking in her ear.  She has no pain she does not have any discharge.  It happens intermittently is been going on since the summer she thought that there could be some bugs that he got in her ear.  I forgot to look in her ear I was can look in that today but it sounds like she has an intermittent ear effusion.  Second the patient would like to get a flu shot and shingles shot she must make sure that safe given the fact she is on biologic agents and it is and we will give those today.  She wanted to ask about her positive YEYO anti-DS and a and whether it was associate with strokes.  I explained to her that autoimmune disorders in general can be associated with strokes and hypercoagulable state but so is dyslipidemia and other hypertension which she also has so there is lots of different reasons for strokes.  Positive ANAs and autoimmune disorders like lupus can cause complete heart block which she had and needed a pacemaker for.    She has a weird dimple in the back of her neck also has 1 further down which I have no idea what they are why they are there or what they could be associated with and have told her that I would have to have dermatology opinion on that.    She finds that she is still dropping things with her right hand that did  not occur post stroke but occurred a couple of months ago she thinks is related to the arthritis in her hands which should could be a possibility we will check a B12 and a folate but again in addition to it the patient could be having some late sequela from the stroke on the right side.    Things that I would like to discuss would include that her blood pressure is not adequately controlled and it looks like she is on benazepril hydrochlorothiazide split that out and increase the benazepril to 40 and the hydrochlorothiazide should remain at 25.    She has no anginal symptoms currently continue the isosorbide    Continue her biologic agent may be aware that she is immune compromised and how that affects her illnesses or future illnesses.    Continue her lipid-lowering medication and went through electrophysiology's note and it looks like her dual-chamber pacemaker stable    Restless leg syndrome is stable we will refill her medications for the next year she did have some microcytosis we will do an iron panel because iron deficiency anemia would be the most common cause of RLS    Continue her Zovirax because she is immune compromised and gets HSV quite frequently    Reflux is well controlled with omeprazole so she can continue that    Past medical history surgical history social history is reviewed in the chart  HISTORY  Past Medical History:   Diagnosis Date   • Actinic keratosis    • Benign paroxysmal positional vertigo 10/30/2018   • Cancer (CMS/[facility]) ([facility])     Skin CA on nose   • CVA (cerebral vascular accident) (CMS/[facility]) ([facility]) 2014    Numbness/tingling of RUE but functional and large right toe numbness.   • High degree atrioventricular block     with syncope   • Hyperlipidemia    • Hypertension    • Hypertriglyceridemia    • Inguinal pain 10/30/2018   • Mid sternal chest pain 5/29/2018   • Neurologic disorder    • Presence of heart assist device (CMS/[facility]) ([facility])     Dual chamber pacemaker   • Psoriasis    •  Sclerosis of the skin     Vaginal   • Squamous cell carcinoma in situ (SCCIS) of skin of nose 2019   • Squamous cell skin cancer 2019    in situ right pretip nose   • Urinary frequency 3/27/2019     Past Surgical History:   Procedure Laterality Date   • CARDIAC CATHETERIZATION     • CARDIAC PACEMAKER PLACEMENT      Dual chamber pacemaker implantation   • CARDIAC SURGERY  2014   •  SECTION  1977    [place]   • COLONOSCOPY  2016    Rare sigmoid diverticula. Physiologic internal hemorrhoids, otherwise normal exam   • COLONOSCOPY  2008   • COLPORRHAPHY      and rectocele repair   • MOHS SURGERY Right 2019    right pretip nose Banks's   • PERIPHERAL NERVE EVALUATION N/A 2019    Procedure: PERIPHERAL NERVE EVALUATION;  Surgeon: Jacqueline Landin MD;  Location: [facility] OR;  Service: Urology;  Laterality: N/A;   • SACRAL NERVE STIMULATOR PLACEMENT N/A 2019    Procedure: FULL IMPLANT INTERSTIM;  Surgeon: Jacqueline Landin MD;  Location: [facility] OR;  Service: Urology;  Laterality: N/A;   • TOTAL ABDOMINAL HYSTERECTOMY W/ BILATERAL SALPINGOOPHORECTOMY      [id number] [facility]     Family History   Problem Relation Age of Onset   • Stroke Mother 97   • Hypertension Mother    • Dementia Mother    • Other cancer Father 32        secondary malignant neoplasm of lymph node     Social History     Occupational History     Comment: [employer]   Tobacco Use   • Smoking status: Never Smoker   • Smokeless tobacco: Never Used   Substance and Sexual Activity   • Alcohol use: No   • Drug use: No   • Sexual activity: Defer   Social History Narrative   • Not on file       ALLERGIES AND MEDICATIONS  Allergies   Allergen Reactions   • Apricot      throat pain   • Clindamycin Phosphate Rash     Ointment used for rash on face     Current Outpatient Medications   Medication Sig Dispense Refill   • calcium carbonate-vitamin D3 600 mg calcium- 200 unit capsule insert  1 tablet by oral route  twice daily     • gabapentin (NEURONTIN) 100 mg capsule   0   • omeprazole (PriLOSEC) 20 mg capsule Take 1 capsule (20 mg total) by mouth daily 90 capsule 3   • omega-3 fatty acids-fish oil (Fish Oil) 340-1,000 mg capsule Take 1 capsule by mouth 2 (two) times a day 240 capsule 3   • zolpidem (AMBIEN) 5 mg tablet Take 1 tablet (5 mg total) by mouth nightly as needed for sleep 90 tablet 1   • losartan (COZAAR) 100 mg tablet Take 1 tablet (100 mg total) by mouth 2 (two) times a day 180 tablet 3   • clopidogreL (PLAVIX) 75 mg tablet Take 1 tablet (75 mg total) by mouth daily 90 tablet 3   • atorvastatin (LIPITOR) 40 mg tablet Take 1 tablet (40 mg total) by mouth daily 90 tablet 3   • acyclovir (ZOVIRAX) 800 mg tablet Take 1 tablet (800 mg total) by mouth 3 (three) times a day for 7 days 21 tablet 2   • benazepril-hydrochlorothiazide (Lotensin HCT) 20-12.5 mg per tablet Take 2 tablets by mouth daily 180 tablet 3   • rOPINIRole (REQUIP) 1 mg tablet TAKE 1 TABLET TWICE DAILY 180 tablet 3   • isosorbide mononitrate (IMDUR) 30 mg 24 hr tablet TAKE 1 TABLET EVERY DAY 90 tablet 2   • potassium chloride (K-DUR,KLOR-CON) 10 mEq CR tablet TAKE 2 TABLETS EVERY  tablet 3   • hydrocortisone 2.5 % cream Apply three times daily as needed for rash on face 60 g 2   • CLOBETASOL PROPIONATE, BULK, MISC daily     • secukinumab 150 mg/mL pen injector Inject 300 mg once weekly at weeks 0, 1, 2, 3, and 4. 10 pen 0   • naproxen sodium 220 mg capsule Take 2 tablets by mouth 2 (two) times a day.     • triamcinolone (KENALOG) 0.1 % cream APPLY A THIN LAYER TO THE AFFECTED AREA(S) TOPICALLY 2 TIMES PER DAY 30 g 12   • multivitamin with minerals tablet Take 1 tablet by mouth daily.     • lysine 500 mg tablet Take 1 tablet every day by oral route in the evening.       No current facility-administered medications for this visit.      In reviewing her medications she cannot be on both losartan 100 and benazepril 40 and  hydrochlorothiazide so were going to stop the losartan and increase the benazepril to 40 and continue the hydrochlorothiazide 25    Review of Systems   Constitutional: Negative for activity change, appetite change, chills, diaphoresis, fatigue, fever and unexpected weight change.   HENT: Positive for ear pain and hearing loss. Negative for congestion, dental problem, drooling, ear discharge, facial swelling, mouth sores, nosebleeds, postnasal drip, rhinorrhea, sinus pressure, sinus pain, sneezing, sore throat, tinnitus, trouble swallowing and voice change.    Eyes: Negative for photophobia, pain, discharge, redness, itching and visual disturbance.   Respiratory: Negative for apnea, cough, choking, chest tightness, shortness of breath, wheezing and stridor.    Cardiovascular: Negative for chest pain, palpitations and leg swelling.   Gastrointestinal: Positive for constipation. Negative for abdominal distention, abdominal pain, anal bleeding, blood in stool, diarrhea, nausea, rectal pain and vomiting.   Endocrine: Negative for cold intolerance, heat intolerance, polydipsia, polyphagia and polyuria.   Genitourinary: Negative for decreased urine volume, difficulty urinating, dysuria, enuresis, flank pain, frequency, genital sores, hematuria and urgency.   Musculoskeletal: Negative for arthralgias, back pain, gait problem, joint swelling, myalgias, neck pain and neck stiffness.   Skin: Negative for color change, pallor, rash and wound.   Allergic/Immunologic: Negative for environmental allergies, food allergies and immunocompromised state.   Neurological: Negative for dizziness, tremors, seizures, syncope, facial asymmetry, speech difficulty, weakness, light-headedness, numbness and headaches.   Hematological: Negative for adenopathy. Does not bruise/bleed easily.   Psychiatric/Behavioral: Negative for agitation, behavioral problems, confusion, decreased concentration, dysphoric mood, hallucinations, self-injury, sleep  disturbance and suicidal ideas. The patient is not nervous/anxious and is not hyperactive.        OBJECTIVE  Vitals:    01/15/20 1111   BP: 142/78   Pulse: 64   Resp: 16   Weight: 81.6 kg (180 lb)   PainSc: 0-No pain     BP Readings from Last 3 Encounters:   01/15/20 142/78   09/06/19 120/62   08/07/19 159/68       Body mass index is 29.95 kg/m².  Wt Readings from Last 3 Encounters:   01/15/20 81.6 kg (180 lb)   09/06/19 80.5 kg (177 lb 8 oz)   08/07/19 80.8 kg (178 lb 3.2 oz)       Physical Exam  Normocephalic atraumatic membranes pink moist pharynx clear  Heart regular rate and rhythm with no murmurs gallops rubs noted lungs clear to auscultation without wheezes rales rhonchi  The patient is alert and oriented x3  Abdomen is soft nontender without organomegaly  Small 5 mm divot at the top of her neck and then a larger 10 mm x 20 mm divot that looks like lipodystrophy on her side of her back  Actinic keratosis she asked me about on her right upper extremity that she is asked to see Dr. Burrell for  Palpated dual-chamber pacemaker    Lab Results   Component Value Date    GLUCOSE 90 07/29/2019    CALCIUM 9.0 07/29/2019     07/29/2019    K 4.1 07/29/2019    CO2 23 07/29/2019     07/29/2019    BUN 15 07/29/2019    CREATININE 0.93 07/29/2019    ANIONGAP 9 07/29/2019     Lab Results   Component Value Date    WBC 6.5 07/29/2019    HGB 14.2 07/29/2019    HCT 41.1 07/29/2019    MCV 97.9 (H) 07/29/2019     07/29/2019     Lab Results   Component Value Date    TSH 1.435 02/22/2019     Lab Results   Component Value Date    HGBA1C 5.9 10/30/2018    HGBA1C 5.9 12/18/2017    HGBA1C 5.8 07/30/2015      Lab Results   Component Value Date    MICROALBUR 15.1 07/29/2019    LDLCALC 54 06/21/2016    CREATININE 0.93 07/29/2019     Lab Results   Component Value Date    SEDRATE 6 05/20/2019     No results found for: URACSRM  No results found for: AMYLASE  No results found for: LIPASE      Bi Screening Mammogram  "Bilateral    Result Date: 12/30/2019  Narrative: STUDY DATE: 12/27/2019  HISTORY:  Patient is 74 years old and is seen for screening.  The patient has no personal history of cancer.  The patient has the following family history of breast cancer:  maternal aunt, at age 54.  Procedures performed. Bilateral Digital Mammography With Image Analysis Using Computer Aided Detection (Cad).  The present examination has been compared to prior imaging studies performed at [facility] on 05/26/2017 and 08/06/2018.  BILATERAL MAMMOGRAM FINDINGS: The breasts are heterogeneously dense,which may obscure small masses.  No suspicious masses, significant calcifications or other abnormalities are seen.  There are no significant changes from the prior study.      Impression: IMPRESSION: There is no mammographic evidence of malignancy.  Screening mammogram in 1 year is recommended.  BI-RADS Category 1: Negative           Allergies  Meds  Problems         Portions of this note was documented by Yinka Villeda MA as I performed the exam and collected the information from Sunitha Arrington. I attest that I have reviewed the information as documented, verified the accuracy of the documentation and added additional information as needed.       Leon Donahue MD wrote,   \"When you run out of doctor things to do for the sick person, see if there are any human things you can offer.\"     Thank you for intrusting us with your care. We understand this can be a very scary time and you can feel quite vulnerable.    You, and your health, are very important to me and my healthcare team.   If you have not signed up for [employer], please do so. It can dramatically improve your care.   I want you to update us by sending communication in [employer].  If you cannot do that then  call   [phone] or drop a line at [address] [place] [address]    We wish you the best.  Please let us know.    My Best,    Hilda Donald MD    "

## 2020-01-23 DIAGNOSIS — I10 ESSENTIAL HYPERTENSION: ICD-10-CM

## 2020-01-23 RX ORDER — BENAZEPRIL HYDROCHLORIDE AND HYDROCHLOROTHIAZIDE 20; 12.5 MG/1; MG/1
2 TABLET ORAL DAILY
OUTPATIENT
Start: 2020-01-23 | End: 2021-01-22

## 2020-01-27 DIAGNOSIS — I10 ESSENTIAL HYPERTENSION: ICD-10-CM

## 2020-01-27 RX ORDER — LOSARTAN POTASSIUM 100 MG/1
100 TABLET ORAL DAILY
Qty: 30 TABLET | Refills: 11 | OUTPATIENT
Start: 2020-01-27 | End: 2021-01-26

## 2020-01-27 RX ORDER — BENAZEPRIL HYDROCHLORIDE AND HYDROCHLOROTHIAZIDE 20; 12.5 MG/1; MG/1
2 TABLET ORAL DAILY
OUTPATIENT
Start: 2020-01-27 | End: 2021-01-26

## 2020-01-30 ENCOUNTER — TELEPHONE (OUTPATIENT)
Dept: FAMILY MEDICINE | Facility: CLINIC | Age: 75
End: 2020-01-30

## 2020-01-30 NOTE — TELEPHONE ENCOUNTER
Patient needs some clarification on her meds. Should she be on the benazepril or the losartan?     Also, what is HCTZ?     Patient requesting call back.

## 2020-01-31 ENCOUNTER — LAB (OUTPATIENT)
Dept: FAMILY MEDICINE | Facility: CLINIC | Age: 75
End: 2020-01-31
Payer: MEDICARE

## 2020-01-31 DIAGNOSIS — R76.8 POSITIVE ANA (ANTINUCLEAR ANTIBODY): ICD-10-CM

## 2020-01-31 DIAGNOSIS — L40.9 PSORIASIS: ICD-10-CM

## 2020-01-31 DIAGNOSIS — F51.01 PRIMARY INSOMNIA: ICD-10-CM

## 2020-01-31 DIAGNOSIS — I73.00 RAYNAUD'S PHENOMENON WITHOUT GANGRENE: ICD-10-CM

## 2020-01-31 DIAGNOSIS — I63.9 CEREBROVASCULAR ACCIDENT (CVA), UNSPECIFIED MECHANISM (CMS/HCC): ICD-10-CM

## 2020-01-31 DIAGNOSIS — D84.9 IMMUNOCOMPROMISED (CMS/HCC): ICD-10-CM

## 2020-01-31 DIAGNOSIS — B02.8 HERPES ZOSTER WITH COMPLICATION: ICD-10-CM

## 2020-01-31 DIAGNOSIS — L85.1 STUCCO KERATOSES: ICD-10-CM

## 2020-01-31 DIAGNOSIS — Z51.81 MEDICATION MONITORING ENCOUNTER: ICD-10-CM

## 2020-01-31 DIAGNOSIS — Z23 IMMUNIZATION DUE: ICD-10-CM

## 2020-01-31 DIAGNOSIS — R73.01 IMPAIRED FASTING GLUCOSE: ICD-10-CM

## 2020-01-31 DIAGNOSIS — G25.81 RLS (RESTLESS LEGS SYNDROME): ICD-10-CM

## 2020-01-31 DIAGNOSIS — E78.5 DYSLIPIDEMIA: ICD-10-CM

## 2020-01-31 DIAGNOSIS — I44.30 ATRIOVENTRICULAR BLOCK: ICD-10-CM

## 2020-01-31 DIAGNOSIS — R76.8 ANA POSITIVE: ICD-10-CM

## 2020-01-31 DIAGNOSIS — I10 ESSENTIAL HYPERTENSION: ICD-10-CM

## 2020-01-31 DIAGNOSIS — Z13.9 SCREENING PROCEDURE: ICD-10-CM

## 2020-01-31 DIAGNOSIS — L85.3 XEROSIS OF SKIN: ICD-10-CM

## 2020-01-31 DIAGNOSIS — R51.9 ACUTE INTRACTABLE HEADACHE, UNSPECIFIED HEADACHE TYPE: ICD-10-CM

## 2020-01-31 DIAGNOSIS — E78.5 HYPERLIPIDEMIA, UNSPECIFIED HYPERLIPIDEMIA TYPE: ICD-10-CM

## 2020-01-31 DIAGNOSIS — K59.04 CHRONIC IDIOPATHIC CONSTIPATION: ICD-10-CM

## 2020-01-31 DIAGNOSIS — N81.6 FEMALE PROCTOCELE WITHOUT UTERINE PROLAPSE: ICD-10-CM

## 2020-01-31 DIAGNOSIS — Z95.0 CARDIAC PACEMAKER IN SITU: ICD-10-CM

## 2020-01-31 DIAGNOSIS — Z87.2 HISTORY OF ACTINIC KERATOSES: ICD-10-CM

## 2020-01-31 DIAGNOSIS — R71.8 MICROCYTOSIS: ICD-10-CM

## 2020-01-31 DIAGNOSIS — K21.9 GASTROESOPHAGEAL REFLUX DISEASE, ESOPHAGITIS PRESENCE NOT SPECIFIED: ICD-10-CM

## 2020-01-31 DIAGNOSIS — E87.6 HYPOKALEMIA: ICD-10-CM

## 2020-01-31 LAB
ALBUMIN SERPL-MCNC: 4.3 G/DL (ref 3.5–5.3)
ALP SERPL-CCNC: 65 U/L (ref 55–142)
ALT SERPL-CCNC: 21 U/L (ref 0–52)
ANION GAP SERPL CALC-SCNC: 8 MMOL/L (ref 3–11)
AST SERPL-CCNC: 21 U/L (ref 0–39)
BACTERIA #/AREA URNS HPF: NORMAL /HPF
BILIRUB SERPL-MCNC: 0.81 MG/DL (ref 0–1.4)
BILIRUB UR QL: NEGATIVE
BUN SERPL-MCNC: 17 MG/DL (ref 7–25)
C3 SERPL-MCNC: 150 MG/DL (ref 87–200)
C4 SERPL-MCNC: 24 MG/DL (ref 19–52)
CALCIUM ALBUM COR SERPL-MCNC: 9 MG/DL (ref 8.6–10.3)
CALCIUM SERPL-MCNC: 9.2 MG/DL (ref 8.6–10.3)
CENTROMERE B AB SER-ACNC: <0.2 AI
CHLORIDE SERPL-SCNC: 107 MMOL/L (ref 98–107)
CHOLEST SERPL-MCNC: 127 MG/DL (ref 0–199)
CHROMATIN AB, MIA, INDEX: <0.2 AI
CLARITY UR: CLEAR
CO2 SERPL-SCNC: 25 MMOL/L (ref 21–32)
COLOR UR: YELLOW
CREAT SERPL-MCNC: 0.86 MG/DL (ref 0.6–1.2)
CRP SERPL-MCNC: <1 MG/L
DSDNA IGG SERPL IA-ACNC: 17 IU/ML
ENA JO1 IGG SER-ACNC: <0.2 AI
ENA RNP IGG SER-ACNC: 0.4 AI
ENA SCL70 IGG SER IA-ACNC: <0.2 AI
ENA SM IGG SER-ACNC: <0.2 AI
ENA SM+RNP AB SER-ACNC: <0.2 AI
ENA SS-A AB SER-ACNC: <0.2 AI
ENA SS-B AB SER-ACNC: <0.2 AI
EOSINOPHIL # BLD MANUAL: 0.3 10*3/UL
EOSINOPHIL NFR BLD MANUAL: 4 % (ref 0–3)
ERYTHROCYTE [DISTWIDTH] IN BLOOD BY AUTOMATED COUNT: 12.8 % (ref 11.5–14)
FASTING STATUS PATIENT QL REPORTED: YES
FERRITIN SERPL-MCNC: 77.5 NG/ML (ref 11–307)
FOLATE SERPL-MCNC: >22.3 NG/ML
GFR SERPL CREATININE-BSD FRML MDRD: 67 ML/MIN/1.73M*2
GLUCOSE SERPL-MCNC: 106 MG/DL (ref 70–105)
GLUCOSE UR QL: NEGATIVE MG/DL
HCT VFR BLD AUTO: 43.3 % (ref 34–45)
HCV AB SER QL: NORMAL
HDLC SERPL-MCNC: 56 MG/DL
HGB BLD-MCNC: 15.1 G/DL (ref 11.5–15.5)
HGB UR QL: NEGATIVE
IRON SATN MFR SERPL: 45 % (ref 13–50)
IRON SERPL-MCNC: 161 UG/DL (ref 50–175)
KETONES UR-MCNC: NEGATIVE MG/DL
LDLC SERPL CALC-MCNC: 53 MG/DL (ref 20–99)
LEUKOCYTE ESTERASE UR QL STRIP: NEGATIVE
LYMPHOCYTES # BLD MANUAL: 1.5 10*3/UL
LYMPHOCYTES NFR BLD MANUAL: 23 % (ref 11–47)
MAGNESIUM SERPL-MCNC: 2.2 MG/DL (ref 1.8–2.4)
MCH RBC QN AUTO: 33.9 PG (ref 28–33)
MCHC RBC AUTO-ENTMCNC: 34.9 G/DL (ref 32–36)
MCV RBC AUTO: 97.3 FL (ref 81–97)
MONOCYTES # BLD MANUAL: 0.4 10*3/UL
MONOCYTES NFR BLD MANUAL: 6 % (ref 3–11)
NEUTROPHILS # BLD MANUAL: 4.29 10*3/UL
NEUTS SEG # BLD MANUAL: 4.3 10*3/UL
NEUTS SEG NFR BLD MANUAL: 67 % (ref 41–81)
NITRITE UR QL: NEGATIVE
PH UR: 5 PH
PLATELET # BLD AUTO: 208 10*3/UL (ref 140–350)
PLATELET # BLD EST: ADEQUATE 10*3/UL
PMV BLD AUTO: 8.4 FL (ref 6.9–10.8)
POTASSIUM SERPL-SCNC: 3.9 MMOL/L (ref 3.5–5.1)
PROT SERPL-MCNC: 7.4 G/DL (ref 6–8.3)
PROT UR STRIP-MCNC: NEGATIVE MG/DL
RBC # BLD AUTO: 4.45 10*6/ΜL (ref 3.7–5.3)
RBC #/AREA URNS HPF: NORMAL /HPF
RBC MORPH BLD: NORMAL
RIBOSOMAL P IGG SER-ACNC: <0.2 AI
SODIUM SERPL-SCNC: 140 MMOL/L (ref 135–145)
SP GR UR: 1.02 (ref 1–1.03)
SQUAMOUS #/AREA URNS HPF: NORMAL /HPF
TIBC SERPL-MCNC: 356 UG/DL (ref 250–450)
TOTAL CELLS COUNTED BLD: 100 CELLS
TRIGL SERPL-MCNC: 89 MG/DL
UIBC SERPL-MCNC: 195 UG/DL (ref 155–355)
UROBILINOGEN UR-MCNC: 0.2 E.U./DL
VIT B12 SERPL-MCNC: 675 PG/ML (ref 180–914)
WBC # BLD AUTO: 6.4 10*3/UL (ref 4.5–10.5)
WBC #/AREA URNS HPF: NORMAL /HPF
WBC NRBC COR # BLD: 6.4 10*3/UL

## 2020-01-31 PROCEDURE — 83735 ASSAY OF MAGNESIUM: CPT

## 2020-01-31 PROCEDURE — 86160 COMPLEMENT ANTIGEN: CPT

## 2020-01-31 PROCEDURE — 82746 ASSAY OF FOLIC ACID SERUM: CPT | Performed by: FAMILY MEDICINE

## 2020-01-31 PROCEDURE — 83540 ASSAY OF IRON: CPT

## 2020-01-31 PROCEDURE — 82728 ASSAY OF FERRITIN: CPT

## 2020-01-31 PROCEDURE — 82607 VITAMIN B-12: CPT | Performed by: FAMILY MEDICINE

## 2020-01-31 PROCEDURE — 36415 COLL VENOUS BLD VENIPUNCTURE: CPT

## 2020-01-31 PROCEDURE — 80053 COMPREHEN METABOLIC PANEL: CPT

## 2020-01-31 PROCEDURE — 86140 C-REACTIVE PROTEIN: CPT

## 2020-01-31 PROCEDURE — 80061 LIPID PANEL: CPT

## 2020-01-31 PROCEDURE — 81001 URINALYSIS AUTO W/SCOPE: CPT

## 2020-01-31 PROCEDURE — 86803 HEPATITIS C AB TEST: CPT

## 2020-01-31 PROCEDURE — 85025 COMPLETE CBC W/AUTO DIFF WBC: CPT

## 2020-01-31 PROCEDURE — 86235 NUCLEAR ANTIGEN ANTIBODY: CPT

## 2020-02-04 ENCOUNTER — TELEPHONE (OUTPATIENT)
Dept: FAMILY MEDICINE | Facility: CLINIC | Age: 75
End: 2020-02-04

## 2020-02-04 NOTE — TELEPHONE ENCOUNTER
She has decided to continue with takinig the Benzapril-HCTZ and see how it works for her.  She is just now switching due to getting it from the mail order pharmacy takes a while.

## 2020-02-05 ENCOUNTER — ANCILLARY PROCEDURE (OUTPATIENT)
Dept: CARDIOLOGY | Facility: CLINIC | Age: 75
End: 2020-02-05
Payer: MEDICARE

## 2020-02-05 DIAGNOSIS — Z45.018 FITTING AND ADJUSTMENT OF CARDIAC PACEMAKER: ICD-10-CM

## 2020-02-05 PROCEDURE — 93294 REM INTERROG EVL PM/LDLS PM: CPT | Performed by: INTERNAL MEDICINE

## 2020-02-05 PROCEDURE — 93296 REM INTERROG EVL PM/IDS: CPT | Mod: PO

## 2020-02-05 RX ORDER — LOSARTAN POTASSIUM 100 MG/1
100 TABLET ORAL DAILY
Qty: 30 TABLET | Refills: 11 | OUTPATIENT
Start: 2020-02-05 | End: 2021-02-04

## 2020-02-05 NOTE — PROGRESS NOTES
Sunitha is a 73-year-old female with history of positive YEYO was found to have negative SSA and SSB.  Double-stranded DNA positive at 19 with normal less than 5.  CK is normal.  SMRNP was normal.  Myomarker 3 panel was normal.  Aldolase was negative.  Antihistone antibodies were also normal.  Patient did have a biopsy performed of lesion over her upper back which showed vascular interface dermatitis with increased mucin.  This could raise possibility of a connective tissue disorder.  The patient does have a history of psoriasis and had been treated with Humira.  She develops lesions initially in December with bumps along her cheek that then spread to her back.  She had good control of her symptoms with Humira and has been on it for several years. Sunitha has had raynauds symptoms for  20+ years, she does wear gloves. She has not noticed raynauds symptoms anywhere else.  She does have a chest tightness that she has had several months. She did complete a chemical stress test which came back normal.  Echocardiogram was also completed which was essentially unremarkable.  The past couple of nights she is noticing more chest pressure. This is something she notices mainly at night. Exertion does not make a difference with this at all. She does get pain in her hands along the second metacarpal in the left hand.  She is unable to describe any particular activity this occurs with.  She does not note actual joint swelling.  She has also had some difficulty with the right knee just starting today and does note some chronic pain over second and third toes after her stroke.  No troubles swallowing, she does occasionally have problems with heartburn. Constipation is a problem for her. Her right knee did start hurting the other day. No problems with pregnancy in the past or getting pregnant. It feels tight when she tries to take a deep breath. No kidney problems that she knows of. She had itching when she got this rash and was given  "prednisone which she feels really helped with the itching.  She has not had previous history of blood clots other than history of stroke.  She has not noted recurrence of photosensitive rash.  She does have a history of psoriasis but has not had other rash.  She does not note mouth sores or canker sores.  Occasionally does note dry eyes.  Additional labs were drawn that showed a borderline positive double-stranded DNA and also slightly low C4 at 18.  I have received multiple communications from Sunitha regarding skin lesions.  She has sent us many different pictures which are available through her communications on my chart.  She also has followed up with dermatology.  She recently has seen Dr. Michel who was questioned about the possibility of lichen sclerosis plaque of her upper back as well as possible morphea.  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 and Mohs excision recommended.  Patient was seen in May 2019 with worsening rash and we suggested discontinue Humira and she was started with Cosentyx.  Recent labs from 1/31/2020 had shown C-reactive protein to be normal.  C3,C4 were normal.  Double-stranded DNA is 17.  Blood sugar mildly elevated at 106 with normals to 105.    Today Sunitha reports a pain of 3/10 in her back. She is currently maintained on Cosentyx injections every 28 days.  He has been very happy with this medicine with complete clearing of psoriasis.  She is having problems with raynauds symptoms. Sunitha does report she did have some canker sores on her lower lip. These are now gone.  No complaints of dry eyes or dry mouth. No problems with chest pain or shortness of breath. She does continue to have problems with fatigue which she has discussed with her PCP. She has noticed more \"popping\" in her fingers and " knees and questions if this is autoimmune related.  She does note sometimes some cracking of the skin over her hands over the thumbs during winter months.  She does not use rubber gloves to clean or wash dishes.  She has not had chest pains or shortness of breath.  She currently has not had GI complaints.  She has noted what she describes as a new indentation of the skin over her neck.    Allergies:  Apricot and Clindamycin phosphate    History:  Past Medical History:   Diagnosis Date   • Actinic keratosis    • Benign paroxysmal positional vertigo 10/30/2018   • Cancer (CMS/[facility]) ([facility])     Skin CA on nose   • CVA (cerebral vascular accident) (CMS/[facility]) ([facility])     Numbness/tingling of RUE but functional and large right toe numbness.   • High degree atrioventricular block     with syncope   • Hyperlipidemia    • Hypertension    • Hypertriglyceridemia    • Inguinal pain 10/30/2018   • Mid sternal chest pain 2018   • Neurologic disorder    • Presence of heart assist device (CMS/[facility]) ([facility])     Dual chamber pacemaker   • Psoriasis    • Sclerosis of the skin     Vaginal   • Squamous cell carcinoma in situ (SCCIS) of skin of nose 2019   • Squamous cell skin cancer 2019    in situ right pretip nose   • Urinary frequency 3/27/2019     Past Surgical History:   Procedure Laterality Date   • CARDIAC CATHETERIZATION     • CARDIAC PACEMAKER PLACEMENT      Dual chamber pacemaker implantation   • CARDIAC SURGERY  2014   •  SECTION  1977    [place], [place]   • COLONOSCOPY  2016    Rare sigmoid diverticula. Physiologic internal hemorrhoids, otherwise normal exam   • COLONOSCOPY  2008   • COLPORRHAPHY      and rectocele repair   • MOHS SURGERY Right 2019    right pretip nose Banks's   • PERIPHERAL NERVE EVALUATION N/A 2019    Procedure: PERIPHERAL NERVE EVALUATION;  Surgeon: Jacqueline Landin MD;  Location: [facility];  Service: Urology;  Laterality: N/A;   • SACRAL NERVE  STIMULATOR PLACEMENT N/A 5/28/2019    Procedure: FULL IMPLANT INTERSTIM;  Surgeon: Jacqueline Landin MD;  Location: [facility] OR;  Service: Urology;  Laterality: N/A;   • TOTAL ABDOMINAL HYSTERECTOMY W/ BILATERAL SALPINGOOPHORECTOMY      [id number] [facility]     Social History     Socioeconomic History   • Marital status:      Spouse name: Not on file   • Number of children: Not on file   • Years of education: Not on file   • Highest education level: Not on file   Occupational History     Comment: [employer]   Social Needs   • Financial resource strain: Not on file   • Food insecurity     Worry: Not on file     Inability: Not on file   • Transportation needs     Medical: Not on file     Non-medical: Not on file   Tobacco Use   • Smoking status: Never Smoker   • Smokeless tobacco: Never Used   Substance and Sexual Activity   • Alcohol use: No   • Drug use: No   • Sexual activity: Defer   Lifestyle   • Physical activity     Days per week: Not on file     Minutes per session: Not on file   • Stress: Not on file   Relationships   • Social connections     Talks on phone: Not on file     Gets together: Not on file     Attends [employer] service: Not on file     Active member of club or organization: Not on file     Attends meetings of clubs or organizations: Not on file     Relationship status: Not on file   • Intimate partner violence     Fear of current or ex partner: Not on file     Emotionally abused: Not on file     Physically abused: Not on file     Forced sexual activity: Not on file   Other Topics Concern   • Not on file   Social History Narrative   • Not on file       Review of Systems   Constitutional: Positive for fatigue. Negative for chills and fever.   HENT: Negative for ear pain and sore throat.    Eyes: Negative for pain and visual disturbance.   Respiratory: Negative for cough and shortness of breath.    Cardiovascular: Negative for chest pain and palpitations.   Gastrointestinal:  Positive for constipation. Negative for abdominal pain and vomiting.   Endocrine: Positive for cold intolerance.   Genitourinary: Negative for dysuria and hematuria.   Musculoskeletal: Positive for back pain. Negative for arthralgias.   Skin: Negative for color change and rash.   Allergic/Immunologic: Positive for immunocompromised state.   Neurological: Negative for seizures and syncope.   All other systems reviewed and are negative.    14 point ROS otherwise -.  Objective:  Vitals:    02/07/20 0919 02/07/20 0926   BP: (!) 183/63    BP Location: Left arm    Patient Position: Sitting    Cuff Size: Regular Adult    Pulse: 60    Resp: 14    SpO2:  100%   Weight: 82.8 kg (182 lb 9.6 oz)        Physical Exam  Constitutional:       Appearance: Normal appearance.   Cardiovascular:      Rate and Rhythm: Regular rhythm.      Heart sounds: Normal heart sounds.   Pulmonary:      Effort: Pulmonary effort is normal.      Breath sounds: Normal breath sounds.   Musculoskeletal:      Right lower leg: Edema present.      Left lower leg: Edema present.      Comments: Shoulders, elbows, wrists, MCPs, PIPs were unremarkable without tenderness, synovitis or limitation range of motion.  There is no tenderness on palpation of the greater trochanters.  Hips show good range of motion.  Knees ankles and MTPs were unremarkable without synovitis, tenderness or limitation range of motion.  There is no tenderness on palpation along thoracic or lumbar spine.  There is no tenderness on palpation over SI joints.      Exceptions include- Degenerative changes present in bilateral hands. Crepitus present over bilateral knees.    Skin:     General: Skin is warm and dry.      Comments: She does have an area of scar present over the neck which I think is the indentation she is referring to.  On further questioning she thinks she may have had a previous biopsy performed here.  She does also have an area which appears to have some fat atrophy present over  mid back which is been previously present.  This is unchanged.  Skin of her hands is very dry and she does have some cracking of the skin present over both thumbs.  Psoriasis does not appear to be present   Neurological:      Mental Status: She is alert and oriented to person, place, and time.       Joint Exam     Not documented       Medications:  Current Outpatient Medications on File Prior to Visit   Medication Sig Dispense Refill   • trazodone HCl (TRAZODONE ORAL) Take by mouth Used the last few nights as she was out of zolpidem     • gabapentin (NEURONTIN) 100 mg capsule Take 100 mg by mouth 2 (two) times a day    0   • omega-3 fatty acids-fish oil (Fish Oil) 340-1,000 mg capsule Take 1 capsule by mouth 2 (two) times a day 240 capsule 3   • zolpidem (AMBIEN) 5 mg tablet Take 1 tablet (5 mg total) by mouth nightly as needed for sleep 90 tablet 1   • clopidogreL (PLAVIX) 75 mg tablet Take 1 tablet (75 mg total) by mouth daily 90 tablet 3   • atorvastatin (LIPITOR) 40 mg tablet Take 1 tablet (40 mg total) by mouth daily 90 tablet 3   • rOPINIRole (REQUIP) 1 mg tablet TAKE 1 TABLET TWICE DAILY 180 tablet 3   • isosorbide mononitrate (IMDUR) 30 mg 24 hr tablet TAKE 1 TABLET EVERY DAY 90 tablet 2   • potassium chloride (K-DUR,KLOR-CON) 10 mEq CR tablet TAKE 2 TABLETS EVERY  tablet 3   • CLOBETASOL PROPIONATE, BULK, MISC daily     • secukinumab 150 mg/mL pen injector Inject 300 mg once weekly at weeks 0, 1, 2, 3, and 4. (Patient taking differently: Inject 300 mg under the skin every 28 (twenty-eight) days I ) 10 pen 0   • naproxen sodium 220 mg capsule Take 2 tablets by mouth 2 (two) times a day.     • multivitamin with minerals tablet Take 1 tablet by mouth daily.     • lysine 500 mg tablet Take 1 tablet every day by oral route in the evening.     • calcium carbonate-vitamin D3 600 mg calcium- 200 unit capsule insert 1 tablet by oral route  twice daily     • omeprazole (PriLOSEC) 20 mg capsule Take 1 capsule  (20 mg total) by mouth daily (Patient not taking: Reported on 2/7/2020 ) 90 capsule 3   • benazepril-hydrochlorothiazide (Lotensin HCT) 20-12.5 mg per tablet Take 2 tablets by mouth daily (Patient not taking: Reported on 2/7/2020 ) 180 tablet 3   • hydrocortisone 2.5 % cream Apply three times daily as needed for rash on face (Patient not taking: Reported on 2/7/2020 ) 60 g 2   • triamcinolone (KENALOG) 0.1 % cream APPLY A THIN LAYER TO THE AFFECTED AREA(S) TOPICALLY 2 TIMES PER DAY (Patient not taking: Reported on 2/7/2020) 30 g 12     No current facility-administered medications on file prior to visit.          Labs:  CBC:     WBC   Date Value Ref Range Status   01/31/2020 6.4 4.5 - 10.5 10*3/uL Final     RBC   Date Value Ref Range Status   01/31/2020 4.45 3.70 - 5.30 10*6/µL Final     Hemoglobin   Date Value Ref Range Status   01/31/2020 15.1 11.5 - 15.5 g/dL Final     Hematocrit   Date Value Ref Range Status   01/31/2020 43.3 34.0 - 45.0 % Final     Platelets   Date Value Ref Range Status   01/31/2020 208 140 - 350 10*3/uL Final     Comment:     No plt clumps seen.     CMP:   Sodium   Date Value Ref Range Status   01/31/2020 140 135 - 145 mmol/L Final     Potassium   Date Value Ref Range Status   01/31/2020 3.9 3.5 - 5.1 mmol/L Final     Chloride   Date Value Ref Range Status   01/31/2020 107 98 - 107 mmol/L Final     CO2   Date Value Ref Range Status   01/31/2020 25 21 - 32 mmol/L Final     Glucose   Date Value Ref Range Status   01/31/2020 106 (H) 70 - 105 mg/dL Final     Creatinine   Date Value Ref Range Status   01/31/2020 0.86 0.60 - 1.20 mg/dL Final     Calcium   Date Value Ref Range Status   01/31/2020 9.2 8.6 - 10.3 mg/dL Final     Albumin   Date Value Ref Range Status   01/31/2020 4.3 3.5 - 5.3 g/dL Final     Alkaline Phosphatase   Date Value Ref Range Status   01/31/2020 65 55 - 142 U/L Final     Total Bilirubin   Date Value Ref Range Status   01/31/2020 0.81 0.00 - 1.40 mg/dL Final     ALT (SGPT)    Date Value Ref Range Status   01/31/2020 21 0 - 52 U/L Final     AST   Date Value Ref Range Status   01/31/2020 21 0 - 39 U/L Final     BUN   Date Value Ref Range Status   01/31/2020 17 7 - 25 mg/dL Final     Anion Gap   Date Value Ref Range Status   01/31/2020 8 3 - 11 mmol/L Final     ESR/CRP:   Sed Rate   Date Value Ref Range Status   05/20/2019 6 <=20 mm/hr Final     CRP   Date Value Ref Range Status   01/31/2020 <1.0 <=10.0 mg/L Final     Lab Results   Component Value Date    HEPCAB Non-reactive 01/31/2020    TBGOLD Negative 08/06/2018       Assessment/Plan:    Diagnoses and all orders for this visit:    Psoriasis  -     CBC w/auto differential Blood, Venous; Future  -     Comprehensive metabolic panel Blood, Venous; Future  -     C-reactive protein (Inflammation) Blood, Venous; Future    Medication monitoring encounter  -     CBC w/auto differential Blood, Venous; Future  -     Comprehensive metabolic panel Blood, Venous; Future    Positive YEYO (antinuclear antibody)  -     C3 complement Blood, Venous; Future  -     C4 complement Blood, Venous; Future  -     dsDNA Antibodies Blood, Venous; Future  -     Urinalysis w/microscopic, reflex culture Urine, Clean Catch; Future    On exam today Ira psoriasis has cleared on the Cosentyx. We will have her continue the Cosentyx injections every 28 days.  She has had no adverse effects of this medicine and has had excellent control of symptoms.    From standpoint of positive double-stranded DNA and positive YEYO she is continued to have borderline positive double-stranded DNA but has not gone on to develop other signs or symptoms of autoimmune disease.  We will continue to monitor.  I have told her I feel this is most likely a false positive and possibly related to medication.  She will be due for labs again prior to her next follow up to include a CBC, CMP, CRP, C3, C4, DSDNA and UA.   From standpoint of fatigue she has already discussed this with her primary care  physician.  She has had previous stroke.  I do not have further suggestions.    Follow up with Dr. Dueñas in 6 months     Discussed all in detail patient voiced understanding and is in agreement with plan.    A voice recognition program was used to aid in documentation of this record.  Sometimes words are not printed exactly as they were spoken.  While efforts were made to carefully edit and correct any inaccuracies, some errors may be present; please take these into context.  Please contact the provider if areas are identified.    Scribed by:   Caroline Padgett LPN    9:28 AM, 2/7/2020

## 2020-02-07 ENCOUNTER — TELEPHONE (OUTPATIENT)
Dept: FAMILY MEDICINE | Facility: CLINIC | Age: 75
End: 2020-02-07

## 2020-02-07 ENCOUNTER — OFFICE VISIT (OUTPATIENT)
Dept: RHEUMATOLOGY | Facility: CLINIC | Age: 75
End: 2020-02-07
Payer: MEDICARE

## 2020-02-07 VITALS
DIASTOLIC BLOOD PRESSURE: 63 MMHG | SYSTOLIC BLOOD PRESSURE: 183 MMHG | OXYGEN SATURATION: 100 % | HEART RATE: 60 BPM | WEIGHT: 182.6 LBS | RESPIRATION RATE: 14 BRPM | BODY MASS INDEX: 30.39 KG/M2

## 2020-02-07 DIAGNOSIS — L40.9 PSORIASIS: Primary | ICD-10-CM

## 2020-02-07 DIAGNOSIS — R76.8 POSITIVE ANA (ANTINUCLEAR ANTIBODY): ICD-10-CM

## 2020-02-07 DIAGNOSIS — Z51.81 MEDICATION MONITORING ENCOUNTER: ICD-10-CM

## 2020-02-07 PROCEDURE — 99214 OFFICE O/P EST MOD 30 MIN: CPT | Performed by: INTERNAL MEDICINE

## 2020-02-07 PROCEDURE — G0463 HOSPITAL OUTPT CLINIC VISIT: HCPCS | Mod: PO | Performed by: INTERNAL MEDICINE

## 2020-02-07 ASSESSMENT — ENCOUNTER SYMPTOMS
SEIZURES: 0
HEMATURIA: 0
FEVER: 0
CONSTIPATION: 1
BACK PAIN: 1
EYE PAIN: 0
PALPITATIONS: 0
SHORTNESS OF BREATH: 0
CHILLS: 0
ABDOMINAL PAIN: 0
DYSURIA: 0
VOMITING: 0
COLOR CHANGE: 0
SORE THROAT: 0
ARTHRALGIAS: 0
COUGH: 0
FATIGUE: 1

## 2020-02-07 ASSESSMENT — PAIN SCALES - GENERAL: PAINLEVEL: 3

## 2020-02-11 ENCOUNTER — OFFICE VISIT (OUTPATIENT)
Dept: URGENT CARE | Facility: CLINIC | Age: 75
End: 2020-02-11
Payer: MEDICARE

## 2020-02-11 VITALS
DIASTOLIC BLOOD PRESSURE: 66 MMHG | WEIGHT: 175 LBS | HEART RATE: 74 BPM | SYSTOLIC BLOOD PRESSURE: 126 MMHG | HEIGHT: 65 IN | BODY MASS INDEX: 29.16 KG/M2 | OXYGEN SATURATION: 97 % | TEMPERATURE: 98.1 F | RESPIRATION RATE: 18 BRPM

## 2020-02-11 DIAGNOSIS — R25.2 LEG CRAMPS: Primary | ICD-10-CM

## 2020-02-11 DIAGNOSIS — I10 ESSENTIAL HYPERTENSION: ICD-10-CM

## 2020-02-11 LAB
ANION GAP SERPL CALC-SCNC: 9 MMOL/L (ref 3–11)
BASOPHILS # BLD AUTO: 0.1 10*3/UL
BASOPHILS NFR BLD AUTO: 1 % (ref 0–2)
BUN SERPL-MCNC: 27 MG/DL (ref 7–25)
CALCIUM SERPL-MCNC: 8.9 MG/DL (ref 8.6–10.3)
CHLORIDE SERPL-SCNC: 103 MMOL/L (ref 98–107)
CO2 SERPL-SCNC: 23 MMOL/L (ref 21–32)
CREAT SERPL-MCNC: 1.17 MG/DL (ref 0.6–1.2)
EOSINOPHIL # BLD AUTO: 0.1 10*3/UL
EOSINOPHIL NFR BLD AUTO: 2 % (ref 0–3)
ERYTHROCYTE [DISTWIDTH] IN BLOOD BY AUTOMATED COUNT: 13 % (ref 11.5–14)
GFR SERPL CREATININE-BSD FRML MDRD: 46 ML/MIN/1.73M*2
GLUCOSE SERPL-MCNC: 115 MG/DL (ref 70–105)
HCT VFR BLD AUTO: 41.9 % (ref 34–45)
HGB BLD-MCNC: 14.5 G/DL (ref 11.5–15.5)
LYMPHOCYTES # BLD AUTO: 1 10*3/UL
LYMPHOCYTES NFR BLD AUTO: 18 % (ref 11–47)
MAGNESIUM SERPL-MCNC: 2.2 MG/DL (ref 1.8–2.4)
MCH RBC QN AUTO: 33.5 PG (ref 28–33)
MCHC RBC AUTO-ENTMCNC: 34.6 G/DL (ref 32–36)
MCV RBC AUTO: 96.9 FL (ref 81–97)
MONOCYTES # BLD AUTO: 0.6 10*3/UL
MONOCYTES NFR BLD AUTO: 10 % (ref 3–11)
NEUTROPHILS # BLD AUTO: 4 10*3/UL
NEUTROPHILS NFR BLD AUTO: 70 % (ref 41–81)
PLATELET # BLD AUTO: 188 10*3/UL (ref 140–350)
PMV BLD AUTO: 7.3 FL (ref 6.9–10.8)
POTASSIUM SERPL-SCNC: 3.9 MMOL/L (ref 3.5–5.1)
RBC # BLD AUTO: 4.33 10*6/ΜL (ref 3.7–5.3)
SODIUM SERPL-SCNC: 135 MMOL/L (ref 135–145)
WBC # BLD AUTO: 5.7 10*3/UL (ref 4.5–10.5)

## 2020-02-11 PROCEDURE — 99213 OFFICE O/P EST LOW 20 MIN: CPT | Performed by: FAMILY MEDICINE

## 2020-02-11 PROCEDURE — 36415 COLL VENOUS BLD VENIPUNCTURE: CPT | Performed by: FAMILY MEDICINE

## 2020-02-11 PROCEDURE — 85025 COMPLETE CBC W/AUTO DIFF WBC: CPT | Performed by: FAMILY MEDICINE

## 2020-02-11 PROCEDURE — G0463 HOSPITAL OUTPT CLINIC VISIT: HCPCS

## 2020-02-11 PROCEDURE — 83735 ASSAY OF MAGNESIUM: CPT | Performed by: FAMILY MEDICINE

## 2020-02-11 PROCEDURE — 80048 BASIC METABOLIC PNL TOTAL CA: CPT | Performed by: FAMILY MEDICINE

## 2020-02-11 RX ORDER — AMLODIPINE BESYLATE 10 MG/1
10 TABLET ORAL DAILY
Qty: 90 TABLET | Refills: 0 | Status: SHIPPED | OUTPATIENT
Start: 2020-02-11 | End: 2020-03-09

## 2020-02-11 NOTE — PROGRESS NOTES
Clinic Note    Chief Complaint   Leg Cramps       Subjective     Sunitha Arrington is a 74 y.o. female who presents for Leg Cramps   per nursing.  Patient presents to walk-in clinic today for concern of having bilateral leg cramps that just started 3 days ago.  She states that that same day she switch from losartan to benazepril/hydrochlorothiazide because her blood pressure was uncontrolled.  She states that since that time every night she is awoken in the middle the night with bilateral leg cramps usually in the calfs but can also be up in the thighs.  Looking back in her chart it looks like she has been on benazepril/hydrochlorothiazide before and did not have leg cramps but it did cause her cough.  She is not having that now.  She does take a potassium supplement.  She has been trying to stay hydrated as best as possible.  No other recent changes to her medications.    The following have been reviewed and updated as appropriate in this visit:    Allergies  Meds  Problems  Med Hx  Surg Hx       Allergies   Allergen Reactions   • Apricot      throat pain   • Clindamycin Phosphate Rash     Ointment used for rash on face     Current Outpatient Medications   Medication Sig Dispense Refill   • trazodone HCl (TRAZODONE ORAL) Take by mouth Used the last few nights as she was out of zolpidem     • calcium carbonate-vitamin D3 600 mg calcium- 200 unit capsule insert 1 tablet by oral route  twice daily     • gabapentin (NEURONTIN) 100 mg capsule Take 100 mg by mouth 2 (two) times a day    0   • omega-3 fatty acids-fish oil (Fish Oil) 340-1,000 mg capsule Take 1 capsule by mouth 2 (two) times a day 240 capsule 3   • zolpidem (AMBIEN) 5 mg tablet Take 1 tablet (5 mg total) by mouth nightly as needed for sleep 90 tablet 1   • clopidogreL (PLAVIX) 75 mg tablet Take 1 tablet (75 mg total) by mouth daily 90 tablet 3   • atorvastatin (LIPITOR) 40 mg tablet Take 1 tablet (40 mg total) by mouth daily 90 tablet 3   •  benazepril-hydrochlorothiazide (Lotensin HCT) 20-12.5 mg per tablet Take 2 tablets by mouth daily 180 tablet 3   • rOPINIRole (REQUIP) 1 mg tablet TAKE 1 TABLET TWICE DAILY 180 tablet 3   • isosorbide mononitrate (IMDUR) 30 mg 24 hr tablet TAKE 1 TABLET EVERY DAY 90 tablet 2   • potassium chloride (K-DUR,KLOR-CON) 10 mEq CR tablet TAKE 2 TABLETS EVERY  tablet 3   • CLOBETASOL PROPIONATE, BULK, MISC daily     • secukinumab 150 mg/mL pen injector Inject 300 mg once weekly at weeks 0, 1, 2, 3, and 4. (Patient taking differently: Inject 300 mg under the skin every 28 (twenty-eight) days I ) 10 pen 0   • naproxen sodium 220 mg capsule Take 2 tablets by mouth as needed Indications: Chest pain       • multivitamin with minerals tablet Take 1 tablet by mouth daily.     • lysine 500 mg tablet Take 1 tablet every day by oral route in the evening.     • amLODIPine (NORVASC) 10 mg tablet Take 1 tablet (10 mg total) by mouth daily 90 tablet 0   • omeprazole (PriLOSEC) 20 mg capsule Take 1 capsule (20 mg total) by mouth daily (Patient not taking: Reported on 2/7/2020 ) 90 capsule 3   • hydrocortisone 2.5 % cream Apply three times daily as needed for rash on face (Patient not taking: Reported on 2/7/2020 ) 60 g 2   • triamcinolone (KENALOG) 0.1 % cream APPLY A THIN LAYER TO THE AFFECTED AREA(S) TOPICALLY 2 TIMES PER DAY (Patient not taking: Reported on 2/7/2020) 30 g 12     No current facility-administered medications for this visit.      Past Medical History:   Diagnosis Date   • Actinic keratosis    • Benign paroxysmal positional vertigo 10/30/2018   • Cancer (CMS/HCC) ([facility])     Skin CA on nose   • CVA (cerebral vascular accident) (CMS/HCC) ([facility]) 2014    Numbness/tingling of RUE but functional and large right toe numbness.   • High degree atrioventricular block     with syncope   • Hyperlipidemia    • Hypertension    • Hypertriglyceridemia    • Inguinal pain 10/30/2018   • Mid sternal chest pain 5/29/2018   • Neurologic  disorder    • Presence of heart assist device (CMS/HCC) (HCC)     Dual chamber pacemaker   • Psoriasis    • Sclerosis of the skin     Vaginal   • Squamous cell carcinoma in situ (SCCIS) of skin of nose 2019   • Squamous cell skin cancer 2019    in situ right pretip nose   • Urinary frequency 3/27/2019     Past Surgical History:   Procedure Laterality Date   • CARDIAC CATHETERIZATION     • CARDIAC PACEMAKER PLACEMENT      Dual chamber pacemaker implantation   • CARDIAC SURGERY  2014   •  SECTION  1977    [place]   • COLONOSCOPY  2016    Rare sigmoid diverticula. Physiologic internal hemorrhoids, otherwise normal exam   • COLONOSCOPY  2008   • COLPORRHAPHY      and rectocele repair   • MOHS SURGERY Right 2019    right pretip nose Banks's   • PERIPHERAL NERVE EVALUATION N/A 2019    Procedure: PERIPHERAL NERVE EVALUATION;  Surgeon: Jacqueline Landin MD;  Location: [facility] OR;  Service: Urology;  Laterality: N/A;   • SACRAL NERVE STIMULATOR PLACEMENT N/A 2019    Procedure: FULL IMPLANT INTERSTIM;  Surgeon: Jacqueline Landin MD;  Location: [facility] OR;  Service: Urology;  Laterality: N/A;   • TOTAL ABDOMINAL HYSTERECTOMY W/ BILATERAL SALPINGOOPHORECTOMY      [id number] [facility]     Family History   Problem Relation Age of Onset   • Stroke Mother 97   • Hypertension Mother    • Dementia Mother    • Other cancer Father 32        secondary malignant neoplasm of lymph node     Social History     Occupational History     Comment: [employer]   Tobacco Use   • Smoking status: Never Smoker   • Smokeless tobacco: Never Used   Substance and Sexual Activity   • Alcohol use: No   • Drug use: No   • Sexual activity: Defer   Social History Narrative   • Not on file       Review of Systems   Musculoskeletal:        Leg cramps     All other review of systems is negative    Active Problems  Patient Active Problem List   Diagnosis   • Cardiac pacemaker in  "situ   • Cerebrovascular accident (CMS/HCC) ([facility])   • Constipation   • Essential hypertension   • Gout   • Headache   • Hypokalemia   • Impaired fasting glucose   • Psoriasis   • Primary insomnia   • Immunocompromised (CMS/HCC) (HCC)   • Gastroesophageal reflux disease   • Female bladder prolapse   • 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   • Herpes zoster   • Hyperlipidemia   • Lichen sclerosus et atrophicus   • Atrioventricular block   • Thalamic infarction (CMS/HCC) (HCC)   • Cerebrovascular accident (CMS/HCC) ([facility])   • RLS (restless legs syndrome)   • Morphea   • Xerosis of skin   • History of actinic keratoses   • Telangiectasia   • Depigmentation of skin   • Acne rosacea   • Lentigines   • Stucco keratoses   • Raynaud's phenomenon without gangrene       Objective   BP Readings from Last 3 Encounters:   02/11/20 126/66   02/07/20 (!) 183/63   01/15/20 142/78       /66 (BP Location: Left arm, Patient Position: Sitting, Cuff Size: Regular Adult)   Pulse 74   Temp 36.7 °C (98.1 °F) (Oral)   Resp 18   Ht 1.651 m (5' 5\")   Wt 79.4 kg (175 lb)   SpO2 97%   BMI 29.12 kg/m²     Physical Exam  Vitals signs and nursing note reviewed.   Constitutional:       General: She is not in acute distress.     Appearance: Normal appearance. She is well-developed and normal weight. She is not ill-appearing, toxic-appearing or diaphoretic.   HENT:      Head: Normocephalic and atraumatic.      Nose: Nose normal. No congestion or rhinorrhea.      Mouth/Throat:      Mouth: Mucous membranes are moist.      Pharynx: Oropharynx is clear.   Eyes:      General: No scleral icterus.        Right eye: No discharge.         Left eye: No discharge.      Extraocular Movements: Extraocular movements intact.      Conjunctiva/sclera: Conjunctivae normal.   Neck:      Musculoskeletal: Normal range of motion and neck supple. No neck rigidity or muscular tenderness.      Thyroid: No thyromegaly.      Trachea: No tracheal deviation. "   Cardiovascular:      Rate and Rhythm: Normal rate and regular rhythm.      Pulses: Normal pulses.      Heart sounds: Normal heart sounds. No murmur.   Pulmonary:      Effort: Pulmonary effort is normal. No respiratory distress.      Breath sounds: Normal breath sounds. No stridor. No wheezing, rhonchi or rales.   Chest:      Chest wall: No tenderness.   Musculoskeletal: Normal range of motion.         General: No swelling, tenderness, deformity or signs of injury.      Right lower leg: No edema.      Left lower leg: No edema.   Lymphadenopathy:      Cervical: No cervical adenopathy.   Skin:     General: Skin is warm.      Capillary Refill: Capillary refill takes less than 2 seconds.      Coloration: Skin is not jaundiced or pale.      Findings: No bruising, erythema, lesion or rash.   Neurological:      General: No focal deficit present.      Mental Status: She is alert and oriented to person, place, and time. Mental status is at baseline.      Cranial Nerves: No cranial nerve deficit.      Motor: No weakness or abnormal muscle tone.      Coordination: Coordination normal.      Gait: Gait normal.   Psychiatric:         Mood and Affect: Mood normal.         Behavior: Behavior normal.         Thought Content: Thought content normal.         Judgment: Judgment normal.             Assessment and Plan  Leg cramps: Blood work today overall looks well.  She may be a little bit dehydrated with an elevated BUN and a decreased GFR.  We talked about seeing stain hydrated.  Discussed that we can take her off the benazepril/hydrochlorothiazide and see if that makes a difference.    Hypertension: Reviewed patient's chart as well as recent telephone encounters.  It looks like Dr. Donald second choice besides benazepril/hydrochlorothiazide was amlodipine.  We will go ahead and have her stop her combination pill and try the amlodipine 10 mg once a day.  Discussed that over the next couple days after her medicine gets out of  her system hopefully she will start to feel better.  She will continue to monitor her blood pressure intermittently.  Discussed returning as needed if her symptoms do not improve or her blood pressure is not controlled.      Diagnoses and all orders for this visit:    Leg cramps  -     Basic metabolic panel Blood, Venous; Future  -     Magnesium Blood, Venous; Future  -     CBC w/auto differential Blood, Venous; Future    Essential hypertension  -     amLODIPine (NORVASC) 10 mg tablet; Take 1 tablet (10 mg total) by mouth daily          No follow-ups on file.    Christoph Torres MD  02/11/20    A voice recognition program was used to aid in medical record documentation. Sometimes words are printed not exactly as they were spoken. While efforts were made to carefully edit and correct any inaccuracies, some errors may be present. Errors should be taken within the context of the discussion.  Please contact our office if you need assistance interpreting this medical record or notice any mistakes.

## 2020-02-12 NOTE — PROGRESS NOTES
Subjective   Return Dermatology Skin examination  Sunitha Arrington is a 74 y.o. female with a history of actinic keratoses, psoriasis and acne rosacea who presents for a above the waist skin exam . Lesions of concern include: right deltoid, left cheek.            Review of Systems   Constitutional: Positive for fever.   Skin: Positive for rash.   Allergic/Immunologic: Negative for environmental allergies and immunocompromised state.         Past Medical History:   Diagnosis Date   • Actinic keratosis    • Benign paroxysmal positional vertigo 10/30/2018   • Cancer (CMS/[facility]) ([facility])     Skin CA on nose   • CVA (cerebral vascular accident) (CMS/[facility]) ([facility]) 2014    Numbness/tingling of RUE but functional and large right toe numbness.   • High degree atrioventricular block     with syncope   • Hyperlipidemia    • Hypertension    • Hypertriglyceridemia    • Inguinal pain 10/30/2018   • Mid sternal chest pain 5/29/2018   • Neurologic disorder    • Presence of heart assist device (CMS/[facility]) ([facility])     Dual chamber pacemaker   • Psoriasis    • Sclerosis of the skin     Vaginal   • Squamous cell carcinoma in situ (SCCIS) of skin of nose 6/11/2019   • Squamous cell skin cancer 06/11/2019    in situ right pretip nose   • Urinary frequency 3/27/2019       Current Outpatient Medications on File Prior to Visit   Medication Sig Dispense Refill   • amLODIPine (NORVASC) 10 mg tablet Take 1 tablet (10 mg total) by mouth daily 90 tablet 0   • trazodone HCl (TRAZODONE ORAL) Take by mouth Used the last few nights as she was out of zolpidem     • calcium carbonate-vitamin D3 600 mg calcium- 200 unit capsule insert 1 tablet by oral route  twice daily     • gabapentin (NEURONTIN) 100 mg capsule Take 100 mg by mouth 2 (two) times a day    0   • omeprazole (PriLOSEC) 20 mg capsule Take 1 capsule (20 mg total) by mouth daily (Patient not taking: Reported on 2/7/2020 ) 90 capsule 3   • omega-3 fatty acids-fish oil (Fish Oil) 340-1,000 mg capsule Take 1  capsule by mouth 2 (two) times a day 240 capsule 3   • zolpidem (AMBIEN) 5 mg tablet Take 1 tablet (5 mg total) by mouth nightly as needed for sleep 90 tablet 1   • clopidogreL (PLAVIX) 75 mg tablet Take 1 tablet (75 mg total) by mouth daily 90 tablet 3   • atorvastatin (LIPITOR) 40 mg tablet Take 1 tablet (40 mg total) by mouth daily 90 tablet 3   • benazepril-hydrochlorothiazide (Lotensin HCT) 20-12.5 mg per tablet Take 2 tablets by mouth daily 180 tablet 3   • rOPINIRole (REQUIP) 1 mg tablet TAKE 1 TABLET TWICE DAILY 180 tablet 3   • isosorbide mononitrate (IMDUR) 30 mg 24 hr tablet TAKE 1 TABLET EVERY DAY 90 tablet 2   • potassium chloride (K-DUR,KLOR-CON) 10 mEq CR tablet TAKE 2 TABLETS EVERY  tablet 3   • hydrocortisone 2.5 % cream Apply three times daily as needed for rash on face (Patient not taking: Reported on 2/7/2020 ) 60 g 2   • CLOBETASOL PROPIONATE, BULK, MISC daily     • secukinumab 150 mg/mL pen injector Inject 300 mg once weekly at weeks 0, 1, 2, 3, and 4. (Patient taking differently: Inject 300 mg under the skin every 28 (twenty-eight) days I ) 10 pen 0   • naproxen sodium 220 mg capsule Take 2 tablets by mouth as needed Indications: Chest pain       • triamcinolone (KENALOG) 0.1 % cream APPLY A THIN LAYER TO THE AFFECTED AREA(S) TOPICALLY 2 TIMES PER DAY (Patient not taking: Reported on 2/7/2020) 30 g 12   • multivitamin with minerals tablet Take 1 tablet by mouth daily.     • lysine 500 mg tablet Take 1 tablet every day by oral route in the evening.       No current facility-administered medications on file prior to visit.        Allergies  Apricot and Clindamycin phosphate    The following have been reviewed and updated as appropriate in this visit         Physical Examination  A full body skin exam was performed today to include the scalp, head, face, ears, neck, torso, arms, hands, buttock, legs, feet.  Trunk: Stuck on tan brown papules, well demarcated tan brown macules, bright red  cherry papules.     Arms: Stuck on papules.  Erythematous scale on hands. Scattered less than 1cm erythematous patches with overlying silver scale.    Scalp: Generalized hair thinning.    Face: telangiectatic macules, stuck on papules, well demarcated tan brown macules, Gritty erythematous papule- left forehead, nose x2, right cheek    Legs: Stuck on papules. Scattered less than 1cm erythematous patches with overlying silver scale.     Buttock: No suspicious lesions noted    Feet:  Right foot second toe- thickened nail    Vital Signs  blood pressure is 167/82 and her pulse is 67.   General: normal general appearance and in no apparent distress      Physical Exam Findings:   Senior skin type:II    Lesions to follow:  Left medial cheek, 6mm scared papule with adjacent 7 mm telangiectasia     All lesions unchanged from previous exam. To be observed, patient to call if lesion should change prior to next appointment.      Procedure Note:  Destruction of Benign or Premalignant Lesion  Date/Time: 2/14/2020 9:43 AM  Performed by: JOHN Curtis      Consent  Verbal consent was obtained from the patient. Written consent was not obtained from the patient. Risks include permanent scarring, light or dark discoloration, infection, pain, bleeding, bruising, redness, blister formation and recurrence of the lesion. Consent given by patient. The patient states understanding of the procedure being performed. Patient ID confirmed verbally with patient.     Location:  4 total lesions removed   Head/neck location(s): forehead (left forhead, nose x2, right cheek)  Number of head/neck lesions removed: 4        Procedure Details   Lesion(s) are pre-malignant. Local anesthesia was not used. Lesion(s) destroyed with: liquid nitrogen. Number of freeze/thaw cycles was 1. Lesion(s) were frozen until an ice ball extended beyond the lesion.     Post-Procedure  Patient tolerated procedure well with no complications.     Procedure  Comments  Actinic keratoses      Assessment and plan:  1. History of actinic keratoses  I reviewed the importance of photo-protection, photo-avoidance and routine skin checks.      2. Psoriasis  Treated with Cosentyx.  This is followed by Dr. Dueñas.  Patient reports improvement with this treatment regimen.  She does have a few areas that are still itchy on the arms and legs.  She is to use topical triamcinolone 0.1% cream up to 3 times daily on these areas.  Stop when clear.  Repeat with flares.  Reviewed dry skin care regimen.  - triamcinolone (KENALOG) 0.1 % cream; Apply 1 application topically 2 (two) times a day as needed for rash Apply to hands, arms and legs up to three times daily.  Dispense: 80 g; Refill: 4    3. Seborrheic keratoses  The common and benign nature of the lesion was discussed. The patient was reassured.      4. Hand dermatitis  Dry skin care regimen was reviewed in detail with the patient.  Avoid wet dry cycles with hands.  Avoid hand .  Dove white bar soap and cooler water when washing hands.  Cream based moisturizer multiple times daily as needed.  Recommendations were made.  She can use topical triamcinolone 0.1% cream up to 3 times daily as needed.  Stop triamcinolone when clear and repeat with flares.  Protect hands from cold winter elements.    5. Telangiectasia  The common and benign nature of the lesion was discussed. The patient was reassured.  - we did discuss mixing 1 bottle of Afrin nose spray was Cetaphil moisturizer equal parts shaking prior to use and using daily to decrease facial redness.  I informed patient this would be a temporary effect and could not use more than once daily.      6. Lentigines  The common and benign nature of the lesion was discussed. The patient was reassured.      7.. Cherry hemangioma  The common and benign nature of the lesion was discussed. The patient was reassured.      8. Keratosis, actinic  Liquid nitrogen cryotherapy 10 second spot  treatment was performed.  Please see procedure documentation for location and number of lesions treated.  The patient was informed that redness and blistering will result and this may take a few weeks to heal.  They are to avoid rubbing, scratching or using makeup on the treated areas.'    - Destruction of Benign or Premalignant Lesion    9. Onychodystrophy  Likely secondary to footwear.  No evidence of fungal infection.  Discussed referral to podiatry.  She declines at this time however will contact the clinic if she decides to proceed with referral.        There are no diagnoses linked to this encounter.          I emphasized daily sunscreen use with an SPF of 30-50, broad spectrum and water resistant.  I directed reapplication every two hours.  I recommended wide brimmed hats.  Finally, we discussed danger signs of changing skin lesions including sores not healing and moles changing in size, shape or color.  Should any of these lesions occur before next appointment, I instructed patent to call sooner for evaluation.    Patient expresses understanding and agreement with the plan of care, and had no further questions at the end of the exam today.     No follow-ups on file.

## 2020-02-14 ENCOUNTER — OFFICE VISIT (OUTPATIENT)
Dept: DERMATOLOGY | Facility: CLINIC | Age: 75
End: 2020-02-14
Payer: MEDICARE

## 2020-02-14 VITALS — SYSTOLIC BLOOD PRESSURE: 167 MMHG | HEART RATE: 67 BPM | DIASTOLIC BLOOD PRESSURE: 82 MMHG

## 2020-02-14 DIAGNOSIS — L82.1 SEBORRHEIC KERATOSES: ICD-10-CM

## 2020-02-14 DIAGNOSIS — L40.9 PSORIASIS: ICD-10-CM

## 2020-02-14 DIAGNOSIS — D18.01 CHERRY HEMANGIOMA: ICD-10-CM

## 2020-02-14 DIAGNOSIS — L81.4 LENTIGINES: ICD-10-CM

## 2020-02-14 DIAGNOSIS — L30.9 HAND DERMATITIS: ICD-10-CM

## 2020-02-14 DIAGNOSIS — I78.1 TELANGIECTASIA: ICD-10-CM

## 2020-02-14 DIAGNOSIS — Z87.2 HISTORY OF ACTINIC KERATOSES: ICD-10-CM

## 2020-02-14 DIAGNOSIS — L60.3 ONYCHODYSTROPHY: ICD-10-CM

## 2020-02-14 DIAGNOSIS — L57.0 KERATOSIS, ACTINIC: Primary | ICD-10-CM

## 2020-02-14 PROCEDURE — 99213 OFFICE O/P EST LOW 20 MIN: CPT | Mod: 25 | Performed by: PHYSICIAN ASSISTANT

## 2020-02-14 PROCEDURE — 17000 DESTRUCT PREMALG LESION: CPT | Performed by: PHYSICIAN ASSISTANT

## 2020-02-14 PROCEDURE — G0463 HOSPITAL OUTPT CLINIC VISIT: HCPCS | Performed by: PHYSICIAN ASSISTANT

## 2020-02-14 PROCEDURE — 17003 DESTRUCT PREMALG LES 2-14: CPT | Performed by: PHYSICIAN ASSISTANT

## 2020-02-14 RX ORDER — TRIAMCINOLONE ACETONIDE 1 MG/G
1 CREAM TOPICAL 2 TIMES DAILY PRN
Qty: 80 G | Refills: 4 | Status: SHIPPED | OUTPATIENT
Start: 2020-02-14 | End: 2020-02-19 | Stop reason: SDUPTHER

## 2020-02-14 ASSESSMENT — ENCOUNTER SYMPTOMS: FEVER: 1

## 2020-02-14 ASSESSMENT — PAIN SCALES - GENERAL: PAINLEVEL: 0-NO PAIN

## 2020-02-14 NOTE — PATIENT INSTRUCTIONS
"Avoid sunlight between the hours of 10am-2pm, wear a broad spectrum, water resistant sunscreen with SPF of 30-50 year round. Reapply sunscreen every 2 hours and after exercising or swimming. Wear a 6\" broad brimmed hat. Use a lip sunblock of SPF of 30-50 year round. Sun protective clothing suggested. Monthly self-examination of moles is important. Should any moles change in shape or color, or itch, bleed or burn, contact our office for sooner evaluation.  ------------------------------------------------------------------------------------------------------  Sunscreens recommended: Neutrogena Ultra Sheer Dry Touch SPF 50 or higher, Neutrogena Sheer Zinc Dry Touch SPF 50, Vanicream sunblocks containing Titanium Dioxide or Zinc Oxide with SPF 50 or higher.    Vanicream sunscreen of SPF 50 is highly recommended however only found at [facility]. We do sell a few tinted sunscreens here in the clinic. Ask about our powdered brush on sunscreen! We also do have one lip balm product with SPF. [employer] is a store located in [facility] that sells a brand of hat called Sunday Afternoon that is highly recommended.    ---------------------------------------------------------------------------------------------------------  **[employer] is our survey company. You may be receiving a survey about your care. Your identity is kept confidential so please be honest! Your care is important to us and we are always looking for opportunities to improve your experience. If you come across questions in the survey that are not applicable to your visit, please leave these questions blank. Thank you!**    Shower no more than once daily with tepid-lukewarm water and Dove unscented soap. Do not use washcloths or loofah sponges. Apply a thick layer of moisturizing CREAM such as Eucerin Crème, Cetaphil Cream, CeraVe Cream or Vanicream within 3 minutes of getting out of shower and patting dry. This traps 50-80% of the moisture from the " shower under the cream. Lotions have water and alcohol in them and will actually evaporate more moisture from  the skin. Or you can use just plain Vaseline on your hands as well.    We did discuss mixing 1 bottle of Afrin nose spray was Cetaphil moisturizer equal parts shaking prior to use and using daily to decrease facial redness.  I informed patient this would be a temporary effect and could not use more than once daily.    You were treated with liquid nitrogen today. You should expect these areas to burn and once the burning subsides, the area(s)may itch. You should expect some reaction anywhere from welting of the skin to larger liquid filled blisters depending on how aggressively your lesion(s) needed to be treated. If you had treatment on your forehead, eyebrow areas or cheekbones, you could have some swelling under your eyes. This is normal and nothing to worry about. Blisters should be left intact until they pop and drain on their own. You will most likely have a clear drainage much like a blister when it pops.     The areas treated should be cared for by gentle daily cleansing with Dove soap and water and your hands. You should apply Polysporin ointment or Vaseline  WITH A Q-TIP to the areas daily as they are healing and continue this until they are completely healed. It may take anywhere from 7-14 days for areas to completely heal. If any other area than your face was treated, it may take longer for those areas to heal.    If warts were treated, they may turn to blood blisters and may appear purple, red or black. Leave the blister intact and let it pop on its own. Keep these areas dry and clean and do not use any ointment or Vaseline to warts.    IF YOU NOTICE INCREASED SURROUNDING REDNESS, TENDERNESS OR A PUS-LIKE DRAINAGE, PLEASE CONTACT OUR OFFICE IMMEDIATELY.

## 2020-02-17 ENCOUNTER — OFFICE VISIT (OUTPATIENT)
Dept: URGENT CARE | Facility: CLINIC | Age: 75
End: 2020-02-17
Payer: MEDICARE

## 2020-02-17 VITALS
TEMPERATURE: 99.5 F | DIASTOLIC BLOOD PRESSURE: 78 MMHG | SYSTOLIC BLOOD PRESSURE: 144 MMHG | WEIGHT: 180 LBS | BODY MASS INDEX: 29.95 KG/M2 | OXYGEN SATURATION: 97 % | HEART RATE: 90 BPM | RESPIRATION RATE: 18 BRPM

## 2020-02-17 DIAGNOSIS — R05.9 COUGH: Primary | ICD-10-CM

## 2020-02-17 DIAGNOSIS — L40.9 PSORIASIS: ICD-10-CM

## 2020-02-17 DIAGNOSIS — R21 SKIN ERUPTION: ICD-10-CM

## 2020-02-17 PROCEDURE — 99213 OFFICE O/P EST LOW 20 MIN: CPT | Performed by: FAMILY MEDICINE

## 2020-02-17 PROCEDURE — G0463 HOSPITAL OUTPT CLINIC VISIT: HCPCS

## 2020-02-17 ASSESSMENT — ENCOUNTER SYMPTOMS
NECK PAIN: 0
ARTHRALGIAS: 0
DYSPHORIC MOOD: 0
WHEEZING: 0
SINUS PRESSURE: 1
SHORTNESS OF BREATH: 1
EYE DISCHARGE: 0
FEVER: 0
SLEEP DISTURBANCE: 0
UNEXPECTED WEIGHT CHANGE: 0
PHOTOPHOBIA: 0
STRIDOR: 0
EYE REDNESS: 0
RECTAL PAIN: 0
RHINORRHEA: 1
APPETITE CHANGE: 1
TROUBLE SWALLOWING: 0
ADENOPATHY: 0
LIGHT-HEADEDNESS: 0
AGITATION: 0
BRUISES/BLEEDS EASILY: 0
BACK PAIN: 0
DYSURIA: 0
BLOOD IN STOOL: 0
CONFUSION: 0
COLOR CHANGE: 0
HALLUCINATIONS: 0
FLANK PAIN: 0
FATIGUE: 1
PALPITATIONS: 0
HEMATURIA: 0
JOINT SWELLING: 0
POLYPHAGIA: 0
DIARRHEA: 0
ABDOMINAL DISTENTION: 0
NECK STIFFNESS: 0
EYE ITCHING: 0
FREQUENCY: 0
ABDOMINAL PAIN: 0
DIZZINESS: 0
SINUS PAIN: 0
WEAKNESS: 0
SEIZURES: 0
EYE PAIN: 0
VOICE CHANGE: 0
CHOKING: 0
WOUND: 0
ANAL BLEEDING: 0
ACTIVITY CHANGE: 0
VOMITING: 0
NAUSEA: 0
COUGH: 1
TREMORS: 0
POLYDIPSIA: 0
DECREASED CONCENTRATION: 0
CHEST TIGHTNESS: 0
CHILLS: 1
DIFFICULTY URINATING: 0
CONSTIPATION: 0
DIAPHORESIS: 0
MYALGIAS: 0
FACIAL ASYMMETRY: 0
SORE THROAT: 1
HYPERACTIVE: 0
HEADACHES: 0
NERVOUS/ANXIOUS: 0
NUMBNESS: 0
SPEECH DIFFICULTY: 0
FACIAL SWELLING: 0
APNEA: 0

## 2020-02-17 NOTE — PROGRESS NOTES
There are no Patient Instructions on file for this visit.    Visit date not found    ASSESSMENT AND PLAN   Diagnoses and all orders for this visit:    Cough    Discussed a respiratory pathogen panel but advised against that this looks most like a rhinovirus or coronavirus take her out of work today explained she is contagious for period of time advised Mucinex but but no antibiotics and no further testing for influenza or Tamiflu I do not think this is influenza related  HPI   this is a 74-year-old who comes in today she has a lot of mucus production she has a cough she has a headache she does not have a fever she does not have body aching she does not have nausea she does not have dizziness she is not having any sweats or chills.  No joint abnormalities.  She has a lot of other medical illnesses including that she has had a stroke she has a history of being immune compromised because she is taking a specific biologic for psoriasis.  She has a history of restless leg syndrome and she has a pacemaker.    Chief Complaint  Sunitha is a 74 y.o. female who presents for  Cough (Symptoms began Fri pm. ) and Sinusitis    HISTORY  Past Medical History:   Diagnosis Date   • Actinic keratosis    • Benign paroxysmal positional vertigo 10/30/2018   • Cancer (CMS/[facility]) ([facility])     Skin CA on nose   • CVA (cerebral vascular accident) (CMS/[facility]) ([facility]) 2014    Numbness/tingling of RUE but functional and large right toe numbness.   • High degree atrioventricular block     with syncope   • Hyperlipidemia    • Hypertension    • Hypertriglyceridemia    • Inguinal pain 10/30/2018   • Mid sternal chest pain 5/29/2018   • Neurologic disorder    • Presence of heart assist device (CMS/[facility]) ([facility])     Dual chamber pacemaker   • Psoriasis    • Sclerosis of the skin     Vaginal   • Squamous cell carcinoma in situ (SCCIS) of skin of nose 6/11/2019   • Squamous cell skin cancer 06/11/2019    in situ right pretip nose   • Urinary frequency 3/27/2019      Past Surgical History:   Procedure Laterality Date   • CARDIAC CATHETERIZATION     • CARDIAC PACEMAKER PLACEMENT      Dual chamber pacemaker implantation   • CARDIAC SURGERY  2014   •  SECTION  1977    [place]   • COLONOSCOPY  2016    Rare sigmoid diverticula. Physiologic internal hemorrhoids, otherwise normal exam   • COLONOSCOPY  2008   • COLPORRHAPHY      and rectocele repair   • MOHS SURGERY Right 2019    right pretip nose Banks's   • PERIPHERAL NERVE EVALUATION N/A 2019    Procedure: PERIPHERAL NERVE EVALUATION;  Surgeon: Jacqueline Landin MD;  Location: [facility] OR;  Service: Urology;  Laterality: N/A;   • SACRAL NERVE STIMULATOR PLACEMENT N/A 2019    Procedure: FULL IMPLANT INTERSTIM;  Surgeon: Jacqueline Landin MD;  Location: [facility] OR;  Service: Urology;  Laterality: N/A;   • TOTAL ABDOMINAL HYSTERECTOMY W/ BILATERAL SALPINGOOPHORECTOMY      [id number] [facility]     Family History   Problem Relation Age of Onset   • Stroke Mother 97   • Hypertension Mother    • Dementia Mother    • Other cancer Father 32        secondary malignant neoplasm of lymph node     Social History     Occupational History     Comment: [employer]   Tobacco Use   • Smoking status: Never Smoker   • Smokeless tobacco: Never Used   Substance and Sexual Activity   • Alcohol use: No   • Drug use: No   • Sexual activity: Defer   Social History Narrative   • Not on file       ALLERGIES AND MEDICATIONS  Allergies   Allergen Reactions   • Apricot      throat pain   • Clindamycin Phosphate Rash     Ointment used for rash on face     Current Outpatient Medications   Medication Sig Dispense Refill   • triamcinolone (KENALOG) 0.1 % cream Apply 1 application topically 2 (two) times a day as needed for rash Apply to hands, arms and legs up to three times daily. 80 g 4   • amLODIPine (NORVASC) 10 mg tablet Take 1 tablet (10 mg total) by mouth daily 90 tablet 0   • trazodone  HCl (TRAZODONE ORAL) Take by mouth Used the last few nights as she was out of zolpidem     • calcium carbonate-vitamin D3 600 mg calcium- 200 unit capsule insert 1 tablet by oral route  twice daily     • gabapentin (NEURONTIN) 100 mg capsule Take 100 mg by mouth 2 (two) times a day    0   • omeprazole (PriLOSEC) 20 mg capsule Take 1 capsule (20 mg total) by mouth daily 90 capsule 3   • omega-3 fatty acids-fish oil (Fish Oil) 340-1,000 mg capsule Take 1 capsule by mouth 2 (two) times a day 240 capsule 3   • zolpidem (AMBIEN) 5 mg tablet Take 1 tablet (5 mg total) by mouth nightly as needed for sleep 90 tablet 1   • clopidogreL (PLAVIX) 75 mg tablet Take 1 tablet (75 mg total) by mouth daily 90 tablet 3   • atorvastatin (LIPITOR) 40 mg tablet Take 1 tablet (40 mg total) by mouth daily 90 tablet 3   • rOPINIRole (REQUIP) 1 mg tablet TAKE 1 TABLET TWICE DAILY 180 tablet 3   • isosorbide mononitrate (IMDUR) 30 mg 24 hr tablet TAKE 1 TABLET EVERY DAY 90 tablet 2   • potassium chloride (K-DUR,KLOR-CON) 10 mEq CR tablet TAKE 2 TABLETS EVERY  tablet 3   • hydrocortisone 2.5 % cream Apply three times daily as needed for rash on face 60 g 2   • CLOBETASOL PROPIONATE, BULK, MISC daily     • secukinumab 150 mg/mL pen injector Inject 300 mg once weekly at weeks 0, 1, 2, 3, and 4. (Patient taking differently: Inject 300 mg under the skin every 28 (twenty-eight) days I ) 10 pen 0   • naproxen sodium 220 mg capsule Take 2 tablets by mouth as needed Indications: Chest pain       • triamcinolone (KENALOG) 0.1 % cream APPLY A THIN LAYER TO THE AFFECTED AREA(S) TOPICALLY 2 TIMES PER DAY 30 g 12   • multivitamin with minerals tablet Take 1 tablet by mouth daily.     • lysine 500 mg tablet Take 1 tablet every day by oral route in the evening.     • benazepril-hydrochlorothiazide (Lotensin HCT) 20-12.5 mg per tablet Take 2 tablets by mouth daily (Patient not taking: Reported on 2/17/2020 ) 180 tablet 3     No current  facility-administered medications for this visit.        Review of Systems   Constitutional: Positive for appetite change, chills and fatigue. Negative for activity change, diaphoresis, fever and unexpected weight change.   HENT: Positive for congestion, postnasal drip, rhinorrhea, sinus pressure, sneezing and sore throat. Negative for dental problem, drooling, ear discharge, ear pain, facial swelling, hearing loss, mouth sores, nosebleeds, sinus pain, tinnitus, trouble swallowing and voice change.    Eyes: Negative for photophobia, pain, discharge, redness, itching and visual disturbance.   Respiratory: Positive for cough and shortness of breath. Negative for apnea, choking, chest tightness, wheezing and stridor.    Cardiovascular: Negative for chest pain, palpitations and leg swelling.   Gastrointestinal: Negative for abdominal distention, abdominal pain, anal bleeding, blood in stool, constipation, diarrhea, nausea, rectal pain and vomiting.   Endocrine: Negative for cold intolerance, heat intolerance, polydipsia, polyphagia and polyuria.   Genitourinary: Negative for decreased urine volume, difficulty urinating, dysuria, enuresis, flank pain, frequency, genital sores, hematuria and urgency.   Musculoskeletal: Negative for arthralgias, back pain, gait problem, joint swelling, myalgias, neck pain and neck stiffness.   Skin: Negative for color change, pallor, rash and wound.   Allergic/Immunologic: Negative for environmental allergies, food allergies and immunocompromised state.   Neurological: Negative for dizziness, tremors, seizures, syncope, facial asymmetry, speech difficulty, weakness, light-headedness, numbness and headaches.   Hematological: Negative for adenopathy. Does not bruise/bleed easily.   Psychiatric/Behavioral: Negative for agitation, behavioral problems, confusion, decreased concentration, dysphoric mood, hallucinations, self-injury, sleep disturbance and suicidal ideas. The patient is not  nervous/anxious and is not hyperactive.    All other systems reviewed and are negative.      OBJECTIVE  Vitals:    02/17/20 0804   BP: 144/78   Temp: 37.5 °C (99.5 °F)   TempSrc: Oral   Pulse: 90   Resp: 18   SpO2: 97%  Comment: RA   Weight: 81.6 kg (180 lb)   Patient Position: Sitting     BP Readings from Last 3 Encounters:   02/17/20 144/78   02/14/20 167/82   02/11/20 126/66       Body mass index is 29.95 kg/m².  Wt Readings from Last 3 Encounters:   02/17/20 81.6 kg (180 lb)   02/11/20 79.4 kg (175 lb)   02/07/20 82.8 kg (182 lb 9.6 oz)       Physical Exam  Normocephalic atraumatic membranes pink moist pharynx clear  Heart regular rate and rhythm with no murmurs gallops rubs noted lungs clear to auscultation without wheezes rales rhonchi  The patient is alert and oriented x3  Abdomen is soft nontender without organomegaly      Lab Results   Component Value Date    GLUCOSE 115 (H) 02/11/2020    CALCIUM 8.9 02/11/2020     02/11/2020    K 3.9 02/11/2020    CO2 23 02/11/2020     02/11/2020    BUN 27 (H) 02/11/2020    CREATININE 1.17 02/11/2020    ANIONGAP 9 02/11/2020     Lab Results   Component Value Date    WBC 5.7 02/11/2020    HGB 14.5 02/11/2020    HCT 41.9 02/11/2020    MCV 96.9 02/11/2020     02/11/2020     Lab Results   Component Value Date    TSH 1.435 02/22/2019     Lab Results   Component Value Date    HGBA1C 5.9 10/30/2018    HGBA1C 5.9 12/18/2017    HGBA1C 5.8 07/30/2015      Lab Results   Component Value Date    MICROALBUR 15.1 07/29/2019    LDLCALC 53 01/31/2020    CREATININE 1.17 02/11/2020     Lab Results   Component Value Date    SEDRATE 6 05/20/2019     No results found for: URACSRM  No results found for: AMYLASE  No results found for: LIPASE      No results found.      Allergies  Meds  Problems  Med Hx  Surg Hx  Fam Hx         Portions of this note was documented by Yinka Villeda MA as I performed the exam and collected the information from Sunitha Arrington. I attest that I  "have reviewed the information as documented, verified the accuracy of the documentation and added additional information as needed.       Leon Donahue MD wrote,   \"When you run out of doctor things to do for the sick person, see if there are any human things you can offer.\"     Thank you for intrusting us with your care. We understand this can be a very scary time and you can feel quite vulnerable.    You, and your health, are very important to me and my healthcare team.   If you have not signed up for [employer] please do so. It can dramatically improve your care.   I want you to update us by sending communication in [employer]  If you cannot do that then  call   [phone] or drop a line at [address] [place] [address]    We wish you the best.  Please let us know.    My Best,    Hilda Donald MD    "

## 2020-02-19 RX ORDER — TRIAMCINOLONE ACETONIDE 1 MG/G
CREAM TOPICAL
Status: CANCELLED | OUTPATIENT
Start: 2020-02-19

## 2020-02-19 RX ORDER — TRIAMCINOLONE ACETONIDE 1 MG/G
1 CREAM TOPICAL 2 TIMES DAILY PRN
Qty: 80 G | Refills: 4 | Status: SHIPPED | OUTPATIENT
Start: 2020-02-19 | End: 2021-02-18

## 2020-02-21 DIAGNOSIS — L40.9 PSORIASIS: ICD-10-CM

## 2020-02-21 DIAGNOSIS — R21 SKIN ERUPTION: ICD-10-CM

## 2020-02-24 ENCOUNTER — TELEPHONE (OUTPATIENT)
Dept: DERMATOLOGY | Facility: CLINIC | Age: 75
End: 2020-02-24

## 2020-02-24 RX ORDER — TRIAMCINOLONE ACETONIDE 1 MG/G
CREAM TOPICAL
Status: CANCELLED | OUTPATIENT
Start: 2020-02-24

## 2020-02-24 RX ORDER — TRIAMCINOLONE ACETONIDE 1 MG/G
1 CREAM TOPICAL 2 TIMES DAILY PRN
OUTPATIENT
Start: 2020-02-24 | End: 2021-02-23

## 2020-03-05 ENCOUNTER — TELEPHONE (OUTPATIENT)
Dept: FAMILY MEDICINE | Facility: CLINIC | Age: 75
End: 2020-03-05

## 2020-03-05 NOTE — TELEPHONE ENCOUNTER
"Sunitha says the amlodipine she was just put on has been causing feet and ankle swelling and she was wondering if you want her on a \"water pill\". Please let patient know.  She thinks the BP has been doing better but she has not checked lately.  Please advise.    "

## 2020-03-09 DIAGNOSIS — I10 ESSENTIAL HYPERTENSION: Primary | Chronic | ICD-10-CM

## 2020-03-09 RX ORDER — BENAZEPRIL HYDROCHLORIDE 40 MG/1
40 TABLET ORAL DAILY
Qty: 90 TABLET | Refills: 3 | Status: SHIPPED | OUTPATIENT
Start: 2020-03-09 | End: 2020-04-14 | Stop reason: SDUPTHER

## 2020-04-03 ENCOUNTER — OFFICE VISIT (OUTPATIENT)
Dept: URGENT CARE | Facility: CLINIC | Age: 75
End: 2020-04-03
Payer: MEDICARE

## 2020-04-03 VITALS
TEMPERATURE: 97.8 F | SYSTOLIC BLOOD PRESSURE: 140 MMHG | HEART RATE: 66 BPM | RESPIRATION RATE: 18 BRPM | BODY MASS INDEX: 29.24 KG/M2 | OXYGEN SATURATION: 98 % | DIASTOLIC BLOOD PRESSURE: 74 MMHG | HEIGHT: 65 IN | WEIGHT: 175.5 LBS

## 2020-04-03 DIAGNOSIS — N30.01 ACUTE CYSTITIS WITH HEMATURIA: ICD-10-CM

## 2020-04-03 DIAGNOSIS — R39.9 URINARY SYMPTOM OR SIGN: Primary | ICD-10-CM

## 2020-04-03 LAB
BACTERIA #/AREA URNS HPF: ABNORMAL /HPF
BILIRUB UR QL: NEGATIVE
CLARITY UR: ABNORMAL
COLOR UR: YELLOW
GLUCOSE UR QL: NEGATIVE MG/DL
HGB UR QL: ABNORMAL
KETONES UR-MCNC: NEGATIVE MG/DL
LEUKOCYTE ESTERASE UR QL STRIP: ABNORMAL
NITRITE UR QL: NEGATIVE
PH UR: 5 PH
PROT UR STRIP-MCNC: 100 MG/DL
RBC #/AREA URNS HPF: ABNORMAL /HPF
SP GR UR: 1.01 (ref 1–1.03)
SQUAMOUS #/AREA URNS HPF: ABNORMAL /HPF
UROBILINOGEN UR-MCNC: 0.2 E.U./DL
WBC #/AREA URNS HPF: ABNORMAL /HPF
WBC CLUMPS #/AREA URNS HPF: ABNORMAL /HPF

## 2020-04-03 PROCEDURE — 99213 OFFICE O/P EST LOW 20 MIN: CPT | Mod: GF | Performed by: PHYSICIAN ASSISTANT

## 2020-04-03 PROCEDURE — G0463 HOSPITAL OUTPT CLINIC VISIT: HCPCS

## 2020-04-03 PROCEDURE — 81001 URINALYSIS AUTO W/SCOPE: CPT | Performed by: PHYSICIAN ASSISTANT

## 2020-04-03 PROCEDURE — 87088 URINE BACTERIA CULTURE: CPT | Performed by: PHYSICIAN ASSISTANT

## 2020-04-03 RX ORDER — NITROFURANTOIN (MACROCRYSTALS) 100 MG/1
100 CAPSULE ORAL 2 TIMES DAILY
Qty: 14 CAPSULE | Refills: 0 | Status: SHIPPED | OUTPATIENT
Start: 2020-04-03 | End: 2020-04-10

## 2020-04-03 ASSESSMENT — ENCOUNTER SYMPTOMS
HEMATURIA: 0
DIARRHEA: 0
RESPIRATORY NEGATIVE: 1
DYSURIA: 1
FATIGUE: 0
FLANK PAIN: 1
BACK PAIN: 0
ACTIVITY CHANGE: 0
ABDOMINAL PAIN: 0
VOMITING: 0
FREQUENCY: 1
CONSTIPATION: 1
NAUSEA: 0
DIFFICULTY URINATING: 0
FEVER: 0
CARDIOVASCULAR NEGATIVE: 1
CHILLS: 0

## 2020-04-03 ASSESSMENT — PAIN SCALES - GENERAL: PAINLEVEL: 4

## 2020-04-03 NOTE — PROGRESS NOTES
Chief Complaint: Urinary frequency and burning    Subjective:    Sunitha Arrington is a 74 y.o. female with: urinary frequency and burning at the end of urination.  Denies any back pain.  Denies fever or chills.  She does have a history of constipation, has not been eating as much fiber recently so constipation has been worse.  She reports drinking several large glasses of water during the day, urine is yellow in the morning and clear throughout the day.  History of urinary frequency and incontinence but has a sacral bladder stimulator.  Also has lichen sclerosis, has been using topical steroid.  On Cosentyx for her psoriasis.    The follow have been reviewed and updated as needed:  Tobacco  Allergies  Meds  Problems  Med Hx  Surg Hx  Fam Hx         Meds:    Current Outpatient Medications:   •  benazepriL (Lotensin) 40 mg tablet, Take 1 tablet (40 mg total) by mouth daily, Disp: 90 tablet, Rfl: 3  •  triamcinolone (KENALOG) 0.1 % cream, Apply 1 application topically 2 (two) times a day as needed for rash Apply to hands, arms and legs up to three times daily. (Patient taking differently: Apply 1 application topically 2 (two) times a day as needed for rash Apply to hands, arms and legs up to three times daily. ), Disp: 80 g, Rfl: 4  •  gabapentin (NEURONTIN) 100 mg capsule, Take 300 mg by mouth nightly  , Disp: , Rfl: 0  •  omeprazole (PriLOSEC) 20 mg capsule, Take 1 capsule (20 mg total) by mouth daily, Disp: 90 capsule, Rfl: 3  •  omega-3 fatty acids-fish oil (Fish Oil) 340-1,000 mg capsule, Take 1 capsule by mouth 2 (two) times a day, Disp: 240 capsule, Rfl: 3  •  zolpidem (AMBIEN) 5 mg tablet, Take 1 tablet (5 mg total) by mouth nightly as needed for sleep (Patient taking differently: Take 5 mg by mouth nightly as needed for sleep  ), Disp: 90 tablet, Rfl: 1  •  clopidogreL (PLAVIX) 75 mg tablet, Take 1 tablet (75 mg total) by mouth daily, Disp: 90 tablet, Rfl: 3  •  atorvastatin (LIPITOR) 40 mg tablet, Take 1  tablet (40 mg total) by mouth daily, Disp: 90 tablet, Rfl: 3  •  rOPINIRole (REQUIP) 1 mg tablet, TAKE 1 TABLET TWICE DAILY, Disp: 180 tablet, Rfl: 3  •  isosorbide mononitrate (IMDUR) 30 mg 24 hr tablet, TAKE 1 TABLET EVERY DAY, Disp: 90 tablet, Rfl: 2  •  potassium chloride (K-DUR,KLOR-CON) 10 mEq CR tablet, TAKE 2 TABLETS EVERY DAY, Disp: 180 tablet, Rfl: 3  •  hydrocortisone 2.5 % cream, Apply three times daily as needed for rash on face, Disp: 60 g, Rfl: 2  •  CLOBETASOL PROPIONATE, BULK, MISC, daily, Disp: , Rfl:   •  secukinumab 150 mg/mL pen injector, Inject 300 mg once weekly at weeks 0, 1, 2, 3, and 4. (Patient taking differently: Inject 300 mg under the skin every 28 (twenty-eight) days I ), Disp: 10 pen, Rfl: 0  •  naproxen sodium 220 mg capsule, Take 2 tablets by mouth as needed Indications: Chest pain  , Disp: , Rfl:   •  multivitamin with minerals tablet, Take 1 tablet by mouth daily., Disp: , Rfl:   •  lysine 500 mg tablet, Take 1 tablet every day by oral route in the evening., Disp: , Rfl:   •  nitrofurantoin (MACRODANTIN) 100 mg capsule, Take 1 capsule (100 mg total) by mouth 2 (two) times a day for 7 days, Disp: 14 capsule, Rfl: 0  •  trazodone HCl (TRAZODONE ORAL), Take by mouth Used the last few nights as she was out of zolpidem, Disp: , Rfl:   •  calcium carbonate-vitamin D3 600 mg calcium- 200 unit capsule, insert 1 tablet by oral route  twice daily, Disp: , Rfl:     Allergies:  Allergies   Allergen Reactions   • Apricot      throat pain   • Clindamycin Phosphate Rash     Ointment used for rash on face       Review of Systems:  Review of Systems   Constitutional: Negative for activity change, chills, fatigue and fever.   Respiratory: Negative.    Cardiovascular: Negative.    Gastrointestinal: Positive for constipation. Negative for abdominal pain, diarrhea, nausea and vomiting.   Genitourinary: Positive for dysuria, flank pain, frequency, pelvic pain and urgency. Negative for difficulty  "urinating, hematuria, vaginal bleeding, vaginal discharge and vaginal pain.        Patient has been taking AZO for 4 days with no relief   Musculoskeletal: Negative for back pain.   All other systems reviewed and are negative.      Objective:  /74 (BP Location: Left arm, Patient Position: Sitting, Cuff Size: Large Adult)   Pulse 66   Temp 36.6 °C (97.8 °F) (Oral)   Resp 18   Ht 1.651 m (5' 5\")   Wt 79.6 kg (175 lb 8 oz)   SpO2 98%   Breastfeeding No   BMI 29.20 kg/m²       Physical Exam  Vitals signs and nursing note reviewed.   Constitutional:       General: She is not in acute distress.     Appearance: Normal appearance. She is well-developed. She is not ill-appearing.   HENT:      Head: Atraumatic.   Cardiovascular:      Rate and Rhythm: Normal rate and regular rhythm.   Pulmonary:      Effort: Pulmonary effort is normal.      Breath sounds: Normal breath sounds.   Abdominal:      General: Bowel sounds are normal.      Palpations: Abdomen is soft.      Tenderness: There is no abdominal tenderness. There is no right CVA tenderness or left CVA tenderness.   Musculoskeletal:      Comments: Trace pitting bilateral ankle edema    Skin:     General: Skin is warm and dry.   Neurological:      Mental Status: She is alert and oriented to person, place, and time.         Labs: Urine is cloudy.  Small amount hematuria.  Moderate leukocytes, no nitrites.  Culture pending.      Assessment/Plan:    Sunitha was seen today for pos uti.    Diagnoses and all orders for this visit:    Urinary symptom or sign  -     Urinalysis w/microscopic, reflex culture Urine, Clean Catch  -     Urinalysis, Dip (part 1 of panel) Urine, Clean Catch  -     Urinalysis, Micro (part 2 of panel) Urine, Clean Catch  -     Urine culture    Acute cystitis with hematuria  -     nitrofurantoin (MACRODANTIN) 100 mg capsule; Take 1 capsule (100 mg total) by mouth 2 (two) times a day for 7 days      Culture pending, will treat empirically with " nitrofurantoin.  This was given from [facility] here in the clinic.  Recommend she continue to push fluids, consider probiotic.  Discussed ways to increase fiber intake to regulate bowel movements.  She will follow-up if symptoms worsen or persist.  BP above goal but she has not yet taken her medication for the day.  Patient also has occasional lower extremity swelling, particularly if she stands in one spot for long periods at work.  We discussed treatment options, she elects to purchase compression stockings online.    The patient is in agreement with this plan and has no further questions at this time.     A voice recognition program was used to aid in documentation of this record.  Sometimes words are not printed exactly as they were spoken.  While efforts were made to carefully edit and correct any inaccuracies, some areas may be present; please take these into context.  Please contact the provider if areas are identified.

## 2020-04-05 LAB — BACTERIA UR CULT: ABNORMAL

## 2020-04-14 DIAGNOSIS — I10 ESSENTIAL HYPERTENSION: Chronic | ICD-10-CM

## 2020-04-14 RX ORDER — BENAZEPRIL HYDROCHLORIDE 40 MG/1
40 TABLET ORAL DAILY
Qty: 90 TABLET | Refills: 3 | Status: SHIPPED | OUTPATIENT
Start: 2020-04-14 | End: 2021-02-08

## 2020-05-06 ENCOUNTER — ANCILLARY PROCEDURE (OUTPATIENT)
Dept: CARDIOLOGY | Facility: CLINIC | Age: 75
End: 2020-05-06
Payer: MEDICARE

## 2020-05-06 DIAGNOSIS — Z45.018 ENCOUNTER FOR INTERROGATION OF CARDIAC PACEMAKER: ICD-10-CM

## 2020-05-06 PROCEDURE — 93296 REM INTERROG EVL PM/IDS: CPT | Mod: PO

## 2020-05-06 PROCEDURE — 93294 REM INTERROG EVL PM/LDLS PM: CPT | Performed by: INTERNAL MEDICINE

## 2020-07-15 ENCOUNTER — TELEPHONE (OUTPATIENT)
Dept: FAMILY MEDICINE | Facility: CLINIC | Age: 75
End: 2020-07-15

## 2020-07-15 NOTE — TELEPHONE ENCOUNTER
Sunitha needs an appt to see Dr. Melton (if possible) before her appt on 8/6/20 with Dr. Dueñas for her rhuematology;  She has requested an appt time with Dr. Stevens through My Chart.  She will do her labs around 8/1/20; would you like to see her sometime before that?  Please advise.

## 2020-07-25 DIAGNOSIS — I10 ESSENTIAL HYPERTENSION: Chronic | ICD-10-CM

## 2020-07-27 RX ORDER — ISOSORBIDE MONONITRATE 30 MG/1
TABLET, EXTENDED RELEASE ORAL
Qty: 90 TABLET | Refills: 2 | Status: SHIPPED | OUTPATIENT
Start: 2020-07-27 | End: 2021-02-08 | Stop reason: SDUPTHER

## 2020-07-28 ENCOUNTER — OFFICE VISIT (OUTPATIENT)
Dept: FAMILY MEDICINE | Facility: CLINIC | Age: 75
End: 2020-07-28
Payer: MEDICARE

## 2020-07-28 VITALS
DIASTOLIC BLOOD PRESSURE: 74 MMHG | BODY MASS INDEX: 29.95 KG/M2 | RESPIRATION RATE: 18 BRPM | SYSTOLIC BLOOD PRESSURE: 116 MMHG | WEIGHT: 180 LBS | OXYGEN SATURATION: 97 % | HEART RATE: 78 BPM

## 2020-07-28 DIAGNOSIS — L40.9 PSORIASIS: ICD-10-CM

## 2020-07-28 DIAGNOSIS — L85.1 STUCCO KERATOSES: ICD-10-CM

## 2020-07-28 DIAGNOSIS — F51.01 PRIMARY INSOMNIA: ICD-10-CM

## 2020-07-28 DIAGNOSIS — L85.3 XEROSIS OF SKIN: ICD-10-CM

## 2020-07-28 DIAGNOSIS — Z95.0 CARDIAC PACEMAKER IN SITU: ICD-10-CM

## 2020-07-28 DIAGNOSIS — I63.9 CEREBROVASCULAR ACCIDENT (CVA), UNSPECIFIED MECHANISM (CMS/HCC): ICD-10-CM

## 2020-07-28 DIAGNOSIS — E87.6 HYPOKALEMIA: ICD-10-CM

## 2020-07-28 DIAGNOSIS — D84.9 IMMUNOCOMPROMISED (CMS/HCC): ICD-10-CM

## 2020-07-28 DIAGNOSIS — G25.81 RLS (RESTLESS LEGS SYNDROME): Primary | ICD-10-CM

## 2020-07-28 DIAGNOSIS — I10 ESSENTIAL HYPERTENSION: ICD-10-CM

## 2020-07-28 PROCEDURE — G0463 HOSPITAL OUTPT CLINIC VISIT: HCPCS | Performed by: FAMILY MEDICINE

## 2020-07-28 PROCEDURE — 99214 OFFICE O/P EST MOD 30 MIN: CPT | Performed by: FAMILY MEDICINE

## 2020-07-28 RX ORDER — ZOLPIDEM TARTRATE 5 MG/1
5 TABLET ORAL NIGHTLY PRN
Qty: 90 TABLET | Refills: 1 | Status: SHIPPED | OUTPATIENT
Start: 2020-07-28 | End: 2021-01-19 | Stop reason: SDUPTHER

## 2020-07-28 ASSESSMENT — ENCOUNTER SYMPTOMS
SORE THROAT: 0
BACK PAIN: 0
SEIZURES: 0
CHILLS: 0
ABDOMINAL PAIN: 0
DYSURIA: 0
FEVER: 0
HEMATURIA: 0
ARTHRALGIAS: 0
SHORTNESS OF BREATH: 0
COLOR CHANGE: 0
PALPITATIONS: 0
COUGH: 0
EYE PAIN: 0
VOMITING: 0

## 2020-07-28 NOTE — PROGRESS NOTES
ASSESSMENT AND PLAN   Diagnoses and all orders for this visit:    RLS (restless legs syndrome)    Immunocompromised (CMS/HCC) ([facility])    Essential hypertension    Cerebrovascular accident (CVA), unspecified mechanism (CMS/HCC) ([facility])    Primary insomnia  -     zolpidem (AMBIEN) 5 mg tablet; Take 1 tablet (5 mg total) by mouth nightly as needed for sleep Max Daily Amount: 5 mg    Psoriasis    Hypokalemia    Cardiac pacemaker in situ    Stucco keratoses    Xerosis of skin      Continue her Biologics  Continue with Ambien  Medications refilled and labs are pending for this next week    Chief Complaint  Sunitha is a 74 y.o. female who presents for  6 month follow up chronic conditions        HPI  This is a pleasant 74-year-old female who has a history of psoriasis along with several other issues  She takes biologic agents.  The patient has a cardiac pacemaker she had a CVA in the past the patient has had history of hypertension and dyslipidemia.  She has insomnia and takes zolpidem for that.  She also gets restless leg syndrome and takes Requip.  Recently the patient reports that she has been doing well.  She needs a refill on her Ambien.  She is on her biologic agents.  She takes care to get her labs prior to seeing Dr. Dueñas.  She is currently on Plavix to  help prevent strokes and she continues on Requip for her restless legs and has been doing well.  Patient's blood pressure and cholesterol been well controlled we reviewed her labs through January February.  Past medical history surgical social history is reviewed in chart medications medication allergies are reviewed the chart  HISTORY  Past Medical History:   Diagnosis Date   • Actinic keratosis    • Benign paroxysmal positional vertigo 10/30/2018   • Cancer (CMS/HCC) ([facility])     Skin CA on nose   • CVA (cerebral vascular accident) (CMS/HCC) ([facility]) 2014    Numbness/tingling of RUE but functional and large right toe numbness.   • High degree atrioventricular block      with syncope   • Hyperlipidemia    • Hypertension    • Hypertriglyceridemia    • Inguinal pain 10/30/2018   • Mid sternal chest pain 2018   • Neurologic disorder    • Presence of heart assist device (CMS/HCC) (H[facility])     Dual chamber pacemaker   • Psoriasis    • Sclerosis of the skin     Vaginal   • Squamous cell carcinoma in situ (SCCIS) of skin of nose 2019   • Squamous cell skin cancer 2019    in situ right pretip nose   • Urinary frequency 3/27/2019     Past Surgical History:   Procedure Laterality Date   • CARDIAC CATHETERIZATION     • CARDIAC PACEMAKER PLACEMENT      Dual chamber pacemaker implantation   • CARDIAC SURGERY  2014   •  SECTION  1977    [place]   • COLONOSCOPY  2016    Rare sigmoid diverticula. Physiologic internal hemorrhoids, otherwise normal exam   • COLONOSCOPY  2008   • COLPORRHAPHY      and rectocele repair   • MOHS SURGERY Right 2019    right pretip nose Banks's   • PERIPHERAL NERVE EVALUATION N/A 2019    Procedure: PERIPHERAL NERVE EVALUATION;  Surgeon: Jacqueline Landin MD;  Location: [facility] OR;  Service: Urology;  Laterality: N/A;   • SACRAL NERVE STIMULATOR PLACEMENT N/A 2019    Procedure: FULL IMPLANT INTERSTIM;  Surgeon: Jacqueline Landin MD;  Location: [facility] OR;  Service: Urology;  Laterality: N/A;   • TOTAL ABDOMINAL HYSTERECTOMY W/ BILATERAL SALPINGOOPHORECTOMY      [id number] [facility]     Family History   Problem Relation Age of Onset   • Stroke Mother 97   • Hypertension Mother    • Dementia Mother    • Other cancer Father 32        secondary malignant neoplasm of lymph node     Social History     Occupational History     Comment: [employer]   Tobacco Use   • Smoking status: Never Smoker   • Smokeless tobacco: Never Used   Substance and Sexual Activity   • Alcohol use: No   • Drug use: No   • Sexual activity: Defer   Social History Narrative   • Not on file       ALLERGIES AND  MEDICATIONS  Allergies   Allergen Reactions   • Amlodipine Other (see comments)     Peripheral edema     • Apricot      throat pain   • Clindamycin Phosphate Rash     Ointment used for rash on face     Current Outpatient Medications   Medication Sig Dispense Refill   • isosorbide mononitrate (IMDUR) 30 mg 24 hr tablet TAKE 1 TABLET EVERY DAY 90 tablet 2   • benazepriL (Lotensin) 40 mg tablet Take 1 tablet (40 mg total) by mouth daily 90 tablet 3   • triamcinolone (KENALOG) 0.1 % cream Apply 1 application topically 2 (two) times a day as needed for rash Apply to hands, arms and legs up to three times daily. (Patient taking differently: Apply 1 application topically 2 (two) times a day as needed for rash Apply to hands, arms and legs up to three times daily. ) 80 g 4   • calcium carbonate-vitamin D3 600 mg calcium- 200 unit capsule insert 1 tablet by oral route  twice daily     • gabapentin (NEURONTIN) 100 mg capsule Take 300 mg by mouth nightly    0   • omeprazole (PriLOSEC) 20 mg capsule Take 1 capsule (20 mg total) by mouth daily 90 capsule 3   • omega-3 fatty acids-fish oil (Fish Oil) 340-1,000 mg capsule Take 1 capsule by mouth 2 (two) times a day 240 capsule 3   • clopidogreL (PLAVIX) 75 mg tablet Take 1 tablet (75 mg total) by mouth daily 90 tablet 3   • atorvastatin (LIPITOR) 40 mg tablet Take 1 tablet (40 mg total) by mouth daily 90 tablet 3   • rOPINIRole (REQUIP) 1 mg tablet TAKE 1 TABLET TWICE DAILY 180 tablet 3   • potassium chloride (K-DUR,KLOR-CON) 10 mEq CR tablet TAKE 2 TABLETS EVERY  tablet 3   • hydrocortisone 2.5 % cream Apply three times daily as needed for rash on face 60 g 2   • CLOBETASOL PROPIONATE, BULK, MISC daily     • secukinumab 150 mg/mL pen injector Inject 300 mg once weekly at weeks 0, 1, 2, 3, and 4. (Patient taking differently: Inject 300 mg under the skin every 28 (twenty-eight) days I ) 10 pen 0   • naproxen sodium 220 mg capsule Take 2 tablets by mouth as needed  Indications: Chest pain       • multivitamin with minerals tablet Take 1 tablet by mouth daily.     • lysine 500 mg tablet Take 1 tablet every day by oral route in the evening.     • zolpidem (AMBIEN) 5 mg tablet Take 1 tablet (5 mg total) by mouth nightly as needed for sleep Max Daily Amount: 5 mg 90 tablet 1     No current facility-administered medications for this visit.        Review of Systems   Constitutional: Negative for chills and fever.   HENT: Negative for ear pain and sore throat.    Eyes: Negative for pain and visual disturbance.   Respiratory: Negative for cough and shortness of breath.    Cardiovascular: Negative for chest pain and palpitations.   Gastrointestinal: Negative for abdominal pain and vomiting.   Genitourinary: Negative for dysuria and hematuria.   Musculoskeletal: Negative for arthralgias and back pain.   Skin: Negative for color change and rash.   Neurological: Negative for seizures and syncope.   All other systems reviewed and are negative.      OBJECTIVE  Vitals:    07/28/20 1212   BP: 116/74   Pulse: 78   Resp: 18   SpO2: 97%   Weight: 81.6 kg (180 lb)   Patient Position: Sitting     BP Readings from Last 3 Encounters:   07/28/20 116/74   04/03/20 140/74   02/17/20 144/78       Body mass index is 29.95 kg/m².  Wt Readings from Last 3 Encounters:   07/28/20 81.6 kg (180 lb)   04/03/20 79.6 kg (175 lb 8 oz)   02/17/20 81.6 kg (180 lb)       Physical Exam  Normocephalic atraumatic membranes are pink moist pharynx is clear neck supple heart regular rate and rhythm lungs are clear skin shows that she is clear of psoriasis currently    Lab Results   Component Value Date    GLUCOSE 115 (H) 02/11/2020    CALCIUM 8.9 02/11/2020     02/11/2020    K 3.9 02/11/2020    CO2 23 02/11/2020     02/11/2020    BUN 27 (H) 02/11/2020    CREATININE 1.17 02/11/2020    ANIONGAP 9 02/11/2020     Lab Results   Component Value Date    WBC 5.7 02/11/2020    HGB 14.5 02/11/2020    HCT 41.9 02/11/2020  "   MCV 96.9 02/11/2020     02/11/2020     Lab Results   Component Value Date    TSH 1.435 02/22/2019     Lab Results   Component Value Date    HGBA1C 5.9 10/30/2018    HGBA1C 5.9 12/18/2017    HGBA1C 5.8 07/30/2015      Lab Results   Component Value Date    MICROALBUR 15.1 07/29/2019    LDLCALC 53 01/31/2020    CREATININE 1.17 02/11/2020     Lab Results   Component Value Date    SEDRATE 6 05/20/2019       Portions of this note was documented by Jennie Hurst RN as I performed the exam and collected the information from Sunitha Arrington. I attest that I have reviewed the information as documented, verified the accuracy of the documentation and added additional information as needed.       Leon Donahue MD wrote,   \"When you run out of doctor things to do for the sick person, see if there are any human things you can offer.\"     Thank you for intrusting us with your care. We understand this can be a very scary time and you can feel quite vulnerable.    You, and your health, are very important to me and my healthcare team.   If you have not signed up for [employer], please do so. It can dramatically improve your care.   I want you to update us by sending communication in [employer].  If you cannot do that then  call   [phone] or drop a line at [id number] [place] [address]    We wish you the best.  Please let us know.    My Best,    Hilda Donald MD    "

## 2020-07-31 ENCOUNTER — LAB (OUTPATIENT)
Dept: FAMILY MEDICINE | Facility: CLINIC | Age: 75
End: 2020-07-31
Payer: MEDICARE

## 2020-07-31 DIAGNOSIS — Z51.81 MEDICATION MONITORING ENCOUNTER: ICD-10-CM

## 2020-07-31 DIAGNOSIS — Z11.59 SCREENING FOR VIRAL DISEASE: ICD-10-CM

## 2020-07-31 DIAGNOSIS — R76.8 POSITIVE ANA (ANTINUCLEAR ANTIBODY): ICD-10-CM

## 2020-07-31 DIAGNOSIS — L40.9 PSORIASIS: ICD-10-CM

## 2020-07-31 LAB
ALBUMIN SERPL-MCNC: 4.3 G/DL (ref 3.5–5.3)
ALP SERPL-CCNC: 66 U/L (ref 55–142)
ALT SERPL-CCNC: 20 U/L (ref 7–52)
ANION GAP SERPL CALC-SCNC: 9 MMOL/L (ref 3–11)
AST SERPL-CCNC: 22 U/L
BACTERIA #/AREA URNS HPF: NORMAL /HPF
BASOPHILS # BLD AUTO: 0 10*3/UL
BASOPHILS NFR BLD AUTO: 1 % (ref 0–2)
BILIRUB SERPL-MCNC: 0.68 MG/DL (ref 0.2–1.4)
BILIRUB UR QL: NEGATIVE
BUN SERPL-MCNC: 15 MG/DL (ref 7–25)
C3 SERPL-MCNC: 143 MG/DL (ref 87–200)
C4 SERPL-MCNC: 23 MG/DL (ref 19–52)
CALCIUM ALBUM COR SERPL-MCNC: 9.2 MG/DL (ref 8.6–10.3)
CALCIUM SERPL-MCNC: 9.4 MG/DL (ref 8.6–10.3)
CHLORIDE SERPL-SCNC: 107 MMOL/L (ref 98–107)
CLARITY UR: CLEAR
CO2 SERPL-SCNC: 24 MMOL/L (ref 21–32)
COLOR UR: YELLOW
CREAT SERPL-MCNC: 0.88 MG/DL (ref 0.6–1.1)
CRP SERPL-MCNC: <1 MG/L
DSDNA IGG SERPL IA-ACNC: 12 IU/ML
EOSINOPHIL # BLD AUTO: 0.2 10*3/UL
EOSINOPHIL NFR BLD AUTO: 4 % (ref 0–3)
ERYTHROCYTE [DISTWIDTH] IN BLOOD BY AUTOMATED COUNT: 12.5 % (ref 11.5–14)
GFR SERPL CREATININE-BSD FRML MDRD: 65 ML/MIN/1.73M*2
GLUCOSE SERPL-MCNC: 107 MG/DL (ref 70–105)
GLUCOSE UR QL: NEGATIVE MG/DL
HCT VFR BLD AUTO: 40.7 % (ref 34–45)
HGB BLD-MCNC: 14.1 G/DL (ref 11.5–15.5)
HGB UR QL: ABNORMAL
KETONES UR-MCNC: NEGATIVE MG/DL
LEUKOCYTE ESTERASE UR QL STRIP: ABNORMAL
LYMPHOCYTES # BLD AUTO: 1.2 10*3/UL
LYMPHOCYTES NFR BLD AUTO: 26 % (ref 11–47)
MCH RBC QN AUTO: 33.8 PG (ref 28–33)
MCHC RBC AUTO-ENTMCNC: 34.6 G/DL (ref 32–36)
MCV RBC AUTO: 97.7 FL (ref 81–97)
MONOCYTES # BLD AUTO: 0.4 10*3/UL
MONOCYTES NFR BLD AUTO: 9 % (ref 3–11)
NEUTROPHILS # BLD AUTO: 2.8 10*3/UL
NEUTROPHILS NFR BLD AUTO: 61 % (ref 41–81)
NITRITE UR QL: NEGATIVE
PH UR: 5 PH
PLATELET # BLD AUTO: 179 10*3/UL (ref 140–350)
PMV BLD AUTO: 7.9 FL (ref 6.9–10.8)
POTASSIUM SERPL-SCNC: 4.1 MMOL/L (ref 3.5–5.1)
PROT SERPL-MCNC: 7.3 G/DL (ref 6–8.3)
PROT UR STRIP-MCNC: NEGATIVE MG/DL
RBC # BLD AUTO: 4.16 10*6/ΜL (ref 3.7–5.3)
RBC #/AREA URNS HPF: NORMAL /HPF
SODIUM SERPL-SCNC: 140 MMOL/L (ref 135–145)
SP GR UR: 1.02 (ref 1–1.03)
SQUAMOUS #/AREA URNS HPF: NORMAL /HPF
UROBILINOGEN UR-MCNC: 0.2 E.U./DL
WBC # BLD AUTO: 4.6 10*3/UL (ref 4.5–10.5)
WBC #/AREA URNS HPF: NORMAL /HPF

## 2020-07-31 PROCEDURE — 86160 COMPLEMENT ANTIGEN: CPT

## 2020-07-31 PROCEDURE — 81001 URINALYSIS AUTO W/SCOPE: CPT

## 2020-07-31 PROCEDURE — 36415 COLL VENOUS BLD VENIPUNCTURE: CPT

## 2020-07-31 PROCEDURE — 86235 NUCLEAR ANTIGEN ANTIBODY: CPT

## 2020-07-31 PROCEDURE — 86769 SARS-COV-2 COVID-19 ANTIBODY: CPT

## 2020-07-31 PROCEDURE — 85025 COMPLETE CBC W/AUTO DIFF WBC: CPT

## 2020-07-31 PROCEDURE — 87088 URINE BACTERIA CULTURE: CPT

## 2020-07-31 PROCEDURE — 86160 COMPLEMENT ANTIGEN: CPT | Mod: 59

## 2020-07-31 PROCEDURE — 80053 COMPREHEN METABOLIC PANEL: CPT

## 2020-07-31 PROCEDURE — 86140 C-REACTIVE PROTEIN: CPT

## 2020-08-01 LAB — BACTERIA UR CULT: NORMAL

## 2020-08-03 DIAGNOSIS — Z11.59 SCREENING FOR VIRAL DISEASE: Primary | ICD-10-CM

## 2020-08-03 LAB — SARS-COV-2 IGG SERPL QL IA: NONREACTIVE

## 2020-08-05 ENCOUNTER — ANCILLARY PROCEDURE (OUTPATIENT)
Dept: CARDIOLOGY | Facility: CLINIC | Age: 75
End: 2020-08-05
Payer: MEDICARE

## 2020-08-05 DIAGNOSIS — Z45.018 ENCOUNTER FOR INTERROGATION OF CARDIAC PACEMAKER: ICD-10-CM

## 2020-08-05 PROCEDURE — 93294 REM INTERROG EVL PM/LDLS PM: CPT | Performed by: INTERNAL MEDICINE

## 2020-08-05 PROCEDURE — 93296 REM INTERROG EVL PM/IDS: CPT | Mod: PO

## 2020-08-05 NOTE — PROGRESS NOTES
Sunitha is a 73-year-old female with history of positive YEYO was found to have negative SSA and SSB.  Double-stranded DNA positive at 19 with normal less than 5.  CK is normal.  SMRNP was normal.  Myomarker 3 panel was normal.  Aldolase was negative.  Antihistone antibodies were also normal.  Patient did have a biopsy performed of lesion over her upper back which showed vascular interface dermatitis with increased mucin.  This could raise possibility of a connective tissue disorder.  The patient does have a history of psoriasis and had been treated with Humira.  She develops lesions initially in December with bumps along her cheek that then spread to her back.  She had good control of her symptoms with Humira and has been on it for several years. Sunitha has had raynauds symptoms for  20+ years, she does wear gloves. She has not noticed raynauds symptoms anywhere else.  She does have a chest tightness that she has had several months. She did complete a chemical stress test which came back normal.  Echocardiogram was also completed which was essentially unremarkable.  The past couple of nights she is noticing more chest pressure. This is something she notices mainly at night. Exertion does not make a difference with this at all. She does get pain in her hands along the second metacarpal in the left hand.  She is unable to describe any particular activity this occurs with.  She does not note actual joint swelling.  She has also had some difficulty with the right knee just starting today and does note some chronic pain over second and third toes after her stroke.  No troubles swallowing, she does occasionally have problems with heartburn. Constipation is a problem for her. Her right knee did start hurting the other day. No problems with pregnancy in the past or getting pregnant. It feels tight when she tries to take a deep breath. No kidney problems that she knows of. She had itching when she got this rash and was given  prednisone which she feels really helped with the itching.  She has not had previous history of blood clots other than history of stroke.  She has not noted recurrence of photosensitive rash.  She does have a history of psoriasis but has not had other rash.  She does not note mouth sores or canker sores.  Occasionally does note dry eyes.  Additional labs were drawn that showed a borderline positive double-stranded DNA and also slightly low C4 at 18.  I have received multiple communications from Sunitha regarding skin lesions.  She has sent us many different pictures which are available through her communications on my chart.  She also has followed up with dermatology.  She recently has seen Dr. Michel who was questioned about the possibility of lichen sclerosis plaque of her upper back as well as possible morphea.  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 and Mohs excision recommended.  Patient was seen in May 2019 with worsening rash and we suggested discontinue Humira and she was started with Cosentyx.  Her labs from 1/31/2020 had shown C-reactive protein to be normal.  C3,C4 were normal.  Double-stranded DNA is 17.  Blood sugar mildly elevated at 106 with normals to 105.  Gian completely cleared on the Cosentyx.  We had felt that she was not going on to develop other signs or symptoms of autoimmune disease.  She did complain also fatigue when she was last seen here and we had recommended she continue to follow with her primary care physician.  She had had a previous stroke.    Today Sunitha reports a pain of 3/10 in her right knee. She is currently maintained on Cosentyx 300mg every 28 days and naproxen as needed. She does use the naproxen when she has the knee pain which she feels is helpful. The pain she has in her knee is  not constant. She reports her psoriasis is much better, no other skin rash that she has noticed. No complaints of chest pain, it does not hurt to take a deep breath.  No difficulty with raynaud's.  Has had no other rash.  She has not noted photosensitivity.  Not noting dry eyes or dry mouth or mouth sores.  She has not had pleuritic pain.     Allergies:  Amlodipine; Apricot; and Clindamycin phosphate    History:  Past Medical History:   Diagnosis Date   • Actinic keratosis    • Benign paroxysmal positional vertigo 10/30/2018   • Cancer (CMS/[facility]) ([facility])     Skin CA on nose   • CVA (cerebral vascular accident) (CMS/[facility]ly [facility]) ([facility])     Numbness/tingling of RUE but functional and large right toe numbness.   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 Physiologic internal hemorrhoids, otherwise normal exam   • COLONOSCOPY  2008   • COLPORRHAPHY      and rectocele repair   • MOHS SURGERY Right 2019    right pretip nose Banks's   • PERIPHERAL NERVE EVALUATION N/A 2019    Procedure: PERIPHERAL NERVE EVALUATION;  Surgeon: Jacqueline Landin MD;  Location: [facility] OR;  Service: Urology;  Laterality: N/A;   • SACRAL NERVE STIMULATOR PLACEMENT N/A 2019     Procedure: FULL IMPLANT INTERSTIM;  Surgeon: Jacqueline Landin MD;  Location: [facility] OR;  Service: Urology;  Laterality: N/A;   • TOTAL ABDOMINAL HYSTERECTOMY W/ BILATERAL SALPINGOOPHORECTOMY      [id number] [facility]     Social History     Socioeconomic History   • Marital status:      Spouse name: Not on file   • Number of children: Not on file   • Years of education: Not on file   • Highest education level: Not on file   Occupational History     Comment: [employer]   Social Needs   • Financial resource strain: Not on file   • Food insecurity     Worry: Not on file     Inability: Not on file   • Transportation needs     Medical: Not on file     Non-medical: Not on file   Tobacco Use   • Smoking status: Never Smoker   • Smokeless tobacco: Never Used   Substance and Sexual Activity   • Alcohol use: No   • Drug use: No   • Sexual activity: Defer   Lifestyle   • Physical activity     Days per week: Not on file     Minutes per session: Not on file   • Stress: Not on file   Relationships   • Social connections     Talks on phone: Not on file     Gets together: Not on file     Attends [employer] service: Not on file     Active member of club or organization: Not on file     Attends meetings of clubs or organizations: Not on file     Relationship status: Not on file   • Intimate partner violence     Fear of current or ex partner: Not on file     Emotionally abused: Not on file     Physically abused: Not on file     Forced sexual activity: Not on file   Other Topics Concern   • Not on file   Social History Narrative   • Not on file       Review of Systems   Constitutional: Negative for chills and fever.   HENT: Negative for ear pain and sore throat.    Eyes: Negative for pain and visual disturbance.   Respiratory: Negative for cough and shortness of breath.    Cardiovascular: Negative for chest pain and palpitations.   Gastrointestinal: Negative for abdominal pain and vomiting.   Endocrine: Positive  for cold intolerance.   Genitourinary: Negative for dysuria and hematuria.   Musculoskeletal: Negative for arthralgias and back pain.   Skin: Negative for color change and rash.   Neurological: Negative for seizures and syncope.   Hematological: Bruises/bleeds easily.   All other systems reviewed and are negative.    14 point ROS otherwise -.  Objective:  Vitals:    08/06/20 1034 08/06/20 1036   BP: 126/61    BP Location: Left arm    Patient Position: Sitting    Cuff Size: Regular Adult    Pulse: 70    SpO2:  96%   Weight: 81.2 kg (179 lb)        Physical Exam  Constitutional:       Appearance: Normal appearance.   Cardiovascular:      Rate and Rhythm: Regular rhythm.   Pulmonary:      Effort: Pulmonary effort is normal.      Breath sounds: Normal breath sounds.   Musculoskeletal:      Right lower leg: No edema.      Left lower leg: No edema.      Comments: Shoulders, elbows, wrists, MCPs, PIPs were unremarkable without tenderness, synovitis or limitation range of motion.  There is no tenderness on palpation of the greater trochanters.  Hips show good range of motion.  Knees ankles and MTPs were unremarkable without synovitis, tenderness or limitation range of motion.  There is no tenderness on palpation along thoracic or lumbar spine.  There is no tenderness on palpation over SI joints.    Exceptions include- Crepitus present over the right knee.    Skin:     Findings: No rash.   Neurological:      Mental Status: She is alert and oriented to person, place, and time.       Joint Exam     No joint exam has been documented for this visit       Medications:  Current Outpatient Medications on File Prior to Visit   Medication Sig Dispense Refill   • losartan (COZAAR) 25 mg tablet Take 25 mg by mouth daily Patient unsure of dosage     • zolpidem (AMBIEN) 5 mg tablet Take 1 tablet (5 mg total) by mouth nightly as needed for sleep Max Daily Amount: 5 mg 90 tablet 1   • isosorbide mononitrate (IMDUR) 30 mg 24 hr tablet TAKE  1 TABLET EVERY DAY 90 tablet 2   • triamcinolone (KENALOG) 0.1 % cream Apply 1 application topically 2 (two) times a day as needed for rash Apply to hands, arms and legs up to three times daily. (Patient taking differently: Apply 1 application topically 2 (two) times a day as needed for rash Apply to hands, arms and legs up to three times daily. ) 80 g 4   • calcium carbonate-vitamin D3 600 mg calcium- 200 unit capsule Taking once daily      • gabapentin (NEURONTIN) 100 mg capsule Take 200 mg by mouth 2 (two) times a day Patient taking one in the morning and one at night   0   • omeprazole (PriLOSEC) 20 mg capsule Take 1 capsule (20 mg total) by mouth daily 90 capsule 3   • omega-3 fatty acids-fish oil (Fish Oil) 340-1,000 mg capsule Take 1 capsule by mouth 2 (two) times a day 240 capsule 3   • clopidogreL (PLAVIX) 75 mg tablet Take 1 tablet (75 mg total) by mouth daily 90 tablet 3   • atorvastatin (LIPITOR) 40 mg tablet Take 1 tablet (40 mg total) by mouth daily 90 tablet 3   • rOPINIRole (REQUIP) 1 mg tablet TAKE 1 TABLET TWICE DAILY 180 tablet 3   • potassium chloride (K-DUR,KLOR-CON) 10 mEq CR tablet TAKE 2 TABLETS EVERY  tablet 3   • hydrocortisone 2.5 % cream Apply three times daily as needed for rash on face 60 g 2   • CLOBETASOL PROPIONATE, BULK, MISC daily     • secukinumab 150 mg/mL pen injector Inject 300 mg once weekly at weeks 0, 1, 2, 3, and 4. (Patient taking differently: Inject 300 mg under the skin every 28 (twenty-eight) days I ) 10 pen 0   • naproxen sodium 220 mg capsule Take 2 tablets by mouth as needed Indications: Chest pain       • multivitamin with minerals tablet Take 1 tablet by mouth daily.     • lysine 500 mg tablet Take 1 tablet every day by oral route in the evening.     • benazepriL (Lotensin) 40 mg tablet Take 1 tablet (40 mg total) by mouth daily (Patient not taking: Reported on 8/6/2020 ) 90 tablet 3     No current facility-administered medications on file prior to visit.           Labs:  CBC:     WBC   Date Value Ref Range Status   07/31/2020 4.6 4.5 - 10.5 10*3/uL Final     RBC   Date Value Ref Range Status   07/31/2020 4.16 3.70 - 5.30 10*6/µL Final     Hemoglobin   Date Value Ref Range Status   07/31/2020 14.1 11.5 - 15.5 g/dL Final     Hematocrit   Date Value Ref Range Status   07/31/2020 40.7 34.0 - 45.0 % Final     Platelets   Date Value Ref Range Status   07/31/2020 179 140 - 350 10*3/uL Final     CMP:   Sodium   Date Value Ref Range Status   07/31/2020 140 135 - 145 mmol/L Final     Potassium   Date Value Ref Range Status   07/31/2020 4.1 3.5 - 5.1 mmol/L Final     Chloride   Date Value Ref Range Status   07/31/2020 107 98 - 107 mmol/L Final     CO2   Date Value Ref Range Status   07/31/2020 24 21 - 32 mmol/L Final     Glucose   Date Value Ref Range Status   07/31/2020 107 (H) 70 - 105 mg/dL Final     Creatinine   Date Value Ref Range Status   07/31/2020 0.88 0.60 - 1.10 mg/dL Final     Calcium   Date Value Ref Range Status   07/31/2020 9.4 8.6 - 10.3 mg/dL Final     Albumin   Date Value Ref Range Status   07/31/2020 4.3 3.5 - 5.3 g/dL Final     Alkaline Phosphatase   Date Value Ref Range Status   07/31/2020 66 55 - 142 U/L Final     Total Bilirubin   Date Value Ref Range Status   07/31/2020 0.68 0.20 - 1.40 mg/dL Final     ALT (SGPT)   Date Value Ref Range Status   07/31/2020 20 7 - 52 U/L Final     AST   Date Value Ref Range Status   07/31/2020 22 <40 U/L Final     BUN   Date Value Ref Range Status   07/31/2020 15 7 - 25 mg/dL Final     Anion Gap   Date Value Ref Range Status   07/31/2020 9 3 - 11 mmol/L Final     ESR/CRP:   Sed Rate   Date Value Ref Range Status   05/20/2019 6 <=20 mm/hr Final     CRP   Date Value Ref Range Status   07/31/2020 <1.0 <=10.0 mg/L Final     Lab Results   Component Value Date    HEPCAB Non-reactive 01/31/2020    TBGOLD Negative 08/06/2018       Assessment/Plan:    Diagnoses and all orders for this visit:    Psoriasis  -     CBC w/auto  differential Blood, Venous; Future  -     Comprehensive metabolic panel Blood, Venous; Future  -     C-reactive protein (Inflammation) Blood, Venous; Future  -     C3 complement Blood, Venous; Future  -     C4 complement Blood, Venous; Future  -     dsDNA Antibodies Blood, Venous; Future  -     Urinalysis w/microscopic, reflex culture Urine, Clean Catch; Future    Medication management  -     CBC w/auto differential Blood, Venous; Future  -     Comprehensive metabolic panel Blood, Venous; Future    YEYO positive        On exam today her symptoms are managed well on current medications with the exception of her right knee. I do think weight reduction would be beneficial for her and help with the knee pain.  Psoriasis seems to be under excellent control and she has had no other joint complaints.  We will continue current medications to include Cosentyx 300mg every 28 days and naproxen as needed.     The past she has had positive double-stranded DNA and YEYO.  There have been a question also as to whether her symptoms could be related to TNF inhibitors with a drug-induced lupus symptoms.  She is now off this medication has not had further symptoms.  Labs are improved.  Reviewed labs from 7-31-20, her DSDNA was 12, complement levels are normal. Liver and kidney tests are normal. Blood counts look good, she is not anemic. Her blood sugar was slightly elevated at 107 wit normal to 105. We will repeat labs again in 6 months.     Follow up with Dr. Dueñas in 6 months with labs prior     Discussed all in detail patient voiced understanding and is in agreement with plan.    A voice recognition program was used to aid in documentation of this record.  Sometimes words are not printed exactly as they were spoken.  While efforts were made to carefully edit and correct any inaccuracies, some errors may be present; please take these into context.  Please contact the provider if areas are identified.    Scribed by:   Caroline NGUYEN  HOMERO Padgett    10:41 AM, 8/6/2020

## 2020-08-06 ENCOUNTER — OFFICE VISIT (OUTPATIENT)
Dept: RHEUMATOLOGY | Facility: CLINIC | Age: 75
End: 2020-08-06
Payer: MEDICARE

## 2020-08-06 ENCOUNTER — ANCILLARY PROCEDURE (OUTPATIENT)
Dept: CARDIOLOGY | Facility: CLINIC | Age: 75
End: 2020-08-06
Payer: MEDICARE

## 2020-08-06 VITALS
DIASTOLIC BLOOD PRESSURE: 61 MMHG | SYSTOLIC BLOOD PRESSURE: 126 MMHG | BODY MASS INDEX: 29.79 KG/M2 | OXYGEN SATURATION: 96 % | WEIGHT: 179 LBS | HEART RATE: 70 BPM

## 2020-08-06 DIAGNOSIS — Z79.899 MEDICATION MANAGEMENT: ICD-10-CM

## 2020-08-06 DIAGNOSIS — R76.8 ANA POSITIVE: ICD-10-CM

## 2020-08-06 DIAGNOSIS — L40.9 PSORIASIS: Primary | ICD-10-CM

## 2020-08-06 DIAGNOSIS — Z45.018 ENCOUNTER FOR INTERROGATION OF CARDIAC PACEMAKER: ICD-10-CM

## 2020-08-06 PROCEDURE — G0463 HOSPITAL OUTPT CLINIC VISIT: HCPCS | Mod: PO | Performed by: INTERNAL MEDICINE

## 2020-08-06 PROCEDURE — 93280 PM DEVICE PROGR EVAL DUAL: CPT | Mod: PO

## 2020-08-06 PROCEDURE — 99214 OFFICE O/P EST MOD 30 MIN: CPT | Performed by: INTERNAL MEDICINE

## 2020-08-06 RX ORDER — LOSARTAN POTASSIUM 25 MG/1
25 TABLET ORAL DAILY
COMMUNITY
End: 2020-08-21 | Stop reason: ALTCHOICE

## 2020-08-06 ASSESSMENT — ENCOUNTER SYMPTOMS
COLOR CHANGE: 0
ARTHRALGIAS: 0
EYE PAIN: 0
COUGH: 0
DYSURIA: 0
ABDOMINAL PAIN: 0
VOMITING: 0
SHORTNESS OF BREATH: 0
BACK PAIN: 0
PALPITATIONS: 0
BRUISES/BLEEDS EASILY: 1
HEMATURIA: 0
FEVER: 0
SEIZURES: 0
SORE THROAT: 0
CHILLS: 0

## 2020-08-06 ASSESSMENT — PAIN SCALES - GENERAL: PAINLEVEL: 3

## 2020-08-06 NOTE — LETTER
[facility] RHE[facility]  [address] [address]  [phone]  Dept: [phone]  08/06/20      Hilda Donald MD  [address] [address]        To: Hilda Donald MD      Thank you for referring your patient, Sunitha Arrington, to receive care in my office. I have enclosed a summary of the care provided to Sunitha on 08/06/20.    Please contact me with any questions you may have regarding the visit.    Sincerely,         Connie Dueñas MD  [address] [id number] [phone]    CC: No Recipients

## 2020-08-07 PROCEDURE — 93280 PM DEVICE PROGR EVAL DUAL: CPT | Mod: 26 | Performed by: INTERNAL MEDICINE

## 2020-08-08 DIAGNOSIS — E87.6 HYPOKALEMIA: Chronic | ICD-10-CM

## 2020-08-10 RX ORDER — POTASSIUM CHLORIDE 750 MG/1
TABLET, EXTENDED RELEASE ORAL
Qty: 180 TABLET | Refills: 3 | Status: SHIPPED | OUTPATIENT
Start: 2020-08-10 | End: 2021-02-08 | Stop reason: SDUPTHER

## 2020-08-14 ENCOUNTER — OFFICE VISIT (OUTPATIENT)
Dept: URGENT CARE | Facility: CLINIC | Age: 75
End: 2020-08-14
Payer: MEDICARE

## 2020-08-14 VITALS
OXYGEN SATURATION: 97 % | TEMPERATURE: 97.9 F | RESPIRATION RATE: 20 BRPM | DIASTOLIC BLOOD PRESSURE: 82 MMHG | WEIGHT: 176 LBS | BODY MASS INDEX: 29.29 KG/M2 | HEART RATE: 59 BPM | SYSTOLIC BLOOD PRESSURE: 148 MMHG

## 2020-08-14 DIAGNOSIS — N30.00 ACUTE CYSTITIS WITHOUT HEMATURIA: Primary | ICD-10-CM

## 2020-08-14 LAB
BACTERIA #/AREA URNS HPF: ABNORMAL /HPF
BILIRUB UR QL: NEGATIVE
CLARITY UR: ABNORMAL
COLOR UR: ABNORMAL
GLUCOSE UR QL: NEGATIVE MG/DL
HGB UR QL: ABNORMAL
KETONES UR-MCNC: NEGATIVE MG/DL
LEUKOCYTE ESTERASE UR QL STRIP: ABNORMAL
NITRITE UR QL: POSITIVE
PH UR: 5 PH
PROT UR STRIP-MCNC: NEGATIVE MG/DL
RBC #/AREA URNS HPF: ABNORMAL /HPF
SP GR UR: 1.02 (ref 1–1.03)
SQUAMOUS #/AREA URNS HPF: ABNORMAL /HPF
UROBILINOGEN UR-MCNC: 0.2 E.U./DL
WBC #/AREA URNS HPF: ABNORMAL /HPF

## 2020-08-14 PROCEDURE — 90750 HZV VACC RECOMBINANT IM: CPT | Performed by: FAMILY MEDICINE

## 2020-08-14 PROCEDURE — G0463 HOSPITAL OUTPT CLINIC VISIT: HCPCS

## 2020-08-14 PROCEDURE — 90471 IMMUNIZATION ADMIN: CPT | Performed by: FAMILY MEDICINE

## 2020-08-14 PROCEDURE — 99213 OFFICE O/P EST LOW 20 MIN: CPT | Performed by: FAMILY MEDICINE

## 2020-08-14 PROCEDURE — 81001 URINALYSIS AUTO W/SCOPE: CPT | Performed by: FAMILY MEDICINE

## 2020-08-14 PROCEDURE — 87088 URINE BACTERIA CULTURE: CPT | Performed by: FAMILY MEDICINE

## 2020-08-14 RX ORDER — NITROFURANTOIN 25; 75 MG/1; MG/1
100 CAPSULE ORAL 2 TIMES DAILY
Qty: 10 CAPSULE | Refills: 0 | Status: SHIPPED | OUTPATIENT
Start: 2020-08-14 | End: 2020-08-19

## 2020-08-14 ASSESSMENT — PAIN SCALES - GENERAL: PAINLEVEL: 0-NO PAIN

## 2020-08-14 ASSESSMENT — ENCOUNTER SYMPTOMS
FREQUENCY: 1
DYSURIA: 1

## 2020-08-14 NOTE — PROGRESS NOTES
Chief Complaint: UTI    Subjective:    Sunitha Arrington is a 74 y.o. female presents today for concern of a possible UTI.  She reports that she developed symptoms 3 days ago that include urinary urgency/frequency and dysuria.  She denies any symptoms of fever, chills, nausea, vomiting, leg pain, hematuria.   Attempted to take azotemia yesterday with no alleviation of her symptoms.  Last known urinary tract infection was in April and she was treated with a 5-day course of nitrofurantoin with resolution of her symptoms.    The following have been reviewed and updated as needed:  Tobacco  Allergies  Meds  Problems  Med Hx  Surg Hx  Fam Hx         Meds:    Current Outpatient Medications:   •  potassium chloride 10 mEq CR tablet, TAKE 2 TABLETS EVERY DAY, Disp: 180 tablet, Rfl: 3  •  zolpidem (AMBIEN) 5 mg tablet, Take 1 tablet (5 mg total) by mouth nightly as needed for sleep Max Daily Amount: 5 mg, Disp: 90 tablet, Rfl: 1  •  isosorbide mononitrate (IMDUR) 30 mg 24 hr tablet, TAKE 1 TABLET EVERY DAY, Disp: 90 tablet, Rfl: 2  •  benazepriL (Lotensin) 40 mg tablet, Take 1 tablet (40 mg total) by mouth daily, Disp: 90 tablet, Rfl: 3  •  triamcinolone (KENALOG) 0.1 % cream, Apply 1 application topically 2 (two) times a day as needed for rash Apply to hands, arms and legs up to three times daily. (Patient taking differently: Apply 1 application topically 2 (two) times a day as needed for rash Apply to hands, arms and legs up to three times daily. ), Disp: 80 g, Rfl: 4  •  calcium carbonate-vitamin D3 600 mg calcium- 200 unit capsule, Taking once daily , Disp: , Rfl:   •  gabapentin (NEURONTIN) 100 mg capsule, Take 200 mg by mouth 2 (two) times a day Patient taking one in the morning and one at night , Disp: , Rfl: 0  •  omeprazole (PriLOSEC) 20 mg capsule, Take 1 capsule (20 mg total) by mouth daily, Disp: 90 capsule, Rfl: 3  •  omega-3 fatty acids-fish oil (Fish Oil) 340-1,000 mg capsule, Take 1 capsule by mouth 2 (two)  times a day, Disp: 240 capsule, Rfl: 3  •  clopidogreL (PLAVIX) 75 mg tablet, Take 1 tablet (75 mg total) by mouth daily, Disp: 90 tablet, Rfl: 3  •  atorvastatin (LIPITOR) 40 mg tablet, Take 1 tablet (40 mg total) by mouth daily, Disp: 90 tablet, Rfl: 3  •  rOPINIRole (REQUIP) 1 mg tablet, TAKE 1 TABLET TWICE DAILY, Disp: 180 tablet, Rfl: 3  •  CLOBETASOL PROPIONATE, BULK, MISC, daily, Disp: , Rfl:   •  secukinumab 150 mg/mL pen injector, Inject 300 mg once weekly at weeks 0, 1, 2, 3, and 4. (Patient taking differently: Inject 300 mg under the skin every 28 (twenty-eight) days I ), Disp: 10 pen, Rfl: 0  •  naproxen sodium 220 mg capsule, Take 2 tablets by mouth as needed Indications: Chest pain  , Disp: , Rfl:   •  multivitamin with minerals tablet, Take 1 tablet by mouth daily., Disp: , Rfl:   •  lysine 500 mg tablet, Take 1 tablet every day by oral route in the evening., Disp: , Rfl:   •  nitrofurantoin, macrocrystal-monohydrate, (Macrobid) 100 mg capsule, Take 1 capsule (100 mg total) by mouth 2 (two) times a day for 5 days, Disp: 10 capsule, Rfl: 0  •  losartan (COZAAR) 25 mg tablet, Take 25 mg by mouth daily Patient unsure of dosage, Disp: , Rfl:   •  hydrocortisone 2.5 % cream, Apply three times daily as needed for rash on face, Disp: 60 g, Rfl: 2    Allergies:  Allergies   Allergen Reactions   • Amlodipine Other (see comments)     Peripheral edema     • Apricot      throat pain   • Clindamycin Phosphate Rash     Ointment used for rash on face       Review of Systems:  Review of Systems   Genitourinary: Positive for dysuria, frequency and urgency.       Objective:  /82 (BP Location: Left arm, Patient Position: Sitting)   Pulse 59   Temp 36.6 °C (97.9 °F)   Resp 20   Wt 79.8 kg (176 lb)   SpO2 97%   BMI 29.29 kg/m²     Physical Exam  Vitals signs and nursing note reviewed.   Constitutional:       General: She is not in acute distress.     Appearance: She is normal weight. She is not ill-appearing.    Cardiovascular:      Rate and Rhythm: Normal rate and regular rhythm.      Heart sounds: Normal heart sounds.   Pulmonary:      Effort: Pulmonary effort is normal.      Breath sounds: Normal breath sounds.   Abdominal:      General: Abdomen is flat. Bowel sounds are normal.      Palpations: Abdomen is soft.      Tenderness: There is no abdominal tenderness. There is no right CVA tenderness or left CVA tenderness.   Skin:     General: Skin is warm.      Capillary Refill: Capillary refill takes 2 to 3 seconds.   Neurological:      Mental Status: She is alert and oriented to person, place, and time.         Assessment/Plan:    Sunitha was seen today for urinary problem.    Diagnoses and all orders for this visit:    Acute cystitis without hematuria: Presenting history consistent with likely acute cystitis.  Urinalysis will be pending.  However, last urinalysis and culture showed growth of E. coli and pan susceptibilities. Will treat patient with 5-day course of nitrofurantoin.  -     Urinalysis w/microscopic, reflex culture Urine, Clean Catch; Future  -     nitrofurantoin, macrocrystal-monohydrate, (Macrobid) 100 mg capsule; Take 1 capsule (100 mg total) by mouth 2 (two) times a day for 5 days  -     Urinalysis w/microscopic, reflex culture Urine, Clean Catch  -     Urinalysis, Dip (part 1 of panel) Urine, Clean Catch  -     Urinalysis, Micro (part 2 of panel) Urine, Clean Catch    Follow Up: PRN    The patient is in agreement with this plan and has no further questions at this time.     A voice recognition program was used to aid in documentation of this record.  Sometimes words are not printed exactly as they were spoken.  While efforts were made to carefully edit and correct any inaccuracies, some areas may be present; please take these into context.  Please contact the provider if areas are identified.    Priscila Dubon MD

## 2020-08-16 LAB — BACTERIA UR CULT: ABNORMAL

## 2020-08-18 DIAGNOSIS — N39.0 URINARY TRACT INFECTION WITH HEMATURIA, SITE UNSPECIFIED: Primary | ICD-10-CM

## 2020-08-18 DIAGNOSIS — R31.9 URINARY TRACT INFECTION WITH HEMATURIA, SITE UNSPECIFIED: Primary | ICD-10-CM

## 2020-08-18 RX ORDER — CIPROFLOXACIN 500 MG/1
500 TABLET ORAL 2 TIMES DAILY
Qty: 14 TABLET | Refills: 0 | Status: SHIPPED | OUTPATIENT
Start: 2020-08-18 | End: 2020-08-25

## 2020-08-21 ENCOUNTER — TELEPHONE - BILLABLE (OUTPATIENT)
Dept: CARDIOLOGY | Facility: CLINIC | Age: 75
End: 2020-08-21
Payer: MEDICARE

## 2020-08-21 DIAGNOSIS — I44.30 ATRIOVENTRICULAR BLOCK: ICD-10-CM

## 2020-08-21 DIAGNOSIS — I10 ESSENTIAL HYPERTENSION: Primary | Chronic | ICD-10-CM

## 2020-08-21 DIAGNOSIS — Z95.0 CARDIAC PACEMAKER IN SITU: Chronic | ICD-10-CM

## 2020-08-21 PROCEDURE — 99443 *INACTIVE DO NOT USE* PR PHYS/QHP TELEPHONE EVALUATION 21-30 MIN: CPT | Performed by: NURSE PRACTITIONER

## 2020-08-21 SDOH — ECONOMIC STABILITY: FOOD INSECURITY: WITHIN THE PAST 12 MONTHS, THE FOOD YOU BOUGHT JUST DIDN'T LAST AND YOU DIDN'T HAVE MONEY TO GET MORE.: PATIENT DECLINED

## 2020-08-21 SDOH — ECONOMIC STABILITY: TRANSPORTATION INSECURITY
IN THE PAST 12 MONTHS, HAS LACK OF TRANSPORTATION KEPT YOU FROM MEDICAL APPOINTMENTS OR FROM GETTING MEDICATIONS?: PATIENT DECLINED

## 2020-08-21 SDOH — ECONOMIC STABILITY: FOOD INSECURITY
WITHIN THE PAST 12 MONTHS, YOU WORRIED THAT YOUR FOOD WOULD RUN OUT BEFORE YOU GOT THE MONEY TO BUY MORE.: PATIENT DECLINED

## 2020-08-21 SDOH — ECONOMIC STABILITY: FOOD INSECURITY: HOW HARD IS IT FOR YOU TO PAY FOR THE VERY BASICS LIKE FOOD, HOUSING, MEDICAL CARE, AND HEATING?: PATIENT DECLINED

## 2020-08-21 ASSESSMENT — ENCOUNTER SYMPTOMS
FALLS: 0
NAUSEA: 0
BRUISES/BLEEDS EASILY: 0
SHORTNESS OF BREATH: 0
WHEEZING: 0
NERVOUS/ANXIOUS: 1
NEAR-SYNCOPE: 0
CHILLS: 0
LIGHT-HEADEDNESS: 0
IRREGULAR HEARTBEAT: 0
ABDOMINAL PAIN: 0
PND: 0
EYES NEGATIVE: 1
DIARRHEA: 0
COUGH: 0
PALPITATIONS: 0
ENDOCRINE NEGATIVE: 1
HEARTBURN: 0
CONSTIPATION: 0
ORTHOPNEA: 0
SNORING: 0
MYALGIAS: 0
DIZZINESS: 0
DYSPNEA ON EXERTION: 0
FEVER: 0
SYNCOPE: 0
VOMITING: 0
DIAPHORESIS: 0
HEMATOCHEZIA: 0
HEADACHES: 0
HEMATURIA: 0

## 2020-08-21 ASSESSMENT — ACTIVITIES OF DAILY LIVING (ADL): LACK_OF_TRANSPORTATION: PATIENT DECLINED

## 2020-08-21 NOTE — PROGRESS NOTES
"  [facility]  [address] [place] [address]    Electrophysiology Outpatient Follow-up Note       Subjective   Per discussion with Kathryn Mujica CNP, Sunitha, has verbally consented to be treated via a telephone based visit: Yes. A total of 30 minutes were required for this telephone based visit.   Patient Location: Home  Provider Location: Clinic  Technology used by Provider: Phone      Chief Complaint  Chief Complaint   Patient presents with   • Annual F/U   • Hypertension   • HLD   • Cerebrovascular Accident       HPI  Sunitha Arrington is a 74 y.o. female with past medical history significant for complete heart block status post dual-chamber permanent pacemaker implanted in 12/2014, stroke thought to be due to estrogen replacement, and hypertension.  Of note, there was no concurrent atrial fibrillation at any time around the timing of her stroke.  Patient reported a syncopal episode in July 2018 when she thought she passed out while driving.  No arrhythmias were noted on her device interrogation.  She thought she might have fallen asleep since she was working long hours.  She was last seen in the EP clinic on 9/6/2019 at which time she was to continue current regimen.    Patient states she is feeling well and has no complaints.  She occasionally feels her pacemaker \"kicking in\" but it is not bothersome to her.  She denies chest pain, dyspnea, lightheadedness, dizziness, palpitations, presyncopal or syncopal episodes, or edema.  She has not had any further syncopal episodes.  No PND or orthopnea.  She denies signs or symptoms of bleeding.  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    Most recent echocardiogram on 4/5/2018 showed EF 65%, normal wall motion, and no significant valvular disease.  Patient had bilateral carotid artery duplex in 2016 which showed " mild plaque in both carotid bifurcations but no significant stenosis.  She had an overnight pulse oximetry test which was prescribed by her PCP and was negative for desaturations.  Most recent stress test was completed on 2018 and was negative for ischemia.  Most recent device check on 2020 showed no significant arrhythmias, right atrial pacing 19% and right ventricular pacing less than 1%.      Past Medical History:   Diagnosis Date   • Actinic keratosis    • Benign paroxysmal positional vertigo 10/30/2018   • Cancer (CMS/[facility]) ([facility])     Skin CA on nose   • CVA (cerebral vascular accident) (CMS/HCC) ([facility])     Numbness/tingling of RUE but functional and large right toe numbness.   • High degree atrioventricular block     with syncope   • Hyperlipidemia    • Hypertension    • Hypertriglyceridemia    • Inguinal pain 10/30/2018   • Mid sternal chest pain 2018   • Neurologic disorder    • Presence of heart assist device (CMS/[facility]) ([facility])     Dual chamber pacemaker   • Psoriasis    • Sclerosis of the skin     Vaginal   • Squamous cell carcinoma in situ (SCCIS) of skin of nose 2019   • Squamous cell skin cancer 2019    in situ right pretip nose   • Urinary frequency 3/27/2019       Past Surgical History:   Procedure Laterality Date   • CARDIAC CATHETERIZATION     • CARDIAC PACEMAKER PLACEMENT      Dual chamber pacemaker implantation   • CARDIAC SURGERY  2014   •  SECTION  1977    [place]   • COLONOSCOPY  2016    Rare sigmoid diverticula. Physiologic internal hemorrhoids, otherwise normal exam   • COLONOSCOPY  2008   • COLPORRHAPHY      and rectocele repair   • MOHS SURGERY Right 2019    right pretip nose Banks's   • PERIPHERAL NERVE EVALUATION N/A 2019    Procedure: PERIPHERAL NERVE EVALUATION;  Surgeon: Jacqueline Landin MD;  Location: [facility] OR;  Service: Urology;  Laterality: N/A;   • SACRAL NERVE STIMULATOR PLACEMENT N/A 2019     Procedure: FULL IMPLANT INTERSTIM;  Surgeon: Jacqueline Landin MD;  Location: [facility] OR;  Service: Urology;  Laterality: N/A;   • TOTAL ABDOMINAL HYSTERECTOMY W/ BILATERAL SALPINGOOPHORECTOMY      [id number] [facility]       Last updated by Edwige Mujica CNP on 9/4/2018:    CARDIAC HISTORY:  ARRHYTHMIA:  1. Syncope  2. Complete Heart Block [PPM] - 12/2014    PVD:  1. Ischemic stroke    RISK FACTORS:  1. Hypertension  2. Tobacco Use: No/never    CARDIOVASCULAR PROCEDURES:  Echo (Normal EF) -4/5/2018    EKG (NSR; High degree AV block) - 12/4/2014    Carotid Duplex (both < 50%) -2016    Head CT (Negative for acute intracranial abnormalities) - 6/8/2015    Lexiscan stress test-negative for ischemia-5/18/2018    Allergies as of 08/21/2020 - Reviewed 08/21/2020   Allergen Reaction Noted   • Amlodipine Other (see comments) 07/28/2020   • Apricot  06/12/2015   • Clindamycin phosphate Rash 05/26/2019       Current Outpatient Medications   Medication Sig Dispense Refill   • ciprofloxacin (Cipro) 500 mg tablet Take 1 tablet (500 mg total) by mouth 2 (two) times a day for 7 days 14 tablet 0   • potassium chloride 10 mEq CR tablet TAKE 2 TABLETS EVERY DAY (Patient taking differently: Take 10 mEq by mouth 2 (two) times a day  ) 180 tablet 3   • zolpidem (AMBIEN) 5 mg tablet Take 1 tablet (5 mg total) by mouth nightly as needed for sleep Max Daily Amount: 5 mg 90 tablet 1   • isosorbide mononitrate (IMDUR) 30 mg 24 hr tablet TAKE 1 TABLET EVERY DAY 90 tablet 2   • benazepriL (Lotensin) 40 mg tablet Take 1 tablet (40 mg total) by mouth daily 90 tablet 3   • triamcinolone (KENALOG) 0.1 % cream Apply 1 application topically 2 (two) times a day as needed for rash Apply to hands, arms and legs up to three times daily. (Patient taking differently: Apply 1 application topically 2 (two) times a day as needed for rash Apply to hands, arms and legs up to three times daily. ) 80 g 4   • calcium carbonate-vitamin D3 600 mg  calcium- 200 unit capsule Taking once daily      • gabapentin (NEURONTIN) 100 mg capsule Take 100 mg by mouth 2 (two) times a day Patient taking one in the morning and one at night Total of 200mg daily   0   • omeprazole (PriLOSEC) 20 mg capsule Take 1 capsule (20 mg total) by mouth daily 90 capsule 3   • omega-3 fatty acids-fish oil (Fish Oil) 340-1,000 mg capsule Take 1 capsule by mouth 2 (two) times a day 240 capsule 3   • clopidogreL (PLAVIX) 75 mg tablet Take 1 tablet (75 mg total) by mouth daily 90 tablet 3   • atorvastatin (LIPITOR) 40 mg tablet Take 1 tablet (40 mg total) by mouth daily 90 tablet 3   • rOPINIRole (REQUIP) 1 mg tablet TAKE 1 TABLET TWICE DAILY 180 tablet 3   • hydrocortisone 2.5 % cream Apply three times daily as needed for rash on face 60 g 2   • CLOBETASOL PROPIONATE, BULK, MISC daily     • secukinumab 150 mg/mL pen injector Inject 300 mg once weekly at weeks 0, 1, 2, 3, and 4. (Patient taking differently: Inject 300 mg under the skin every 28 (twenty-eight) days I ) 10 pen 0   • naproxen sodium 220 mg capsule Take 2 tablets by mouth as needed Indications: Chest pain       • multivitamin with minerals tablet Take 1 tablet by mouth daily.     • lysine 500 mg tablet Take 1 tablet every day by oral route in the evening.       No current facility-administered medications for this visit.        Family History   Problem Relation Age of Onset   • Stroke Mother 97   • Hypertension Mother    • Dementia Mother    • Other cancer Father 32        secondary malignant neoplasm of lymph node       Social History     Tobacco Use   • Smoking status: Never Smoker   • Smokeless tobacco: Never Used   Substance Use Topics   • Alcohol use: No   • Drug use: No       Review of Systems  Review of Systems   Constitution: Negative for chills, diaphoresis, fever and malaise/fatigue.   HENT: Negative for nosebleeds.    Eyes: Negative.    Cardiovascular: Negative for chest pain, dyspnea on exertion, irregular heartbeat,  leg swelling, near-syncope, orthopnea, palpitations, paroxysmal nocturnal dyspnea and syncope.   Respiratory: Negative for cough, shortness of breath, snoring and wheezing.    Endocrine: Negative.    Hematologic/Lymphatic: Does not bruise/bleed easily.   Skin: Negative.    Musculoskeletal: Negative for falls, muscle weakness and myalgias.   Gastrointestinal: Negative for abdominal pain, constipation, diarrhea, dysphagia, heartburn, hematochezia, nausea and vomiting.   Genitourinary: Negative for hematuria.   Neurological: Negative for dizziness, headaches and light-headedness.   Psychiatric/Behavioral: The patient is nervous/anxious.        Objective     Per patient at her recent PCP appointment:  Weight:   176 pounds  Blood pressure: 148/82 without medications  Heart rate: 59 bpm  SPO2: 97% room air    Physical Exam-deferred due to telephone visit.    Data Review:   Sodium   Date Value Ref Range Status   07/31/2020 140 135 - 145 mmol/L Final     Potassium   Date Value Ref Range Status   07/31/2020 4.1 3.5 - 5.1 mmol/L Final     Chloride   Date Value Ref Range Status   07/31/2020 107 98 - 107 mmol/L Final     CO2   Date Value Ref Range Status   07/31/2020 24 21 - 32 mmol/L Final     BUN   Date Value Ref Range Status   07/31/2020 15 7 - 25 mg/dL Final     Creatinine   Date Value Ref Range Status   07/31/2020 0.88 0.60 - 1.10 mg/dL Final     Glucose   Date Value Ref Range Status   07/31/2020 107 (H) 70 - 105 mg/dL Final     Calcium   Date Value Ref Range Status   07/31/2020 9.4 8.6 - 10.3 mg/dL Final     WBC   Date Value Ref Range Status   07/31/2020 4.6 4.5 - 10.5 10*3/uL Final     Hemoglobin   Date Value Ref Range Status   07/31/2020 14.1 11.5 - 15.5 g/dL Final     Hematocrit   Date Value Ref Range Status   07/31/2020 40.7 34.0 - 45.0 % Final     MCV   Date Value Ref Range Status   07/31/2020 97.7 (H) 81.0 - 97.0 fL Final     Platelets   Date Value Ref Range Status   07/31/2020 179 140 - 350 10*3/uL Final      Cholesterol   Date Value Ref Range Status   01/31/2020 127 0 - 199 mg/dL Final     Triglycerides   Date Value Ref Range Status   01/31/2020 89 <=149 mg/dL Final     HDL   Date Value Ref Range Status   01/31/2020 56 >=40 mg/dL Final     LDL Direct   Date Value Ref Range Status   05/16/2017 67 <100 mg/dL      LDL Calculated   Date Value Ref Range Status   01/31/2020 53 20 - 99 mg/dL Final     Lab Results   Component Value Date    TSH 1.435 02/22/2019       Assessment/Plan   Diagnoses and all orders for this visit:    Essential hypertension    Cardiac pacemaker in situ  -     Ambulatory referral to Cardiac Electrophysiology; Future    Atrioventricular block        Plan  1.  Complete heart block: Status post dual-chamber permanent pacemaker implanted in 2014.  Continue to follow in the device clinic.  2.  Hypertension: Not well controlled per her most recent reading at her PCPs office.  Will defer further management to her PCP.  3.  CVA: Etiology thought to be due to estrogen replacement.  There was no concurrent atrial fibrillation at any time around the timing of her stroke.  She has no residual.    Establish care with Dr. Fermin or Dr. Navarro in 1 year or sooner for new or worsening symptoms.        There are no Patient Instructions on file for this visit.    Return in about 1 year (around 8/21/2021) for Recheck - In Office.    Patient Education: Ready to learn, no apparent learning barriers were identified; learning preference includes listening.  Explained diagnosis and treatment plan; patient expressed understanding of the content.    Electronically signed by: Kathryn Mujica CNP        A voice recognition program was used to aid in documentation of this record.  Sometimes words are not printed exactly as they were spoken.  While efforts were made to carefully edit and correct any inaccuracies, some errors may be present; please take these into context.  Please contact the provider if errors are identified.

## 2020-09-25 ENCOUNTER — TELEPHONE (OUTPATIENT)
Dept: DERMATOLOGY | Facility: CLINIC | Age: 75
End: 2020-09-25

## 2020-09-25 NOTE — TELEPHONE ENCOUNTER
Lm on both numbers to see if pt wants to r/s due to TH being out of office or if she would like to see SR.

## 2020-10-04 ENCOUNTER — HOSPITAL ENCOUNTER (EMERGENCY)
Facility: HOSPITAL | Age: 75
Discharge: HOME | End: 2020-10-04
Attending: FAMILY MEDICINE
Payer: MEDICARE

## 2020-10-04 VITALS
DIASTOLIC BLOOD PRESSURE: 87 MMHG | OXYGEN SATURATION: 98 % | RESPIRATION RATE: 16 BRPM | SYSTOLIC BLOOD PRESSURE: 159 MMHG | HEART RATE: 78 BPM | TEMPERATURE: 98 F

## 2020-10-04 DIAGNOSIS — B02.9 HERPES ZOSTER WITHOUT COMPLICATION: Primary | ICD-10-CM

## 2020-10-04 PROCEDURE — 99283 EMERGENCY DEPT VISIT LOW MDM: CPT | Performed by: FAMILY MEDICINE

## 2020-10-04 PROCEDURE — 6370000100 HC RX 637 (ALT 250 FOR IP): Performed by: FAMILY MEDICINE

## 2020-10-04 PROCEDURE — 99282 EMERGENCY DEPT VISIT SF MDM: CPT | Performed by: FAMILY MEDICINE

## 2020-10-04 RX ORDER — ACYCLOVIR 800 MG/1
1 TABLET ORAL ONCE
Status: COMPLETED | OUTPATIENT
Start: 2020-10-04 | End: 2020-10-04

## 2020-10-04 RX ADMIN — Medication 1 PACKAGE: at 13:20

## 2020-10-04 ASSESSMENT — ENCOUNTER SYMPTOMS
FEVER: 0
CHILLS: 0
WEAKNESS: 0

## 2020-10-04 NOTE — ED PROVIDER NOTES
HPI:  Chief Complaint   Patient presents with   • Herpes Zoster       74-year-old female with a history of shingles who presents today with concern for possible shingles outbreak on her left inner wrist.  Patient reports that her symptoms started 2 days ago and feels similar to her last shingles outbreak.  Patient ports that her last outbreak was many years ago.  Patient reports associated symptoms of sharp stinging pain.  She denies any pruritus.  She reports that there are vesicular lesions but denies any leakage.  She is try to keep her rash covered and treated with bacitracin.    Of note, patient does have neuropathic pain for which she is ready on gabapentin for.  Patient reports that her pain is overall tolerable at this time.  She also has acyclovir at home for treatment of cold sores to take as needed.          HISTORY:  Past Medical History:   Diagnosis Date   • Actinic keratosis    • Benign paroxysmal positional vertigo 10/30/2018   • Cancer (CMS/[facility]) ([facility])     Skin CA on nose   • CVA (cerebral vascular accident) (CMS/[facility]) ([facility])     Numbness/tingling of RUE but functional and large right toe numbness.   • High degree atrioventricular block     with syncope   • Hyperlipidemia    • Hypertension    • Hypertriglyceridemia    • Inguinal pain 10/30/2018   • Mid sternal chest pain 2018   • Neurologic disorder    • Presence of heart assist device (CMS/[facility]) ([facility])     Dual chamber pacemaker   • Psoriasis    • Sclerosis of the skin     Vaginal   • Squamous cell carcinoma in situ (SCCIS) of skin of nose 2019   • Squamous cell skin cancer 2019    in situ right pretip nose   • Urinary frequency 3/27/2019       Past Surgical History:   Procedure Laterality Date   • CARDIAC CATHETERIZATION     • CARDIAC PACEMAKER PLACEMENT      Dual chamber pacemaker implantation   • CARDIAC SURGERY  2014   •  SECTION  1977    [place]   • COLONOSCOPY  2016    Rare sigmoid  diverticula. Physiologic internal hemorrhoids, otherwise normal exam   • COLONOSCOPY  01/01/2008   • COLPORRHAPHY      and rectocele repair   • MOHS SURGERY Right 06/11/2019    right pretip nose Banks's   • PERIPHERAL NERVE EVALUATION N/A 4/23/2019    Procedure: PERIPHERAL NERVE EVALUATION;  Surgeon: Jacqueline Landin MD;  Location: [facility] OR;  Service: Urology;  Laterality: N/A;   • SACRAL NERVE STIMULATOR PLACEMENT N/A 5/28/2019    Procedure: FULL IMPLANT INTERSTIM;  Surgeon: Jacqueline Landin MD;  Location: [facility] OR;  Service: Urology;  Laterality: N/A;   • TOTAL ABDOMINAL HYSTERECTOMY W/ BILATERAL SALPINGOOPHORECTOMY      [id number] [facility]       Family History   Problem Relation Age of Onset   • Stroke Mother 97   • Hypertension Mother    • Dementia Mother    • Other cancer Father 32        secondary malignant neoplasm of lymph node       Social History     Tobacco Use   • Smoking status: Never Smoker   • Smokeless tobacco: Never Used   Substance Use Topics   • Alcohol use: No   • Drug use: No         ROS:  Review of Systems   Constitutional: Negative for chills and fever.   Skin: Positive for rash.   Neurological: Negative for weakness.       PE:  ED Triage Vitals [10/04/20 1221]   Temp Heart Rate Resp BP SpO2   36.7 °C (98 °F) 78 16 159/87 98 %      Temp Source Heart Rate Source Patient Position BP Location FiO2 (%)   Oral -- Sitting -- --       Physical Exam  Vitals signs and nursing note reviewed.   Constitutional:       General: She is not in acute distress.  Pulmonary:      Effort: Pulmonary effort is normal.   Skin:     General: Skin is warm.      Capillary Refill: Capillary refill takes 2 to 3 seconds.      Findings: Rash present. Rash is vesicular.          Neurological:      Mental Status: She is alert and oriented to person, place, and time.         ED LABS:  Labs Reviewed - No data to display      ED IMAGES:  No orders to display       ED PROCEDURES:  Procedures    ED COURSE:    Presenting history as well as physical exam consistent with shingles.  Discussed with patient that she is within the 72-hour window to benefit from starting antiviral medication.  Patient was prescribed take-home pack of acyclovir to take 5 times a day for the next 7 days.  Discussed with patient that she is infectious and that she should keep the rash covered as much as possible and refrain from contact with others were pregnant, immunocompromise, or low birthweight/premature infants.  Note was written for patient that she is okay to go back to work and that transmission is fairly low as long she keeps her wounds covered.  In regards to her pain, discussed with patient that she could increase her frequency of her gabapentin from 2 times a day to 3 times a day.  If her pain is still unmanageable, and patient should follow-up with her primary care physician for further direction.  Discussed signs and symptoms that warrant further medical evaluation, further information was placed under patient instructions.  Patient was in agreement plan and had no further questions or concerns.         MDM:  ADIS    Final diagnoses:   [B02.9] Herpes zoster without complication          Priscila Dubon MD  10/04/20 [id number]

## 2020-10-04 NOTE — Clinical Note
Sunitha Arrington was seen and treated in our emergency department on 10/4/2020.  She may return to work on 10/05/2020.  Shingles is infectious but transmission is low as long as patient keeps her rash covered.     If you have any questions or concerns, please don't hesitate to call.      Priscila Dubon MD

## 2020-10-04 NOTE — DISCHARGE INSTRUCTIONS
Antiviral treatment:  -take acyclovir one tablet five times a day for 7 days    Pain:  -can increase frequency of gabapentin dose to three times a day from your usual two times a day    Until the rash has crusted, patients should be advised to:   ?Keep the rash covered, if feasible, and to wash their hands often to prevent the spread of virus to others.   ?Avoid contact with pregnant women who have never had chickenpox or the varicella vaccine, premature or low birth weight infants, and immunocompromised individuals [39].

## 2020-10-30 ENCOUNTER — TELEPHONE (OUTPATIENT)
Dept: FAMILY MEDICINE | Facility: CLINIC | Age: 75
End: 2020-10-30

## 2020-10-30 NOTE — PROGRESS NOTES
Dermatology clinic visit note  Chief complaint: skin check    Patient presented today for visit despite risk factors for evaluation. Upon arrival at the clinic, patient was screened for any Covid-19 symptoms and was found to be negative today.     HPI:  Sunitha Arrington is a 74 y.o. female who is here today with the following concern(s):    Location: 2nd digit right foot  Timing: since stroke in 2002  Quality: Hard rough spot at end of toe along nail bed  Associated signs and symptoms: has to clip an area to fit in shoe, aggravating   Severity: Mild  Modifying factors: None    Recently finished shingles infection to left wrist area.    The patient desires a skin exam, full body. There is not a personal history of melanoma, nonmelanoma skin cancer or abnormal moles. There is a history of actinic keratoses.    MEDICATIONS, ALLERGIES, AND PAST MEDICAL HISTORY AND SURGICAL HISTORY, FAMILY AND SOCIAL HISTORY were reviewed, discussed and entered into the electronic medical record.    Past medical history:  Past Medical History:   Diagnosis Date   • Actinic keratosis    • Benign paroxysmal positional vertigo 10/30/2018   • Cancer (CMS/[facility]) ([facility])     Skin CA on nose   • CVA (cerebral vascular accident) (CMS/[facility]) ([facility]) 2014    Numbness/tingling of RUE but functional and large right toe numbness.   • High degree atrioventricular block     with syncope   • Hyperlipidemia    • Hypertension    • Hypertriglyceridemia    • Inguinal pain 10/30/2018   • Mid sternal chest pain 5/29/2018   • Neurologic disorder    • Presence of heart assist device (CMS/[facility]) ([facility])     Dual chamber pacemaker   • Psoriasis    • Sclerosis of the skin     Vaginal   • Squamous cell carcinoma in situ (SCCIS) of skin of nose 6/11/2019   • Squamous cell skin cancer 06/11/2019    in situ right pretip nose   • Urinary frequency 3/27/2019       Medications:    Current Outpatient Medications:   •  potassium chloride 10 mEq CR tablet, TAKE 2 TABLETS EVERY DAY (Patient  taking differently: Take 10 mEq by mouth 2 (two) times a day  ), Disp: 180 tablet, Rfl: 3  •  zolpidem (AMBIEN) 5 mg tablet, Take 1 tablet (5 mg total) by mouth nightly as needed for sleep Max Daily Amount: 5 mg, Disp: 90 tablet, Rfl: 1  •  isosorbide mononitrate (IMDUR) 30 mg 24 hr tablet, TAKE 1 TABLET EVERY DAY, Disp: 90 tablet, Rfl: 2  •  benazepriL (Lotensin) 40 mg tablet, Take 1 tablet (40 mg total) by mouth daily, Disp: 90 tablet, Rfl: 3  •  triamcinolone (KENALOG) 0.1 % cream, Apply 1 application topically 2 (two) times a day as needed for rash Apply to hands, arms and legs up to three times daily. (Patient taking differently: Apply 1 application topically 2 (two) times a day as needed for rash Apply to hands, arms and legs up to three times daily. ), Disp: 80 g, Rfl: 4  •  calcium carbonate-vitamin D3 600 mg calcium- 200 unit capsule, Taking once daily , Disp: , Rfl:   •  gabapentin (NEURONTIN) 100 mg capsule, Take 100 mg by mouth 2 (two) times a day Patient taking one in the morning and one at night Total of 200mg daily , Disp: , Rfl: 0  •  omeprazole (PriLOSEC) 20 mg capsule, Take 1 capsule (20 mg total) by mouth daily, Disp: 90 capsule, Rfl: 3  •  omega-3 fatty acids-fish oil (Fish Oil) 340-1,000 mg capsule, Take 1 capsule by mouth 2 (two) times a day, Disp: 240 capsule, Rfl: 3  •  clopidogreL (PLAVIX) 75 mg tablet, Take 1 tablet (75 mg total) by mouth daily, Disp: 90 tablet, Rfl: 3  •  atorvastatin (LIPITOR) 40 mg tablet, Take 1 tablet (40 mg total) by mouth daily, Disp: 90 tablet, Rfl: 3  •  rOPINIRole (REQUIP) 1 mg tablet, TAKE 1 TABLET TWICE DAILY, Disp: 180 tablet, Rfl: 3  •  hydrocortisone 2.5 % cream, Apply three times daily as needed for rash on face, Disp: 60 g, Rfl: 2  •  CLOBETASOL PROPIONATE, BULK, MISC, daily, Disp: , Rfl:   •  secukinumab 150 mg/mL pen injector, Inject 300 mg once weekly at weeks 0, 1, 2, 3, and 4. (Patient taking differently: Inject 300 mg under the skin every 28  "(twenty-eight) days I ), Disp: 10 pen, Rfl: 0  •  naproxen sodium 220 mg capsule, Take 2 tablets by mouth as needed Indications: Chest pain  , Disp: , Rfl:   •  multivitamin with minerals tablet, Take 1 tablet by mouth daily., Disp: , Rfl:   •  lysine 500 mg tablet, Take 1 tablet every day by oral route in the evening., Disp: , Rfl:     Allergies:  Amlodipine, Apricot, and Clindamycin phosphate    Review of systems:  Constitutional: No associated fever, chills, sweats, weight loss or gain or fatigue.   Allergic/Immunologic: No associated frequent colds or frequent infections.   Respiratory: No associated cough, shortness of breath or wheezing.   GI: No associated nausea, vomiting, diarrhea.   Musculoskeletal: No associated joint pain or swelling.   Integument: No rash.    Neurologic: No associated headache, weakness, numbness or tingling, fainting or slurred speech.   Hematologic/Lymphatic: No associated swollen glands, unusual bleeding or easy bruising.   Psychiatric: No associated anxiety or depression.    Physical exam:   /69 (BP Location: Right arm, Patient Position: Sitting, Cuff Size: Long Adult)   Pulse 63   Temp 36.6 °C (97.8 °F) (Temporal)   Resp 16   Ht 1.651 m (5' 5\")   Wt 79.8 kg (176 lb)   SpO2 97%   BMI 29.29 kg/m²   CONSTITUTIONAL:   Vital signs have been reviewed and entered into the electronic medical record.  The patient appears well nourished.  EYES:  There is no conjunctival injection.  EARS, NOSE, THROAT, MOUTH:  There is no photodamage on the lips.  The oropharynx appears normal.  NECK:  There are no obvious thyroid masses.  NEUROLOGICAL/PSYCHIATRIC:  The patient appears happy, alert and oriented to person, place and time.  SKIN:  The scalp, hair, head including the face, neck, chest, abdomen, back, bilateral upper extremities including the hands and fingernails, buttocks, bilateral lower extremities including the feet, and toenails were examined. Examination revealed: Stuck on tan " brown papules, well demarcated tan brown macules, bright red cherry papules, Multiple scattered 1-3 mm tan to brown papules and macules, right foot 2nd digit distal aspect-thickening of skin,   Gritty erythematous papule-left forearm, right upper arm, chest, left inferior jawline, left temple, left nasal sidewall, right nasal sidewall.  Right distal forearm- 5 mm erythematous crusted papule precisely 4.0 cm proximal and 1.0 cm lateral to the right ulnar head.     LESIONS TO FOLLOW:  Left medial cheek, 6mm scared papule with adjacent 7 mm telangiectasia   -All lesions unchanged from previous exam. To be observed, patient to call if lesion should change prior to next appointment.    Senior phototype: II.     Procedure(s):  Destruction of Benign or Premalignant Lesion    Date/Time: 11/6/2020 11:06 AM  Performed by: JOHN Curtis      Consent  Verbal consent was obtained from the patient. Written consent was not obtained from the patient. Risks include permanent scarring, light or dark discoloration, infection, pain, bleeding, bruising, redness, blister formation and recurrence of the lesion. Consent given by patient. The patient states understanding of the procedure being performed. Patient ID confirmed verbally with patient.     Location:  7 total lesions removed   Head/neck location(s): chin, nose and forehead (left inferior jawline, left temple, left nasal sidewall, right nasal sidewall, )  Number of head/neck lesions removed: 4    Upper Extremity location(s): left lower arm and right upper arm (left forearm, right upper arm, )  Number of Upper Extremity lesions removed: 2    Trunk location(s): chest (chest, )  Number of Trunk lesions removed:1        Procedure Details   Lesion(s) are pre-malignant. Local anesthesia was not used. Lesion(s) destroyed with: liquid nitrogen. Number of freeze/thaw cycles was 1. Lesion(s) were frozen until an ice ball extended beyond the lesion.     Post-Procedure  Patient  tolerated procedure well with no complications.       Lesion Biopsy    Date/Time: 11/6/2020 11:10 AM  Performed by: JOHN Curtis      Consent  Verbal consent was obtained from the patient. Written consent was not obtained from the patient. Risks, benefits, and alternatives were discussed. Consent given by patient. The patient states understanding of the procedure being performed. Patient ID confirmed verbally with patient.         Biopsy 1    Location Details  Body area: upper extremity  Site: R lower arm (right distal forearm TH20/)  Lesion size: 0.5 cm.     Preparation Details  Adequate anesthesia is achieved using 0.5 mL of lidocaine 1% with epinephrine (administered by Corinne JEROME) in a local infiltration method. Area cleaned and prepped with Alcohol.     Procedure Details  Shave biopsy completed with dermablade. Biopsy performed to the depth of other (dermis). Hemostasis obtained with aluminium chloride and pressure.                     Post-Procedure  Specimen was sent to Pathology. Gauze and adhesive bandage applied for dressing. Patient tolerated the procedure well with no complications.               Assessment and Plan:   1. History of actinic keratoses  I reviewed the importance of photo-protection, photo-avoidance and routine skin checks.      2. Callus  Right foot second digit.  Referral to podiatry for evaluation and care.  - Ambulatory referral to Podiatry; Future    3. Lentigines  The common and benign nature of the lesion was discussed. The patient was reassured.      4. Seborrheic keratoses  The common and benign nature of the lesion was discussed. The patient was reassured.      5. Cherry hemangioma  The common and benign nature of the lesion was discussed. The patient was reassured.      6. Multiple benign nevi  The common and benign nature of the lesion was discussed. The patient was reassured.      7. Keratosis, actinic  I reviewed the importance of photo-protection,  photo-avoidance and routine skin checks.  Liquid nitrogen cryotherapy 10 second spot treatment was performed.  Please see procedure documentation for location and number of lesions treated.  The patient was informed that redness and blistering will result and this may take a few weeks to heal.  They are to avoid rubbing, scratching or using makeup on the treated areas.    - Destruction of Benign or Premalignant Lesion    8. Neoplasm of uncertain behavior  Location: Right forearm. Question skin cancer.  The clinical diagnosis is uncertain.  Malignancy is a consideration based upon lesion appearance noted on physical exam.  I reviewed shave biopsy as an appropriate procedure to obtain tissue for the diagnosis.  The procedure, morbidity (bruising, swelling, wound care, bandaging), activity restrictions, time off of work and complications (bleeding, infection, scarring) were reviewed.  The potential appearance of the scar (depressed, permanently hypopigmented) was discussed.  The patient elects to proceed.  Shave biopsy of the lesion was obtained in the office today.  Questions were addressed.  We will correspond with pathology results and any further necessary treatment based upon lesion histology.    - Lesion Biopsy  - Specimen to pathology    9. Psoriasis  Patient was started on Cosentyx for her psoriasis and psoriatic arthritis by Dr. Dueñas.  Continue this plan of care per Dr. Dueñas's recommendations.      I spent 15 minutes with the patient today with greater than 50% in counseling and/or coordination of care in regards to the above listed diagnoses.     I emphasized daily sunscreen use with an SPF of 30 or greater, broad spectrum and water resistant.  I directed reapplication every two hours.  I recommended wide brimmed hats, long pants and long sleeves.  Finally, we discussed danger signs of changing skin lesions including sores not healing and moles changing in size, shape or color.  Should any of these  lesions occur before next appointment, I instructed patent to call sooner for evaluation.     Portions of this note were dictated using Dragon DMO voice recognition software.  Though the note has been proofread, small discrepencies may exist.  If a significant discrepancy is identified please alert me to further review.

## 2020-10-30 NOTE — PATIENT INSTRUCTIONS
"Avoid sunlight between the hours of 10 am and 2 pm, wear a broad spectrum, water resistant sunscreen with SPF of 30-50 year round. Reapply sunscreen every 2 hours and after exercising or swimming. Wear a 6\" broad brimmed hat. [employer] is a store located in [facility] that sells a brand of hat called Sunday Afternoon that is highly recommended. Use a lip sunblock of SPF of 30-50 year round. Sun protective clothing suggested. Searching on Amazon, or outdoor stores such as [employer] or [employer] is recommended to find these.   -----------------------------------------------------------------------------------------------  Monthly self-examination of your skin is important. Should any lesions, including moles, change in shape or color, or itch, bleed or burn, contact our office for sooner evaluation.  ---------------------------------------------------------------------------------------------  Sunscreens recommended: Neutrogena Ultra Sheer Dry Touch SPF 50 or higher, Neutrogena Sheer Zinc Dry Touch SPF 50, Vanicream sunblocks containing Titanium Dioxide or Zinc Oxide with SPF 50 or higher.  -----------------------------------------------------------------------------------------------  **[employer] is our survey company. You may be receiving a survey about your care. Your identity is kept confidential so please be honest! Your care is important to us and we are always looking for opportunities to improve your experience. If you come across questions in the survey that are not applicable to your visit, please leave these questions blank. Thank you!**    You were treated with liquid nitrogen today. You should expect these areas to burn and once the burning subsides, the area(s)may itch. You should expect some reaction anywhere from welting of the skin to larger liquid filled blisters depending on how aggressively your lesion(s) needed to be treated. If you had treatment on your forehead, eyebrow areas or " cheekbones, you could have some swelling under your eyes. This is normal and nothing to worry about. Blisters should be left intact until they pop and drain on their own. You will most likely have a clear drainage much like a blister when it pops.     The areas treated should be cared for by gentle daily cleansing with Dove soap and water and your hands. You should apply Polysporin ointment or Vaseline  WITH A Q-TIP to the areas daily as they are healing and continue this until they are completely healed. It may take anywhere from 7-14 days for areas to completely heal. If any other area than your face was treated, it may take longer for those areas to heal.    If warts were treated, they may turn to blood blisters and may appear purple, red or black. Leave the blister intact and let it pop on its own. Keep these areas dry and clean and do not use any ointment or Vaseline to warts.    IF YOU NOTICE INCREASED SURROUNDING REDNESS, TENDERNESS OR A PUS-LIKE DRAINAGE, PLEASE CONTACT OUR OFFICE IMMEDIATELY.    WOUND CARE FOLLOWING BIOPSY    After your biopsy a dressing will be placed on the site.  You may remove bandage at end of day.  This is to minimize any bleeding that can possibly occur after surgery.  To clean the area, use Dove soap, warm water and hands, no wash cloth, once daily    Next, apply a layer of POLYSPORIN ANTIBIOTIC OINTMENT or VASELINE, daily.  Do this until healed.  We will contact you with your results when available.  Please call with questions or concerns    If you have not received results in 2 weeks, please call our office and reference your biopsy number [id number].

## 2020-11-04 ENCOUNTER — ANCILLARY PROCEDURE (OUTPATIENT)
Dept: CARDIOLOGY | Facility: CLINIC | Age: 75
End: 2020-11-04
Payer: MEDICARE

## 2020-11-04 DIAGNOSIS — Z45.018 ENCOUNTER FOR INTERROGATION OF CARDIAC PACEMAKER: ICD-10-CM

## 2020-11-04 PROCEDURE — 93296 REM INTERROG EVL PM/IDS: CPT

## 2020-11-06 ENCOUNTER — OFFICE VISIT (OUTPATIENT)
Dept: DERMATOLOGY | Facility: CLINIC | Age: 75
End: 2020-11-06
Payer: MEDICARE

## 2020-11-06 ENCOUNTER — TELEPHONE (OUTPATIENT)
Dept: DERMATOLOGY | Facility: CLINIC | Age: 75
End: 2020-11-06

## 2020-11-06 VITALS
HEART RATE: 63 BPM | BODY MASS INDEX: 29.32 KG/M2 | RESPIRATION RATE: 16 BRPM | DIASTOLIC BLOOD PRESSURE: 69 MMHG | HEIGHT: 65 IN | SYSTOLIC BLOOD PRESSURE: 137 MMHG | WEIGHT: 176 LBS | OXYGEN SATURATION: 97 % | TEMPERATURE: 97.8 F

## 2020-11-06 DIAGNOSIS — L82.1 SEBORRHEIC KERATOSES: ICD-10-CM

## 2020-11-06 DIAGNOSIS — L84 CALLUS: ICD-10-CM

## 2020-11-06 DIAGNOSIS — L81.4 LENTIGINES: ICD-10-CM

## 2020-11-06 DIAGNOSIS — L40.9 PSORIASIS: ICD-10-CM

## 2020-11-06 DIAGNOSIS — D22.9 MULTIPLE BENIGN NEVI: ICD-10-CM

## 2020-11-06 DIAGNOSIS — D18.01 CHERRY HEMANGIOMA: ICD-10-CM

## 2020-11-06 DIAGNOSIS — Z87.2 HISTORY OF ACTINIC KERATOSES: ICD-10-CM

## 2020-11-06 DIAGNOSIS — L57.0 KERATOSIS, ACTINIC: ICD-10-CM

## 2020-11-06 DIAGNOSIS — D48.9 NEOPLASM OF UNCERTAIN BEHAVIOR: Primary | ICD-10-CM

## 2020-11-06 PROCEDURE — 88305 TISSUE EXAM BY PATHOLOGIST: CPT | Performed by: PHYSICIAN ASSISTANT

## 2020-11-06 PROCEDURE — 17000 DESTRUCT PREMALG LESION: CPT | Mod: 59,51 | Performed by: PHYSICIAN ASSISTANT

## 2020-11-06 PROCEDURE — G0463 HOSPITAL OUTPT CLINIC VISIT: HCPCS | Performed by: PHYSICIAN ASSISTANT

## 2020-11-06 PROCEDURE — 11102 TANGNTL BX SKIN SINGLE LES: CPT | Performed by: PHYSICIAN ASSISTANT

## 2020-11-06 PROCEDURE — 99213 OFFICE O/P EST LOW 20 MIN: CPT | Mod: 25 | Performed by: PHYSICIAN ASSISTANT

## 2020-11-06 PROCEDURE — 17003 DESTRUCT PREMALG LES 2-14: CPT | Performed by: PHYSICIAN ASSISTANT

## 2020-11-06 ASSESSMENT — PAIN SCALES - GENERAL: PAINLEVEL: 0-NO PAIN

## 2020-11-06 NOTE — TELEPHONE ENCOUNTER
Patient called wanted to let Corinne Carver know that  Prodiatrist no longer does callas or nail fungus. Patient may need to be referred over to  Podiatrist. Patient may need new referral.

## 2020-11-09 NOTE — TELEPHONE ENCOUNTER
Left voicemail message to return call to clinic and ask for Corinne or Sharla.  I confirmed with [facility] that Dr. Mendiola does see patients in regards to calluses and ingrown toenails.  As a backup she could see Dr. Feliz at [facility] as he also sees these conditions.  If we have not heard from the patient in the next day or 2 please try contacting the patient again.  Please place referral to the podiatrist of her choice.

## 2020-11-10 DIAGNOSIS — L84 CALLUS: Primary | ICD-10-CM

## 2020-11-10 NOTE — PROGRESS NOTES
Another referral placed to podiatry to [facility] since the other office we sent the referral to does not work with toes.

## 2020-11-10 NOTE — TELEPHONE ENCOUNTER
Called patient and let her know that a new referral was placed for [facility].

## 2020-11-12 ENCOUNTER — TELEPHONE (OUTPATIENT)
Dept: DERMATOLOGY | Facility: CLINIC | Age: 75
End: 2020-11-12

## 2020-11-12 NOTE — TELEPHONE ENCOUNTER
----- Message from JOHN Curtis sent at 11/11/2020  8:25 AM MST -----  Please call patient.  Biopsy of right distal forearm reveals an actinic keratosis.  Recommended treatment for precancerous lesion is liquid nitrogen cryotherapy.  Please schedule in 4 to 6 weeks with TH.

## 2020-11-12 NOTE — TELEPHONE ENCOUNTER
[facility] for results.    ----- Message from JOHN Curtis sent at 11/11/2020  8:25 AM MST -----  Please call patient.  Biopsy of right distal forearm reveals an actinic keratosis.  Recommended treatment for precancerous lesion is liquid nitrogen cryotherapy.  Please schedule in 4 to 6 weeks with TH.

## 2020-11-20 ENCOUNTER — TELEPHONE (OUTPATIENT)
Dept: DERMATOLOGY | Facility: CLINIC | Age: 75
End: 2020-11-20

## 2020-11-20 LAB — BSA FOR ECHO PROCEDURE: 1.91 M2

## 2020-11-20 PROCEDURE — 93294 REM INTERROG EVL PM/LDLS PM: CPT | Performed by: INTERNAL MEDICINE

## 2020-11-20 NOTE — TELEPHONE ENCOUNTER
No body in  deals callous they want more juice things. Who else should she or talk to. Dr. Mitchell maybe from Orthopedic? For the toe with a deep ache. Starting to effect walk on R side.

## 2020-11-23 NOTE — PATIENT INSTRUCTIONS
You were treated with liquid nitrogen today. You should expect these areas to burn and once the burning subsides, the area(s)may itch. You should expect some reaction anywhere from welting of the skin to larger liquid filled blisters depending on how aggressively your lesion(s) needed to be treated. If you had treatment on your forehead, eyebrow areas or cheekbones, you could have some swelling under your eyes. This is normal and nothing to worry about. Blisters should be left intact until they pop and drain on their own. You will most likely have a clear drainage much like a blister when it pops.     The areas treated should be cared for by gentle daily cleansing with Dove soap and water and your hands. You should apply Polysporin ointment or Vaseline  WITH A Q-TIP to the areas daily as they are healing and continue this until they are completely healed. It may take anywhere from 7-14 days for areas to completely heal. If any other area than your face was treated, it may take longer for those areas to heal.  IF YOU NOTICE INCREASED SURROUNDING REDNESS, TENDERNESS OR A PUS-LIKE DRAINAGE, PLEASE CONTACT OUR OFFICE IMMEDIATELY.

## 2020-11-23 NOTE — PROGRESS NOTES
Dermatology clinic visit note  Chief complaint: Biopsy proven lichenoid actinic keratosis on the right distal forearm.  Resents today for treatment with liquid nitrogen cryotherapy.    Patient presented today for visit despite risk factors for evaluation. Upon arrival at the clinic, patient was screened for any Covid-19 symptoms and was found to be negative today.       MEDICATIONS, ALLERGIES, AND PAST MEDICAL HISTORY AND SURGICAL HISTORY, FAMILY AND SOCIAL HISTORY were reviewed, discussed and entered into the electronic medical record.    Past medical history:  Past Medical History:   Diagnosis Date   • Actinic keratosis    • Benign paroxysmal positional vertigo 10/30/2018   • Cancer (CMS/[facility]) ([facility])     Skin CA on nose   • CVA (cerebral vascular accident) (CMS/[facility]) ([facility]) 2014    Numbness/tingling of RUE but functional and large right toe numbness.   • High degree atrioventricular block     with syncope   • Hyperlipidemia    • Hypertension    • Hypertriglyceridemia    • Inguinal pain 10/30/2018   • Mid sternal chest pain 5/29/2018   • Neurologic disorder    • Presence of heart assist device (CMS/[facility]) ([facility])     Dual chamber pacemaker   • Psoriasis    • Sclerosis of the skin     Vaginal   • Squamous cell carcinoma in situ (SCCIS) of skin of nose 6/11/2019   • Squamous cell skin cancer 06/11/2019    in situ right pretip nose   • Urinary frequency 3/27/2019       Medications:    Current Outpatient Medications:   •  potassium chloride 10 mEq CR tablet, TAKE 2 TABLETS EVERY DAY (Patient taking differently: Take 10 mEq by mouth 2 (two) times a day  ), Disp: 180 tablet, Rfl: 3  •  zolpidem (AMBIEN) 5 mg tablet, Take 1 tablet (5 mg total) by mouth nightly as needed for sleep Max Daily Amount: 5 mg, Disp: 90 tablet, Rfl: 1  •  isosorbide mononitrate (IMDUR) 30 mg 24 hr tablet, TAKE 1 TABLET EVERY DAY, Disp: 90 tablet, Rfl: 2  •  benazepriL (Lotensin) 40 mg tablet, Take 1 tablet (40 mg total) by mouth daily, Disp: 90 tablet,  Rfl: 3  •  triamcinolone (KENALOG) 0.1 % cream, Apply 1 application topically 2 (two) times a day as needed for rash Apply to hands, arms and legs up to three times daily. (Patient taking differently: Apply 1 application topically 2 (two) times a day as needed for rash Apply to hands, arms and legs up to three times daily. ), Disp: 80 g, Rfl: 4  •  calcium carbonate-vitamin D3 600 mg calcium- 200 unit capsule, Taking once daily , Disp: , Rfl:   •  gabapentin (NEURONTIN) 100 mg capsule, Take 100 mg by mouth 2 (two) times a day Patient taking one in the morning and one at night Total of 200mg daily , Disp: , Rfl: 0  •  omeprazole (PriLOSEC) 20 mg capsule, Take 1 capsule (20 mg total) by mouth daily (Patient not taking: Reported on 11/6/2020 ), Disp: 90 capsule, Rfl: 3  •  omega-3 fatty acids-fish oil (Fish Oil) 340-1,000 mg capsule, Take 1 capsule by mouth 2 (two) times a day, Disp: 240 capsule, Rfl: 3  •  clopidogreL (PLAVIX) 75 mg tablet, Take 1 tablet (75 mg total) by mouth daily, Disp: 90 tablet, Rfl: 3  •  atorvastatin (LIPITOR) 40 mg tablet, Take 1 tablet (40 mg total) by mouth daily, Disp: 90 tablet, Rfl: 3  •  rOPINIRole (REQUIP) 1 mg tablet, TAKE 1 TABLET TWICE DAILY, Disp: 180 tablet, Rfl: 3  •  hydrocortisone 2.5 % cream, Apply three times daily as needed for rash on face, Disp: 60 g, Rfl: 2  •  CLOBETASOL PROPIONATE, BULK, MISC, daily, Disp: , Rfl:   •  secukinumab 150 mg/mL pen injector, Inject 300 mg once weekly at weeks 0, 1, 2, 3, and 4. (Patient taking differently: Inject 300 mg under the skin every 28 (twenty-eight) days I ), Disp: 10 pen, Rfl: 0  •  naproxen sodium 220 mg capsule, Take 2 tablets by mouth as needed Indications: Chest pain  , Disp: , Rfl:   •  multivitamin with minerals tablet, Take 1 tablet by mouth daily., Disp: , Rfl:   •  lysine 500 mg tablet, Take 1 tablet every day by oral route in the evening., Disp: , Rfl:     Allergies:  Amlodipine, Apricot, and Clindamycin  "phosphate      Physical exam:   /61 (BP Location: Left arm, Patient Position: Sitting, Cuff Size: Long Adult)   Pulse 71   Temp 36.2 °C (97.1 °F) (Temporal)   Ht 1.651 m (5' 5\")   Wt 79.8 kg (176 lb)   SpO2 95%   BMI 29.29 kg/m²   CONSTITUTIONAL:   Vital signs have been reviewed and entered into the electronic medical record.  The patient appears well nourished.  Skin: Right distal forearm-well-healing biopsy site with mild erythema       Procedure(s):  Destruction of Benign or Premalignant Lesion    Date/Time: 12/4/2020 12:45 PM  Performed by: JOHN Curtis      Consent  Verbal consent was obtained from the patient. Written consent was not obtained from the patient. Consent given by patient. The patient states understanding of the procedure being performed. Patient ID confirmed verbally with patient.     Location:  1 total lesions removed   Upper Extremity location(s): right lower arm (right distal forearm x1)  Number of Upper Extremity lesions removed: 1        Procedure Details   Lesion(s) are pre-malignant. Local anesthesia was not used. Lesion(s) destroyed with: liquid nitrogen. Number of freeze/thaw cycles was 1. Lesion(s) were frozen until an ice ball extended beyond the lesion.     Post-Procedure  Patient tolerated procedure well with no complications.               Assessment and Plan:   1. Actinic keratoses  Liquid nitrogen cryotherapy 10 second spot treatment was performed.  Please see procedure documentation for location and number of lesions treated.  The patient was informed that redness and blistering will result and this may take a few weeks to heal.  They are to avoid rubbing, scratching or using makeup on the treated areas.    - Destruction of Benign or Premalignant Lesion        I spent 5 minutes with the patient today with greater than 50% in counseling and/or coordination of care in regards to the above listed diagnoses.     I emphasized daily sunscreen use with an SPF of 30 or " greater, broad spectrum and water resistant.  I directed reapplication every two hours.  I recommended wide brimmed hats, long pants and long sleeves.  Finally, we discussed danger signs of changing skin lesions including sores not healing and moles changing in size, shape or color.  Should any of these lesions occur before next appointment, I instructed patent to call sooner for evaluation.     Portions of this note were dictated using Dragon DMO voice recognition software.  Though the note has been proofread, small discrepencies may exist.  If a significant discrepancy is identified please alert me to further review.

## 2020-11-27 DIAGNOSIS — K21.9 GASTROESOPHAGEAL REFLUX DISEASE: ICD-10-CM

## 2020-11-30 ENCOUNTER — MEDICATION ACCESS (OUTPATIENT)
Dept: RHEUMATOLOGY | Facility: CLINIC | Age: 75
End: 2020-11-30

## 2020-11-30 RX ORDER — OMEPRAZOLE 20 MG/1
CAPSULE, DELAYED RELEASE ORAL
Qty: 90 CAPSULE | Refills: 3 | Status: SHIPPED | OUTPATIENT
Start: 2020-11-30 | End: 2021-02-08 | Stop reason: SDUPTHER

## 2020-12-02 DIAGNOSIS — G25.81 RESTLESS LEGS SYNDROME: ICD-10-CM

## 2020-12-02 NOTE — PROGRESS NOTES
01/13/21-Glo [phone]--Call to [employer]-agent reports pt can press option 2 to speak with an agent to request delivery.  I did try to conference pt into the call with [employer], but she was not able to answer at that time.  At pt's request, I sent a text with instructions and phone number to request a delivery    12.8.2020-PAP faxed to [employer]. All docs uploaded to this encounter    12.7.2020-call to karishma Bryan is on file, but we received a denial with no denial reason.  Irvin will refax her approval.    12.2.2020-pending fax PA with updated dates.  Will then submit PAP for Cosenty[facility]

## 2020-12-03 RX ORDER — ROPINIROLE 1 MG/1
TABLET, FILM COATED ORAL
Qty: 180 TABLET | Refills: 3 | Status: SHIPPED | OUTPATIENT
Start: 2020-12-03 | End: 2021-09-15

## 2020-12-04 ENCOUNTER — OFFICE VISIT (OUTPATIENT)
Dept: DERMATOLOGY | Facility: CLINIC | Age: 75
End: 2020-12-04
Payer: MEDICARE

## 2020-12-04 VITALS
TEMPERATURE: 97.1 F | DIASTOLIC BLOOD PRESSURE: 61 MMHG | WEIGHT: 176 LBS | HEIGHT: 65 IN | SYSTOLIC BLOOD PRESSURE: 146 MMHG | HEART RATE: 71 BPM | OXYGEN SATURATION: 95 % | BODY MASS INDEX: 29.32 KG/M2

## 2020-12-04 DIAGNOSIS — L57.0 ACTINIC KERATOSIS: ICD-10-CM

## 2020-12-04 DIAGNOSIS — L57.0 ACTINIC KERATOSES: Primary | ICD-10-CM

## 2020-12-04 PROCEDURE — 17000 DESTRUCT PREMALG LESION: CPT | Performed by: PHYSICIAN ASSISTANT

## 2020-12-04 RX ORDER — ACYCLOVIR 800 MG/1
TABLET ORAL
COMMUNITY
Start: 2020-10-04 | End: 2021-02-08 | Stop reason: SDUPTHER

## 2020-12-04 ASSESSMENT — PAIN SCALES - GENERAL: PAINLEVEL: 8

## 2020-12-22 NOTE — PROGRESS NOTES
12/22/2020    Follow up call to [employer] - Spoke to representative Eulalio.    [employer] has everything they need for re-enrollment. They will start processing renewals after the first of the year.    Patient last filled Cosentyx on 12/14, for a 28 day supply. Eulalio confirmed Pt's refills will be honored while they are processing her renewal paperwork.

## 2020-12-28 NOTE — TELEPHONE ENCOUNTER
Left message for patient to follow up and see if she has gotten her referral figured out or if she needs us to do something else for her.

## 2020-12-29 ENCOUNTER — TELEPHONE (OUTPATIENT)
Dept: FAMILY MEDICINE | Facility: CLINIC | Age: 75
End: 2020-12-29

## 2020-12-29 NOTE — TELEPHONE ENCOUNTER
Sunitha has an appointment in April for a MWV, she is wondering if she could get in earlier.  Please advise of date and time.  thanks

## 2020-12-29 NOTE — TELEPHONE ENCOUNTER
I have put patient on our February/March Monday lists for when the schedule is built to put her in earlier.  Just an FYI

## 2021-01-08 DIAGNOSIS — E78.5 HYPERLIPIDEMIA, UNSPECIFIED HYPERLIPIDEMIA TYPE: ICD-10-CM

## 2021-01-11 RX ORDER — ATORVASTATIN CALCIUM 40 MG/1
TABLET, FILM COATED ORAL
Qty: 90 TABLET | Refills: 3 | Status: SHIPPED | OUTPATIENT
Start: 2021-01-11 | End: 2021-02-08 | Stop reason: SDUPTHER

## 2021-01-11 NOTE — PROGRESS NOTES
01/11/2021    --- [facility] --- Spoke to representative Crystal.    [facility] Application is currently in review status.     Patient has an active prescription on file, and can continue to request a refill.    Patient notified on 01.11.2021

## 2021-01-19 ENCOUNTER — OFFICE VISIT (OUTPATIENT)
Dept: URGENT CARE | Facility: CLINIC | Age: 76
End: 2021-01-19
Payer: MEDICARE

## 2021-01-19 VITALS
TEMPERATURE: 97.7 F | BODY MASS INDEX: 29.82 KG/M2 | WEIGHT: 179 LBS | DIASTOLIC BLOOD PRESSURE: 72 MMHG | OXYGEN SATURATION: 98 % | HEIGHT: 65 IN | HEART RATE: 61 BPM | SYSTOLIC BLOOD PRESSURE: 142 MMHG | RESPIRATION RATE: 18 BRPM

## 2021-01-19 DIAGNOSIS — R30.0 DYSURIA: Primary | ICD-10-CM

## 2021-01-19 DIAGNOSIS — R35.0 URINARY FREQUENCY: ICD-10-CM

## 2021-01-19 DIAGNOSIS — F51.01 PRIMARY INSOMNIA: ICD-10-CM

## 2021-01-19 LAB
BACTERIA #/AREA URNS HPF: NORMAL /HPF
BILIRUB UR QL: NEGATIVE
CLARITY UR: CLEAR
COLOR UR: YELLOW
GLUCOSE UR QL: NEGATIVE MG/DL
HGB UR QL: ABNORMAL
KETONES UR-MCNC: NEGATIVE MG/DL
LEUKOCYTE ESTERASE UR QL STRIP: NEGATIVE
NITRITE UR QL: NEGATIVE
PH UR: 5.5 PH
PROT UR STRIP-MCNC: NEGATIVE MG/DL
RBC #/AREA URNS HPF: NORMAL /HPF
SP GR UR: 1.02 (ref 1–1.03)
SQUAMOUS #/AREA URNS HPF: NORMAL /HPF
UROBILINOGEN UR-MCNC: 0.2 E.U./DL
WBC #/AREA URNS HPF: NORMAL /HPF

## 2021-01-19 PROCEDURE — 99213 OFFICE O/P EST LOW 20 MIN: CPT | Performed by: FAMILY MEDICINE

## 2021-01-19 PROCEDURE — G0463 HOSPITAL OUTPT CLINIC VISIT: HCPCS

## 2021-01-19 PROCEDURE — 81001 URINALYSIS AUTO W/SCOPE: CPT | Performed by: FAMILY MEDICINE

## 2021-01-19 RX ORDER — PHENAZOPYRIDINE HYDROCHLORIDE 200 MG/1
200 TABLET, FILM COATED ORAL 3 TIMES DAILY PRN
Qty: 10 TABLET | Refills: 0 | Status: SHIPPED | OUTPATIENT
Start: 2021-01-19 | End: 2021-01-21

## 2021-01-19 RX ORDER — ZOLPIDEM TARTRATE 5 MG/1
5 TABLET ORAL NIGHTLY PRN
Qty: 90 TABLET | Refills: 1 | Status: SHIPPED | OUTPATIENT
Start: 2021-01-19 | End: 2022-01-19

## 2021-01-19 RX ORDER — PHENAZOPYRIDINE HYDROCHLORIDE 200 MG/1
200 TABLET, FILM COATED ORAL 3 TIMES DAILY PRN
Qty: 10 TABLET | Refills: 0 | Status: SHIPPED | OUTPATIENT
Start: 2021-01-19 | End: 2021-01-19 | Stop reason: ALTCHOICE

## 2021-01-19 ASSESSMENT — ENCOUNTER SYMPTOMS
SHORTNESS OF BREATH: 0
COLOR CHANGE: 0
DYSURIA: 1
PALPITATIONS: 0
ABDOMINAL PAIN: 0
ARTHRALGIAS: 0
BACK PAIN: 0
VOMITING: 0
FEVER: 0
FREQUENCY: 1
COUGH: 0
SORE THROAT: 0
HEMATURIA: 0
CHILLS: 0
EYE PAIN: 0
SEIZURES: 0

## 2021-01-19 NOTE — PROGRESS NOTES
Clinic Note    Chief Complaint   Urinary Frequency       Subjective     Sunitha Arrington is a 75 y.o. female who presents for Urinary Frequency   per nursing.  Patient presents today to the walk-in clinic for concern of having a UTI.  She states that she urinated 5 times this morning before she left the house for work.  She states that she has not noticed any dysuria or hematuria.  She thought she had a little bit of suprapubic discomfort a couple days ago.  No flank pain.  No fevers or chills.  In reviewing her records in August 2020 she had Klebsiella in her Macrobid was switched to Cipro at that time and took care of her infection.  Prior to that in April 2020 she had an E. coli infection that was pansensitive.  She has been drinking plenty of fluids.  She also asks if she can have her Ambien refilled.  This is a chronic home medicine for her and is not causing any side effects.    The following have been reviewed and updated as appropriate in this visit:    Allergies  Meds  Problems  Med Hx  Surg Hx  Fam Hx       Allergies   Allergen Reactions   • Amlodipine Other (see comments)     Peripheral edema     • Apricot      throat pain   • Clindamycin Phosphate Rash     Ointment used for rash on face     Current Outpatient Medications   Medication Sig Dispense Refill   • zolpidem (AMBIEN) 5 mg tablet Take 1 tablet (5 mg total) by mouth nightly as needed for sleep Max Daily Amount: 5 mg 90 tablet 1   • atorvastatin (LIPITOR) 40 mg tablet TAKE 1 TABLET EVERY DAY 90 tablet 3   • acyclovir (ZOVIRAX) 800 mg tablet      • rOPINIRole (REQUIP) 1 mg tablet TAKE 1 TABLET TWICE DAILY 180 tablet 3   • omeprazole (PriLOSEC) 20 mg capsule TAKE 1 CAPSULE EVERY DAY 90 capsule 3   • potassium chloride 10 mEq CR tablet TAKE 2 TABLETS EVERY DAY (Patient taking differently: Take 10 mEq by mouth 2 (two) times a day  ) 180 tablet 3   • isosorbide mononitrate (IMDUR) 30 mg 24 hr tablet TAKE 1 TABLET EVERY DAY 90 tablet 2   • benazepriL  (Lotensin) 40 mg tablet Take 1 tablet (40 mg total) by mouth daily 90 tablet 3   • triamcinolone (KENALOG) 0.1 % cream Apply 1 application topically 2 (two) times a day as needed for rash Apply to hands, arms and legs up to three times daily. (Patient taking differently: Apply 1 application topically 2 (two) times a day as needed for rash Apply to hands, arms and legs up to three times daily. ) 80 g 4   • calcium carbonate-vitamin D3 600 mg calcium- 200 unit capsule Taking once daily      • gabapentin (NEURONTIN) 100 mg capsule Take 100 mg by mouth 2 (two) times a day Patient taking one in the morning and one at night Total of 200mg daily   0   • hydrocortisone 2.5 % cream Apply three times daily as needed for rash on face 60 g 2   • CLOBETASOL PROPIONATE, BULK, MISC daily     • secukinumab 150 mg/mL pen injector Inject 300 mg once weekly at weeks 0, 1, 2, 3, and 4. (Patient taking differently: Inject 300 mg under the skin every 28 (twenty-eight) days I ) 10 pen 0   • naproxen sodium 220 mg capsule Take 2 tablets by mouth as needed Indications: Chest pain       • multivitamin with minerals tablet Take 1 tablet by mouth daily.     • lysine 500 mg tablet Take 1 tablet every day by oral route in the evening.     • clopidogreL (PLAVIX) 75 mg tablet Take 1 tablet (75 mg total) by mouth daily 90 tablet 3     No current facility-administered medications for this visit.      Past Medical History:   Diagnosis Date   • Actinic keratosis    • Benign paroxysmal positional vertigo 10/30/2018   • Cancer (CMS/[facility]) ([facility])     Skin CA on nose   • CVA (cerebral vascular accident) (CMS/[facility]) ([facility]) 2014    Numbness/tingling of RUE but functional and large right toe numbness.   • High degree atrioventricular block     with syncope   • Hyperlipidemia    • Hypertension    • Hypertriglyceridemia    • Inguinal pain 10/30/2018   • Mid sternal chest pain 5/29/2018   • Neurologic disorder    • Presence of heart assist device (CMS/[facility]) ([facility])      Dual chamber pacemaker   • Psoriasis    • Sclerosis of the skin     Vaginal   • Squamous cell carcinoma in situ (SCCIS) of skin of nose 2019   • Squamous cell skin cancer 2019    in situ right pretip nose   • Urinary frequency 3/27/2019     Past Surgical History:   Procedure Laterality Date   • CARDIAC CATHETERIZATION     • CARDIAC PACEMAKER PLACEMENT      Dual chamber pacemaker implantation   • CARDIAC SURGERY  2014   •  SECTION  1977    [place]   • COLONOSCOPY  2016    Rare sigmoid diverticula. Physiologic internal hemorrhoids, otherwise normal exam   • COLONOSCOPY  2008   • COLPORRHAPHY      and rectocele repair   • MOHS SURGERY Right 2019    right pretip nose Banks's   • PERIPHERAL NERVE EVALUATION N/A 2019    Procedure: PERIPHERAL NERVE EVALUATION;  Surgeon: Jacqueline Landin MD;  Location: [facility] OR;  Service: Urology;  Laterality: N/A;   • SACRAL NERVE STIMULATOR PLACEMENT N/A 2019    Procedure: FULL IMPLANT INTERSTIM;  Surgeon: Jacqueline Landin MD;  Location: [facility] OR;  Service: Urology;  Laterality: N/A;   • TOTAL ABDOMINAL HYSTERECTOMY W/ BILATERAL SALPINGOOPHORECTOMY      [id number] [facility]     Family History   Problem Relation Age of Onset   • Stroke Mother 97   • Hypertension Mother    • Dementia Mother    • Other cancer Father 32        secondary malignant neoplasm of lymph node     Social History     Occupational History     Comment: [employer]   Tobacco Use   • Smoking status: Never Smoker   • Smokeless tobacco: Never Used   Substance and Sexual Activity   • Alcohol use: No   • Drug use: No   • Sexual activity: Defer   Social History Narrative   • Not on file       Review of Systems   Constitutional: Negative for chills and fever.   HENT: Negative for ear pain and sore throat.    Eyes: Negative for pain and visual disturbance.   Respiratory: Negative for cough and shortness of breath.    Cardiovascular: Negative  "for chest pain and palpitations.   Gastrointestinal: Negative for abdominal pain and vomiting.   Genitourinary: Positive for dysuria and frequency. Negative for hematuria.   Musculoskeletal: Negative for arthralgias and back pain.   Skin: Negative for color change and rash.   Neurological: Negative for seizures and syncope.   All other systems reviewed and are negative.      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   • Herpes zoster   • Hyperlipidemia   • Lichen sclerosus et atrophicus   • Atrioventricular block   • Thalamic infarction (CMS/[facility]) ([facility])   • Cerebrovascular accident (CMS/[facility]) ([facility])   • RLS (restless legs syndrome)   • Morphea   • Xerosis of skin   • History of actinic keratoses   • Telangiectasia   • Depigmentation of skin   • Acne rosacea   • Lentigines   • Stucco keratoses   • Raynaud's phenomenon without gangrene       Objective   BP Readings from Last 3 Encounters:   01/19/21 142/72   12/04/20 146/61   11/06/20 137/69       /72 (BP Location: Left arm, Patient Position: Sitting, Cuff Size: Regular Adult)   Pulse 61   Temp 36.5 °C (97.7 °F) (Oral)   Resp 18   Ht 1.651 m (5' 5\")   Wt 81.2 kg (179 lb)   SpO2 98%   BMI 29.79 kg/m²     Physical Exam  Vitals signs and nursing note reviewed.   Constitutional:       General: She is not in acute distress.     Appearance: Normal appearance. She is well-developed and normal weight. She is not ill-appearing, toxic-appearing or diaphoretic.   HENT:      Head: Normocephalic and atraumatic.      Right Ear: Tympanic membrane, ear canal and external ear normal. There is no " impacted cerumen.      Left Ear: Tympanic membrane, ear canal and external ear normal. There is no impacted cerumen.      Nose: Nose normal. No congestion or rhinorrhea.      Mouth/Throat:      Mouth: Mucous membranes are moist.   Eyes:      General: No scleral icterus.        Right eye: No discharge.         Left eye: No discharge.      Extraocular Movements: Extraocular movements intact.      Conjunctiva/sclera: Conjunctivae normal.   Neck:      Musculoskeletal: Normal range of motion and neck supple. No neck rigidity or muscular tenderness.      Thyroid: No thyromegaly.      Trachea: No tracheal deviation.   Cardiovascular:      Rate and Rhythm: Normal rate and regular rhythm.      Pulses: Normal pulses.      Heart sounds: Normal heart sounds. No murmur.   Pulmonary:      Effort: Pulmonary effort is normal. No respiratory distress.      Breath sounds: Normal breath sounds. No stridor. No wheezing, rhonchi or rales.   Chest:      Chest wall: No tenderness.   Abdominal:      General: Abdomen is flat. Bowel sounds are normal. There is no distension.      Palpations: Abdomen is soft. There is no mass.      Tenderness: There is no abdominal tenderness. There is no right CVA tenderness, left CVA tenderness, guarding or rebound.   Musculoskeletal: Normal range of motion.         General: No swelling, tenderness, deformity or signs of injury.      Right lower leg: No edema.      Left lower leg: No edema.   Lymphadenopathy:      Cervical: No cervical adenopathy.   Skin:     General: Skin is warm.      Capillary Refill: Capillary refill takes less than 2 seconds.      Coloration: Skin is not jaundiced or pale.      Findings: No bruising, erythema, lesion or rash.   Neurological:      General: No focal deficit present.      Mental Status: She is alert and oriented to person, place, and time. Mental status is at baseline.      Cranial Nerves: No cranial nerve deficit.      Motor: No weakness or abnormal muscle tone.       Coordination: Coordination normal.      Gait: Gait normal.   Psychiatric:         Mood and Affect: Mood normal.         Behavior: Behavior normal.         Thought Content: Thought content normal.         Judgment: Judgment normal.             Assessment and Plan  No problem-specific Assessment & Plan notes found for this encounter.    Urinary frequency: Urinalysis today reveals some blood but no infection.  We will do Pyridium for 2 days.  Gave her a handout on urgency.  Discussed drinking plenty of fluids and cranberry juice.  Follow-up as needed if symptoms do not improve    Insomnia: Stable on Ambien.  Will refill that now for her    Diagnoses and all orders for this visit:    Dysuria  -     Urinalysis w/microscopic, reflex culture Urine, Clean Catch; Future    Urinary frequency    Primary insomnia  -     zolpidem (AMBIEN) 5 mg tablet; Take 1 tablet (5 mg total) by mouth nightly as needed for sleep Max Daily Amount: 5 mg          No follow-ups on file.    Christoph Torres MD  01/19/21    A voice recognition program was used to aid in medical record documentation. Sometimes words are printed not exactly as they were spoken. While efforts were made to carefully edit and correct any inaccuracies, some errors may be present. Errors should be taken within the context of the discussion.  Please contact our office if you need assistance interpreting this medical record or notice any mistakes.

## 2021-01-19 NOTE — PATIENT INSTRUCTIONS
Refilled ambien  Urine shows no infection  Use pyridium for next 2 days to help decrease urgency  Drink lots of water and cranberry juice  Follow up as needed     Patient Education     Urinary Frequency, Adult  Urinary frequency means urinating more often than usual. You may urinate every 1-2 hours even though you drink a normal amount of fluid and do not have a bladder infection or condition. Although you urinate more often than normal, the total amount of urine produced in a day is normal.  With urinary frequency, you may have an urgent need to urinate often. The stress and anxiety of needing to find a bathroom quickly can make this urge worse. This condition may go away on its own or you may need treatment at home. Home treatment may include bladder training, exercises, taking medicines, or making changes to your diet.  Follow these instructions at home:  Bladder health    · Keep a bladder diary if told by your health care provider. Keep track of:  ? What you eat and drink.  ? How often you urinate.  ? How much you urinate.  · Follow a bladder training program if told by your health care provider. This may include:  ? Learning to delay going to the bathroom.  ? Double urinating (voiding). This helps if you are not completely emptying your bladder.  ? Scheduled voiding.  · Do Kegel exercises as told by your health care provider. Kegel exercises strengthen the muscles that help control urination, which may help the condition.  Eating and drinking  · If told by your health care provider, make diet changes, such as:  ? Avoiding caffeine.  ? Drinking fewer fluids, especially alcohol.  ? Not drinking in the evening.  ? Avoiding foods or drinks that may irritate the bladder. These include coffee, tea, soda, artificial sweeteners, citrus, tomato-based foods, and chocolate.  ? Eating foods that help prevent or ease constipation. Constipation can make this condition worse. Your health care provider may recommend that  you:  § Drink enough fluid to keep your urine pale yellow.  § Take over-the-counter or prescription medicines.  § Eat foods that are high in fiber, such as beans, whole grains, and fresh fruits and vegetables.  § Limit foods that are high in fat and processed sugars, such as fried or sweet foods.  General instructions  · Take over-the-counter and prescription medicines only as told by your health care provider.  · Keep all follow-up visits as told by your health care provider. This is important.  Contact a health care provider if:  · You start urinating more often.  · You feel pain or irritation when you urinate.  · You notice blood in your urine.  · Your urine looks cloudy.  · You develop a fever.  · You begin vomiting.  Get help right away if:  · You are unable to urinate.  Summary  · Urinary frequency means urinating more often than usual. With urinary frequency, you may urinate every 1-2 hours even though you drink a normal amount of fluid and do not have a bladder infection or other bladder condition.  · Your health care provider may recommend that you keep a bladder diary, follow a bladder training program, or make dietary changes.  · If told by your health care provider, do Kegel exercises to strengthen the muscles that help control urination.  · Take over-the-counter and prescription medicines only as told by your health care provider.  · Contact a health care provider if your symptoms do not improve or get worse.  This information is not intended to replace advice given to you by your health care provider. Make sure you discuss any questions you have with your health care provider.  Document Revised: 06/27/2019 Document Reviewed: 06/27/2019  Elsevier Patient Education © 2020 [employer]

## 2021-01-19 NOTE — LETTER
[facility]  [address] [facility] [address]  [phone]  Dept: [phone]  January 19, 2021     Sunitha Arrington      Patient: Sunitha Arrington   YOB: 1945   Date of Visit: 1/19/2021       To Whom it May Concern:    Sunitha Arrington was seen in my clinic on  1/19/2021 at [facility]. Please excuse Sunitha for her absence for this appointment. Return to work tomorrow 1/19/21.           Sincerely,         Christoph Torres MD    Electronically signed by Christoph Torres MD at 11:15 AM        CC: No Recipients

## 2021-02-01 NOTE — PROGRESS NOTES
02/01/2021    ---- [facility] Spoke to representative Anuel ---    [facility] has everything they need to review PAP renewal.    PAP is currently under review, and should have a determination no later than March 1st. [facility] will fax the determination letter.

## 2021-02-03 ENCOUNTER — ANCILLARY PROCEDURE (OUTPATIENT)
Dept: CARDIOLOGY | Facility: CLINIC | Age: 76
End: 2021-02-03
Payer: MEDICARE

## 2021-02-03 DIAGNOSIS — Z45.018 ENCOUNTER FOR INTERROGATION OF CARDIAC PACEMAKER: ICD-10-CM

## 2021-02-03 PROCEDURE — 93296 REM INTERROG EVL PM/IDS: CPT

## 2021-02-05 DIAGNOSIS — I10 ESSENTIAL HYPERTENSION: Chronic | ICD-10-CM

## 2021-02-08 ENCOUNTER — OFFICE VISIT (OUTPATIENT)
Dept: FAMILY MEDICINE | Facility: CLINIC | Age: 76
End: 2021-02-08
Payer: MEDICARE

## 2021-02-08 VITALS
WEIGHT: 176 LBS | DIASTOLIC BLOOD PRESSURE: 88 MMHG | HEART RATE: 64 BPM | SYSTOLIC BLOOD PRESSURE: 138 MMHG | RESPIRATION RATE: 18 BRPM | OXYGEN SATURATION: 97 % | BODY MASS INDEX: 29.29 KG/M2

## 2021-02-08 DIAGNOSIS — I63.9 CEREBROVASCULAR ACCIDENT (CVA), UNSPECIFIED MECHANISM (CMS/HCC): ICD-10-CM

## 2021-02-08 DIAGNOSIS — K59.04 CHRONIC IDIOPATHIC CONSTIPATION: ICD-10-CM

## 2021-02-08 DIAGNOSIS — B02.8 HERPES ZOSTER WITH COMPLICATION: ICD-10-CM

## 2021-02-08 DIAGNOSIS — Z12.31 ENCOUNTER FOR SCREENING MAMMOGRAM FOR MALIGNANT NEOPLASM OF BREAST: ICD-10-CM

## 2021-02-08 DIAGNOSIS — Z23 IMMUNIZATION DUE: ICD-10-CM

## 2021-02-08 DIAGNOSIS — E83.42 HYPOMAGNESEMIA: ICD-10-CM

## 2021-02-08 DIAGNOSIS — Z12.11 SCREENING FOR COLON CANCER: Primary | ICD-10-CM

## 2021-02-08 DIAGNOSIS — K21.9 GASTROESOPHAGEAL REFLUX DISEASE: ICD-10-CM

## 2021-02-08 DIAGNOSIS — I10 ESSENTIAL HYPERTENSION: ICD-10-CM

## 2021-02-08 DIAGNOSIS — Z95.0 CARDIAC PACEMAKER IN SITU: ICD-10-CM

## 2021-02-08 DIAGNOSIS — L40.9 PSORIASIS: ICD-10-CM

## 2021-02-08 DIAGNOSIS — D84.9 IMMUNOCOMPROMISED (CMS/HCC): ICD-10-CM

## 2021-02-08 DIAGNOSIS — F51.01 PRIMARY INSOMNIA: ICD-10-CM

## 2021-02-08 DIAGNOSIS — Z79.899 MEDICATION MANAGEMENT: ICD-10-CM

## 2021-02-08 DIAGNOSIS — Z00.00 ENCOUNTER FOR MEDICARE ANNUAL WELLNESS EXAM: ICD-10-CM

## 2021-02-08 DIAGNOSIS — Z13.9 SCREENING PROCEDURE: ICD-10-CM

## 2021-02-08 DIAGNOSIS — E78.5 DYSLIPIDEMIA: ICD-10-CM

## 2021-02-08 DIAGNOSIS — E87.6 HYPOKALEMIA: Chronic | ICD-10-CM

## 2021-02-08 DIAGNOSIS — E78.5 HYPERLIPIDEMIA, UNSPECIFIED HYPERLIPIDEMIA TYPE: ICD-10-CM

## 2021-02-08 DIAGNOSIS — Z78.0 POSTMENOPAUSAL: ICD-10-CM

## 2021-02-08 DIAGNOSIS — Z12.39 ENCOUNTER FOR SCREENING FOR MALIGNANT NEOPLASM OF BREAST, UNSPECIFIED SCREENING MODALITY: ICD-10-CM

## 2021-02-08 DIAGNOSIS — L85.3 XEROSIS OF SKIN: ICD-10-CM

## 2021-02-08 DIAGNOSIS — I73.00 RAYNAUD'S PHENOMENON WITHOUT GANGRENE: ICD-10-CM

## 2021-02-08 DIAGNOSIS — G25.81 RLS (RESTLESS LEGS SYNDROME): ICD-10-CM

## 2021-02-08 LAB
ALBUMIN SERPL-MCNC: 4.3 G/DL (ref 3.5–5.3)
ALP SERPL-CCNC: 65 U/L (ref 55–142)
ALT SERPL-CCNC: 19 U/L (ref 7–52)
ANION GAP SERPL CALC-SCNC: 8 MMOL/L (ref 3–11)
AST SERPL-CCNC: 22 U/L
BACTERIA #/AREA URNS HPF: NORMAL /HPF
BASOPHILS # BLD AUTO: 0 10*3/UL
BASOPHILS NFR BLD AUTO: 1 % (ref 0–2)
BILIRUB SERPL-MCNC: 0.76 MG/DL (ref 0.2–1.4)
BILIRUB UR QL: NEGATIVE
BUN SERPL-MCNC: 12 MG/DL (ref 7–25)
C3 SERPL-MCNC: 128 MG/DL (ref 87–200)
C4 SERPL-MCNC: 23 MG/DL (ref 19–52)
CALCIUM ALBUM COR SERPL-MCNC: 9.4 MG/DL (ref 8.6–10.3)
CALCIUM SERPL-MCNC: 9.6 MG/DL (ref 8.6–10.3)
CHLORIDE SERPL-SCNC: 106 MMOL/L (ref 98–107)
CHOLEST SERPL-MCNC: 126 MG/DL (ref 0–199)
CLARITY UR: CLEAR
CO2 SERPL-SCNC: 26 MMOL/L (ref 21–32)
COLOR UR: YELLOW
CREAT SERPL-MCNC: 0.79 MG/DL (ref 0.6–1.1)
CRP SERPL-MCNC: <1 MG/L
DSDNA IGG SERPL IA-ACNC: 9 IU/ML
EOSINOPHIL # BLD AUTO: 0.1 10*3/UL
EOSINOPHIL NFR BLD AUTO: 2 % (ref 0–3)
ERYTHROCYTE [DISTWIDTH] IN BLOOD BY AUTOMATED COUNT: 12.6 % (ref 11.5–14)
FASTING STATUS PATIENT QL REPORTED: YES
GFR SERPL CREATININE-BSD FRML MDRD: 74 ML/MIN/1.73M*2
GLUCOSE SERPL-MCNC: 117 MG/DL (ref 70–105)
GLUCOSE UR QL: NEGATIVE MG/DL
HCT VFR BLD AUTO: 41.7 % (ref 34–45)
HDLC SERPL-MCNC: 52 MG/DL
HGB BLD-MCNC: 14.5 G/DL (ref 11.5–15.5)
HGB UR QL: NEGATIVE
KETONES UR-MCNC: NEGATIVE MG/DL
LDLC SERPL CALC-MCNC: 53 MG/DL (ref 20–99)
LEUKOCYTE ESTERASE UR QL STRIP: NEGATIVE
LYMPHOCYTES # BLD AUTO: 1 10*3/UL
LYMPHOCYTES NFR BLD AUTO: 21 % (ref 11–47)
MAGNESIUM SERPL-MCNC: 2 MG/DL (ref 1.8–2.4)
MCH RBC QN AUTO: 34 PG (ref 28–33)
MCHC RBC AUTO-ENTMCNC: 34.7 G/DL (ref 32–36)
MCV RBC AUTO: 98 FL (ref 81–97)
MONOCYTES # BLD AUTO: 0.4 10*3/UL
MONOCYTES NFR BLD AUTO: 8 % (ref 3–11)
NEUTROPHILS # BLD AUTO: 3.1 10*3/UL
NEUTROPHILS NFR BLD AUTO: 68 % (ref 41–81)
NITRITE UR QL: NEGATIVE
PH UR: 5.5 PH
PLATELET # BLD AUTO: 184 10*3/UL (ref 140–350)
PMV BLD AUTO: 7.6 FL (ref 6.9–10.8)
POTASSIUM SERPL-SCNC: 4.2 MMOL/L (ref 3.5–5.1)
PROT SERPL-MCNC: 7.1 G/DL (ref 6–8.3)
PROT UR STRIP-MCNC: NEGATIVE MG/DL
RBC # BLD AUTO: 4.26 10*6/ΜL (ref 3.7–5.3)
RBC #/AREA URNS HPF: NORMAL /HPF
SODIUM SERPL-SCNC: 140 MMOL/L (ref 135–145)
SP GR UR: >=1.03 (ref 1–1.03)
SQUAMOUS #/AREA URNS HPF: NORMAL /HPF
TRIGL SERPL-MCNC: 107 MG/DL
UROBILINOGEN UR-MCNC: 0.2 E.U./DL
WBC # BLD AUTO: 4.6 10*3/UL (ref 4.5–10.5)
WBC #/AREA URNS HPF: NORMAL /HPF

## 2021-02-08 PROCEDURE — 90471 IMMUNIZATION ADMIN: CPT | Performed by: FAMILY MEDICINE

## 2021-02-08 PROCEDURE — 86140 C-REACTIVE PROTEIN: CPT | Performed by: FAMILY MEDICINE

## 2021-02-08 PROCEDURE — G0463 HOSPITAL OUTPT CLINIC VISIT: HCPCS

## 2021-02-08 PROCEDURE — 86235 NUCLEAR ANTIGEN ANTIBODY: CPT | Performed by: FAMILY MEDICINE

## 2021-02-08 PROCEDURE — 90472 IMMUNIZATION ADMIN EACH ADD: CPT

## 2021-02-08 PROCEDURE — 90715 TDAP VACCINE 7 YRS/> IM: CPT | Performed by: FAMILY MEDICINE

## 2021-02-08 PROCEDURE — 85025 COMPLETE CBC W/AUTO DIFF WBC: CPT | Performed by: FAMILY MEDICINE

## 2021-02-08 PROCEDURE — 80061 LIPID PANEL: CPT | Performed by: FAMILY MEDICINE

## 2021-02-08 PROCEDURE — 81001 URINALYSIS AUTO W/SCOPE: CPT | Performed by: FAMILY MEDICINE

## 2021-02-08 PROCEDURE — 90471 IMMUNIZATION ADMIN: CPT

## 2021-02-08 PROCEDURE — 36415 COLL VENOUS BLD VENIPUNCTURE: CPT | Performed by: FAMILY MEDICINE

## 2021-02-08 PROCEDURE — 80053 COMPREHEN METABOLIC PANEL: CPT | Performed by: FAMILY MEDICINE

## 2021-02-08 PROCEDURE — G0439 PPPS, SUBSEQ VISIT: HCPCS | Performed by: FAMILY MEDICINE

## 2021-02-08 PROCEDURE — 86160 COMPLEMENT ANTIGEN: CPT | Performed by: FAMILY MEDICINE

## 2021-02-08 PROCEDURE — 99214 OFFICE O/P EST MOD 30 MIN: CPT | Performed by: FAMILY MEDICINE

## 2021-02-08 PROCEDURE — 83735 ASSAY OF MAGNESIUM: CPT | Performed by: FAMILY MEDICINE

## 2021-02-08 RX ORDER — OMEPRAZOLE 20 MG/1
20 CAPSULE, DELAYED RELEASE ORAL DAILY
Qty: 90 CAPSULE | Refills: 3 | Status: SHIPPED | OUTPATIENT
Start: 2021-02-08 | End: 2021-12-16

## 2021-02-08 RX ORDER — GABAPENTIN 100 MG/1
100 CAPSULE ORAL 3 TIMES DAILY
Qty: 270 CAPSULE | Refills: 3 | Status: SHIPPED | OUTPATIENT
Start: 2021-02-08 | End: 2022-02-08

## 2021-02-08 RX ORDER — POTASSIUM CHLORIDE 750 MG/1
10 TABLET, EXTENDED RELEASE ORAL 2 TIMES DAILY
Qty: 180 TABLET | Refills: 3 | Status: SHIPPED | OUTPATIENT
Start: 2021-02-08 | End: 2021-12-08

## 2021-02-08 RX ORDER — ATORVASTATIN CALCIUM 40 MG/1
40 TABLET, FILM COATED ORAL DAILY
Qty: 90 TABLET | Refills: 3 | Status: SHIPPED | OUTPATIENT
Start: 2021-02-08 | End: 2021-12-31

## 2021-02-08 RX ORDER — CLOPIDOGREL BISULFATE 75 MG/1
75 TABLET ORAL DAILY
Qty: 90 TABLET | Refills: 3 | Status: SHIPPED | OUTPATIENT
Start: 2021-02-08 | End: 2021-12-16

## 2021-02-08 RX ORDER — ISOSORBIDE MONONITRATE 30 MG/1
30 TABLET, EXTENDED RELEASE ORAL DAILY
Qty: 90 TABLET | Refills: 3 | Status: SHIPPED | OUTPATIENT
Start: 2021-02-08 | End: 2021-11-26

## 2021-02-08 RX ORDER — ACYCLOVIR 800 MG/1
800 TABLET ORAL 3 TIMES DAILY
Qty: 21 TABLET | Refills: 0 | Status: SHIPPED | OUTPATIENT
Start: 2021-02-08 | End: 2021-02-15

## 2021-02-08 RX ORDER — BENAZEPRIL HYDROCHLORIDE 40 MG/1
TABLET ORAL
Qty: 90 TABLET | Refills: 3 | Status: SHIPPED | OUTPATIENT
Start: 2021-02-08 | End: 2021-02-08 | Stop reason: SDUPTHER

## 2021-02-08 RX ORDER — BENAZEPRIL HYDROCHLORIDE 40 MG/1
40 TABLET ORAL DAILY
Qty: 90 TABLET | Refills: 3 | Status: SHIPPED | OUTPATIENT
Start: 2021-02-08 | End: 2021-12-16

## 2021-02-08 RX ORDER — CLOBETASOL PROPIONATE 0.5 MG/G
1 OINTMENT TOPICAL 2 TIMES DAILY
Qty: 60 G | Refills: 2 | Status: SHIPPED | OUTPATIENT
Start: 2021-02-08 | End: 2022-02-08

## 2021-02-08 ASSESSMENT — ENCOUNTER SYMPTOMS
APPETITE CHANGE: 0
BLOOD IN STOOL: 0
JOINT SWELLING: 0
UNEXPECTED WEIGHT CHANGE: 0
FEVER: 0
ANAL BLEEDING: 0
ARTHRALGIAS: 0
SLEEP DISTURBANCE: 0
LIGHT-HEADEDNESS: 0
RECTAL PAIN: 0
SEIZURES: 0
VOICE CHANGE: 0
HEADACHES: 0
NECK PAIN: 0
DYSPHORIC MOOD: 0
CHILLS: 0
EYE REDNESS: 0
ACTIVITY CHANGE: 0
TREMORS: 0
DIZZINESS: 0
TROUBLE SWALLOWING: 0
SPEECH DIFFICULTY: 0
COLOR CHANGE: 0
EYE ITCHING: 0
CHOKING: 0
AGITATION: 0
SINUS PAIN: 0
NECK STIFFNESS: 0
FACIAL SWELLING: 0
DYSURIA: 0
DIARRHEA: 0
RHINORRHEA: 0
STRIDOR: 0
EYE PAIN: 0
COUGH: 0
CONFUSION: 0
NERVOUS/ANXIOUS: 0
POLYDIPSIA: 0
SINUS PRESSURE: 0
WOUND: 0
ADENOPATHY: 0
PALPITATIONS: 0
NAUSEA: 0
ABDOMINAL DISTENTION: 0
PHOTOPHOBIA: 0
WHEEZING: 0
FREQUENCY: 0
FATIGUE: 0
DIAPHORESIS: 0
CHEST TIGHTNESS: 0
EYE DISCHARGE: 0
FLANK PAIN: 0
BACK PAIN: 0
BRUISES/BLEEDS EASILY: 0
HALLUCINATIONS: 0
APNEA: 0
DIFFICULTY URINATING: 0
POLYPHAGIA: 0
NUMBNESS: 0
ABDOMINAL PAIN: 0
WEAKNESS: 0
SHORTNESS OF BREATH: 0
HYPERACTIVE: 0
CONSTIPATION: 0
DECREASED CONCENTRATION: 0
HEMATURIA: 0
FACIAL ASYMMETRY: 0
VOMITING: 0
MYALGIAS: 0
SORE THROAT: 0

## 2021-02-08 ASSESSMENT — PAIN SCALES - GENERAL: PAINLEVEL: 1

## 2021-02-08 ASSESSMENT — PATIENT HEALTH QUESTIONNAIRE - PHQ9: SUM OF ALL RESPONSES TO PHQ QUESTIONS 1-9: 0

## 2021-02-08 NOTE — PATIENT INSTRUCTIONS
We have advised the following.  Lipid screening, metabolic panel and Dr. Dueñas has labs that she is ordered as well including a CBC  Hepatitis C was done and negative  You have had a Pap smear done prior to your hysterectomy but do not need those any longer  You had a colonoscopy in 2016 so you do not need to redo that until 2026  He had no need for AAA screening or CT scanning of the chest  Bone density when last done was in 2015 T score was -1 and we will order that today  Your shingles is up-to-date your pneumonia you will get the second dose today TDA P was done today and the flu was done today    Advanced directive depression screen vision screening all reviewed    In regards to your medications we will refill your Temovate and we will refill your Ambien, gabapentin, benazepril.    Your blood pressure is under good control  You can use the gabapentin either 2-3 times daily  We refilled her acyclovir so you have it on hand in case she get another course of shingles  You should follow-up in 6 months for your blood pressure and to establish with a doctor down there  You take the Imdur for some angina that has been worked up in the past  It is important to continue on your potassium supplementation  In regards to your omeprazole we will check a magnesium

## 2021-02-08 NOTE — PROGRESS NOTES
ASSESSMENT AND PLAN   Diagnoses and all orders for this visit:    Screening for colon cancer  -     Cologuard Cancer Screen Stool; Future    Encounter for screening for malignant neoplasm of breast, unspecified screening modality  -     BI screening mammogram bilateral w holly; Future    Screening procedure  -     Lipid panel Blood, Venous; Future    Postmenopausal  -     DXA bone density    Encounter for Medicare annual wellness exam  -     clobetasoL (TEMOVATE) 0.05 % ointment; Apply 1 application topically 2 (two) times a day    Xerosis of skin    Cerebrovascular accident (CVA), unspecified mechanism (CMS/HCC) ([facility])    RLS (restless legs syndrome)  -     gabapentin (NEURONTIN) 100 mg capsule; Take 1 capsule (100 mg total) by mouth 3 (three) times a day Patient taking one in the morning and one at night Total of 200mg daily    Immunocompromised (CMS/HCC) ([facility])    Primary insomnia    Dyslipidemia    Psoriasis  -     CBC w/auto differential Blood, Venous  -     Comprehensive metabolic panel Blood, Venous  -     C-reactive protein (Inflammation) Blood, Venous  -     C3 complement Blood, Venous  -     C4 complement Blood, Venous  -     dsDNA Antibodies Blood, Venous  -     Urinalysis w/microscopic, reflex culture Urine, Clean Catch    Essential hypertension  -     benazepriL (LOTENSIN) 40 mg tablet; Take 1 tablet (40 mg total) by mouth daily  -     isosorbide mononitrate (IMDUR) 30 mg 24 hr tablet; Take 1 tablet (30 mg total) by mouth daily    Raynaud's phenomenon without gangrene    Chronic idiopathic constipation    Immunization due  -     Pneumococcal polysaccharide vaccine 23-valent greater than or equal to 3yo subcutaneous/IM  -     Tdap vaccine greater than or equal to 8yo IM  -     Flu Vaccine High-Dose (PF) greater than or equal to 65 years old IM    Encounter for screening mammogram for malignant neoplasm of breast   -     BI screening mammogram bilateral w holly; Future    Hyperlipidemia, unspecified  hyperlipidemia type  -     atorvastatin (LIPITOR) 40 mg tablet; Take 1 tablet (40 mg total) by mouth daily    Cardiac pacemaker in situ  -     clopidogreL (PLAVIX) 75 mg tablet; Take 1 tablet (75 mg total) by mouth daily    Hypokalemia  -     potassium chloride 10 mEq CR tablet; Take 1 tablet (10 mEq total) by mouth 2 (two) times a day    Gastroesophageal reflux disease  -     omeprazole (PriLOSEC) 20 mg capsule; Take 1 capsule (20 mg total) by mouth daily    Herpes zoster with complication  -     acyclovir (ZOVIRAX) 800 mg tablet; Take 1 tablet (800 mg total) by mouth 3 (three) times a day for 7 days    Medication management  -     CBC w/auto differential Blood, Venous  -     Comprehensive metabolic panel Blood, Venous    Hypomagnesemia  -     Magnesium Blood, Venous; Future    Plan   we have advised the following.  Lipid screening, metabolic panel and Dr. Dueñas has labs that she is ordered as well including a CBC  Hepatitis C was done and negative  You have had a Pap smear done prior to your hysterectomy but do not need those any longer  You had a colonoscopy in 2016 so you do not need to redo that until 2026  He had no need for AAA screening or CT scanning of the chest  Bone density when last done was in 2015 T score was -1 and we will order that today  Your shingles is up-to-date your pneumonia you will get the second dose today TDA P was done today and the flu was done today    Advanced directive depression screen vision screening all reviewed    In regards to your medications we will refill your Temovate and we will refill your Ambien, gabapentin, benazepril.    Your blood pressure is under good control  You can use the gabapentin either 2-3 times daily  We refilled her acyclovir so you have it on hand in case she get another course of shingles  You should follow-up in 6 months for your blood pressure and to establish with a doctor down there  You take the Imdur for some angina that has been worked up in the  past  It is important to continue on your potassium supplementation  In regards to your omeprazole we will check a magnesium        Chief Complaint  Sunitha is a 75 y.o. female who presents for a Medicare Wellness Exam      HPI  This is a 75-year-old female who needs her preventive health updated today.  We have gone through all of this with her please see the above orders and discussion.  In regards her chronic disease illnesses  Patient takes gabapentin after her stroke she has had right-sided pain that just has not seem to go away she is on intensive statin therapy for her stroke as well that includes atorvastatin 40 mg benazepril controls her blood pressure and she takes Plavix as an antiplatelet for stroke prevention.  The patient also had angina and was placed on Imdur after cardiac work-up was unrevealing  The patient takes ropinirole for restless leg syndrome and Ambien for sleep and she also has proton pump inhibitors because she gets reflux    Screenings    Depression/Mood  1. Over the past two weeks, have you felt down, depressed or hopeless?  no   2. Over the past two weeks, have you felt little interest or pleasure in doing things?  no    Functional Ability/Safety  1. Was the patient's timed Up & Go test unsteady or longer than 30 seconds?   no  2. Do you need help with the phone, transportation, shopping, preparing meals housework, laundry, medications, or managing money?   no  3. Does your home have rugs in the hallway, lack grab bars in the bathroom, lack handrails on the stairs, or have poor lighting?   no  4. Have you noticed any hearing difficulties?   no    These questions have been asked and discussed with the patient.     Vision screening was completed, documented and discussed with the patient.     Advanced directives have been discussed with the patient.     Care Team reviewed and updated.    Preventative services recommendations were discussed with the patient.  Handout on preventive care and  any orders for those services are outlined in the patients After Visit Summary.   Past medical history surgical history social history is all reviewed the chart  HISTORY  Past Medical History:   Diagnosis Date   • Actinic keratosis    • Benign paroxysmal positional vertigo 10/30/2018   • Cancer (CMS/[facility]) ([facility])     Skin CA on nose   • CVA (cerebral vascular accident) (CMS/[facility]) ([facility])     Numbness/tingling of RUE but functional and large right toe numbness.   • High degree atrioventricular block     with syncope   • Hyperlipidemia    • Hypertension    • Hypertriglyceridemia    • Inguinal pain 10/30/2018   • Mid sternal chest pain 2018   • Neurologic disorder    • Presence of heart assist device (CMS/[facility]) ([facility])     Dual chamber pacemaker   • Psoriasis    • Sclerosis of the skin     Vaginal   • Squamous cell carcinoma in situ (SCCIS) of skin of nose 2019   • Squamous cell skin cancer 2019    in situ right pretip nose   • Urinary frequency 3/27/2019     Past Surgical History:   Procedure Laterality Date   • CARDIAC CATHETERIZATION     • CARDIAC PACEMAKER PLACEMENT      Dual chamber pacemaker implantation   • CARDIAC SURGERY  2014   •  SECTION  1977    [place]   • COLONOSCOPY  2016    Rare sigmoid diverticula. Physiologic internal hemorrhoids, otherwise normal exam   • COLONOSCOPY  2008   • COLPORRHAPHY      and rectocele repair   • MOHS SURGERY Right 2019    right pretip nose Banks's   • PERIPHERAL NERVE EVALUATION N/A 2019    Procedure: PERIPHERAL NERVE EVALUATION;  Surgeon: Jacqueline Landin MD;  Location: [facility] OR;  Service: Urology;  Laterality: N/A;   • SACRAL NERVE STIMULATOR PLACEMENT N/A 2019    Procedure: FULL IMPLANT INTERSTIM;  Surgeon: Jacqueline Landin MD;  Location: [facility] OR;  Service: Urology;  Laterality: N/A;   • TOTAL ABDOMINAL HYSTERECTOMY W/ BILATERAL SALPINGOOPHORECTOMY      [id number] [facility]  History   Problem Relation Age of Onset   • Stroke Mother 97   • Hypertension Mother    • Dementia Mother    • Other cancer Father 32        secondary malignant neoplasm of lymph node     Social History     Occupational History     Comment: [employer]   Tobacco Use   • Smoking status: Never Smoker   • Smokeless tobacco: Never Used   Substance and Sexual Activity   • Alcohol use: No   • Drug use: No   • Sexual activity: Defer   Social History Narrative   • Not on file       ALLERGIES AND MEDICATIONS  Allergies   Allergen Reactions   • Amlodipine Other (see comments)     Peripheral edema     • Apricot      throat pain   • Clindamycin Phosphate Rash     Ointment used for rash on face     Current Outpatient Medications   Medication Sig Dispense Refill   • gabapentin (NEURONTIN) 100 mg capsule Take 1 capsule (100 mg total) by mouth 3 (three) times a day Patient taking one in the morning and one at night Total of 200mg daily 270 capsule 3   • clobetasoL (TEMOVATE) 0.05 % ointment Apply 1 application topically 2 (two) times a day 60 g 2   • acyclovir (ZOVIRAX) 800 mg tablet Take 1 tablet (800 mg total) by mouth 3 (three) times a day for 7 days 21 tablet 0   • atorvastatin (LIPITOR) 40 mg tablet Take 1 tablet (40 mg total) by mouth daily 90 tablet 3   • benazepriL (LOTENSIN) 40 mg tablet Take 1 tablet (40 mg total) by mouth daily 90 tablet 3   • clopidogreL (PLAVIX) 75 mg tablet Take 1 tablet (75 mg total) by mouth daily 90 tablet 3   • isosorbide mononitrate (IMDUR) 30 mg 24 hr tablet Take 1 tablet (30 mg total) by mouth daily 90 tablet 3   • potassium chloride 10 mEq CR tablet Take 1 tablet (10 mEq total) by mouth 2 (two) times a day 180 tablet 3   • omeprazole (PriLOSEC) 20 mg capsule Take 1 capsule (20 mg total) by mouth daily 90 capsule 3   • zolpidem (AMBIEN) 5 mg tablet Take 1 tablet (5 mg total) by mouth nightly as needed for sleep Max Daily Amount: 5 mg 90 tablet 1   • rOPINIRole (REQUIP) 1 mg tablet TAKE 1  TABLET TWICE DAILY 180 tablet 3   • triamcinolone (KENALOG) 0.1 % cream Apply 1 application topically 2 (two) times a day as needed for rash Apply to hands, arms and legs up to three times daily. (Patient taking differently: Apply 1 application topically 2 (two) times a day as needed for rash Apply to hands, arms and legs up to three times daily. ) 80 g 4   • calcium carbonate-vitamin D3 600 mg calcium- 200 unit capsule Taking once daily      • hydrocortisone 2.5 % cream Apply three times daily as needed for rash on face 60 g 2   • CLOBETASOL PROPIONATE, BULK, MISC daily     • secukinumab 150 mg/mL pen injector Inject 300 mg once weekly at weeks 0, 1, 2, 3, and 4. (Patient taking differently: Inject 300 mg under the skin every 28 (twenty-eight) days I ) 10 pen 0   • naproxen sodium 220 mg capsule Take 2 tablets by mouth as needed Indications: Chest pain       • multivitamin with minerals tablet Take 1 tablet by mouth daily.     • lysine 500 mg tablet Take 1 tablet every day by oral route in the evening.       No current facility-administered medications for this visit.        Review of Systems   Constitutional: Negative for activity change, appetite change, chills, diaphoresis, fatigue, fever and unexpected weight change.   HENT: Negative for congestion, dental problem, drooling, ear discharge, ear pain, facial swelling, hearing loss, mouth sores, nosebleeds, postnasal drip, rhinorrhea, sinus pressure, sinus pain, sneezing, sore throat, tinnitus, trouble swallowing and voice change.    Eyes: Negative for photophobia, pain, discharge, redness, itching and visual disturbance.   Respiratory: Negative for apnea, cough, choking, chest tightness, shortness of breath, wheezing and stridor.    Cardiovascular: Negative for chest pain, palpitations and leg swelling.   Gastrointestinal: Negative for abdominal distention, abdominal pain, anal bleeding, blood in stool, constipation, diarrhea, nausea, rectal pain and vomiting.    Endocrine: Negative for cold intolerance, heat intolerance, polydipsia, polyphagia and polyuria.   Genitourinary: Negative for decreased urine volume, difficulty urinating, dysuria, enuresis, flank pain, frequency, genital sores, hematuria and urgency.   Musculoskeletal: Negative for arthralgias, back pain, gait problem, joint swelling, myalgias, neck pain and neck stiffness.   Skin: Negative for color change, pallor, rash and wound.   Allergic/Immunologic: Negative for environmental allergies, food allergies and immunocompromised state.   Neurological: Negative for dizziness, tremors, seizures, syncope, facial asymmetry, speech difficulty, weakness, light-headedness, numbness and headaches.   Hematological: Negative for adenopathy. Does not bruise/bleed easily.   Psychiatric/Behavioral: Negative for agitation, behavioral problems, confusion, decreased concentration, dysphoric mood, hallucinations, self-injury, sleep disturbance and suicidal ideas. The patient is not nervous/anxious and is not hyperactive.        OBJECTIVE  Vitals:    02/08/21 0959   BP: 138/88   Pulse: 64   Resp: 18   SpO2: 97%   Weight: 79.8 kg (176 lb)   PainSc:   1   PainLoc: Arm     BP Readings from Last 3 Encounters:   02/08/21 138/88   01/19/21 142/72   12/04/20 146/61       Body mass index is 29.29 kg/m².  Wt Readings from Last 3 Encounters:   02/08/21 79.8 kg (176 lb)   01/19/21 81.2 kg (179 lb)   12/04/20 79.8 kg (176 lb)       Physical Exam  Normocephalic atraumatic membranes are pink and moist pharynx is clear neck is supple heart regular rate and rhythm lungs are clear abdomen soft nontender extremity show no peripheral edema    Lab Results   Component Value Date    GLUCOSE 107 (H) 07/31/2020    CALCIUM 9.4 07/31/2020     07/31/2020    K 4.1 07/31/2020    CO2 24 07/31/2020     07/31/2020    BUN 15 07/31/2020    CREATININE 0.88 07/31/2020    ANIONGAP 9 07/31/2020     Lab Results   Component Value Date    WBC 4.6 07/31/2020  "   HGB 14.1 07/31/2020    HCT 40.7 07/31/2020    MCV 97.7 (H) 07/31/2020     07/31/2020     Lab Results   Component Value Date    TSH 1.435 02/22/2019     Lab Results   Component Value Date    HGBA1C 5.9 10/30/2018    HGBA1C 5.9 12/18/2017    HGBA1C 5.8 07/30/2015      Lab Results   Component Value Date    MICROALBUR 15.1 07/29/2019    LDLCALC 53 01/31/2020    CREATININE 0.88 07/31/2020     Lab Results   Component Value Date    SEDRATE 6 05/20/2019         Patient Care Team:  Hilda Donald MD as PCP - General (Family Medicine)  Connie Dueñas MD as Consulting Physician (Rheumatology)  Rowan Estrada MD as Consulting Physician (Dermatology)  Jacqueline Landin MD as Consulting Physician (Obstetrics and Gynecology)  Azam Avila MD as Consulting Physician (Electrophysiology)     Durable Medical Equipment  Patient does not use durable medical equipment    Allergies  Meds  Problems  Med Hx  Surg Hx  Fam Hx         Portions of this note was documented by Yinka Villeda MA as I performed the exam and collected the information from Sunitha Arrington. I attest that I have reviewed the information as documented, verified the accuracy of the documentation and added additional information as needed.       Leon Donahue MD wrote,   \"When you run out of doctor things to do for the sick person, see if there are any human things you can offer.\"     Thank you for intrusting us with your care. We understand this can be a very scary time and you can feel quite vulnerable.    You, and your health, are very important to me and my healthcare team.   If you have not signed up for [facility], please do so. It can dramatically improve your care.   I want you to update us by sending communication in [employer].  If you cannot do that then  call   [phone] or drop a line at [address] [place] [address]    We wish you the best.  Please let us know.    My Best,    Hilda Donald MD      Electronically signed by Hilda " MD Odilon on 2/8/2021 at 10:41 AM

## 2021-02-09 NOTE — PROGRESS NOTES
02/11/21-Glo [phone]-pt approved for Cosentyx at no cost.  Call to pt, LM, call to pt's mobile number, LM, pt doesn't need to return my call unless she has any questions re:  [employer] and Cosentyx.      02/09/21-Glo [phone]

## 2021-02-16 ENCOUNTER — HOSPITAL ENCOUNTER (OUTPATIENT)
Dept: MAMMOGRAPHY | Facility: HOSPITAL | Age: 76
Discharge: HOME | End: 2021-02-16
Payer: MEDICARE

## 2021-02-16 ENCOUNTER — TELEPHONE (OUTPATIENT)
Dept: FAMILY MEDICINE | Facility: CLINIC | Age: 76
End: 2021-02-16

## 2021-02-16 ENCOUNTER — HOSPITAL ENCOUNTER (OUTPATIENT)
Dept: BONE DENSITY | Facility: HOSPITAL | Age: 76
Discharge: HOME | End: 2021-02-16
Payer: MEDICARE

## 2021-02-16 DIAGNOSIS — Z12.39 ENCOUNTER FOR SCREENING FOR MALIGNANT NEOPLASM OF BREAST, UNSPECIFIED SCREENING MODALITY: ICD-10-CM

## 2021-02-16 DIAGNOSIS — Z12.31 ENCOUNTER FOR SCREENING MAMMOGRAM FOR MALIGNANT NEOPLASM OF BREAST: ICD-10-CM

## 2021-02-16 PROCEDURE — 77080 DXA BONE DENSITY AXIAL: CPT | Mod: 26 | Performed by: INTERNAL MEDICINE

## 2021-02-16 PROCEDURE — 77080 DXA BONE DENSITY AXIAL: CPT

## 2021-02-16 PROCEDURE — 77067 SCR MAMMO BI INCL CAD: CPT

## 2021-02-16 NOTE — TELEPHONE ENCOUNTER
"Irvin is calling to verify patients gabapentin script. There are two sets of directions \"take  By mouth 3 times a day\"  And \"patient taking one in morning and one at night total of 200 mg daily\".     Call back number is [phone]     Patients Reference number [id number]  "

## 2021-02-16 NOTE — PROGRESS NOTES
This bone density test shows results consistent with osteoporosis.  I reviewed your medications did not see that you are on Prolia  I would like you to start a Prolia injection and continue it when she move  I will have the nurses preauthorize it and then we will notify you when to get the injection.  Let us know if you have any questions

## 2021-02-22 ENCOUNTER — CLINICAL SUPPORT (OUTPATIENT)
Dept: FAMILY MEDICINE | Facility: CLINIC | Age: 76
End: 2021-02-22

## 2021-02-22 ENCOUNTER — TELEPHONE (OUTPATIENT)
Dept: FAMILY MEDICINE | Facility: CLINIC | Age: 76
End: 2021-02-22

## 2021-02-22 ENCOUNTER — CLINICAL SUPPORT (OUTPATIENT)
Dept: FAMILY MEDICINE | Facility: CLINIC | Age: 76
End: 2021-02-22
Payer: MEDICARE

## 2021-02-22 DIAGNOSIS — Z23 ENCOUNTER FOR VACCINATION: Primary | ICD-10-CM

## 2021-02-22 DIAGNOSIS — Z13.820 SCREENING FOR OSTEOPOROSIS: Primary | ICD-10-CM

## 2021-02-22 DIAGNOSIS — B02.8 HERPES ZOSTER WITH COMPLICATION: Primary | ICD-10-CM

## 2021-02-22 DIAGNOSIS — M81.0 OSTEOPOROSIS, UNSPECIFIED OSTEOPOROSIS TYPE, UNSPECIFIED PATHOLOGICAL FRACTURE PRESENCE: Primary | ICD-10-CM

## 2021-02-22 DIAGNOSIS — M81.0 AGE-RELATED OSTEOPOROSIS WITHOUT CURRENT PATHOLOGICAL FRACTURE: ICD-10-CM

## 2021-02-22 PROCEDURE — 6360000200 HC RX 636 W HCPCS (ALT 250 FOR IP): Performed by: FAMILY MEDICINE

## 2021-02-22 PROCEDURE — 96372 THER/PROPH/DIAG INJ SC/IM: CPT | Performed by: FAMILY MEDICINE

## 2021-02-22 PROCEDURE — G0463 HOSPITAL OUTPT CLINIC VISIT: HCPCS | Performed by: NURSE PRACTITIONER

## 2021-02-22 RX ADMIN — DENOSUMAB 60 MG: 60 INJECTION SUBCUTANEOUS at 10:33

## 2021-02-22 NOTE — PROGRESS NOTES
Pt. Presents for her Prolia injection given without complications. Pt. Tolerated it well A Rust RN

## 2021-02-23 RX ORDER — ACYCLOVIR 800 MG/1
TABLET ORAL
Qty: 21 TABLET | Refills: 3 | Status: SHIPPED | OUTPATIENT
Start: 2021-02-23

## 2021-03-01 PROCEDURE — 93294 REM INTERROG EVL PM/LDLS PM: CPT | Performed by: INTERNAL MEDICINE

## 2021-03-02 ENCOUNTER — TELEPHONE (OUTPATIENT)
Dept: FAMILY MEDICINE | Facility: CLINIC | Age: 76
End: 2021-03-02

## 2021-03-02 NOTE — TELEPHONE ENCOUNTER
Patient is requesting a nurse to call in regards to her prolia shot. Dr. Donald is aware of the situation but her insurance has denied it. Thanks

## 2021-03-04 NOTE — PROGRESS NOTES
Sunitha is a 75-year-old female with history of positive YEYO was found to have negative SSA and SSB. Seen as NP 2/2019.  Double-stranded DNA positive at 19 with normal less than 5.  CK is normal.  SMRNP was normal.  Myomarker 3 panel was normal.  Aldolase was negative.  Antihistone antibodies were also normal.  Patient did have a biopsy performed of lesion over her upper back which showed vascular interface dermatitis with increased mucin.  This could raise possibility of a connective tissue disorder.  The patient does have a history of psoriasis and had been treated with Humira.  She develops lesions initially in December 2018 with bumps along her cheek that then spread to her back.  She had good control of her symptoms with Humira and has been on it for several years. Sunitha has had raynauds symptoms for  20+ years, she does wear gloves. She has not noticed raynauds symptoms anywhere else.  She does have a chest tightness that she has had several months. She did complete a chemical stress test which came back normal.  Echocardiogram was also completed which was essentially unremarkable.. No problems with pregnancy in the past or getting pregnant. It feels tight when she tries to take a deep breath. No kidney problems that she knows of. She had itching when she got this rash and was given prednisone which she feels really helped with the itching.  She has not had previous history of blood clots other than history of stroke.  She has not noted recurrence of photosensitive rash.  She does have a history of psoriasis but has not had other rash.  She does not note mouth sores or canker sores.  Occasionally does note dry eyes.  Additional labs were drawn that showed a borderline positive double-stranded DNA and also slightly low C4 at 18.  I have received multiple communications from Sunitha regarding skin lesions.  She has sent us many different pictures which are available through her communications on my chart.  She also  has followed up with dermatology.  She has seen Dr. Michel who was questioned about the possibility of lichen sclerosis plaque of her upper back as well as possible morphea.  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 and Mohs excision recommended.  Patient was seen in May 2019 with worsening rash and we suggested discontinue Humira and she was started with Cosentyx.  Her labs from 1/31/2020 had shown C-reactive protein to be normal.  C3,C4 were normal.  Double-stranded DNA is 17.  Blood sugar mildly elevated at 106 with normals to 105.  Psoriasis completely cleared on the Cosentyx.  We had felt that she was not going on to develop other signs or symptoms of autoimmune disease.  She did complain also fatigue when she was last seen here and we had recommended she continue to follow with her primary care physician.  She had had a previous stroke.  Completed labs on 2/8/2021.  Double-stranded DNA was found to be borderline positive at 9.  C3 and C4 were normal.  C-reactive protein was normal.  Urinalysis unremarkable.  Blood sugar was mildly elevated at 117.  Blood counts were normal and she was not anemic.    Sunitha denies pain today. Sunitha is currently maintained on Cosentyx 300 mg every 28 days. She did recently complete a bone density that showed Osteoporosis, patient received her first Prolia injection last week in [place] as well as her first COVID-19 vaccine. Patient did tolerate the Prolia and first COVID-19 vaccine well. She confirms she is doing well with the Cosentyx. Sunitha confirms her Psoriasis is doing well. Denies skin rash at this time, if something does come up she will treat with the cream prescribed by Dermatologist which works well for her. Sunitha confirms to experience Raynaud's symptoms. Denies chest pain or  shortness of breath. Denies joint pain. No mouth sores or nasal sores. No fevers. No pleuritic pain.Patient does experience dry eyes which she treats with artifical tears. Denies dry mouth.      Allergies:  Amlodipine, Apricot, and Clindamycin phosphate    History:  Past Medical History:   Diagnosis Date   • Actinic keratosis    • Benign paroxysmal positional vertigo 10/30/2018   • Cancer (CMS/[facility]) ([facility])     Skin CA on nose   • CVA (cerebral vascular accident) (CMS/[facility]) ([facility])     Numbness/tingling of RUE but functional and large right toe numbness.   • High degree atrioventricular block     with syncope   • Hyperlipidemia    • Hypertension    • Hypertriglyceridemia    • Inguinal pain 10/30/2018   • Mid sternal chest pain 2018   • Neurologic disorder    • Presence of heart assist device (CMS/[facility]) ([facility])     Dual chamber pacemaker   • Psoriasis    • Sclerosis of the skin     Vaginal   • Squamous cell carcinoma in situ (SCCIS) of skin of nose 2019   • Squamous cell skin cancer 2019    in situ right pretip nose   • Urinary frequency 3/27/2019     Past Surgical History:   Procedure Laterality Date   • CARDIAC CATHETERIZATION     • CARDIAC PACEMAKER PLACEMENT      Dual chamber pacemaker implantation   • CARDIAC SURGERY  2014   •  SECTION  1977    [place]   • COLONOSCOPY  2016    Rare sigmoid diverticula. Physiologic internal hemorrhoids, otherwise normal exam   • COLONOSCOPY  2008   • COLPORRHAPHY      and rectocele repair   • MOHS SURGERY Right 2019    right pretip nose Banks's   • PERIPHERAL NERVE EVALUATION N/A 2019    Procedure: PERIPHERAL NERVE EVALUATION;  Surgeon: Jacqueline Landin MD;  Location: [facility] OR;  Service: Urology;  Laterality: N/A;   • SACRAL NERVE STIMULATOR PLACEMENT N/A 2019    Procedure: FULL IMPLANT INTERSTIM;  Surgeon: Jacqueline Landin MD;  Location: [facility] OR;  Service: Urology;  Laterality: N/A;   • TOTAL ABDOMINAL  HYSTERECTOMY W/ BILATERAL SALPINGOOPHORECTOMY      [id number] [facility]     Social History     Socioeconomic History   • Marital status:      Spouse name: Orlando   • Number of children: 3   • Years of education: Not on file   • Highest education level: Not on file   Occupational History     Comment: [employer]   Tobacco Use   • Smoking status: Never Smoker   • Smokeless tobacco: Never Used   Substance and Sexual Activity   • Alcohol use: No   • Drug use: No   • Sexual activity: Defer   Other Topics Concern   • Not on file   Social History Narrative   • Not on file     Social Determinants of Health     Financial Resource Strain: Unknown   • Difficulty of Paying Living Expenses: Patient refused   Food Insecurity: Unknown   • Worried About Running Out of Food in the Last Year: Patient refused   • Ran Out of Food in the Last Year: Patient refused   Transportation Needs: Unknown   • Lack of Transportation (Medical): Patient refused   • Lack of Transportation (Non-Medical): Patient refused   Physical Activity:    • Days of Exercise per Week:    • Minutes of Exercise per Session:    Stress:    • Feeling of Stress :    Social Connections:    • Frequency of Communication with Friends and Family:    • Frequency of Social Gatherings with Friends and Family:    • Attends Nondenominational Services:    • Active Member of Clubs or Organizations:    • Attends Club or Organization Meetings:    • Marital Status:    Intimate Partner Violence:    • Fear of Current or Ex-Partner:    • Emotionally Abused:    • Physically Abused:    • Sexually Abused:        Review of Systems   Constitutional: Negative for chills and fever.   HENT: Negative for ear pain and sore throat.    Eyes: Negative for pain and visual disturbance.        Dry eyes   Respiratory: Negative for cough and shortness of breath.    Cardiovascular: Negative for chest pain and palpitations.   Gastrointestinal: Negative for abdominal pain and vomiting.    Genitourinary: Negative for dysuria and hematuria.   Musculoskeletal: Negative for arthralgias and back pain.   Skin: Positive for color change. Negative for rash.   Neurological: Negative for seizures and syncope.   All other systems reviewed and are negative.    14 point ROS otherwise -.  Objective:  Vitals:    03/05/21 0820 03/05/21 0822   BP: 157/67    BP Location: Left arm    Patient Position: Sitting    Cuff Size: Regular Adult    Pulse: 60    SpO2:  99%   Weight: 79.5 kg (175 lb 3.2 oz)        Physical Exam  Constitutional:       Appearance: Normal appearance.   Cardiovascular:      Rate and Rhythm: Normal rate and regular rhythm.      Heart sounds: Normal heart sounds. No murmur.   Pulmonary:      Effort: Pulmonary effort is normal.      Breath sounds: Normal breath sounds.   Musculoskeletal:      Comments: Shoulders, elbows, wrists, MCPs, PIPs were unremarkable without tenderness, synovitis or limitation range of motion.  There is no tenderness on palpation of the greater trochanters.  Hips show good range of motion.  Knees ankles and MTPs were unremarkable without synovitis, tenderness or limitation range of motion.  There is no tenderness on palpation along thoracic or lumbar spine.  There is no tenderness on palpation over SI joints.      Exceptions include slight limited range of motion in bilateral shoulders.    Skin:     Findings: No rash.      Comments: Hyperkeratosis over nails of bilateral thumbs.    Neurological:      Mental Status: She is alert and oriented to person, place, and time.       Joint Exam 03/05/2021     No joint exam has been documented for this visit       Medications:  Current Outpatient Medications on File Prior to Visit   Medication Sig Dispense Refill   • denosumab (PROLIA) 60 mg/mL syringe syringe Inject 1 mL (60 mg total) under the skin every 6 (six) months 1 mL 0   • gabapentin (NEURONTIN) 100 mg capsule Take 1 capsule (100 mg total) by mouth 3 (three) times a day Patient  taking one in the morning and one at night Total of 200mg daily (Patient taking differently: Take 100 mg by mouth 2 (two) times a day Patient taking one in the morning and one at night Total of 200mg daily ) 270 capsule 3   • clobetasoL (TEMOVATE) 0.05 % ointment Apply 1 application topically 2 (two) times a day (Patient taking differently: Apply 1 application topically every other day  ) 60 g 2   • atorvastatin (LIPITOR) 40 mg tablet Take 1 tablet (40 mg total) by mouth daily 90 tablet 3   • benazepriL (LOTENSIN) 40 mg tablet Take 1 tablet (40 mg total) by mouth daily 90 tablet 3   • clopidogreL (PLAVIX) 75 mg tablet Take 1 tablet (75 mg total) by mouth daily 90 tablet 3   • isosorbide mononitrate (IMDUR) 30 mg 24 hr tablet Take 1 tablet (30 mg total) by mouth daily 90 tablet 3   • potassium chloride 10 mEq CR tablet Take 1 tablet (10 mEq total) by mouth 2 (two) times a day 180 tablet 3   • omeprazole (PriLOSEC) 20 mg capsule Take 1 capsule (20 mg total) by mouth daily 90 capsule 3   • zolpidem (AMBIEN) 5 mg tablet Take 1 tablet (5 mg total) by mouth nightly as needed for sleep Max Daily Amount: 5 mg 90 tablet 1   • rOPINIRole (REQUIP) 1 mg tablet TAKE 1 TABLET TWICE DAILY 180 tablet 3   • calcium carbonate-vitamin D3 600 mg calcium- 200 unit capsule Taking once daily      • hydrocortisone 2.5 % cream Apply three times daily as needed for rash on face 60 g 2   • CLOBETASOL PROPIONATE, BULK, MISC daily     • secukinumab 150 mg/mL pen injector Inject 300 mg once weekly at weeks 0, 1, 2, 3, and 4. (Patient taking differently: Inject 300 mg under the skin every 28 (twenty-eight) days I ) 10 pen 0   • naproxen sodium 220 mg capsule Take 2 tablets by mouth as needed Indications: Chest pain       • multivitamin with minerals tablet Take 1 tablet by mouth daily.     • lysine 500 mg tablet Take 1 tablet every day by oral route in the evening.     • acyclovir (ZOVIRAX) 800 mg tablet TAKE 1 TABLET THREE TIMES DAILY FOR 7 DAYS  (Patient not taking: Reported on 3/5/2021) 21 tablet 3     No current facility-administered medications on file prior to visit.     Labs:  CBC:     WBC   Date Value Ref Range Status   02/08/2021 4.6 4.5 - 10.5 10*3/uL Final     RBC   Date Value Ref Range Status   02/08/2021 4.26 3.70 - 5.30 10*6/µL Final     Hemoglobin   Date Value Ref Range Status   02/08/2021 14.5 11.5 - 15.5 g/dL Final     Hematocrit   Date Value Ref Range Status   02/08/2021 41.7 34.0 - 45.0 % Final     Platelets   Date Value Ref Range Status   02/08/2021 184 140 - 350 10*3/uL Final     CMP:   Sodium   Date Value Ref Range Status   02/08/2021 140 135 - 145 mmol/L Final     Potassium   Date Value Ref Range Status   02/08/2021 4.2 3.5 - 5.1 mmol/L Final     Chloride   Date Value Ref Range Status   02/08/2021 106 98 - 107 mmol/L Final     CO2   Date Value Ref Range Status   02/08/2021 26 21 - 32 mmol/L Final     Glucose   Date Value Ref Range Status   02/08/2021 117 (H) 70 - 105 mg/dL Final     Creatinine   Date Value Ref Range Status   02/08/2021 0.79 0.60 - 1.10 mg/dL Final     Calcium   Date Value Ref Range Status   02/08/2021 9.6 8.6 - 10.3 mg/dL Final     Albumin   Date Value Ref Range Status   02/08/2021 4.3 3.5 - 5.3 g/dL Final     Alkaline Phosphatase   Date Value Ref Range Status   02/08/2021 65 55 - 142 U/L Final     Total Bilirubin   Date Value Ref Range Status   02/08/2021 0.76 0.20 - 1.40 mg/dL Final     ALT (SGPT)   Date Value Ref Range Status   02/08/2021 19 7 - 52 U/L Final     AST   Date Value Ref Range Status   02/08/2021 22 <40 U/L Final     BUN   Date Value Ref Range Status   02/08/2021 12 7 - 25 mg/dL Final     Anion Gap   Date Value Ref Range Status   02/08/2021 8 3 - 11 mmol/L Final     ESR/CRP:   Sed Rate   Date Value Ref Range Status   05/20/2019 6 <=20 mm/hr Final     CRP   Date Value Ref Range Status   02/08/2021 <1.0 <=10.0 mg/L Final     Lab Results   Component Value Date    HEPCAB Non-reactive 01/31/2020    TBGOLD  Negative 08/06/2018       Assessment/Plan:    Diagnoses and all orders for this visit:    Psoriasis    YEYO positive    Medication management        Reviewed recent lab results. Sunitha will continue Cosentyx 300 mg every 28 days. Patient does plan to move to [place] middle of April 2021. Recommend patient establish with a Rheumatologist in her new area in 6 months. Suggested possibility of [facility], Patient will send her new address through her active [employer], then will send Rheumatology referral.     PsoriasisWell controlled.  She has had no adverse effects from the Cosentyx.  She will continue with current medication.    She does continue to have positive double-stranded DNA but no other signs or symptoms of systemic lupus. Labs are reviewed with the patient today. Thought was that this was related to previous use of Humira which she has now discontinued.  Only continuing complaint is Raynaud's.  Hopefully this will be better with the change in climate to [place].    Discussed all in detail patient voiced understanding and is in agreement with plan.    On this date of service 31 minutes of total time was spent on this encounter.        A voice recognition program was used to aid in documentation of this record.  Sometimes words are not printed exactly as they were spoken.  While efforts were made to carefully edit and correct any inaccuracies, some errors may be present; please take these into context.  Please contact the provider if areas are identified.    Scribed by  Eva Morton RN    8:26 AM, 3/5/2021

## 2021-03-05 ENCOUNTER — OFFICE VISIT (OUTPATIENT)
Dept: RHEUMATOLOGY | Facility: CLINIC | Age: 76
End: 2021-03-05
Payer: MEDICARE

## 2021-03-05 VITALS
SYSTOLIC BLOOD PRESSURE: 157 MMHG | OXYGEN SATURATION: 99 % | WEIGHT: 175.2 LBS | DIASTOLIC BLOOD PRESSURE: 67 MMHG | BODY MASS INDEX: 29.15 KG/M2 | HEART RATE: 60 BPM

## 2021-03-05 DIAGNOSIS — L40.9 PSORIASIS: Primary | ICD-10-CM

## 2021-03-05 DIAGNOSIS — R76.8 ANA POSITIVE: ICD-10-CM

## 2021-03-05 DIAGNOSIS — Z79.899 MEDICATION MANAGEMENT: ICD-10-CM

## 2021-03-05 PROCEDURE — G0463 HOSPITAL OUTPT CLINIC VISIT: HCPCS | Mod: PO | Performed by: INTERNAL MEDICINE

## 2021-03-05 PROCEDURE — 99214 OFFICE O/P EST MOD 30 MIN: CPT | Performed by: INTERNAL MEDICINE

## 2021-03-05 ASSESSMENT — ENCOUNTER SYMPTOMS
VOMITING: 0
EYE PAIN: 0
CHILLS: 0
FEVER: 0
COLOR CHANGE: 1
BACK PAIN: 0
ABDOMINAL PAIN: 0
SHORTNESS OF BREATH: 0
SORE THROAT: 0
SEIZURES: 0
ARTHRALGIAS: 0
HEMATURIA: 0
PALPITATIONS: 0
COUGH: 0
DYSURIA: 0

## 2021-03-05 ASSESSMENT — PAIN SCALES - GENERAL: PAINLEVEL: 0-NO PAIN

## 2021-03-22 ENCOUNTER — CLINICAL SUPPORT (OUTPATIENT)
Dept: FAMILY MEDICINE | Facility: CLINIC | Age: 76
End: 2021-03-22

## 2021-03-22 DIAGNOSIS — Z23 ENCOUNTER FOR VACCINATION: Primary | ICD-10-CM

## 2021-03-26 ENCOUNTER — OFFICE VISIT (OUTPATIENT)
Dept: URGENT CARE | Facility: CLINIC | Age: 76
End: 2021-03-26
Payer: MEDICARE

## 2021-03-26 VITALS
TEMPERATURE: 97.5 F | DIASTOLIC BLOOD PRESSURE: 100 MMHG | SYSTOLIC BLOOD PRESSURE: 158 MMHG | HEART RATE: 62 BPM | OXYGEN SATURATION: 96 % | RESPIRATION RATE: 16 BRPM

## 2021-03-26 DIAGNOSIS — N30.00 ACUTE CYSTITIS WITHOUT HEMATURIA: ICD-10-CM

## 2021-03-26 DIAGNOSIS — I10 ESSENTIAL HYPERTENSION: Chronic | ICD-10-CM

## 2021-03-26 DIAGNOSIS — R30.0 DYSURIA: Primary | ICD-10-CM

## 2021-03-26 LAB
BACTERIA #/AREA URNS HPF: ABNORMAL /HPF
BILIRUB UR QL: NEGATIVE
CLARITY UR: CLEAR
COLOR UR: YELLOW
GLUCOSE UR QL: NEGATIVE MG/DL
HGB UR QL: ABNORMAL
KETONES UR-MCNC: NEGATIVE MG/DL
LEUKOCYTE ESTERASE UR QL STRIP: ABNORMAL
NITRITE UR QL: NEGATIVE
PH UR: 5 PH
PROT UR STRIP-MCNC: ABNORMAL MG/DL
RBC #/AREA URNS HPF: ABNORMAL /HPF
SP GR UR: 1.02 (ref 1–1.03)
SQUAMOUS #/AREA URNS HPF: ABNORMAL /HPF
UROBILINOGEN UR-MCNC: 0.2 E.U./DL
WBC #/AREA URNS HPF: ABNORMAL /HPF

## 2021-03-26 PROCEDURE — G0463 HOSPITAL OUTPT CLINIC VISIT: HCPCS | Performed by: NURSE PRACTITIONER

## 2021-03-26 PROCEDURE — 99214 OFFICE O/P EST MOD 30 MIN: CPT | Performed by: STUDENT IN AN ORGANIZED HEALTH CARE EDUCATION/TRAINING PROGRAM

## 2021-03-26 PROCEDURE — 87088 URINE BACTERIA CULTURE: CPT | Performed by: STUDENT IN AN ORGANIZED HEALTH CARE EDUCATION/TRAINING PROGRAM

## 2021-03-26 PROCEDURE — 81001 URINALYSIS AUTO W/SCOPE: CPT | Performed by: STUDENT IN AN ORGANIZED HEALTH CARE EDUCATION/TRAINING PROGRAM

## 2021-03-26 RX ORDER — AMOXICILLIN AND CLAVULANATE POTASSIUM 875; 125 MG/1; MG/1
1 TABLET, FILM COATED ORAL 2 TIMES DAILY
Qty: 10 TABLET | Refills: 0 | Status: SHIPPED | OUTPATIENT
Start: 2021-03-26 | End: 2021-03-31

## 2021-03-26 ASSESSMENT — ENCOUNTER SYMPTOMS
VOMITING: 0
FEVER: 0
RHINORRHEA: 0
APPETITE CHANGE: 0
SHORTNESS OF BREATH: 0
FREQUENCY: 1
CHILLS: 0
CONSTIPATION: 0
DIARRHEA: 0
DYSURIA: 0
NAUSEA: 0

## 2021-03-26 NOTE — PROGRESS NOTES
Subjective      Sunitha Arrington is a 75 y.o. female who presents for urinary symptoms.    She has not taken her BP medication yet this morning, and is concerned about it as she will be moving shortly.    UTI   This is a new problem. Episode onset: Monday 3/22/2021. The problem occurs every urination. The problem has been unchanged. The quality of the pain is described as burning. The pain is moderate. There has been no fever. Associated symptoms include frequency and urgency. Pertinent negatives include no chills, nausea or vomiting. Associated symptoms comments: burning. She has tried nothing for the symptoms.       The following have been reviewed and updated as appropriate in this visit:  Allergies  Meds  Problems         Allergies   Allergen Reactions   • Amlodipine Other (see comments)     Peripheral edema     • Apricot      throat pain   • Clindamycin Phosphate Rash     Ointment used for rash on face     Current Outpatient Medications   Medication Sig Dispense Refill   • acyclovir (ZOVIRAX) 800 mg tablet TAKE 1 TABLET THREE TIMES DAILY FOR 7 DAYS 21 tablet 3   • denosumab (PROLIA) 60 mg/mL syringe syringe Inject 1 mL (60 mg total) under the skin every 6 (six) months 1 mL 0   • gabapentin (NEURONTIN) 100 mg capsule Take 1 capsule (100 mg total) by mouth 3 (three) times a day Patient taking one in the morning and one at night Total of 200mg daily (Patient taking differently: Take 100 mg by mouth 2 (two) times a day Patient taking one in the morning and one at night Total of 200mg daily ) 270 capsule 3   • clobetasoL (TEMOVATE) 0.05 % ointment Apply 1 application topically 2 (two) times a day (Patient taking differently: Apply 1 application topically every other day  ) 60 g 2   • atorvastatin (LIPITOR) 40 mg tablet Take 1 tablet (40 mg total) by mouth daily 90 tablet 3   • benazepriL (LOTENSIN) 40 mg tablet Take 1 tablet (40 mg total) by mouth daily 90 tablet 3   • clopidogreL (PLAVIX) 75 mg tablet Take 1 tablet  (75 mg total) by mouth daily 90 tablet 3   • isosorbide mononitrate (IMDUR) 30 mg 24 hr tablet Take 1 tablet (30 mg total) by mouth daily 90 tablet 3   • potassium chloride 10 mEq CR tablet Take 1 tablet (10 mEq total) by mouth 2 (two) times a day 180 tablet 3   • omeprazole (PriLOSEC) 20 mg capsule Take 1 capsule (20 mg total) by mouth daily 90 capsule 3   • zolpidem (AMBIEN) 5 mg tablet Take 1 tablet (5 mg total) by mouth nightly as needed for sleep Max Daily Amount: 5 mg 90 tablet 1   • rOPINIRole (REQUIP) 1 mg tablet TAKE 1 TABLET TWICE DAILY 180 tablet 3   • calcium carbonate-vitamin D3 600 mg calcium- 200 unit capsule Taking once daily      • hydrocortisone 2.5 % cream Apply three times daily as needed for rash on face 60 g 2   • CLOBETASOL PROPIONATE, BULK, MISC daily     • secukinumab 150 mg/mL pen injector Inject 300 mg once weekly at weeks 0, 1, 2, 3, and 4. (Patient taking differently: Inject 300 mg under the skin every 28 (twenty-eight) days I ) 10 pen 0   • naproxen sodium 220 mg capsule Take 2 tablets by mouth as needed Indications: Chest pain       • multivitamin with minerals tablet Take 1 tablet by mouth daily.     • lysine 500 mg tablet Take 1 tablet every day by oral route in the evening.     • amoxicillin-pot clavulanate (AUGMENTIN) 875-125 mg per tablet Take 1 tablet by mouth 2 (two) times a day for 5 days 10 tablet 0     No current facility-administered medications for this visit.     Past Medical History:   Diagnosis Date   • Actinic keratosis    • Benign paroxysmal positional vertigo 10/30/2018   • Cancer (CMS/HCC) ([facility])     Skin CA on nose   • CVA (cerebral vascular accident) (CMS/HCC) ([facility]) 2014    Numbness/tingling of RUE but functional and large right toe numbness.   • High degree atrioventricular block     with syncope   • Hyperlipidemia    • Hypertension    • Hypertriglyceridemia    • Inguinal pain 10/30/2018   • Mid sternal chest pain 5/29/2018   • Neurologic disorder    • Presence of  heart assist device (CMS/HCC) ([facility])     Dual chamber pacemaker   • Psoriasis    • Sclerosis of the skin     Vaginal   • Squamous cell carcinoma in situ (SCCIS) of skin of nose 2019   • Squamous cell skin cancer 2019    in situ right pretip nose   • Urinary frequency 3/27/2019     Past Surgical History:   Procedure Laterality Date   • CARDIAC CATHETERIZATION     • CARDIAC PACEMAKER PLACEMENT      Dual chamber pacemaker implantation   • CARDIAC SURGERY  2014   •  SECTION  1977    [place]   • COLONOSCOPY  2016    Rare sigmoid diverticula. Physiologic internal hemorrhoids, otherwise normal exam   • COLONOSCOPY  2008   • COLPORRHAPHY      and rectocele repair   • MOHS SURGERY Right 2019    right pretip nose Banks's   • PERIPHERAL NERVE EVALUATION N/A 2019    Procedure: PERIPHERAL NERVE EVALUATION;  Surgeon: Jacqueline Landin MD;  Location: [facility] OR;  Service: Urology;  Laterality: N/A;   • SACRAL NERVE STIMULATOR PLACEMENT N/A 2019    Procedure: FULL IMPLANT INTERSTIM;  Surgeon: Jacqueline Landin MD;  Location: [facility] OR;  Service: Urology;  Laterality: N/A;   • TOTAL ABDOMINAL HYSTERECTOMY W/ BILATERAL SALPINGOOPHORECTOMY      [id number] [facility]     Family History   Problem Relation Age of Onset   • Stroke Mother 97   • Hypertension Mother    • Dementia Mother    • Other cancer Father 32        secondary malignant neoplasm of lymph node     Social History     Occupational History     Comment: [employer]   Tobacco Use   • Smoking status: Never Smoker   • Smokeless tobacco: Never Used   Substance and Sexual Activity   • Alcohol use: No   • Drug use: No   • Sexual activity: Defer   Social History Narrative   • Not on file       Review of Systems   Constitutional: Negative for appetite change, chills and fever.   HENT: Negative for rhinorrhea.    Respiratory: Negative for shortness of breath.    Cardiovascular: Negative for chest pain  and leg swelling.   Gastrointestinal: Negative for constipation, diarrhea, nausea and vomiting.   Genitourinary: Positive for frequency and urgency. Negative for dysuria.       Objective   /100 (BP Location: Left arm, Patient Position: Sitting, Cuff Size: Regular Adult) Comment: has not taken BP meds this AM  Pulse 62   Temp 36.4 °C (97.5 °F)   Resp 16   SpO2 96%     Physical Exam  Vitals reviewed.   Constitutional:       General: She is awake. She is not in acute distress.     Appearance: She is well-developed.   HENT:      Head: Normocephalic and atraumatic.      Nose: Nose normal.      Mouth/Throat:      Mouth: Mucous membranes are moist.   Eyes:      Conjunctiva/sclera: Conjunctivae normal.   Neck:      Trachea: Trachea normal.   Cardiovascular:      Rate and Rhythm: Normal rate and regular rhythm.      Heart sounds: Normal heart sounds. No murmur.   Pulmonary:      Effort: Pulmonary effort is normal. No tachypnea, accessory muscle usage or respiratory distress.      Breath sounds: Normal breath sounds. No wheezing or rales.   Abdominal:      General: Bowel sounds are normal. There is no distension.      Palpations: Abdomen is soft.      Tenderness: There is no abdominal tenderness. There is no right CVA tenderness or left CVA tenderness.   Musculoskeletal:      Cervical back: Neck supple. No edema or signs of trauma.      Right lower leg: No edema.      Left lower leg: No edema.   Lymphadenopathy:      Cervical: No cervical adenopathy.   Skin:     General: Skin is warm and dry.      Capillary Refill: Capillary refill takes less than 2 seconds.      Coloration: Skin is not jaundiced.      Findings: No rash.   Neurological:      Mental Status: She is alert and oriented to person, place, and time.      Cranial Nerves: Cranial nerves are intact.      Gait: Gait is intact.   Psychiatric:         Attention and Perception: Attention normal.         Mood and Affect: Mood and affect normal.         Behavior:  Behavior normal. Behavior is cooperative.         Thought Content: Thought content normal.         Assessment   ASSESSMENT AND PLAN   Problem List Items Addressed This Visit        Circulatory    Essential hypertension (Chronic)     Not taken her morning medication yet, so I recommended she take her blood pressure medication when she get home, and then recheck her blood pressure in 1 to 2 hours.  If it continues to be elevated I have asked that she continue to check her blood pressure daily and follow-up next week for blood pressure titration as she will be moving out of the area shortly and needs to get that figured out before hand.           Other Visit Diagnoses     Dysuria    -  Primary    Relevant Orders    Urinalysis w/microscopic, reflex culture Urine, Clean Catch (Completed)    Urine culture    Acute cystitis without hematuria        Leukocytes and bacteria present in urine, will treat with Augmentin for 5 days, will culture urine and call with results.    Relevant Medications    amoxicillin-pot clavulanate (AUGMENTIN) 875-125 mg per tablet            No follow-ups on file.    Gi Brown, DO  3/26/2021  9:04 AM    A voice recognition program was used to aid in documentation of this record. Sometimes words are not printed exactly as they were spoken. While efforts were made to carefully edit and correct any inaccuracies, some errors may be present; please take these into context.  Please contact the provider if errors are identified.    Patient Instructions   Take your blood pressure medication when you get home, and then check your blood pressure 1-2 hours later. If it is still high, keep an eye on your blood pressure, and follow up late next week with those numbers and your cuff so we can make changes to your medications.

## 2021-03-26 NOTE — ASSESSMENT & PLAN NOTE
Not taken her morning medication yet, so I recommended she take her blood pressure medication when she get home, and then recheck her blood pressure in 1 to 2 hours.  If it continues to be elevated I have asked that she continue to check her blood pressure daily and follow-up next week for blood pressure titration as she will be moving out of the area shortly and needs to get that figured out before hand.

## 2021-03-26 NOTE — PATIENT INSTRUCTIONS
Take your blood pressure medication when you get home, and then check your blood pressure 1-2 hours later. If it is still high, keep an eye on your blood pressure, and follow up late next week with those numbers and your cuff so we can make changes to your medications.

## 2021-03-28 LAB — BACTERIA UR CULT: ABNORMAL

## 2021-03-29 RX ORDER — SULFAMETHOXAZOLE AND TRIMETHOPRIM 800; 160 MG/1; MG/1
1 TABLET ORAL 2 TIMES DAILY
Qty: 10 TABLET | Refills: 0 | Status: SHIPPED | OUTPATIENT
Start: 2021-03-29 | End: 2021-04-03

## 2021-03-30 ENCOUNTER — TELEPHONE (OUTPATIENT)
Dept: CARDIOLOGY | Facility: CLINIC | Age: 76
End: 2021-03-30

## 2021-03-30 NOTE — TELEPHONE ENCOUNTER
S: pt calls to let us know she is moving to [place] and is in the process of establishing care. She will be taking her device monitor with her and will plug in while in transition. Wants us to know she has been very happy with her care here    Carolina Barber RN BSN

## 2021-05-10 DIAGNOSIS — J30.2 SEASONAL ALLERGIES: Primary | ICD-10-CM

## 2021-05-10 RX ORDER — MONTELUKAST SODIUM 10 MG/1
10 TABLET ORAL NIGHTLY
Qty: 90 TABLET | Refills: 1 | Status: SHIPPED | OUTPATIENT
Start: 2021-05-10 | End: 2021-09-15

## 2021-07-23 NOTE — TELEPHONE ENCOUNTER
Problem: Activity Restriction  Goal: Patient will understand activity restrictions  Intervention: Activity assessment per Cardiac Rehab  Note: Intervention Status  Done  Intervention: Work simplification/energy conservation techniques  Note: Intervention Status  Done  Intervention: Surgical restrictions if applicable  Description: Surgical restrictions if applicable.  Note: Intervention Status  Done  Intervention: Return to driving (per physician order)  Note: Intervention Status  Done  Intervention: Sexual activity resumption discussion  Note: Intervention Status  Done  Intervention: Lifting restrictions  Description: Lifting restrictions.  Note: Intervention Status  Done  Intervention: Return to work  Note: Per MD        Problem: Cardiac Risk Factor  Goal: Patient will identify and understand personal cardiac risk factors  Intervention: Smoking risk factor identification/risk reduction plan  Note: Intervention Status  Done  Intervention: Weight risk factor identification/risk reduction plan  Note: Intervention Status  Done  Intervention: Hypertension risk factor identification/risk reduction plan  Note: Intervention Status  Done  Intervention: Diabetes risk factor identification/risk reduction plan  Note: Intervention Status  Done  Intervention: Inactivity risk factor identification/risk reduction plan  Note: Intervention Status  Done  Intervention: Stress risk factor identification/risk reduction plan  Note: Intervention Status  Done  Intervention: Hyperlipidemia risk factor identification/risk reduction plan  Note: Intervention Status  Done     Problem: Discharge Information  Intervention: Home exercise program and self monitoring techniques  Note: Intervention Status  Done  Intervention: Home activity program  Note: Intervention Status  Done  Intervention: Sign and symptom recognition and plan  Note: Intervention Status  Done  Intervention: Information/referral to outpatient cardiac rehabilitation  Note:  Patient called and wanted to let you know that she received her medication today and that she will start her benazepril tomorrow morning. Regarding her,  Acyclovir says its for shingles, chicken pox or genital herpes. She had her shingles shot on Jan. 15th and she would like to know if she should still take this.  Patient said you can respond via My Chart or she can be reached at:  [phone].  Thank you.     Intervention Status  Done

## 2021-08-17 ENCOUNTER — APPOINTMENT (RX ONLY)
Dept: URBAN - METROPOLITAN AREA CLINIC 158 | Facility: CLINIC | Age: 76
Setting detail: DERMATOLOGY
End: 2021-08-17

## 2021-08-17 DIAGNOSIS — L57.0 ACTINIC KERATOSIS: ICD-10-CM | Status: INADEQUATELY CONTROLLED

## 2021-08-17 DIAGNOSIS — D22 MELANOCYTIC NEVI: ICD-10-CM

## 2021-08-17 DIAGNOSIS — L82.1 OTHER SEBORRHEIC KERATOSIS: ICD-10-CM

## 2021-08-17 DIAGNOSIS — L40.0 PSORIASIS VULGARIS: ICD-10-CM | Status: WELL CONTROLLED

## 2021-08-17 DIAGNOSIS — L44.8 OTHER SPECIFIED PAPULOSQUAMOUS DISORDERS: ICD-10-CM

## 2021-08-17 DIAGNOSIS — Z85.828 PERSONAL HISTORY OF OTHER MALIGNANT NEOPLASM OF SKIN: ICD-10-CM | Status: WELL CONTROLLED

## 2021-08-17 DIAGNOSIS — L60.3 NAIL DYSTROPHY: ICD-10-CM

## 2021-08-17 PROBLEM — D22.5 MELANOCYTIC NEVI OF TRUNK: Status: ACTIVE | Noted: 2021-08-17

## 2021-08-17 PROCEDURE — 17000 DESTRUCT PREMALG LESION: CPT

## 2021-08-17 PROCEDURE — 17003 DESTRUCT PREMALG LES 2-14: CPT

## 2021-08-17 PROCEDURE — ? LIQUID NITROGEN (COSMETIC)

## 2021-08-17 PROCEDURE — ? DIAGNOSIS COMMENT

## 2021-08-17 PROCEDURE — 99203 OFFICE O/P NEW LOW 30 MIN: CPT | Mod: 25

## 2021-08-17 PROCEDURE — ? PRESCRIPTION

## 2021-08-17 PROCEDURE — ? COUNSELING

## 2021-08-17 PROCEDURE — ? LIQUID NITROGEN

## 2021-08-17 RX ORDER — TRIAMCINOLONE ACETONIDE 1 MG/G
CREAM TOPICAL
Qty: 1 | Refills: 1 | Status: ERX | COMMUNITY
Start: 2021-08-17

## 2021-08-17 RX ADMIN — TRIAMCINOLONE ACETONIDE: 1 CREAM TOPICAL at 00:00

## 2021-08-17 ASSESSMENT — LOCATION ZONE DERM
LOCATION ZONE: FINGERNAIL
LOCATION ZONE: ARM
LOCATION ZONE: FACE
LOCATION ZONE: HAND
LOCATION ZONE: TRUNK
LOCATION ZONE: LEG
LOCATION ZONE: NOSE

## 2021-08-17 ASSESSMENT — LOCATION DETAILED DESCRIPTION DERM
LOCATION DETAILED: LEFT PROXIMAL DORSAL FOREARM
LOCATION DETAILED: NASAL DORSUM
LOCATION DETAILED: LEFT FOREHEAD
LOCATION DETAILED: RIGHT LATERAL MALAR CHEEK
LOCATION DETAILED: RIGHT THUMBNAIL
LOCATION DETAILED: RIGHT PROXIMAL RADIAL DORSAL FOREARM
LOCATION DETAILED: RIGHT ANTERIOR PROXIMAL THIGH
LOCATION DETAILED: LEFT SUPERIOR CENTRAL MALAR CHEEK
LOCATION DETAILED: RIGHT RADIAL DORSAL HAND
LOCATION DETAILED: EPIGASTRIC SKIN
LOCATION DETAILED: RIGHT INFERIOR MEDIAL UPPER BACK
LOCATION DETAILED: LEFT PROXIMAL POSTERIOR UPPER ARM

## 2021-08-17 ASSESSMENT — LOCATION SIMPLE DESCRIPTION DERM
LOCATION SIMPLE: RIGHT FOREARM
LOCATION SIMPLE: LEFT POSTERIOR UPPER ARM
LOCATION SIMPLE: NOSE
LOCATION SIMPLE: RIGHT THUMBNAIL
LOCATION SIMPLE: ABDOMEN
LOCATION SIMPLE: RIGHT HAND
LOCATION SIMPLE: RIGHT CHEEK
LOCATION SIMPLE: RIGHT THIGH
LOCATION SIMPLE: LEFT FOREHEAD
LOCATION SIMPLE: RIGHT UPPER BACK
LOCATION SIMPLE: LEFT CHEEK
LOCATION SIMPLE: LEFT FOREARM

## 2021-08-17 ASSESSMENT — PGA PSORIASIS: PGA PSORIASIS 2020: MILD

## 2021-08-17 ASSESSMENT — BSA PSORIASIS: % BODY COVERED IN PSORIASIS: 1

## 2021-08-17 NOTE — PROCEDURE: DIAGNOSIS COMMENT
Comment: Patient is treated by a rheumatologist.
Render Risk Assessment In Note?: no
Detail Level: Detailed

## 2021-08-17 NOTE — PROCEDURE: LIQUID NITROGEN
Number Of Freeze-Thaw Cycles: 1 freeze-thaw cycle
Render Post-Care Instructions In Note?: yes
Post-Care Instructions: I reviewed with the patient in detail post-care instructions. Patient is to wear sunprotection, and avoid picking at any of the treated lesions. Pt may apply Vaseline to crusted or scabbing areas.
Render Note In Bullet Format When Appropriate: No
Duration Of Freeze Thaw-Cycle (Seconds): 6
Consent: The patient's consent was obtained including but not limited to risks of crusting, scabbing, blistering, scarring, darker or lighter pigmentary change, recurrence, incomplete removal and infection.
Detail Level: Detailed

## 2021-08-17 NOTE — PROCEDURE: LIQUID NITROGEN (COSMETIC)
Consent: The patient's consent was obtained including but not limited to risks of crusting, scabbing, blistering, scarring, darker or lighter pigmentary change, recurrence, incomplete removal and infection. The patient understands that the procedure is cosmetic in nature and is not covered by insurance.
Render Post-Care Instructions In Note?: no
Price (Use Numbers Only, No Special Characters Or $): 0
Post-Care Instructions: I reviewed with the patient in detail post-care instructions. Patient is to wear sunprotection, and avoid picking at any of the treated lesions. Pt may apply Vaseline to crusted or scabbing areas.
Billing Information: Bill by Static Price
Detail Level: Simple

## 2021-09-15 DIAGNOSIS — G25.81 RESTLESS LEGS SYNDROME: ICD-10-CM

## 2021-09-15 DIAGNOSIS — J30.2 SEASONAL ALLERGIES: ICD-10-CM

## 2021-09-15 RX ORDER — ROPINIROLE 1 MG/1
TABLET, FILM COATED ORAL
Qty: 180 TABLET | Refills: 3 | Status: SHIPPED | OUTPATIENT
Start: 2021-09-15 | End: 2022-07-18

## 2021-09-15 RX ORDER — MONTELUKAST SODIUM 10 MG/1
TABLET ORAL
Qty: 90 TABLET | Refills: 1 | Status: SHIPPED | OUTPATIENT
Start: 2021-09-15

## 2021-11-24 DIAGNOSIS — I10 ESSENTIAL HYPERTENSION: ICD-10-CM

## 2021-11-25 NOTE — TELEPHONE ENCOUNTER
Care Due:                  Date            Visit Type   Department     Provider  --------------------------------------------------------------------------------                              MEDICARE                              [facility]     [facility]  Last Visit: 02-      VISIT - [facility]       KELTON SPEARS  Next Visit: None Scheduled  None         None Found                                                            Last  Test          Frequency    Reason                     Performed    Due Date  --------------------------------------------------------------------------------  Office Visit  6 months...  clopidogreL, zolpidem....  02- 08-    ALT.........  6 months...  clopidogreL..............  02- 08-    CBC.........  6 months...  clopidogreL..............  Not Found    Overdue    Powered by [employer] by [employer]. Reference number: [id number]. 11/24/2021 11:15:19 PM MST

## 2021-11-26 RX ORDER — ISOSORBIDE MONONITRATE 30 MG/1
TABLET, EXTENDED RELEASE ORAL
Qty: 90 TABLET | Refills: 3 | Status: SHIPPED | OUTPATIENT
Start: 2021-11-26 | End: 2022-09-26

## 2021-12-08 DIAGNOSIS — E87.6 HYPOKALEMIA: Chronic | ICD-10-CM

## 2021-12-08 RX ORDER — POTASSIUM CHLORIDE 750 MG/1
TABLET, EXTENDED RELEASE ORAL
Qty: 180 TABLET | Refills: 3 | Status: SHIPPED | OUTPATIENT
Start: 2021-12-08 | End: 2022-08-22

## 2021-12-08 NOTE — TELEPHONE ENCOUNTER
No new care gaps identified.  Powered by [employer]. Reference number: [id number]. 12/08/2021 11:41:36 AM MST

## 2021-12-15 ENCOUNTER — TELEPHONE (OUTPATIENT)
Dept: RHEUMATOLOGY | Facility: CLINIC | Age: 76
End: 2021-12-15
Payer: MEDICARE

## 2021-12-15 DIAGNOSIS — L40.50 PSORIATIC ARTHRITIS (CMS/HCC): ICD-10-CM

## 2021-12-15 DIAGNOSIS — K21.9 GASTROESOPHAGEAL REFLUX DISEASE: ICD-10-CM

## 2021-12-15 DIAGNOSIS — Z95.0 CARDIAC PACEMAKER IN SITU: ICD-10-CM

## 2021-12-15 DIAGNOSIS — I10 ESSENTIAL HYPERTENSION: ICD-10-CM

## 2021-12-15 NOTE — TELEPHONE ENCOUNTER
Sunitha left a voicemail reporting she used to see Dr. Dueñas, [place] [facility] [place] [facility] [place]. Patient saw Dr. Ray Rheumatologist in her new area who advised her she does not have Psoriatic Arthritis so will not refill her Cosentyx. Prescription  21 and she was due for her next injection on 21. Sunitha did make an appointment with a Dermatologist but could not get in until 1/10/22. She questions if Dr. Dueñas can fill the Cosentyx for her, questions if even just a month to get her to her Dermatology appointment. Sunitha left phone number [phone].     Please advise if ok to fill Cosentyx. Thank you.

## 2021-12-15 NOTE — TELEPHONE ENCOUNTER
I do not have a problem with refilling her Cosentyx for a month but if this requires a new preauthorization or patient assistance program to be completed it is unlikely we will be able to get the medication for her prior to her seeing the dermatologist.

## 2021-12-16 RX ORDER — CLOPIDOGREL BISULFATE 75 MG/1
TABLET ORAL
Qty: 90 TABLET | Refills: 3 | Status: SHIPPED | OUTPATIENT
Start: 2021-12-16 | End: 2022-10-18

## 2021-12-16 RX ORDER — BENAZEPRIL HYDROCHLORIDE 40 MG/1
TABLET ORAL
Qty: 90 TABLET | Refills: 3 | Status: SHIPPED | OUTPATIENT
Start: 2021-12-16 | End: 2022-10-18

## 2021-12-16 RX ORDER — OMEPRAZOLE 20 MG/1
CAPSULE, DELAYED RELEASE ORAL
Qty: 90 CAPSULE | Refills: 3 | Status: SHIPPED | OUTPATIENT
Start: 2021-12-16 | End: 2022-10-18

## 2021-12-16 NOTE — TELEPHONE ENCOUNTER
No new care gaps identified.  Powered by [employer] by [employer]. Reference number: [id number]. 12/15/2021 5:42:47 PM MST

## 2021-12-16 NOTE — TELEPHONE ENCOUNTER
12/16/21 10:43 AM    Medication Access Center Outgoing Call made by:     []Monserrat  [x]Glo   []Jammie    Call to:   []Patient   []Parent   []Spouse   []Other  []Insurance company  [x]  []Prescriber  []Pharmacy     Re:  One more fill for 2021  Phone number called:      Spoke with:  Kelley    Outcome of call:  [employer] will fill one more time before 12.31.2021    Further Action:  REGIS from pt.

## 2021-12-16 NOTE — TELEPHONE ENCOUNTER
Script filled to pharmacy.    I returned a call back to Sunitha at phone number left and advised her of Dr. Dueñas's note/recommendations. She states understanding and agreement.

## 2021-12-29 NOTE — TELEPHONE ENCOUNTER
Okay to refill x1 month but please let the patient know we will not be able to continue to provide refills for her beyond this and she will need to establish with a local provider.

## 2021-12-29 NOTE — TELEPHONE ENCOUNTER
Received a call from Glo with the Medication Access Department reporting we had filled 1 month for patient as she had moved to [place] and Rheumatologist in that area had wanted Dermatology to fill this medication for her. Now patient's Dermatologist is going to be out of the office. Patient is questioning if we can fill 1 more month of medication to get her to her new Dermatology appointment?

## 2021-12-30 NOTE — TELEPHONE ENCOUNTER
Prescription filled to [facility].    I did speak with Sunitha at phone number [phone] and advised her of Dr. Dueñas's note below. She states understanding and agreement.

## 2021-12-31 DIAGNOSIS — E78.5 HYPERLIPIDEMIA, UNSPECIFIED HYPERLIPIDEMIA TYPE: ICD-10-CM

## 2021-12-31 RX ORDER — ATORVASTATIN CALCIUM 40 MG/1
TABLET, FILM COATED ORAL
Qty: 90 TABLET | Refills: 3 | Status: SHIPPED | OUTPATIENT
Start: 2021-12-31 | End: 2022-11-07

## 2021-12-31 NOTE — TELEPHONE ENCOUNTER
No new care gaps identified.  Powered by [employer]. Reference number: [id number]. 12/31/2021 1:29:54 PM MST

## 2022-01-06 ENCOUNTER — APPOINTMENT (RX ONLY)
Dept: URBAN - METROPOLITAN AREA CLINIC 158 | Facility: CLINIC | Age: 77
Setting detail: DERMATOLOGY
End: 2022-01-06

## 2022-01-06 DIAGNOSIS — Z79.899 OTHER LONG TERM (CURRENT) DRUG THERAPY: ICD-10-CM

## 2022-01-06 DIAGNOSIS — L40.59 OTHER PSORIATIC ARTHROPATHY: ICD-10-CM | Status: WELL CONTROLLED

## 2022-01-06 DIAGNOSIS — L40.0 PSORIASIS VULGARIS: ICD-10-CM | Status: WELL CONTROLLED

## 2022-01-06 PROCEDURE — ? VENIPUNCTURE

## 2022-01-06 PROCEDURE — ? COSENTYX MONITORING

## 2022-01-06 PROCEDURE — 99214 OFFICE O/P EST MOD 30 MIN: CPT | Mod: 25

## 2022-01-06 PROCEDURE — ? ORDER TESTS

## 2022-01-06 PROCEDURE — ? HIGH RISK MEDICATION MONITORING

## 2022-01-06 PROCEDURE — ? OTHER

## 2022-01-06 PROCEDURE — 36415 COLL VENOUS BLD VENIPUNCTURE: CPT

## 2022-01-06 PROCEDURE — ? COUNSELING

## 2022-01-06 PROCEDURE — ? DIAGNOSIS COMMENT

## 2022-01-06 ASSESSMENT — LOCATION DETAILED DESCRIPTION DERM
LOCATION DETAILED: LEFT HAND
LOCATION DETAILED: RIGHT HAND
LOCATION DETAILED: LEFT PROXIMAL DORSAL FOREARM
LOCATION DETAILED: RIGHT PROXIMAL DORSAL FOREARM

## 2022-01-06 ASSESSMENT — LOCATION ZONE DERM
LOCATION ZONE: ARM
LOCATION ZONE: HAND

## 2022-01-06 ASSESSMENT — LOCATION SIMPLE DESCRIPTION DERM
LOCATION SIMPLE: RIGHT HAND
LOCATION SIMPLE: LEFT FOREARM
LOCATION SIMPLE: LEFT HAND
LOCATION SIMPLE: RIGHT FOREARM

## 2022-01-06 ASSESSMENT — BSA PSORIASIS: % BODY COVERED IN PSORIASIS: 1

## 2022-01-06 ASSESSMENT — PGA PSORIASIS
PGA PSORIASIS 2020: MILD
PGA PSORIASIS: MODERATE (MODERATE PLAQUE ELEVATION, MODERATE ERYTHEMA, COARSE SCALE PREDOMINATES)

## 2022-01-06 NOTE — PROCEDURE: COUNSELING
Quality 337: Tuberculosis Prevention For Psoriasis And Psoriatic Arthritis Patients On A Biological Immune Response Modifier: No documentation of negative or managed positive TB screen
Detail Level: Zone
Quality 410: Psoriasis Clinical Response To Oral Systemic Or Biologic Mediations: Psoriasis Assessment Tool Documented, Met Specified Benchmark
Detail Level: Detailed

## 2022-01-06 NOTE — PROCEDURE: DIAGNOSIS COMMENT
Comment: Patient is well controlled with cosentyx that was initiated by her rheumatologist in [place].
Detail Level: Detailed
Render Risk Assessment In Note?: no

## 2022-01-06 NOTE — PROCEDURE: ORDER TESTS
Performing Laboratory: -[id number]
Bill For Surgical Tray: no
Expected Date Of Service: 01/06/2022
Billing Type: Third-Party Bill

## 2022-01-06 NOTE — PROCEDURE: VENIPUNCTURE
Number Of Tubes Drawn: 2
Detail Level: None
Venipuncture Paragraph: An alcohol pad was applied to the venipuncture site. Venipuncture was performed using a 21 gauge. Pressure and a bandaid was applied to the site. No complications were noted.

## 2022-01-27 DIAGNOSIS — L40.50 PSORIATIC ARTHRITIS (CMS/HCC): ICD-10-CM

## 2022-02-03 ENCOUNTER — RX ONLY (OUTPATIENT)
Age: 77
Setting detail: RX ONLY
End: 2022-02-03

## 2022-02-03 RX ORDER — TRIAMCINOLONE ACETONIDE 1 MG/G
CREAM TOPICAL
Qty: 454 | Refills: 2 | Status: ERX

## 2022-04-06 ENCOUNTER — APPOINTMENT (RX ONLY)
Dept: URBAN - METROPOLITAN AREA CLINIC 158 | Facility: CLINIC | Age: 77
Setting detail: DERMATOLOGY
End: 2022-04-06

## 2022-04-09 ENCOUNTER — RX ONLY (OUTPATIENT)
Age: 77
Setting detail: RX ONLY
End: 2022-04-09

## 2022-04-09 RX ORDER — SECUKINUMAB 150 MG/ML
INJECTION SUBCUTANEOUS
Qty: 2 | Refills: 2 | Status: ERX | COMMUNITY
Start: 2022-04-09

## 2022-06-20 ENCOUNTER — RX ONLY (OUTPATIENT)
Age: 77
Setting detail: RX ONLY
End: 2022-06-20

## 2022-06-20 RX ORDER — SECUKINUMAB 150 MG/ML
INJECTION SUBCUTANEOUS
Qty: 2 | Refills: 0 | Status: ERX

## 2022-07-06 ENCOUNTER — APPOINTMENT (RX ONLY)
Dept: URBAN - METROPOLITAN AREA CLINIC 158 | Facility: CLINIC | Age: 77
Setting detail: DERMATOLOGY
End: 2022-07-06

## 2022-07-06 DIAGNOSIS — Z79.899 OTHER LONG TERM (CURRENT) DRUG THERAPY: ICD-10-CM

## 2022-07-06 DIAGNOSIS — L40.0 PSORIASIS VULGARIS: ICD-10-CM | Status: WELL CONTROLLED

## 2022-07-06 DIAGNOSIS — L82.1 OTHER SEBORRHEIC KERATOSIS: ICD-10-CM

## 2022-07-06 PROCEDURE — ? HIGH RISK MEDICATION MONITORING

## 2022-07-06 PROCEDURE — ? COUNSELING

## 2022-07-06 PROCEDURE — ? DIAGNOSIS COMMENT

## 2022-07-06 PROCEDURE — 99213 OFFICE O/P EST LOW 20 MIN: CPT

## 2022-07-06 PROCEDURE — ? COSENTYX MONITORING

## 2022-07-06 ASSESSMENT — LOCATION DETAILED DESCRIPTION DERM
LOCATION DETAILED: LEFT SUPERIOR PARIETAL SCALP
LOCATION DETAILED: LEFT INFERIOR CENTRAL MALAR CHEEK
LOCATION DETAILED: RIGHT DISTAL PRETIBIAL REGION

## 2022-07-06 ASSESSMENT — LOCATION SIMPLE DESCRIPTION DERM
LOCATION SIMPLE: SCALP
LOCATION SIMPLE: RIGHT PRETIBIAL REGION
LOCATION SIMPLE: LEFT CHEEK

## 2022-07-06 ASSESSMENT — LOCATION ZONE DERM
LOCATION ZONE: SCALP
LOCATION ZONE: FACE
LOCATION ZONE: LEG

## 2022-07-06 ASSESSMENT — PGA PSORIASIS: PGA PSORIASIS 2020: CLEAR

## 2022-07-06 NOTE — PROCEDURE: DIAGNOSIS COMMENT
Comment: Patient states she had labs recently drawn by pcp. Patient to sign medication information request form.
Detail Level: Detailed
Render Risk Assessment In Note?: no

## 2022-07-06 NOTE — PROCEDURE: HIGH RISK MEDICATION MONITORING
Calcipotriene Pregnancy And Lactation Text: This medication has not been proven safe during pregnancy. It is unknown if this medication is excreted in breast milk.
Infliximab Counseling:  I discussed with the patient the risks of infliximab including but not limited to myelosuppression, immunosuppression, autoimmune hepatitis, demyelinating diseases, lymphoma, and serious infections.  The patient understands that monitoring is required including a PPD at baseline and must alert us or the primary physician if symptoms of infection or other concerning signs are noted.
Winlevi Counseling:  I discussed with the patient the risks of topical clascoterone including but not limited to erythema, scaling, itching, and stinging. Patient voiced their understanding.
Doxycycline Counseling:  Patient counseled regarding possible photosensitivity and increased risk for sunburn.  Patient instructed to avoid sunlight, if possible.  When exposed to sunlight, patients should wear protective clothing, sunglasses, and sunscreen.  The patient was instructed to call the office immediately if the following severe adverse effects occur:  hearing changes, easy bruising/bleeding, severe headache, or vision changes.  The patient verbalized understanding of the proper use and possible adverse effects of doxycycline.  All of the patient's questions and concerns were addressed.
Prednisone Pregnancy And Lactation Text: This medication is Pregnancy Category C and it isn't know if it is safe during pregnancy. This medication is excreted in breast milk.
Erivedge Counseling- I discussed with the patient the risks of Erivedge including but not limited to nausea, vomiting, diarrhea, constipation, weight loss, changes in the sense of taste, decreased appetite, muscle spasms, and hair loss.  The patient verbalized understanding of the proper use and possible adverse effects of Erivedge.  All of the patient's questions and concerns were addressed.
Oral Minoxidil Counseling- I discussed with the patient the risks of oral minoxidil including but not limited to shortness of breath, swelling of the feet or ankles, dizziness, lightheadedness, unwanted hair growth and allergic reaction.  The patient verbalized understanding of the proper use and possible adverse effects of oral minoxidil.  All of the patient's questions and concerns were addressed.
Picato Pregnancy And Lactation Text: This medication is Pregnancy Category C. It is unknown if this medication is excreted in breast milk.
Ilumya Pregnancy And Lactation Text: The risk during pregnancy and breastfeeding is uncertain with this medication.
Fluconazole Pregnancy And Lactation Text: This medication is Pregnancy Category C and it isn't know if it is safe during pregnancy. It is also excreted in breast milk.
Wartpeel Pregnancy And Lactation Text: This medication is Pregnancy Category X and contraindicated in pregnancy and in women who may become pregnant. It is unknown if this medication is excreted in breast milk.
Winlevi Pregnancy And Lactation Text: This medication is considered safe during pregnancy and breastfeeding.
Griseofulvin Counseling:  I discussed with the patient the risks of griseofulvin including but not limited to photosensitivity, cytopenia, liver damage, nausea/vomiting and severe allergy.  The patient understands that this medication is best absorbed when taken with a fatty meal (e.g., ice cream or french fries).
5-Fu Counseling: 5-Fluorouracil Counseling:  I discussed with the patient the risks of 5-fluorouracil including but not limited to erythema, scaling, itching, weeping, crusting, and pain.
Doxycycline Pregnancy And Lactation Text: This medication is Pregnancy Category D and not consider safe during pregnancy. It is also excreted in breast milk but is considered safe for shorter treatment courses.
Infliximab Pregnancy And Lactation Text: This medication is Pregnancy Category B and is considered safe during pregnancy. It is unknown if this medication is excreted in breast milk.
Protopic Counseling: Patient may experience a mild burning sensation during topical application. Protopic is not approved in children less than 2 years of age. There have been case reports of hematologic and skin malignancies in patients using topical calcineurin inhibitors although causality is questionable.
Ivermectin Counseling:  Patient instructed to take medication on an empty stomach with a full glass of water.  Patient informed of potential adverse effects including but not limited to nausea, diarrhea, dizziness, itching, and swelling of the extremities or lymph nodes.  The patient verbalized understanding of the proper use and possible adverse effects of ivermectin.  All of the patient's questions and concerns were addressed.
Erivedge Pregnancy And Lactation Text: This medication is Pregnancy Category X and is absolutely contraindicated during pregnancy. It is unknown if it is excreted in breast milk.
Tremfya Counseling: I discussed with the patient the risks of guselkumab including but not limited to immunosuppression, serious infections, worsening of inflammatory bowel disease and drug reactions.  The patient understands that monitoring is required including a PPD at baseline and must alert us or the primary physician if symptoms of infection or other concerning signs are noted.
Oral Minoxidil Pregnancy And Lactation Text: This medication should only be used when clearly needed if you are pregnant, attempting to become pregnant or breast feeding.
Otezla Pregnancy And Lactation Text: This medication is Pregnancy Category C and it isn't known if it is safe during pregnancy. It is unknown if it is excreted in breast milk.
Erythromycin Counseling:  I discussed with the patient the risks of erythromycin including but not limited to GI upset, allergic reaction, drug rash, diarrhea, increase in liver enzymes, and yeast infections.
Rhofade Counseling: Rhofade is a topical medication which can decrease superficial blood flow where applied. Side effects are uncommon and include stinging, redness and allergic reactions.
Include Pregnancy/Lactation Warning?: No
Rituxan Counseling:  I discussed with the patient the risks of Rituxan infusions. Side effects can include infusion reactions, severe drug rashes including mucocutaneous reactions, reactivation of latent hepatitis and other infections and rarely progressive multifocal leukoencephalopathy.  All of the patient's questions and concerns were addressed.
Xeljanz Counseling: I discussed with the patient the risks of Xeljanz therapy including increased risk of infection, liver issues, headache, diarrhea, or cold symptoms. Live vaccines should be avoided. They were instructed to call if they have any problems.
Zyclara Counseling:  I discussed with the patient the risks of imiquimod including but not limited to erythema, scaling, itching, weeping, crusting, and pain.  Patient understands that the inflammatory response to imiquimod is variable from person to person and was educated regarded proper titration schedule.  If flu-like symptoms develop, patient knows to discontinue the medication and contact us.
Acitretin Counseling:  I discussed with the patient the risks of acitretin including but not limited to hair loss, dry lips/skin/eyes, liver damage, hyperlipidemia, depression/suicidal ideation, photosensitivity.  Serious rare side effects can include but are not limited to pancreatitis, pseudotumor cerebri, bony changes, clot formation/stroke/heart attack.  Patient understands that alcohol is contraindicated since it can result in liver toxicity and significantly prolong the elimination of the drug by many years.
Finasteride Counseling:  I discussed with the patient the risks of use of finasteride including but not limited to decreased libido, decreased ejaculate volume, gynecomastia, and depression. Women should not handle medication.  All of the patient's questions and concerns were addressed.
Ivermectin Pregnancy And Lactation Text: This medication is Pregnancy Category C and it isn't known if it is safe during pregnancy. It is also excreted in breast milk.
Protopic Pregnancy And Lactation Text: This medication is Pregnancy Category C. It is unknown if this medication is excreted in breast milk when applied topically.
Otezla Counseling: The side effects of Otezla were discussed with the patient, including but not limited to worsening or new depression, weight loss, diarrhea, nausea, upper respiratory tract infection, and headache. Patient instructed to call the office should any adverse effect occur.  The patient verbalized understanding of the proper use and possible adverse effects of Otezla.  All the patient's questions and concerns were addressed.
Griseofulvin Pregnancy And Lactation Text: This medication is Pregnancy Category X and is known to cause serious birth defects. It is unknown if this medication is excreted in breast milk but breast feeding should be avoided.
Rhofade Pregnancy And Lactation Text: This medication has not been assigned a Pregnancy Risk Category. It is unknown if the medication is excreted in breast milk.
Xeljasenz Pregnancy And Lactation Text: This medication is Pregnancy Category D and is not considered safe during pregnancy.  The risk during breast feeding is also uncertain.
Gabapentin Counseling: I discussed with the patient the risks of gabapentin including but not limited to dizziness, somnolence, fatigue and ataxia.
Acitretin Pregnancy And Lactation Text: This medication is Pregnancy Category X and should not be given to women who are pregnant or may become pregnant in the future. This medication is excreted in breast milk.
Oxybutynin Counseling:  I discussed with the patient the risks of oxybutynin including but not limited to skin rash, drowsiness, dry mouth, difficulty urinating, and blurred vision.
Drysol Counseling:  I discussed with the patient the risks of drysol/aluminum chloride including but not limited to skin rash, itching, irritation, burning.
Itraconazole Counseling:  I discussed with the patient the risks of itraconazole including but not limited to liver damage, nausea/vomiting, neuropathy, and severe allergy.  The patient understands that this medication is best absorbed when taken with acidic beverages such as non-diet cola or ginger ale.  The patient understands that monitoring is required including baseline LFTs and repeat LFTs at intervals.  The patient understands that they are to contact us or the primary physician if concerning signs are noted.
Rituxan Pregnancy And Lactation Text: This medication is Pregnancy Category C and it isn't know if it is safe during pregnancy. It is unknown if this medication is excreted in breast milk but similar antibodies are known to be excreted.
Erythromycin Pregnancy And Lactation Text: This medication is Pregnancy Category B and is considered safe during pregnancy. It is also excreted in breast milk.
Finasteride Pregnancy And Lactation Text: This medication is absolutely contraindicated during pregnancy. It is unknown if it is excreted in breast milk.
Gabapentin Pregnancy And Lactation Text: This medication is Pregnancy Category C and isn't considered safe during pregnancy. It is excreted in breast milk.
Elidel Counseling: Patient may experience a mild burning sensation during topical application. Elidel is not approved in children less than 2 years of age. There have been case reports of hematologic and skin malignancies in patients using topical calcineurin inhibitors although causality is questionable.
Oxybutynin Pregnancy And Lactation Text: This medication is Pregnancy Category B and is considered safe during pregnancy. It is unknown if it is excreted in breast milk.
Solaraze Counseling:  I discussed with the patient the risks of Solaraze including but not limited to erythema, scaling, itching, weeping, crusting, and pain.
Ketoconazole Counseling:   Patient counseled regarding improving absorption with orange juice.  Adverse effects include but are not limited to breast enlargement, headache, diarrhea, nausea, upset stomach, liver function test abnormalities, taste disturbance, and stomach pain.  There is a rare possibility of liver failure that can occur when taking ketoconazole. The patient understands that monitoring of LFTs may be required, especially at baseline. The patient verbalized understanding of the proper use and possible adverse effects of ketoconazole.  All of the patient's questions and concerns were addressed.
Xolair Counseling:  Patient informed of potential adverse effects including but not limited to fever, muscle aches, rash and allergic reactions.  The patient verbalized understanding of the proper use and possible adverse effects of Xolair.  All of the patient's questions and concerns were addressed.
Birth Control Pills Pregnancy And Lactation Text: This medication should be avoided if pregnant and for the first 30 days post-partum.
Bexarotene Counseling:  I discussed with the patient the risks of bexarotene including but not limited to hair loss, dry lips/skin/eyes, liver abnormalities, hyperlipidemia, pancreatitis, depression/suicidal ideation, photosensitivity, drug rash/allergic reactions, hypothyroidism, anemia, leukopenia, infection, cataracts, and teratogenicity.  Patient understands that they will need regular blood tests to check lipid profile, liver function tests, white blood cell count, thyroid function tests and pregnancy test if applicable.
Drysol Pregnancy And Lactation Text: This medication is considered safe during pregnancy and breast feeding.
Metronidazole Counseling:  I discussed with the patient the risks of metronidazole including but not limited to seizures, nausea/vomiting, a metallic taste in the mouth, nausea/vomiting and severe allergy.
Ketoconazole Pregnancy And Lactation Text: This medication is Pregnancy Category C and it isn't know if it is safe during pregnancy. It is also excreted in breast milk and breast feeding isn't recommended.
Opioid Pregnancy And Lactation Text: These medications can lead to premature delivery and should be avoided during pregnancy. These medications are also present in breast milk in small amounts.
Minocycline Counseling: Patient advised regarding possible photosensitivity and discoloration of the teeth, skin, lips, tongue and gums.  Patient instructed to avoid sunlight, if possible.  When exposed to sunlight, patients should wear protective clothing, sunglasses, and sunscreen.  The patient was instructed to call the office immediately if the following severe adverse effects occur:  hearing changes, easy bruising/bleeding, severe headache, or vision changes.  The patient verbalized understanding of the proper use and possible adverse effects of minocycline.  All of the patient's questions and concerns were addressed.
Xolair Pregnancy And Lactation Text: This medication is Pregnancy Category B and is considered safe during pregnancy. This medication is excreted in breast milk.
Propranolol Counseling:  I discussed with the patient the risks of propranolol including but not limited to low heart rate, low blood pressure, low blood sugar, restlessness and increased cold sensitivity. They should call the office if they experience any of these side effects.
Spironolactone Pregnancy And Lactation Text: This medication can cause feminization of the male fetus and should be avoided during pregnancy. The active metabolite is also found in breast milk.
Metronidazole Pregnancy And Lactation Text: This medication is Pregnancy Category B and considered safe during pregnancy.  It is also excreted in breast milk.
Spironolactone Counseling: Patient advised regarding risks of diarrhea, abdominal pain, hyperkalemia, birth defects (for female patients), liver toxicity and renal toxicity. The patient may need blood work to monitor liver and kidney function and potassium levels while on therapy. The patient verbalized understanding of the proper use and possible adverse effects of spironolactone.  All of the patient's questions and concerns were addressed.
Solaraze Pregnancy And Lactation Text: This medication is Pregnancy Category B and is considered safe. There is some data to suggest avoiding during the third trimester. It is unknown if this medication is excreted in breast milk.
Opioid Counseling: I discussed with the patient the potential side effects of opioids including but not limited to addiction, altered mental status, and depression. I stressed avoiding alcohol, benzodiazepines, muscle relaxants and sleep aids unless specifically okayed by a physician. The patient verbalized understanding of the proper use and possible adverse effects of opioids. All of the patient's questions and concerns were addressed. They were instructed to flush the remaining pills down the toilet if they did not need them for pain.
Bexarotene Pregnancy And Lactation Text: This medication is Pregnancy Category X and should not be given to women who are pregnant or may become pregnant. This medication should not be used if you are breast feeding.
SSKI Counseling:  I discussed with the patient the risks of SSKI including but not limited to thyroid abnormalities, metallic taste, GI upset, fever, headache, acne, arthralgias, paraesthesias, lymphadenopathy, easy bleeding, arrhythmias, and allergic reaction.
Glycopyrrolate Pregnancy And Lactation Text: This medication is Pregnancy Category B and is considered safe during pregnancy. It is unknown if it is excreted breast milk.
Isotretinoin Pregnancy And Lactation Text: This medication is Pregnancy Category X and is considered extremely dangerous during pregnancy. It is unknown if it is excreted in breast milk.
Cimzia Pregnancy And Lactation Text: This medication crosses the placenta but can be considered safe in certain situations. Cimzia may be excreted in breast milk.
Propranolol Pregnancy And Lactation Text: This medication is Pregnancy Category C and it isn't known if it is safe during pregnancy. It is excreted in breast milk.
Eucrisa Counseling: Patient may experience a mild burning sensation during topical application. Eucrisa is not approved in children less than 2 years of age.
Minocycline Pregnancy And Lactation Text: This medication is Pregnancy Category D and not consider safe during pregnancy. It is also excreted in breast milk.
Topical Retinoid counseling:  Patient advised to apply a pea-sized amount only at bedtime and wait 30 minutes after washing their face before applying.  If too drying, patient may add a non-comedogenic moisturizer. The patient verbalized understanding of the proper use and possible adverse effects of retinoids.  All of the patient's questions and concerns were addressed.
Cimzia Counseling:  I discussed with the patient the risks of Cimzia including but not limited to immunosuppression, allergic reactions and infections.  The patient understands that monitoring is required including a PPD at baseline and must alert us or the primary physician if symptoms of infection or other concerning signs are noted.
Terbinafine Counseling: Patient counseling regarding adverse effects of terbinafine including but not limited to headache, diarrhea, rash, upset stomach, liver function test abnormalities, itching, taste/smell disturbance, nausea, abdominal pain, and flatulence.  There is a rare possibility of liver failure that can occur when taking terbinafine.  The patient understands that a baseline LFT and kidney function test may be required. The patient verbalized understanding of the proper use and possible adverse effects of terbinafine.  All of the patient's questions and concerns were addressed.
Isotretinoin Counseling: Patient should get monthly blood tests, not donate blood, not drive at night if vision affected, not share medication, and not undergo elective surgery for 6 months after tx completed. Side effects reviewed, pt to contact office should one occur.
Glycopyrrolate Counseling:  I discussed with the patient the risks of glycopyrrolate including but not limited to skin rash, drowsiness, dry mouth, difficulty urinating, and blurred vision.
Quinolones Counseling:  I discussed with the patient the risks of fluoroquinolones including but not limited to GI upset, allergic reaction, drug rash, diarrhea, dizziness, photosensitivity, yeast infections, liver function test abnormalities, tendonitis/tendon rupture.
Arava Counseling:  Patient counseled regarding adverse effects of Arava including but not limited to nausea, vomiting, abnormalities in liver function tests. Patients may develop mouth sores, rash, diarrhea, and abnormalities in blood counts. The patient understands that monitoring is required including LFTs and blood counts.  There is a rare possibility of scarring of the liver and lung problems that can occur when taking methotrexate. Persistent nausea, loss of appetite, pale stools, dark urine, cough, and shortness of breath should be reported immediately. Patient advised to discontinue Arava treatment and consult with a physician prior to attempting conception. The patient will have to undergo a treatment to eliminate Arava from the body prior to conception.
Sski Pregnancy And Lactation Text: This medication is Pregnancy Category D and isn't considered safe during pregnancy. It is excreted in breast milk.
Hydroxychloroquine Counseling:  I discussed with the patient that a baseline ophthalmologic exam is needed at the start of therapy and every year thereafter while on therapy. A CBC may also be warranted for monitoring.  The side effects of this medication were discussed with the patient, including but not limited to agranulocytosis, aplastic anemia, seizures, rashes, retinopathy, and liver toxicity. Patient instructed to call the office should any adverse effect occur.  The patient verbalized understanding of the proper use and possible adverse effects of Plaquenil.  All the patient's questions and concerns were addressed.
High Dose Vitamin A Counseling: Side effects reviewed, pt to contact office should one occur.
Birth Control Pills Counseling: Birth Control Pill Counseling: I discussed with the patient the potential side effects of OCPs including but not limited to increased risk of stroke, heart attack, thrombophlebitis, deep venous thrombosis, hepatic adenomas, breast changes, GI upset, headaches, and depression.  The patient verbalized understanding of the proper use and possible adverse effects of OCPs. All of the patient's questions and concerns were addressed.
Eucrisa Pregnancy And Lactation Text: This medication has not been assigned a Pregnancy Risk Category but animal studies failed to show danger with the topical medication. It is unknown if the medication is excreted in breast milk.
Terbinafine Pregnancy And Lactation Text: This medication is Pregnancy Category B and is considered safe during pregnancy. It is also excreted in breast milk and breast feeding isn't recommended.
Azathioprine Counseling:  I discussed with the patient the risks of azathioprine including but not limited to myelosuppression, immunosuppression, hepatotoxicity, lymphoma, and infections.  The patient understands that monitoring is required including baseline LFTs, Creatinine, possible TPMP genotyping and weekly CBCs for the first month and then every 2 weeks thereafter.  The patient verbalized understanding of the proper use and possible adverse effects of azathioprine.  All of the patient's questions and concerns were addressed.
Thalidomide Counseling: I discussed with the patient the risks of thalidomide including but not limited to birth defects, anxiety, weakness, chest pain, dizziness, cough and severe allergy.
Cellcept Counseling:  I discussed with the patient the risks of mycophenolate mofetil including but not limited to infection/immunosuppression, GI upset, hypokalemia, hypercholesterolemia, bone marrow suppression, lymphoproliferative disorders, malignancy, GI ulceration/bleed/perforation, colitis, interstitial lung disease, kidney failure, progressive multifocal leukoencephalopathy, and birth defects.  The patient understands that monitoring is required including a baseline creatinine and regular CBC testing. In addition, patient must alert us immediately if symptoms of infection or other concerning signs are noted.
High Dose Vitamin A Pregnancy And Lactation Text: High dose vitamin A therapy is contraindicated during pregnancy and breast feeding.
Hydroquinone Counseling:  Patient advised that medication may result in skin irritation, lightening (hypopigmentation), dryness, and burning.  In the event of skin irritation, the patient was advised to reduce the amount of the drug applied or use it less frequently.  Rarely, spots that are treated with hydroquinone can become darker (pseudoochronosis).  Should this occur, patient instructed to stop medication and call the office. The patient verbalized understanding of the proper use and possible adverse effects of hydroquinone.  All of the patient's questions and concerns were addressed.
Hydroxychloroquine Pregnancy And Lactation Text: This medication has been shown to cause fetal harm but it isn't assigned a Pregnancy Risk Category. There are small amounts excreted in breast milk.
Cosentyx Counseling:  I discussed with the patient the risks of Cosentyx including but not limited to worsening of Crohn's disease, immunosuppression, allergic reactions and infections.  The patient understands that monitoring is required including a PPD at baseline and must alert us or the primary physician if symptoms of infection or other concerning signs are noted.
Siliq Counseling:  I discussed with the patient the risks of Siliq including but not limited to new or worsening depression, suicidal thoughts and behavior, immunosuppression, malignancy, posterior leukoencephalopathy syndrome, and serious infections.  The patient understands that monitoring is required including a PPD at baseline and must alert us or the primary physician if symptoms of infection or other concerning signs are noted. There is also a special program designed to monitor depression which is required with Siliq.
Tazorac Counseling:  Patient advised that medication is irritating and drying.  Patient may need to apply sparingly and wash off after an hour before eventually leaving it on overnight.  The patient verbalized understanding of the proper use and possible adverse effects of tazorac.  All of the patient's questions and concerns were addressed.
Azathioprine Pregnancy And Lactation Text: This medication is Pregnancy Category D and isn't considered safe during pregnancy. It is unknown if this medication is excreted in breast milk.
Rifampin Counseling: I discussed with the patient the risks of rifampin including but not limited to liver damage, kidney damage, red-orange body fluids, nausea/vomiting and severe allergy.
Azithromycin Counseling:  I discussed with the patient the risks of azithromycin including but not limited to GI upset, allergic reaction, drug rash, diarrhea, and yeast infections.
Topical Clindamycin Counseling: Patient counseled that this medication may cause skin irritation or allergic reactions.  In the event of skin irritation, the patient was advised to reduce the amount of the drug applied or use it less frequently.   The patient verbalized understanding of the proper use and possible adverse effects of clindamycin.  All of the patient's questions and concerns were addressed.
Simponi Counseling:  I discussed with the patient the risks of golimumab including but not limited to myelosuppression, immunosuppression, autoimmune hepatitis, demyelinating diseases, lymphoma, and serious infections.  The patient understands that monitoring is required including a PPD at baseline and must alert us or the primary physician if symptoms of infection or other concerning signs are noted.
Dupixent Counseling: I discussed with the patient the risks of dupilumab including but not limited to eye infection and irritation, cold sores, injection site reactions, worsening of asthma, allergic reactions and increased risk of parasitic infection.  Live vaccines should be avoided while taking dupilumab. Dupilumab will also interact with certain medications such as warfarin and cyclosporine. The patient understands that monitoring is required and they must alert us or the primary physician if symptoms of infection or other concerning signs are noted.
Clofazimine Counseling:  I discussed with the patient the risks of clofazimine including but not limited to skin and eye pigmentation, liver damage, nausea/vomiting, gastrointestinal bleeding and allergy.
Libtayo Counseling- I discussed with the patient the risks of Libtayo including but not limited to nausea, vomiting, diarrhea, and bone or muscle pain.  The patient verbalized understanding of the proper use and possible adverse effects of Libtayo.  All of the patient's questions and concerns were addressed.
Cimetidine Counseling:  I discussed with the patient the risks of Cimetidine including but not limited to gynecomastia, headache, diarrhea, nausea, drowsiness, arrhythmias, pancreatitis, skin rashes, psychosis, bone marrow suppression and kidney toxicity.
Tazorac Pregnancy And Lactation Text: This medication is not safe during pregnancy. It is unknown if this medication is excreted in breast milk.
Cyclophosphamide Counseling:  I discussed with the patient the risks of cyclophosphamide including but not limited to hair loss, hormonal abnormalities, decreased fertility, abdominal pain, diarrhea, nausea and vomiting, bone marrow suppression and infection. The patient understands that monitoring is required while taking this medication.
Niacinamide Counseling: I recommended taking niacin or niacinamide, also know as vitamin B3, twice daily. Recent evidence suggests that taking vitamin B3 (500 mg twice daily) can reduce the risk of actinic keratoses and non-melanoma skin cancers. Side effects of vitamin B3 include flushing and headache.
Azelaic Acid Counseling: Patient counseled that medicine may cause skin irritation and to avoid applying near the eyes.  In the event of skin irritation, the patient was advised to reduce the amount of the drug applied or use it less frequently.   The patient verbalized understanding of the proper use and possible adverse effects of azelaic acid.  All of the patient's questions and concerns were addressed.
Dupixent Pregnancy And Lactation Text: This medication likely crosses the placenta but the risk for the fetus is uncertain. This medication is excreted in breast milk.
Azithromycin Pregnancy And Lactation Text: This medication is considered safe during pregnancy and is also secreted in breast milk.
Imiquimod Counseling:  I discussed with the patient the risks of imiquimod including but not limited to erythema, scaling, itching, weeping, crusting, and pain.  Patient understands that the inflammatory response to imiquimod is variable from person to person and was educated regarded proper titration schedule.  If flu-like symptoms develop, patient knows to discontinue the medication and contact us.
Rifampin Pregnancy And Lactation Text: This medication is Pregnancy Category C and it isn't know if it is safe during pregnancy. It is also excreted in breast milk and should not be used if you are breast feeding.
Tranexamic Acid Counseling:  Patient advised of the small risk of bleeding problems with tranexamic acid. They were also instructed to call if they developed any nausea, vomiting or diarrhea. All of the patient's questions and concerns were addressed.
Libtayo Pregnancy And Lactation Text: This medication is contraindicated in pregnancy and when breast feeding.
Niacinamide Pregnancy And Lactation Text: These medications are considered safe during pregnancy.
Bactrim Counseling:  I discussed with the patient the risks of sulfa antibiotics including but not limited to GI upset, allergic reaction, drug rash, diarrhea, dizziness, photosensitivity, and yeast infections.  Rarely, more serious reactions can occur including but not limited to aplastic anemia, agranulocytosis, methemoglobinemia, blood dyscrasias, liver or kidney failure, lung infiltrates or desquamative/blistering drug rashes.
Minoxidil Counseling: Minoxidil is a topical medication which can increase blood flow where it is applied. It is uncertain how this medication increases hair growth. Side effects are uncommon and include stinging and allergic reactions.
Enbrel Counseling:  I discussed with the patient the risks of etanercept including but not limited to myelosuppression, immunosuppression, autoimmune hepatitis, demyelinating diseases, lymphoma, and infections.  The patient understands that monitoring is required including a PPD at baseline and must alert us or the primary physician if symptoms of infection or other concerning signs are noted.
Topical Ketoconazole Counseling: Patient counseled that this medication may cause skin irritation or allergic reactions.  In the event of skin irritation, the patient was advised to reduce the amount of the drug applied or use it less frequently.   The patient verbalized understanding of the proper use and possible adverse effects of ketoconazole.  All of the patient's questions and concerns were addressed.
Doxepin Pregnancy And Lactation Text: This medication is Pregnancy Category C and it isn't known if it is safe during pregnancy. It is also excreted in breast milk and breast feeding isn't recommended.
Cyclophosphamide Pregnancy And Lactation Text: This medication is Pregnancy Category D and it isn't considered safe during pregnancy. This medication is excreted in breast milk.
Colchicine Counseling:  Patient counseled regarding adverse effects including but not limited to stomach upset (nausea, vomiting, stomach pain, or diarrhea).  Patient instructed to limit alcohol consumption while taking this medication.  Colchicine may reduce blood counts especially with prolonged use.  The patient understands that monitoring of kidney function and blood counts may be required, especially at baseline. The patient verbalized understanding of the proper use and possible adverse effects of colchicine.  All of the patient's questions and concerns were addressed.
Tranexamic Acid Pregnancy And Lactation Text: It is unknown if this medication is safe during pregnancy or breast feeding.
Doxepin Counseling:  Patient advised that the medication is sedating and not to drive a car after taking this medication. Patient informed of potential adverse effects including but not limited to dry mouth, urinary retention, and blurry vision.  The patient verbalized understanding of the proper use and possible adverse effects of doxepin.  All of the patient's questions and concerns were addressed.
Sarecycline Counseling: Patient advised regarding possible photosensitivity and discoloration of the teeth, skin, lips, tongue and gums.  Patient instructed to avoid sunlight, if possible.  When exposed to sunlight, patients should wear protective clothing, sunglasses, and sunscreen.  The patient was instructed to call the office immediately if the following severe adverse effects occur:  hearing changes, easy bruising/bleeding, severe headache, or vision changes.  The patient verbalized understanding of the proper use and possible adverse effects of sarecycline.  All of the patient's questions and concerns were addressed.
Hydroxyzine Counseling: Patient advised that the medication is sedating and not to drive a car after taking this medication.  Patient informed of potential adverse effects including but not limited to dry mouth, urinary retention, and blurry vision.  The patient verbalized understanding of the proper use and possible adverse effects of hydroxyzine.  All of the patient's questions and concerns were addressed.
Dapsone Counseling: I discussed with the patient the risks of dapsone including but not limited to hemolytic anemia, agranulocytosis, rashes, methemoglobinemia, kidney failure, peripheral neuropathy, headaches, GI upset, and liver toxicity.  Patients who start dapsone require monitoring including baseline LFTs and weekly CBCs for the first month, then every month thereafter.  The patient verbalized understanding of the proper use and possible adverse effects of dapsone.  All of the patient's questions and concerns were addressed.
Tetracycline Counseling: Patient counseled regarding possible photosensitivity and increased risk for sunburn.  Patient instructed to avoid sunlight, if possible.  When exposed to sunlight, patients should wear protective clothing, sunglasses, and sunscreen.  The patient was instructed to call the office immediately if the following severe adverse effects occur:  hearing changes, easy bruising/bleeding, severe headache, or vision changes.  The patient verbalized understanding of the proper use and possible adverse effects of tetracycline.  All of the patient's questions and concerns were addressed. Patient understands to avoid pregnancy while on therapy due to potential birth defects.
Cyclosporine Counseling:  I discussed with the patient the risks of cyclosporine including but not limited to hypertension, gingival hyperplasia,myelosuppression, immunosuppression, liver damage, kidney damage, neurotoxicity, lymphoma, and serious infections. The patient understands that monitoring is required including baseline blood pressure, CBC, CMP, lipid panel and uric acid, and then 1-2 times monthly CMP and blood pressure.
Valtrex Pregnancy And Lactation Text: this medication is Pregnancy Category B and is considered safe during pregnancy. This medication is not directly found in breast milk but it's metabolite acyclovir is present.
Valtrex Counseling: I discussed with the patient the risks of valacyclovir including but not limited to kidney damage, nausea, vomiting and severe allergy.  The patient understands that if the infection seems to be worsening or is not improving, they are to call.
Bactrim Pregnancy And Lactation Text: This medication is Pregnancy Category D and is known to cause fetal risk.  It is also excreted in breast milk.
Skyrizi Counseling: I discussed with the patient the risks of risankizumab-rzaa including but not limited to immunosuppression, and serious infections.  The patient understands that monitoring is required including a PPD at baseline and must alert us or the primary physician if symptoms of infection or other concerning signs are noted.
Benzoyl Peroxide Counseling: Patient counseled that medicine may cause skin irritation and bleach clothing.  In the event of skin irritation, the patient was advised to reduce the amount of the drug applied or use it less frequently.   The patient verbalized understanding of the proper use and possible adverse effects of benzoyl peroxide.  All of the patient's questions and concerns were addressed.
Nsaids Pregnancy And Lactation Text: These medications are considered safe up to 30 weeks gestation. It is excreted in breast milk.
Carac Counseling:  I discussed with the patient the risks of Carac including but not limited to erythema, scaling, itching, weeping, crusting, and pain.
Mirvaso Counseling: Mirvaso is a topical medication which can decrease superficial blood flow where applied. Side effects are uncommon and include stinging, redness and allergic reactions.
Stelara Counseling:  I discussed with the patient the risks of ustekinumab including but not limited to immunosuppression, malignancy, posterior leukoencephalopathy syndrome, and serious infections.  The patient understands that monitoring is required including a PPD at baseline and must alert us or the primary physician if symptoms of infection or other concerning signs are noted.
Topical Sulfur Applications Counseling: Topical Sulfur Counseling: Patient counseled that this medication may cause skin irritation or allergic reactions.  In the event of skin irritation, the patient was advised to reduce the amount of the drug applied or use it less frequently.   The patient verbalized understanding of the proper use and possible adverse effects of topical sulfur application.  All of the patient's questions and concerns were addressed.
Hydroxyzine Pregnancy And Lactation Text: This medication is not safe during pregnancy and should not be taken. It is also excreted in breast milk and breast feeding isn't recommended.
Nsaids Counseling: NSAID Counseling: I discussed with the patient that NSAIDs should be taken with food. Prolonged use of NSAIDs can result in the development of stomach ulcers.  Patient advised to stop taking NSAIDs if abdominal pain occurs.  The patient verbalized understanding of the proper use and possible adverse effects of NSAIDs.  All of the patient's questions and concerns were addressed.
Benzoyl Peroxide Pregnancy And Lactation Text: This medication is Pregnancy Category C. It is unknown if benzoyl peroxide is excreted in breast milk.
Humira Counseling:  I discussed with the patient the risks of adalimumab including but not limited to myelosuppression, immunosuppression, autoimmune hepatitis, demyelinating diseases, lymphoma, and serious infections.  The patient understands that monitoring is required including a PPD at baseline and must alert us or the primary physician if symptoms of infection or other concerning signs are noted.
Cephalexin Counseling: I counseled the patient regarding use of cephalexin as an antibiotic for prophylactic and/or therapeutic purposes. Cephalexin (commonly prescribed under brand name Keflex) is a cephalosporin antibiotic which is active against numerous classes of bacteria, including most skin bacteria. Side effects may include nausea, diarrhea, gastrointestinal upset, rash, hives, yeast infections, and in rare cases, hepatitis, kidney disease, seizures, fever, confusion, neurologic symptoms, and others. Patients with severe allergies to penicillin medications are cautioned that there is about a 10% incidence of cross-reactivity with cephalosporins. When possible, patients with penicillin allergies should use alternatives to cephalosporins for antibiotic therapy.
Ilumya Counseling: I discussed with the patient the risks of tildrakizumab including but not limited to immunosuppression, malignancy, posterior leukoencephalopathy syndrome, and serious infections.  The patient understands that monitoring is required including a PPD at baseline and must alert us or the primary physician if symptoms of infection or other concerning signs are noted.
Clindamycin Counseling: I counseled the patient regarding use of clindamycin as an antibiotic for prophylactic and/or therapeutic purposes. Clindamycin is active against numerous classes of bacteria, including skin bacteria. Side effects may include nausea, diarrhea, gastrointestinal upset, rash, hives, yeast infections, and in rare cases, colitis.
Odomzo Counseling- I discussed with the patient the risks of Odomzo including but not limited to nausea, vomiting, diarrhea, constipation, weight loss, changes in the sense of taste, decreased appetite, muscle spasms, and hair loss.  The patient verbalized understanding of the proper use and possible adverse effects of Odomzo.  All of the patient's questions and concerns were addressed.
Cephalexin Pregnancy And Lactation Text: This medication is Pregnancy Category B and considered safe during pregnancy.  It is also excreted in breast milk but can be used safely for shorter doses.
Topical Sulfur Applications Pregnancy And Lactation Text: This medication is Pregnancy Category C and has an unknown safety profile during pregnancy. It is unknown if this topical medication is excreted in breast milk.
Methotrexate Counseling:  Patient counseled regarding adverse effects of methotrexate including but not limited to nausea, vomiting, abnormalities in liver function tests. Patients may develop mouth sores, rash, diarrhea, and abnormalities in blood counts. The patient understands that monitoring is required including LFT's and blood counts.  There is a rare possibility of scarring of the liver and lung problems that can occur when taking methotrexate. Persistent nausea, loss of appetite, pale stools, dark urine, cough, and shortness of breath should be reported immediately. Patient advised to discontinue methotrexate treatment at least three months before attempting to become pregnant.  I discussed the need for folate supplements while taking methotrexate.  These supplements can decrease side effects during methotrexate treatment. The patient verbalized understanding of the proper use and possible adverse effects of methotrexate.  All of the patient's questions and concerns were addressed.
Dapsone Pregnancy And Lactation Text: This medication is Pregnancy Category C and is not considered safe during pregnancy or breast feeding.
Dutasteride Pregnancy And Lactation Text: This medication is absolutely contraindicated in women, especially during pregnancy and breast feeding. Feminization of male fetuses is possible if taking while pregnant.
Prednisone Counseling:  I discussed with the patient the risks of prolonged use of prednisone including but not limited to weight gain, insomnia, osteoporosis, mood changes, diabetes, susceptibility to infection, glaucoma and high blood pressure.  In cases where prednisone use is prolonged, patients should be monitored with blood pressure checks, serum glucose levels and an eye exam.  Additionally, the patient may need to be placed on GI prophylaxis, PCP prophylaxis, and calcium and vitamin D supplementation and/or a bisphosphonate.  The patient verbalized understanding of the proper use and the possible adverse effects of prednisone.  All of the patient's questions and concerns were addressed.
Calcipotriene Counseling:  I discussed with the patient the risks of calcipotriene including but not limited to erythema, scaling, itching, and irritation.
Picato Counseling:  I discussed with the patient the risks of Picato including but not limited to erythema, scaling, itching, weeping, crusting, and pain.
Clindamycin Pregnancy And Lactation Text: This medication can be used in pregnancy if certain situations. Clindamycin is also present in breast milk.
Wartpeel Counseling:  I discussed with the patient the risks of Wartpeel including but not limited to erythema, scaling, itching, weeping, crusting, and pain.
Albendazole Counseling:  I discussed with the patient the risks of albendazole including but not limited to cytopenia, kidney damage, nausea/vomiting and severe allergy.  The patient understands that this medication is being used in an off-label manner.
Taltz Counseling: I discussed with the patient the risks of ixekizumab including but not limited to immunosuppression, serious infections, worsening of inflammatory bowel disease and drug reactions.  The patient understands that monitoring is required including a PPD at baseline and must alert us or the primary physician if symptoms of infection or other concerning signs are noted.
Methotrexate Pregnancy And Lactation Text: This medication is Pregnancy Category X and is known to cause fetal harm. This medication is excreted in breast milk.
Fluconazole Counseling:  Patient counseled regarding adverse effects of fluconazole including but not limited to headache, diarrhea, nausea, upset stomach, liver function test abnormalities, taste disturbance, and stomach pain.  There is a rare possibility of liver failure that can occur when taking fluconazole.  The patient understands that monitoring of LFTs and kidney function test may be required, especially at baseline. The patient verbalized understanding of the proper use and possible adverse effects of fluconazole.  All of the patient's questions and concerns were addressed.
Dutasteride Counseling: Dustasteride Counseling:  I discussed with the patient the risks of use of dutasteride including but not limited to decreased libido, decreased ejaculate volume, and gynecomastia. Women who can become pregnant should not handle medication.  All of the patient's questions and concerns were addressed.
Detail Level: Zone

## 2022-07-06 NOTE — PROCEDURE: COSENTYX MONITORING
Add High Risk Medication Management Associated Diagnosis?: No
Date Of Last Negative Ppd (Optional): 01/2022
Detail Level: Zone

## 2022-07-08 ENCOUNTER — RX ONLY (OUTPATIENT)
Age: 77
Setting detail: RX ONLY
End: 2022-07-08

## 2022-07-08 RX ORDER — SECUKINUMAB 150 MG/ML
INJECTION SUBCUTANEOUS
Qty: 2 | Refills: 5 | Status: ERX

## 2022-07-17 DIAGNOSIS — G25.81 RESTLESS LEGS SYNDROME: ICD-10-CM

## 2022-07-17 NOTE — TELEPHONE ENCOUNTER
Care Due:                  Date            Visit Type   Department     Provider  --------------------------------------------------------------------------------                              MEDICARE                              WELLNESS     [facility]  Last Visit: 02-      VISIT - [facility]  MARISA SPEARS  Next Visit: None Scheduled  None         None Found                                                            Last  Test          Frequency    Reason                     Performed    Due Date  --------------------------------------------------------------------------------  Office Visit  6 months...  acyclovir, atorvastatin,   02- 08-                             benazepriL, clopidogreL,                             denosumab, isosorbide,                             montelukast, omeprazole,                             rOPINIRole...............    CBC.........  6 months...  clopidogreL..............  Not Found    Overdue    CMP.........  6 months...  benazepriL, clopidogreL..  Not Found    Overdue    Lipid Panel.  12 months..  atorvastatin.............  Not Found    Overdue    [employer] Embedded Care Gaps. Reference number: [id number]. 7/17/2022 1:39:34 AM AMARI

## 2022-07-18 RX ORDER — ROPINIROLE 1 MG/1
TABLET, FILM COATED ORAL
Qty: 180 TABLET | Refills: 3 | Status: SHIPPED | OUTPATIENT
Start: 2022-07-18

## 2022-08-20 DIAGNOSIS — E87.6 HYPOKALEMIA: Chronic | ICD-10-CM

## 2022-08-20 NOTE — TELEPHONE ENCOUNTER
No new care gaps identified.  [facility] Embedded Care Gaps. Reference number: [id number]. 8/20/2022 1:39:08 AM AMARI

## 2022-08-22 RX ORDER — POTASSIUM CHLORIDE 750 MG/1
TABLET, EXTENDED RELEASE ORAL
Qty: 180 TABLET | Refills: 3 | Status: SHIPPED | OUTPATIENT
Start: 2022-08-22

## 2022-09-25 DIAGNOSIS — I10 ESSENTIAL HYPERTENSION: ICD-10-CM

## 2022-09-25 NOTE — TELEPHONE ENCOUNTER
Care Due:                  Date            Visit Type   Department     Provider  --------------------------------------------------------------------------------                              MEDICARE                              WELLNESS     [facility]  Last Visit: 02-      VISIT - [facility]  MARISA SPEARS  Next Visit: None Scheduled  None         None Found                                                            Last  Test          Frequency    Reason                     Performed    Due Date  --------------------------------------------------------------------------------  Office Visit  6 months...  acyclovir, atorvastatin,   02- 08-                             benazepriL, clopidogreL,                             denosumab, isosorbide,                             montelukast, omeprazole,                             rOPINIRole...............    CBC.........  6 months...  clopidogreL..............  Not Found    Overdue    CMP.........  6 months...  benazepriL, clopidogreL..  Not Found    Overdue    Lipid Panel.  12 months..  atorvastatin.............  Not Found    Overdue    [employer] Embedded Care Gaps. Reference number: [id number]. 9/25/2022 2:57:50 AM AMARI

## 2022-09-26 RX ORDER — ISOSORBIDE MONONITRATE 30 MG/1
TABLET, EXTENDED RELEASE ORAL
Qty: 90 TABLET | Refills: 3 | Status: SHIPPED | OUTPATIENT
Start: 2022-09-26 | End: 2023-09-25

## 2022-10-05 DIAGNOSIS — Z95.0 CARDIAC PACEMAKER IN SITU: ICD-10-CM

## 2022-10-05 DIAGNOSIS — K21.9 GASTROESOPHAGEAL REFLUX DISEASE: ICD-10-CM

## 2022-10-05 DIAGNOSIS — I10 ESSENTIAL HYPERTENSION: ICD-10-CM

## 2022-10-05 RX ORDER — CLOPIDOGREL BISULFATE 75 MG/1
TABLET ORAL
Qty: 90 TABLET | Refills: 3 | OUTPATIENT
Start: 2022-10-05

## 2022-10-05 RX ORDER — BENAZEPRIL HYDROCHLORIDE 40 MG/1
TABLET ORAL
Qty: 90 TABLET | Refills: 3 | OUTPATIENT
Start: 2022-10-05

## 2022-10-05 RX ORDER — OMEPRAZOLE 20 MG/1
CAPSULE, DELAYED RELEASE ORAL
Qty: 90 CAPSULE | Refills: 3 | OUTPATIENT
Start: 2022-10-05

## 2022-10-05 NOTE — TELEPHONE ENCOUNTER
No new care gaps identified.  [facility] Embedded Care Gaps. Reference number: [id number]. 10/05/2022 1:31:18 AM AMARI

## 2022-10-18 DIAGNOSIS — Z95.0 CARDIAC PACEMAKER IN SITU: ICD-10-CM

## 2022-10-18 DIAGNOSIS — I10 ESSENTIAL HYPERTENSION: ICD-10-CM

## 2022-10-18 DIAGNOSIS — K21.9 GASTROESOPHAGEAL REFLUX DISEASE: ICD-10-CM

## 2022-10-18 RX ORDER — CLOPIDOGREL BISULFATE 75 MG/1
TABLET ORAL
Qty: 90 TABLET | Refills: 3 | Status: SHIPPED | OUTPATIENT
Start: 2022-10-18

## 2022-10-18 RX ORDER — OMEPRAZOLE 20 MG/1
CAPSULE, DELAYED RELEASE ORAL
Qty: 90 CAPSULE | Refills: 3 | Status: SHIPPED | OUTPATIENT
Start: 2022-10-18

## 2022-10-18 RX ORDER — BENAZEPRIL HYDROCHLORIDE 40 MG/1
TABLET ORAL
Qty: 90 TABLET | Refills: 3 | Status: SHIPPED | OUTPATIENT
Start: 2022-10-18

## 2022-10-18 NOTE — TELEPHONE ENCOUNTER
No new care gaps identified.  [facility] Embedded Care Gaps. Reference number: [id number]. 10/18/2022 9:54:56 AM AMARI

## 2022-11-04 DIAGNOSIS — E78.5 HYPERLIPIDEMIA, UNSPECIFIED HYPERLIPIDEMIA TYPE: ICD-10-CM

## 2022-11-04 NOTE — TELEPHONE ENCOUNTER
No new care gaps identified.  [facility] Embedded Care Gaps. Reference number: [id number]. 11/04/2022 1:41:10 AM AMARI

## 2022-11-07 RX ORDER — ATORVASTATIN CALCIUM 40 MG/1
TABLET, FILM COATED ORAL
Qty: 90 TABLET | Refills: 0 | Status: SHIPPED | OUTPATIENT
Start: 2022-11-07 | End: 2023-04-03

## 2023-01-04 ENCOUNTER — RX ONLY (OUTPATIENT)
Age: 78
Setting detail: RX ONLY
End: 2023-01-04

## 2023-01-04 RX ORDER — SECUKINUMAB 150 MG/ML
INJECTION SUBCUTANEOUS
Qty: 2 | Refills: 5 | Status: ERX

## 2023-01-06 ENCOUNTER — APPOINTMENT (RX ONLY)
Dept: URBAN - METROPOLITAN AREA CLINIC 158 | Facility: CLINIC | Age: 78
Setting detail: DERMATOLOGY
End: 2023-01-06

## 2023-01-06 DIAGNOSIS — L40.59 OTHER PSORIATIC ARTHROPATHY: ICD-10-CM | Status: WELL CONTROLLED

## 2023-01-06 DIAGNOSIS — L81.4 OTHER MELANIN HYPERPIGMENTATION: ICD-10-CM

## 2023-01-06 DIAGNOSIS — Z79.899 OTHER LONG TERM (CURRENT) DRUG THERAPY: ICD-10-CM

## 2023-01-06 DIAGNOSIS — L65.8 OTHER SPECIFIED NONSCARRING HAIR LOSS: ICD-10-CM

## 2023-01-06 DIAGNOSIS — L40.0 PSORIASIS VULGARIS: ICD-10-CM | Status: WELL CONTROLLED

## 2023-01-06 PROCEDURE — ? PRESCRIPTION

## 2023-01-06 PROCEDURE — ? OTHER

## 2023-01-06 PROCEDURE — ? TREATMENT REGIMEN

## 2023-01-06 PROCEDURE — 99214 OFFICE O/P EST MOD 30 MIN: CPT

## 2023-01-06 PROCEDURE — ? COUNSELING

## 2023-01-06 PROCEDURE — ? HIGH RISK MEDICATION MONITORING

## 2023-01-06 PROCEDURE — ? OBSERVATION

## 2023-01-06 PROCEDURE — 36415 COLL VENOUS BLD VENIPUNCTURE: CPT

## 2023-01-06 PROCEDURE — ? VENIPUNCTURE

## 2023-01-06 RX ORDER — DUTASTERIDE 0.5 MG/1
CAPSULE, LIQUID FILLED ORAL ONCE A DAY
Qty: 30 | Refills: 5 | Status: ERX | COMMUNITY
Start: 2023-01-06

## 2023-01-06 RX ADMIN — DUTASTERIDE: 0.5 CAPSULE, LIQUID FILLED ORAL at 00:00

## 2023-01-06 ASSESSMENT — LOCATION SIMPLE DESCRIPTION DERM
LOCATION SIMPLE: LEFT PRETIBIAL REGION
LOCATION SIMPLE: RIGHT FOREARM
LOCATION SIMPLE: RIGHT HAND
LOCATION SIMPLE: RIGHT SCALP
LOCATION SIMPLE: RIGHT PRETIBIAL REGION
LOCATION SIMPLE: SCALP
LOCATION SIMPLE: LEFT HAND
LOCATION SIMPLE: LEFT CHEEK
LOCATION SIMPLE: LEFT FOREARM

## 2023-01-06 ASSESSMENT — LOCATION DETAILED DESCRIPTION DERM
LOCATION DETAILED: RIGHT MEDIAL FRONTAL SCALP
LOCATION DETAILED: LEFT SUPERIOR PARIETAL SCALP
LOCATION DETAILED: LEFT PROXIMAL PRETIBIAL REGION
LOCATION DETAILED: RIGHT PROXIMAL PRETIBIAL REGION
LOCATION DETAILED: LEFT MEDIAL MALAR CHEEK
LOCATION DETAILED: RIGHT PROXIMAL RADIAL DORSAL FOREARM
LOCATION DETAILED: LEFT PROXIMAL DORSAL FOREARM
LOCATION DETAILED: RIGHT HAND
LOCATION DETAILED: LEFT HAND

## 2023-01-06 ASSESSMENT — LOCATION ZONE DERM
LOCATION ZONE: HAND
LOCATION ZONE: SCALP
LOCATION ZONE: LEG
LOCATION ZONE: FACE
LOCATION ZONE: ARM

## 2023-01-06 ASSESSMENT — PGA PSORIASIS: PGA PSORIASIS 2020: MILD

## 2023-01-06 ASSESSMENT — BSA PSORIASIS: % BODY COVERED IN PSORIASIS: 1

## 2023-01-06 NOTE — PROCEDURE: TREATMENT REGIMEN
[facility]    Electrophysiology Follow Up Note      [place] Patient Status:  Outpatient in a Bed    1941 MRN [id number]   [facility] 6NE-A Attending Kiah Stacy MD   Hosp Day # 0 PCP Chema Fox MD
results for input(s): RBC, HGB, HCT, MCV, MCH, MCHC, RDW, NEPRELIM, WBC, PLT in the last 168 hours. No results for input(s): PT, INR, PTT in the last 168 hours.           Data:    Telemetry: BIV pacing; personally reviewed    CXR: apical PTX      Assessm
Otc Regimen: Rogaine 5% foam apply to scalp Twice a day
Detail Level: Zone

## 2023-01-06 NOTE — PROCEDURE: HIGH RISK MEDICATION MONITORING
Cellcept Pregnancy And Lactation Text: This medication is Pregnancy Category D and isn't considered safe during pregnancy. It is unknown if this medication is excreted in breast milk.
Zyclara Counseling:  I discussed with the patient the risks of imiquimod including but not limited to erythema, scaling, itching, weeping, crusting, and pain.  Patient understands that the inflammatory response to imiquimod is variable from person to person and was educated regarded proper titration schedule.  If flu-like symptoms develop, patient knows to discontinue the medication and contact us.
Xolair Pregnancy And Lactation Text: This medication is Pregnancy Category B and is considered safe during pregnancy. This medication is excreted in breast milk.
Ivermectin Counseling:  Patient instructed to take medication on an empty stomach with a full glass of water.  Patient informed of potential adverse effects including but not limited to nausea, diarrhea, dizziness, itching, and swelling of the extremities or lymph nodes.  The patient verbalized understanding of the proper use and possible adverse effects of ivermectin.  All of the patient's questions and concerns were addressed.
Xeljanz Counseling: I discussed with the patient the risks of Xeljanz therapy including increased risk of infection, liver issues, headache, diarrhea, or cold symptoms. Live vaccines should be avoided. They were instructed to call if they have any problems.
Thalidomide Counseling: I discussed with the patient the risks of thalidomide including but not limited to birth defects, anxiety, weakness, chest pain, dizziness, cough and severe allergy.
Fluconazole Counseling:  Patient counseled regarding adverse effects of fluconazole including but not limited to headache, diarrhea, nausea, upset stomach, liver function test abnormalities, taste disturbance, and stomach pain.  There is a rare possibility of liver failure that can occur when taking fluconazole.  The patient understands that monitoring of LFTs and kidney function test may be required, especially at baseline. The patient verbalized understanding of the proper use and possible adverse effects of fluconazole.  All of the patient's questions and concerns were addressed.
Finasteride Pregnancy And Lactation Text: This medication is absolutely contraindicated during pregnancy. It is unknown if it is excreted in breast milk.
Mirvaso Counseling: Mirvaso is a topical medication which can decrease superficial blood flow where applied. Side effects are uncommon and include stinging, redness and allergic reactions.
Include Pregnancy/Lactation Warning?: No
Skyrizi Counseling: I discussed with the patient the risks of risankizumab-rzaa including but not limited to immunosuppression, and serious infections.  The patient understands that monitoring is required including a PPD at baseline and must alert us or the primary physician if symptoms of infection or other concerning signs are noted.
Olanzapine Pregnancy And Lactation Text: This medication is pregnancy category C.   There are no adequate and well controlled trials with olanzapine in pregnant females.  Olanzapine should be used during pregnancy only if the potential benefit justifies the potential risk to the fetus.   In a study in lactating healthy women, olanzapine was excreted in breast milk.  It is recommended that women taking olanzapine should not breast feed.
Azithromycin Pregnancy And Lactation Text: This medication is considered safe during pregnancy and is also secreted in breast milk.
Topical Clindamycin Pregnancy And Lactation Text: This medication is Pregnancy Category B and is considered safe during pregnancy. It is unknown if it is excreted in breast milk.
SSKI Counseling:  I discussed with the patient the risks of SSKI including but not limited to thyroid abnormalities, metallic taste, GI upset, fever, headache, acne, arthralgias, paraesthesias, lymphadenopathy, easy bleeding, arrhythmias, and allergic reaction.
Calcipotriene Pregnancy And Lactation Text: This medication has not been proven safe during pregnancy. It is unknown if this medication is excreted in breast milk.
Terbinafine Pregnancy And Lactation Text: This medication is Pregnancy Category B and is considered safe during pregnancy. It is also excreted in breast milk and breast feeding isn't recommended.
Hydroxychloroquine Pregnancy And Lactation Text: This medication has been shown to cause fetal harm but it isn't assigned a Pregnancy Risk Category. There are small amounts excreted in breast milk.
Topical Ketoconazole Counseling: Patient counseled that this medication may cause skin irritation or allergic reactions.  In the event of skin irritation, the patient was advised to reduce the amount of the drug applied or use it less frequently.   The patient verbalized understanding of the proper use and possible adverse effects of ketoconazole.  All of the patient's questions and concerns were addressed.
Cibinqo Counseling: I discussed with the patient the risks of Cibinqo therapy including but not limited to common cold, nausea, headache, cold sores, increased blood CPK levels, dizziness, UTIs, fatigue, acne, and vomitting. Live vaccines should be avoided.  This medication has been linked to serious infections; higher rate of mortality; malignancy and lymphoproliferative disorders; major adverse cardiovascular events; thrombosis; thrombocytopenia and lymphopenia; lipid elevations; and retinal detachment.
High Dose Vitamin A Pregnancy And Lactation Text: High dose vitamin A therapy is contraindicated during pregnancy and breast feeding.
Bactrim Counseling:  I discussed with the patient the risks of sulfa antibiotics including but not limited to GI upset, allergic reaction, drug rash, diarrhea, dizziness, photosensitivity, and yeast infections.  Rarely, more serious reactions can occur including but not limited to aplastic anemia, agranulocytosis, methemoglobinemia, blood dyscrasias, liver or kidney failure, lung infiltrates or desquamative/blistering drug rashes.
Infliximab Counseling:  I discussed with the patient the risks of infliximab including but not limited to myelosuppression, immunosuppression, autoimmune hepatitis, demyelinating diseases, lymphoma, and serious infections.  The patient understands that monitoring is required including a PPD at baseline and must alert us or the primary physician if symptoms of infection or other concerning signs are noted.
Erythromycin Counseling:  I discussed with the patient the risks of erythromycin including but not limited to GI upset, allergic reaction, drug rash, diarrhea, increase in liver enzymes, and yeast infections.
Colchicine Counseling:  Patient counseled regarding adverse effects including but not limited to stomach upset (nausea, vomiting, stomach pain, or diarrhea).  Patient instructed to limit alcohol consumption while taking this medication.  Colchicine may reduce blood counts especially with prolonged use.  The patient understands that monitoring of kidney function and blood counts may be required, especially at baseline. The patient verbalized understanding of the proper use and possible adverse effects of colchicine.  All of the patient's questions and concerns were addressed.
Rhofade Pregnancy And Lactation Text: This medication has not been assigned a Pregnancy Risk Category. It is unknown if the medication is excreted in breast milk.
Cosentyx Pregnancy And Lactation Text: This medication is Pregnancy Category B and is considered safe during pregnancy. It is unknown if this medication is excreted in breast milk.
Thalidomide Pregnancy And Lactation Text: This medication is Pregnancy Category X and is absolutely contraindicated during pregnancy. It is unknown if it is excreted in breast milk.
Colchicine Pregnancy And Lactation Text: This medication is Pregnancy Category C and isn't considered safe during pregnancy. It is excreted in breast milk.
Birth Control Pills Counseling: Birth Control Pill Counseling: I discussed with the patient the potential side effects of OCPs including but not limited to increased risk of stroke, heart attack, thrombophlebitis, deep venous thrombosis, hepatic adenomas, breast changes, GI upset, headaches, and depression.  The patient verbalized understanding of the proper use and possible adverse effects of OCPs. All of the patient's questions and concerns were addressed.
Ivermectin Pregnancy And Lactation Text: This medication is Pregnancy Category C and it isn't known if it is safe during pregnancy. It is also excreted in breast milk.
Hydroquinone Counseling:  Patient advised that medication may result in skin irritation, lightening (hypopigmentation), dryness, and burning.  In the event of skin irritation, the patient was advised to reduce the amount of the drug applied or use it less frequently.  Rarely, spots that are treated with hydroquinone can become darker (pseudoochronosis).  Should this occur, patient instructed to stop medication and call the office. The patient verbalized understanding of the proper use and possible adverse effects of hydroquinone.  All of the patient's questions and concerns were addressed.
Oral Minoxidil Counseling- I discussed with the patient the risks of oral minoxidil including but not limited to shortness of breath, swelling of the feet or ankles, dizziness, lightheadedness, unwanted hair growth and allergic reaction.  The patient verbalized understanding of the proper use and possible adverse effects of oral minoxidil.  All of the patient's questions and concerns were addressed.
Cyclophosphamide Counseling:  I discussed with the patient the risks of cyclophosphamide including but not limited to hair loss, hormonal abnormalities, decreased fertility, abdominal pain, diarrhea, nausea and vomiting, bone marrow suppression and infection. The patient understands that monitoring is required while taking this medication.
Xeljasenz Pregnancy And Lactation Text: This medication is Pregnancy Category D and is not considered safe during pregnancy.  The risk during breast feeding is also uncertain.
Skyrizi Pregnancy And Lactation Text: The risk during pregnancy and breastfeeding is uncertain with this medication.
Zyclara Pregnancy And Lactation Text: This medication is Pregnancy Category C. It is unknown if this medication is excreted in breast milk.
Fluconazole Pregnancy And Lactation Text: This medication is Pregnancy Category C and it isn't know if it is safe during pregnancy. It is also excreted in breast milk.
Oral Minoxidil Pregnancy And Lactation Text: This medication should only be used when clearly needed if you are pregnant, attempting to become pregnant or breast feeding.
Hydroquinone Pregnancy And Lactation Text: This medication has not been assigned a Pregnancy Risk Category but animal studies failed to show danger with the topical medication. It is unknown if the medication is excreted in breast milk.
Erivedge Counseling- I discussed with the patient the risks of Erivedge including but not limited to nausea, vomiting, diarrhea, constipation, weight loss, changes in the sense of taste, decreased appetite, muscle spasms, and hair loss.  The patient verbalized understanding of the proper use and possible adverse effects of Erivedge.  All of the patient's questions and concerns were addressed.
Cibinqo Pregnancy And Lactation Text: It is unknown if this medication will adversely affect pregnancy or breast feeding.  You should not take this medication if you are currently pregnant or planning a pregnancy or while breastfeeding.
Stelara Counseling:  I discussed with the patient the risks of ustekinumab including but not limited to immunosuppression, malignancy, posterior leukoencephalopathy syndrome, and serious infections.  The patient understands that monitoring is required including a PPD at baseline and must alert us or the primary physician if symptoms of infection or other concerning signs are noted.
Bactrim Pregnancy And Lactation Text: This medication is Pregnancy Category D and is known to cause fetal risk.  It is also excreted in breast milk.
Tranexamic Acid Counseling:  Patient advised of the small risk of bleeding problems with tranexamic acid. They were also instructed to call if they developed any nausea, vomiting or diarrhea. All of the patient's questions and concerns were addressed.
Low Dose Naltrexone Counseling- I discussed with the patient the potential risks and side effects of low dose naltrexone including but not limited to: more vivid dreams, headaches, nausea, vomiting, abdominal pain, fatigue, dizziness, and anxiety.
Dupixent Counseling: I discussed with the patient the risks of dupilumab including but not limited to eye infection and irritation, cold sores, injection site reactions, worsening of asthma, allergic reactions and increased risk of parasitic infection.  Live vaccines should be avoided while taking dupilumab. Dupilumab will also interact with certain medications such as warfarin and cyclosporine. The patient understands that monitoring is required and they must alert us or the primary physician if symptoms of infection or other concerning signs are noted.
5-Fu Counseling: 5-Fluorouracil Counseling:  I discussed with the patient the risks of 5-fluorouracil including but not limited to erythema, scaling, itching, weeping, crusting, and pain.
Erythromycin Pregnancy And Lactation Text: This medication is Pregnancy Category B and is considered safe during pregnancy. It is also excreted in breast milk.
Solaraze Counseling:  I discussed with the patient the risks of Solaraze including but not limited to erythema, scaling, itching, weeping, crusting, and pain.
Opzelura Counseling:  I discussed with the patient the risks of Opzelura including but not limited to nasopharngitis, bronchitis, ear infection, eosinophila, hives, diarrhea, folliculitis, tonsillitis, and rhinorrhea.  Taken orally, this medication has been linked to serious infections; higher rate of mortality; malignancy and lymphoproliferative disorders; major adverse cardiovascular events; thrombosis; thrombocytopenia, anemia, and neutropenia; and lipid elevations.
Dupixent Pregnancy And Lactation Text: This medication likely crosses the placenta but the risk for the fetus is uncertain. This medication is excreted in breast milk.
Birth Control Pills Pregnancy And Lactation Text: This medication should be avoided if pregnant and for the first 30 days post-partum.
Cimetidine Counseling:  I discussed with the patient the risks of Cimetidine including but not limited to gynecomastia, headache, diarrhea, nausea, drowsiness, arrhythmias, pancreatitis, skin rashes, psychosis, bone marrow suppression and kidney toxicity.
Azelaic Acid Counseling: Patient counseled that medicine may cause skin irritation and to avoid applying near the eyes.  In the event of skin irritation, the patient was advised to reduce the amount of the drug applied or use it less frequently.   The patient verbalized understanding of the proper use and possible adverse effects of azelaic acid.  All of the patient's questions and concerns were addressed.
Metronidazole Counseling:  I discussed with the patient the risks of metronidazole including but not limited to seizures, nausea/vomiting, a metallic taste in the mouth, nausea/vomiting and severe allergy.
Dapsone Counseling: I discussed with the patient the risks of dapsone including but not limited to hemolytic anemia, agranulocytosis, rashes, methemoglobinemia, kidney failure, peripheral neuropathy, headaches, GI upset, and liver toxicity.  Patients who start dapsone require monitoring including baseline LFTs and weekly CBCs for the first month, then every month thereafter.  The patient verbalized understanding of the proper use and possible adverse effects of dapsone.  All of the patient's questions and concerns were addressed.
Libtayo Pregnancy And Lactation Text: This medication is contraindicated in pregnancy and when breast feeding.
Cyclophosphamide Pregnancy And Lactation Text: This medication is Pregnancy Category D and it isn't considered safe during pregnancy. This medication is excreted in breast milk.
Griseofulvin Counseling:  I discussed with the patient the risks of griseofulvin including but not limited to photosensitivity, cytopenia, liver damage, nausea/vomiting and severe allergy.  The patient understands that this medication is best absorbed when taken with a fatty meal (e.g., ice cream or french fries).
Rifampin Counseling: I discussed with the patient the risks of rifampin including but not limited to liver damage, kidney damage, red-orange body fluids, nausea/vomiting and severe allergy.
Tranexamic Acid Pregnancy And Lactation Text: It is unknown if this medication is safe during pregnancy or breast feeding.
Olumiant Counseling: I discussed with the patient the risks of Olumiant therapy including but not limited to upper respiratory tract infections, shingles, cold sores, and nausea. Live vaccines should be avoided.  This medication has been linked to serious infections; higher rate of mortality; malignancy and lymphoproliferative disorders; major adverse cardiovascular events; thrombosis; gastrointestinal perforations; neutropenia; lymphopenia; anemia; liver enzyme elevations; and lipid elevations.
Topical Sulfur Applications Counseling: Topical Sulfur Counseling: Patient counseled that this medication may cause skin irritation or allergic reactions.  In the event of skin irritation, the patient was advised to reduce the amount of the drug applied or use it less frequently.   The patient verbalized understanding of the proper use and possible adverse effects of topical sulfur application.  All of the patient's questions and concerns were addressed.
Imiquimod Counseling:  I discussed with the patient the risks of imiquimod including but not limited to erythema, scaling, itching, weeping, crusting, and pain.  Patient understands that the inflammatory response to imiquimod is variable from person to person and was educated regarded proper titration schedule.  If flu-like symptoms develop, patient knows to discontinue the medication and contact us.
Cyclosporine Counseling:  I discussed with the patient the risks of cyclosporine including but not limited to hypertension, gingival hyperplasia,myelosuppression, immunosuppression, liver damage, kidney damage, neurotoxicity, lymphoma, and serious infections. The patient understands that monitoring is required including baseline blood pressure, CBC, CMP, lipid panel and uric acid, and then 1-2 times monthly CMP and blood pressure.
Cephalexin Counseling: I counseled the patient regarding use of cephalexin as an antibiotic for prophylactic and/or therapeutic purposes. Cephalexin (commonly prescribed under brand name Keflex) is a cephalosporin antibiotic which is active against numerous classes of bacteria, including most skin bacteria. Side effects may include nausea, diarrhea, gastrointestinal upset, rash, hives, yeast infections, and in rare cases, hepatitis, kidney disease, seizures, fever, confusion, neurologic symptoms, and others. Patients with severe allergies to penicillin medications are cautioned that there is about a 10% incidence of cross-reactivity with cephalosporins. When possible, patients with penicillin allergies should use alternatives to cephalosporins for antibiotic therapy.
Otezla Counseling: The side effects of Otezla were discussed with the patient, including but not limited to worsening or new depression, weight loss, diarrhea, nausea, upper respiratory tract infection, and headache. Patient instructed to call the office should any adverse effect occur.  The patient verbalized understanding of the proper use and possible adverse effects of Otezla.  All the patient's questions and concerns were addressed.
Rituxan Counseling:  I discussed with the patient the risks of Rituxan infusions. Side effects can include infusion reactions, severe drug rashes including mucocutaneous reactions, reactivation of latent hepatitis and other infections and rarely progressive multifocal leukoencephalopathy.  All of the patient's questions and concerns were addressed.
Solaraze Pregnancy And Lactation Text: This medication is Pregnancy Category B and is considered safe. There is some data to suggest avoiding during the third trimester. It is unknown if this medication is excreted in breast milk.
Opioid Counseling: I discussed with the patient the potential side effects of opioids including but not limited to addiction, altered mental status, and depression. I stressed avoiding alcohol, benzodiazepines, muscle relaxants and sleep aids unless specifically okayed by a physician. The patient verbalized understanding of the proper use and possible adverse effects of opioids. All of the patient's questions and concerns were addressed. They were instructed to flush the remaining pills down the toilet if they did not need them for pain.
Acitretin Counseling:  I discussed with the patient the risks of acitretin including but not limited to hair loss, dry lips/skin/eyes, liver damage, hyperlipidemia, depression/suicidal ideation, photosensitivity.  Serious rare side effects can include but are not limited to pancreatitis, pseudotumor cerebri, bony changes, clot formation/stroke/heart attack.  Patient understands that alcohol is contraindicated since it can result in liver toxicity and significantly prolong the elimination of the drug by many years.
5-Fu Pregnancy And Lactation Text: This medication is Pregnancy Category X and contraindicated in pregnancy and in women who may become pregnant. It is unknown if this medication is excreted in breast milk.
Low Dose Naltrexone Pregnancy And Lactation Text: Naltrexone is pregnancy category C.  There have been no adequate and well-controlled studies in pregnant women.  It should be used in pregnancy only if the potential benefit justifies the potential risk to the fetus.   Limited data indicates that naltrexone is minimally excreted into breastmilk.
Enbrel Counseling:  I discussed with the patient the risks of etanercept including but not limited to myelosuppression, immunosuppression, autoimmune hepatitis, demyelinating diseases, lymphoma, and infections.  The patient understands that monitoring is required including a PPD at baseline and must alert us or the primary physician if symptoms of infection or other concerning signs are noted.
Opzelura Pregnancy And Lactation Text: There is insufficient data to evaluate drug-associated risk for major birth defects, miscarriage, or other adverse maternal or fetal outcomes.  There is a pregnancy registry that monitors pregnancy outcomes in pregnant persons exposed to the medication during pregnancy.  It is unknown if this medication is excreted in breast milk.  Do not breastfeed during treatment and for about 4 weeks after the last dose.
Metronidazole Pregnancy And Lactation Text: This medication is Pregnancy Category B and considered safe during pregnancy.  It is also excreted in breast milk.
Niacinamide Counseling: I recommended taking niacin or niacinamide, also know as vitamin B3, twice daily. Recent evidence suggests that taking vitamin B3 (500 mg twice daily) can reduce the risk of actinic keratoses and non-melanoma skin cancers. Side effects of vitamin B3 include flushing and headache.
Acitretin Pregnancy And Lactation Text: This medication is Pregnancy Category X and should not be given to women who are pregnant or may become pregnant in the future. This medication is excreted in breast milk.
Opioid Pregnancy And Lactation Text: These medications can lead to premature delivery and should be avoided during pregnancy. These medications are also present in breast milk in small amounts.
Drysol Counseling:  I discussed with the patient the risks of drysol/aluminum chloride including but not limited to skin rash, itching, irritation, burning.
Odomzo Counseling- I discussed with the patient the risks of Odomzo including but not limited to nausea, vomiting, diarrhea, constipation, weight loss, changes in the sense of taste, decreased appetite, muscle spasms, and hair loss.  The patient verbalized understanding of the proper use and possible adverse effects of Odomzo.  All of the patient's questions and concerns were addressed.
Griseofulvin Pregnancy And Lactation Text: This medication is Pregnancy Category X and is known to cause serious birth defects. It is unknown if this medication is excreted in breast milk but breast feeding should be avoided.
Spironolactone Counseling: Patient advised regarding risks of diarrhea, abdominal pain, hyperkalemia, birth defects (for female patients), liver toxicity and renal toxicity. The patient may need blood work to monitor liver and kidney function and potassium levels while on therapy. The patient verbalized understanding of the proper use and possible adverse effects of spironolactone.  All of the patient's questions and concerns were addressed.
Doxepin Pregnancy And Lactation Text: This medication is Pregnancy Category C and it isn't known if it is safe during pregnancy. It is also excreted in breast milk and breast feeding isn't recommended.
Dapsone Pregnancy And Lactation Text: This medication is Pregnancy Category C and is not considered safe during pregnancy or breast feeding.
Rifampin Pregnancy And Lactation Text: This medication is Pregnancy Category C and it isn't know if it is safe during pregnancy. It is also excreted in breast milk and should not be used if you are breast feeding.
Azelaic Acid Pregnancy And Lactation Text: This medication is considered safe during pregnancy and breast feeding.
Taltz Counseling: I discussed with the patient the risks of ixekizumab including but not limited to immunosuppression, serious infections, worsening of inflammatory bowel disease and drug reactions.  The patient understands that monitoring is required including a PPD at baseline and must alert us or the primary physician if symptoms of infection or other concerning signs are noted.
Valtrex Counseling: I discussed with the patient the risks of valacyclovir including but not limited to kidney damage, nausea, vomiting and severe allergy.  The patient understands that if the infection seems to be worsening or is not improving, they are to call.
Cephalexin Pregnancy And Lactation Text: This medication is Pregnancy Category B and considered safe during pregnancy.  It is also excreted in breast milk but can be used safely for shorter doses.
Topical Sulfur Applications Pregnancy And Lactation Text: This medication is Pregnancy Category C and has an unknown safety profile during pregnancy. It is unknown if this topical medication is excreted in breast milk.
Adbry Counseling: I discussed with the patient the risks of tralokinumab including but not limited to eye infection and irritation, cold sores, injection site reactions, worsening of asthma, allergic reactions and increased risk of parasitic infection.  Live vaccines should be avoided while taking tralokinumab. The patient understands that monitoring is required and they must alert us or the primary physician if symptoms of infection or other concerning signs are noted.
Cyclosporine Pregnancy And Lactation Text: This medication is Pregnancy Category C and it isn't know if it is safe during pregnancy. This medication is excreted in breast milk.
Hydroxyzine Counseling: Patient advised that the medication is sedating and not to drive a car after taking this medication.  Patient informed of potential adverse effects including but not limited to dry mouth, urinary retention, and blurry vision.  The patient verbalized understanding of the proper use and possible adverse effects of hydroxyzine.  All of the patient's questions and concerns were addressed.
Topical Retinoid counseling:  Patient advised to apply a pea-sized amount only at bedtime and wait 30 minutes after washing their face before applying.  If too drying, patient may add a non-comedogenic moisturizer. The patient verbalized understanding of the proper use and possible adverse effects of retinoids.  All of the patient's questions and concerns were addressed.
Rituxan Pregnancy And Lactation Text: This medication is Pregnancy Category C and it isn't know if it is safe during pregnancy. It is unknown if this medication is excreted in breast milk but similar antibodies are known to be excreted.
Olumiant Pregnancy And Lactation Text: Based on animal studies, Olumiant may cause embryo-fetal harm when administered to pregnant women.  The medication should not be used in pregnancy.  Breastfeeding is not recommended during treatment.
Otezla Pregnancy And Lactation Text: This medication is Pregnancy Category C and it isn't known if it is safe during pregnancy. It is unknown if it is excreted in breast milk.
Libtayo Counseling- I discussed with the patient the risks of Libtayo including but not limited to nausea, vomiting, diarrhea, and bone or muscle pain.  The patient verbalized understanding of the proper use and possible adverse effects of Libtayo.  All of the patient's questions and concerns were addressed.
Benzoyl Peroxide Counseling: Patient counseled that medicine may cause skin irritation and bleach clothing.  In the event of skin irritation, the patient was advised to reduce the amount of the drug applied or use it less frequently.   The patient verbalized understanding of the proper use and possible adverse effects of benzoyl peroxide.  All of the patient's questions and concerns were addressed.
Gabapentin Counseling: I discussed with the patient the risks of gabapentin including but not limited to dizziness, somnolence, fatigue and ataxia.
Bexarotene Counseling:  I discussed with the patient the risks of bexarotene including but not limited to hair loss, dry lips/skin/eyes, liver abnormalities, hyperlipidemia, pancreatitis, depression/suicidal ideation, photosensitivity, drug rash/allergic reactions, hypothyroidism, anemia, leukopenia, infection, cataracts, and teratogenicity.  Patient understands that they will need regular blood tests to check lipid profile, liver function tests, white blood cell count, thyroid function tests and pregnancy test if applicable.
Niacinamide Pregnancy And Lactation Text: These medications are considered safe during pregnancy.
Minocycline Counseling: Patient advised regarding possible photosensitivity and discoloration of the teeth, skin, lips, tongue and gums.  Patient instructed to avoid sunlight, if possible.  When exposed to sunlight, patients should wear protective clothing, sunglasses, and sunscreen.  The patient was instructed to call the office immediately if the following severe adverse effects occur:  hearing changes, easy bruising/bleeding, severe headache, or vision changes.  The patient verbalized understanding of the proper use and possible adverse effects of minocycline.  All of the patient's questions and concerns were addressed.
Siliq Counseling:  I discussed with the patient the risks of Siliq including but not limited to new or worsening depression, suicidal thoughts and behavior, immunosuppression, malignancy, posterior leukoencephalopathy syndrome, and serious infections.  The patient understands that monitoring is required including a PPD at baseline and must alert us or the primary physician if symptoms of infection or other concerning signs are noted. There is also a special program designed to monitor depression which is required with Siliq.
Doxepin Counseling:  Patient advised that the medication is sedating and not to drive a car after taking this medication. Patient informed of potential adverse effects including but not limited to dry mouth, urinary retention, and blurry vision.  The patient verbalized understanding of the proper use and possible adverse effects of doxepin.  All of the patient's questions and concerns were addressed.
Sarecycline Counseling: Patient advised regarding possible photosensitivity and discoloration of the teeth, skin, lips, tongue and gums.  Patient instructed to avoid sunlight, if possible.  When exposed to sunlight, patients should wear protective clothing, sunglasses, and sunscreen.  The patient was instructed to call the office immediately if the following severe adverse effects occur:  hearing changes, easy bruising/bleeding, severe headache, or vision changes.  The patient verbalized understanding of the proper use and possible adverse effects of sarecycline.  All of the patient's questions and concerns were addressed.
Itraconazole Counseling:  I discussed with the patient the risks of itraconazole including but not limited to liver damage, nausea/vomiting, neuropathy, and severe allergy.  The patient understands that this medication is best absorbed when taken with acidic beverages such as non-diet cola or ginger ale.  The patient understands that monitoring is required including baseline LFTs and repeat LFTs at intervals.  The patient understands that they are to contact us or the primary physician if concerning signs are noted.
Spironolactone Pregnancy And Lactation Text: This medication can cause feminization of the male fetus and should be avoided during pregnancy. The active metabolite is also found in breast milk.
Picato Counseling:  I discussed with the patient the risks of Picato including but not limited to erythema, scaling, itching, weeping, crusting, and pain.
Methotrexate Counseling:  Patient counseled regarding adverse effects of methotrexate including but not limited to nausea, vomiting, abnormalities in liver function tests. Patients may develop mouth sores, rash, diarrhea, and abnormalities in blood counts. The patient understands that monitoring is required including LFT's and blood counts.  There is a rare possibility of scarring of the liver and lung problems that can occur when taking methotrexate. Persistent nausea, loss of appetite, pale stools, dark urine, cough, and shortness of breath should be reported immediately. Patient advised to discontinue methotrexate treatment at least three months before attempting to become pregnant.  I discussed the need for folate supplements while taking methotrexate.  These supplements can decrease side effects during methotrexate treatment. The patient verbalized understanding of the proper use and possible adverse effects of methotrexate.  All of the patient's questions and concerns were addressed.
Arava Counseling:  Patient counseled regarding adverse effects of Arava including but not limited to nausea, vomiting, abnormalities in liver function tests. Patients may develop mouth sores, rash, diarrhea, and abnormalities in blood counts. The patient understands that monitoring is required including LFTs and blood counts.  There is a rare possibility of scarring of the liver and lung problems that can occur when taking methotrexate. Persistent nausea, loss of appetite, pale stools, dark urine, cough, and shortness of breath should be reported immediately. Patient advised to discontinue Arava treatment and consult with a physician prior to attempting conception. The patient will have to undergo a treatment to eliminate Arava from the body prior to conception.
Clindamycin Counseling: I counseled the patient regarding use of clindamycin as an antibiotic for prophylactic and/or therapeutic purposes. Clindamycin is active against numerous classes of bacteria, including skin bacteria. Side effects may include nausea, diarrhea, gastrointestinal upset, rash, hives, yeast infections, and in rare cases, colitis.
Adbry Pregnancy And Lactation Text: It is unknown if this medication will adversely affect pregnancy or breast feeding.
Hydroxyzine Pregnancy And Lactation Text: This medication is not safe during pregnancy and should not be taken. It is also excreted in breast milk and breast feeding isn't recommended.
Wartpeel Counseling:  I discussed with the patient the risks of Wartpeel including but not limited to erythema, scaling, itching, weeping, crusting, and pain.
Rinvoq Counseling: I discussed with the patient the risks of Rinvoq therapy including but not limited to upper respiratory tract infections, shingles, cold sores, bronchitis, nausea, cough, fever, acne, and headache. Live vaccines should be avoided.  This medication has been linked to serious infections; higher rate of mortality; malignancy and lymphoproliferative disorders; major adverse cardiovascular events; thrombosis; thrombocytopenia, anemia, and neutropenia; lipid elevations; liver enzyme elevations; and gastrointestinal perforations.
Oxybutynin Counseling:  I discussed with the patient the risks of oxybutynin including but not limited to skin rash, drowsiness, dry mouth, difficulty urinating, and blurred vision.
Valtrex Pregnancy And Lactation Text: this medication is Pregnancy Category B and is considered safe during pregnancy. This medication is not directly found in breast milk but it's metabolite acyclovir is present.
Bexarotene Pregnancy And Lactation Text: This medication is Pregnancy Category X and should not be given to women who are pregnant or may become pregnant. This medication should not be used if you are breast feeding.
Nsaids Counseling: NSAID Counseling: I discussed with the patient that NSAIDs should be taken with food. Prolonged use of NSAIDs can result in the development of stomach ulcers.  Patient advised to stop taking NSAIDs if abdominal pain occurs.  The patient verbalized understanding of the proper use and possible adverse effects of NSAIDs.  All of the patient's questions and concerns were addressed.
Minocycline Pregnancy And Lactation Text: This medication is Pregnancy Category D and not consider safe during pregnancy. It is also excreted in breast milk.
Elidel Counseling: Patient may experience a mild burning sensation during topical application. Elidel is not approved in children less than 2 years of age. There have been case reports of hematologic and skin malignancies in patients using topical calcineurin inhibitors although causality is questionable.
Rinvoq Pregnancy And Lactation Text: Based on animal studies, Rinvoq may cause embryo-fetal harm when administered to pregnant women.  The medication should not be used in pregnancy.  Breastfeeding is not recommended during treatment and for 6 days after the last dose.
Benzoyl Peroxide Pregnancy And Lactation Text: This medication is Pregnancy Category C. It is unknown if benzoyl peroxide is excreted in breast milk.
Humira Counseling:  I discussed with the patient the risks of adalimumab including but not limited to myelosuppression, immunosuppression, autoimmune hepatitis, demyelinating diseases, lymphoma, and serious infections.  The patient understands that monitoring is required including a PPD at baseline and must alert us or the primary physician if symptoms of infection or other concerning signs are noted.
Tazorac Counseling:  Patient advised that medication is irritating and drying.  Patient may need to apply sparingly and wash off after an hour before eventually leaving it on overnight.  The patient verbalized understanding of the proper use and possible adverse effects of tazorac.  All of the patient's questions and concerns were addressed.
Detail Level: Zone
Methotrexate Pregnancy And Lactation Text: This medication is Pregnancy Category X and is known to cause fetal harm. This medication is excreted in breast milk.
Cimzia Counseling:  I discussed with the patient the risks of Cimzia including but not limited to immunosuppression, allergic reactions and infections.  The patient understands that monitoring is required including a PPD at baseline and must alert us or the primary physician if symptoms of infection or other concerning signs are noted.
Tetracycline Counseling: Patient counseled regarding possible photosensitivity and increased risk for sunburn.  Patient instructed to avoid sunlight, if possible.  When exposed to sunlight, patients should wear protective clothing, sunglasses, and sunscreen.  The patient was instructed to call the office immediately if the following severe adverse effects occur:  hearing changes, easy bruising/bleeding, severe headache, or vision changes.  The patient verbalized understanding of the proper use and possible adverse effects of tetracycline.  All of the patient's questions and concerns were addressed. Patient understands to avoid pregnancy while on therapy due to potential birth defects.
Carac Counseling:  I discussed with the patient the risks of Carac including but not limited to erythema, scaling, itching, weeping, crusting, and pain.
Glycopyrrolate Counseling:  I discussed with the patient the risks of glycopyrrolate including but not limited to skin rash, drowsiness, dry mouth, difficulty urinating, and blurred vision.
Ketoconazole Counseling:   Patient counseled regarding improving absorption with orange juice.  Adverse effects include but are not limited to breast enlargement, headache, diarrhea, nausea, upset stomach, liver function test abnormalities, taste disturbance, and stomach pain.  There is a rare possibility of liver failure that can occur when taking ketoconazole. The patient understands that monitoring of LFTs may be required, especially at baseline. The patient verbalized understanding of the proper use and possible adverse effects of ketoconazole.  All of the patient's questions and concerns were addressed.
Azathioprine Counseling:  I discussed with the patient the risks of azathioprine including but not limited to myelosuppression, immunosuppression, hepatotoxicity, lymphoma, and infections.  The patient understands that monitoring is required including baseline LFTs, Creatinine, possible TPMP genotyping and weekly CBCs for the first month and then every 2 weeks thereafter.  The patient verbalized understanding of the proper use and possible adverse effects of azathioprine.  All of the patient's questions and concerns were addressed.
Clindamycin Pregnancy And Lactation Text: This medication can be used in pregnancy if certain situations. Clindamycin is also present in breast milk.
Tremfya Counseling: I discussed with the patient the risks of guselkumab including but not limited to immunosuppression, serious infections, worsening of inflammatory bowel disease and drug reactions.  The patient understands that monitoring is required including a PPD at baseline and must alert us or the primary physician if symptoms of infection or other concerning signs are noted.
Dutasteride Counseling: Dustasteride Counseling:  I discussed with the patient the risks of use of dutasteride including but not limited to decreased libido, decreased ejaculate volume, and gynecomastia. Women who can become pregnant should not handle medication.  All of the patient's questions and concerns were addressed.
Quinolones Counseling:  I discussed with the patient the risks of fluoroquinolones including but not limited to GI upset, allergic reaction, drug rash, diarrhea, dizziness, photosensitivity, yeast infections, liver function test abnormalities, tendonitis/tendon rupture.
Isotretinoin Counseling: Patient should get monthly blood tests, not donate blood, not drive at night if vision affected, not share medication, and not undergo elective surgery for 6 months after tx completed. Side effects reviewed, pt to contact office should one occur.
Simponi Counseling:  I discussed with the patient the risks of golimumab including but not limited to myelosuppression, immunosuppression, autoimmune hepatitis, demyelinating diseases, lymphoma, and serious infections.  The patient understands that monitoring is required including a PPD at baseline and must alert us or the primary physician if symptoms of infection or other concerning signs are noted.
Albendazole Counseling:  I discussed with the patient the risks of albendazole including but not limited to cytopenia, kidney damage, nausea/vomiting and severe allergy.  The patient understands that this medication is being used in an off-label manner.
Winlevi Counseling:  I discussed with the patient the risks of topical clascoterone including but not limited to erythema, scaling, itching, and stinging. Patient voiced their understanding.
Sotyktu Counseling:  I discussed the most common side effects of Sotyktu including: common cold, sore throat, sinus infections, cold sores, canker sores, folliculitis, and acne.? I also discussed more serious side effects of Sotyktu including but not limited to: serious allergic reactions; increased risk for infections such as TB; cancers such as lymphomas; rhabdomyolysis and elevated CPK; and elevated triglycerides and liver enzymes.?
Propranolol Counseling:  I discussed with the patient the risks of propranolol including but not limited to low heart rate, low blood pressure, low blood sugar, restlessness and increased cold sensitivity. They should call the office if they experience any of these side effects.
Protopic Counseling: Patient may experience a mild burning sensation during topical application. Protopic is not approved in children less than 2 years of age. There have been case reports of hematologic and skin malignancies in patients using topical calcineurin inhibitors although causality is questionable.
Tazorac Pregnancy And Lactation Text: This medication is not safe during pregnancy. It is unknown if this medication is excreted in breast milk.
Nsaids Pregnancy And Lactation Text: These medications are considered safe up to 30 weeks gestation. It is excreted in breast milk.
Minoxidil Counseling: Minoxidil is a topical medication which can increase blood flow where it is applied. It is uncertain how this medication increases hair growth. Side effects are uncommon and include stinging and allergic reactions.
Cimzia Pregnancy And Lactation Text: This medication crosses the placenta but can be considered safe in certain situations. Cimzia may be excreted in breast milk.
Protopic Pregnancy And Lactation Text: This medication is Pregnancy Category C. It is unknown if this medication is excreted in breast milk when applied topically.
Glycopyrrolate Pregnancy And Lactation Text: This medication is Pregnancy Category B and is considered safe during pregnancy. It is unknown if it is excreted breast milk.
Ketoconazole Pregnancy And Lactation Text: This medication is Pregnancy Category C and it isn't know if it is safe during pregnancy. It is also excreted in breast milk and breast feeding isn't recommended.
Dutasteride Pregnancy And Lactation Text: This medication is absolutely contraindicated in women, especially during pregnancy and breast feeding. Feminization of male fetuses is possible if taking while pregnant.
Prednisone Counseling:  I discussed with the patient the risks of prolonged use of prednisone including but not limited to weight gain, insomnia, osteoporosis, mood changes, diabetes, susceptibility to infection, glaucoma and high blood pressure.  In cases where prednisone use is prolonged, patients should be monitored with blood pressure checks, serum glucose levels and an eye exam.  Additionally, the patient may need to be placed on GI prophylaxis, PCP prophylaxis, and calcium and vitamin D supplementation and/or a bisphosphonate.  The patient verbalized understanding of the proper use and the possible adverse effects of prednisone.  All of the patient's questions and concerns were addressed.
Clofazimine Counseling:  I discussed with the patient the risks of clofazimine including but not limited to skin and eye pigmentation, liver damage, nausea/vomiting, gastrointestinal bleeding and allergy.
Doxycycline Counseling:  Patient counseled regarding possible photosensitivity and increased risk for sunburn.  Patient instructed to avoid sunlight, if possible.  When exposed to sunlight, patients should wear protective clothing, sunglasses, and sunscreen.  The patient was instructed to call the office immediately if the following severe adverse effects occur:  hearing changes, easy bruising/bleeding, severe headache, or vision changes.  The patient verbalized understanding of the proper use and possible adverse effects of doxycycline.  All of the patient's questions and concerns were addressed.
Sotyktu Pregnancy And Lactation Text: There is insufficient data to evaluate whether or not Sotyktu is safe to use during pregnancy.? ?It is not known if Sotyktu passes into breast milk and whether or not it is safe to use when breastfeeding.??
Winlevi Pregnancy And Lactation Text: This medication is considered safe during pregnancy and breastfeeding.
Propranolol Pregnancy And Lactation Text: This medication is Pregnancy Category C and it isn't known if it is safe during pregnancy. It is excreted in breast milk.
Isotretinoin Pregnancy And Lactation Text: This medication is Pregnancy Category X and is considered extremely dangerous during pregnancy. It is unknown if it is excreted in breast milk.
Ilumya Counseling: I discussed with the patient the risks of tildrakizumab including but not limited to immunosuppression, malignancy, posterior leukoencephalopathy syndrome, and serious infections.  The patient understands that monitoring is required including a PPD at baseline and must alert us or the primary physician if symptoms of infection or other concerning signs are noted.
Azithromycin Counseling:  I discussed with the patient the risks of azithromycin including but not limited to GI upset, allergic reaction, drug rash, diarrhea, and yeast infections.
Topical Clindamycin Counseling: Patient counseled that this medication may cause skin irritation or allergic reactions.  In the event of skin irritation, the patient was advised to reduce the amount of the drug applied or use it less frequently.   The patient verbalized understanding of the proper use and possible adverse effects of clindamycin.  All of the patient's questions and concerns were addressed.
Eucrisa Counseling: Patient may experience a mild burning sensation during topical application. Eucrisa is not approved in children less than 2 years of age.
Olanzapine Counseling- I discussed with the patient the common side effects of olanzapine including but are not limited to: lack of energy, dry mouth, increased appetite, sleepiness, tremor, constipation, dizziness, changes in behavior, or restlessness.  Explained that teenagers are more likely to experience headaches, abdominal pain, pain in the arms or legs, tiredness, and sleepiness.  Serious side effects include but are not limited: increased risk of death in elderly patients who are confused, have memory loss, or dementia-related psychosis; hyperglycemia; increased cholesterol and triglycerides; and weight gain.
Cellcept Counseling:  I discussed with the patient the risks of mycophenolate mofetil including but not limited to infection/immunosuppression, GI upset, hypokalemia, hypercholesterolemia, bone marrow suppression, lymphoproliferative disorders, malignancy, GI ulceration/bleed/perforation, colitis, interstitial lung disease, kidney failure, progressive multifocal leukoencephalopathy, and birth defects.  The patient understands that monitoring is required including a baseline creatinine and regular CBC testing. In addition, patient must alert us immediately if symptoms of infection or other concerning signs are noted.
Sski Pregnancy And Lactation Text: This medication is Pregnancy Category D and isn't considered safe during pregnancy. It is excreted in breast milk.
Terbinafine Counseling: Patient counseling regarding adverse effects of terbinafine including but not limited to headache, diarrhea, rash, upset stomach, liver function test abnormalities, itching, taste/smell disturbance, nausea, abdominal pain, and flatulence.  There is a rare possibility of liver failure that can occur when taking terbinafine.  The patient understands that a baseline LFT and kidney function test may be required. The patient verbalized understanding of the proper use and possible adverse effects of terbinafine.  All of the patient's questions and concerns were addressed.
Calcipotriene Counseling:  I discussed with the patient the risks of calcipotriene including but not limited to erythema, scaling, itching, and irritation.
Hydroxychloroquine Counseling:  I discussed with the patient that a baseline ophthalmologic exam is needed at the start of therapy and every year thereafter while on therapy. A CBC may also be warranted for monitoring.  The side effects of this medication were discussed with the patient, including but not limited to agranulocytosis, aplastic anemia, seizures, rashes, retinopathy, and liver toxicity. Patient instructed to call the office should any adverse effect occur.  The patient verbalized understanding of the proper use and possible adverse effects of Plaquenil.  All the patient's questions and concerns were addressed.
Rhofade Counseling: Rhofade is a topical medication which can decrease superficial blood flow where applied. Side effects are uncommon and include stinging, redness and allergic reactions.
High Dose Vitamin A Counseling: Side effects reviewed, pt to contact office should one occur.
Xolair Counseling:  Patient informed of potential adverse effects including but not limited to fever, muscle aches, rash and allergic reactions.  The patient verbalized understanding of the proper use and possible adverse effects of Xolair.  All of the patient's questions and concerns were addressed.
Finasteride Counseling:  I discussed with the patient the risks of use of finasteride including but not limited to decreased libido, decreased ejaculate volume, gynecomastia, and depression. Women should not handle medication.  All of the patient's questions and concerns were addressed.
Doxycycline Pregnancy And Lactation Text: This medication is Pregnancy Category D and not consider safe during pregnancy. It is also excreted in breast milk but is considered safe for shorter treatment courses.
Cosentyx Counseling:  I discussed with the patient the risks of Cosentyx including but not limited to worsening of Crohn's disease, immunosuppression, allergic reactions and infections.  The patient understands that monitoring is required including a PPD at baseline and must alert us or the primary physician if symptoms of infection or other concerning signs are noted.

## 2023-01-06 NOTE — PROCEDURE: VENIPUNCTURE
Bill For Individual Tests Below?: no
Number Of Tubes Drawn: 1
Detail Level: Simple
Venipuncture Paragraph: An alcohol pad was applied to the venipuncture site. Venipuncture was performed using a 21 gauge. Pressure and a bandaid was applied to the site. No complications were noted.

## 2023-01-06 NOTE — PROCEDURE: OTHER
Other (Free Text): Tspot was obtained and sent to [facility]. Will request recent (12/2022) CBC and CMP results from PCP.
Note Text (......Xxx Chief Complaint.): This diagnosis correlates with the
Render Risk Assessment In Note?: no
Detail Level: Simple

## 2023-01-27 ENCOUNTER — RX ONLY (OUTPATIENT)
Age: 78
Setting detail: RX ONLY
End: 2023-01-27

## 2023-01-27 RX ORDER — DUTASTERIDE 0.5 MG/1
CAPSULE, LIQUID FILLED ORAL ONCE A DAY
Qty: 90 | Refills: 1 | Status: ERX

## 2023-04-02 DIAGNOSIS — E78.5 HYPERLIPIDEMIA, UNSPECIFIED HYPERLIPIDEMIA TYPE: ICD-10-CM

## 2023-04-02 NOTE — TELEPHONE ENCOUNTER
Care Due:                  Date            Visit Type   Department     Provider  --------------------------------------------------------------------------------  Last Visit: None Found      None         None Found  Next Visit: None Scheduled  None         None Found                                                            Last  Test          Frequency    Reason                     Performed    Due Date  --------------------------------------------------------------------------------  Office Visit  6 months...  atorvastatin, benazepriL,   Not Found    Overdue                             clopidogreL, isosorbide,                             montelukast, omeprazole,                             rOPINIRole...............    CBC.........  6 months...  clopidogreL..............  Not Found    Overdue    CMP.........  6 months...  benazepriL, clopidogreL..  Not Found    Overdue    Lipid Panel.  12 months..  atorvastatin.............  Not Found    Overdue    [employer] Embedded Care Gaps. Reference number: [id number]. 4/02/2023 12:44:59 AM AMARI

## 2023-04-03 RX ORDER — ATORVASTATIN CALCIUM 40 MG/1
TABLET, FILM COATED ORAL
Qty: 90 TABLET | Refills: 0 | Status: SHIPPED | OUTPATIENT
Start: 2023-04-03

## 2023-05-22 NOTE — TELEPHONE ENCOUNTER
Spoke with dr. Michel. Patient is not to use any imiquimod until at least after sutures are removed. He will address it at her suture removal appointment. Patient notified.   A-T Advancement Flap Text: The defect edges were debeveled with a #15 scalpel blade.  Given the location of the defect, shape of the defect and the proximity to free margins an A-T advancement flap was deemed most appropriate.  Using a sterile surgical marker, an appropriate advancement flap was drawn incorporating the defect and placing the expected incisions within the relaxed skin tension lines where possible.    The area thus outlined was incised deep to adipose tissue with a #15 scalpel blade.  The skin margins were undermined to an appropriate distance in all directions utilizing iris scissors.

## 2023-06-22 ENCOUNTER — RX ONLY (OUTPATIENT)
Age: 78
Setting detail: RX ONLY
End: 2023-06-22

## 2023-06-22 RX ORDER — SECUKINUMAB 150 MG/ML
INJECTION SUBCUTANEOUS
Qty: 2 | Refills: 5 | Status: ERX

## 2023-07-08 DIAGNOSIS — E78.5 HYPERLIPIDEMIA, UNSPECIFIED HYPERLIPIDEMIA TYPE: ICD-10-CM

## 2023-07-08 NOTE — TELEPHONE ENCOUNTER
Care Due:                  Date            Visit Type   Department     Provider  --------------------------------------------------------------------------------  Last Visit: None Found      None         None Found  Next Visit: None Scheduled  None         None Found                                                            Last  Test          Frequency    Reason                     Performed    Due Date  --------------------------------------------------------------------------------  Office Visit  6 months...  atorvastatin, benazepriL,   Not Found    Overdue                             clopidogreL, isosorbide,                             montelukast, omeprazole,                             rOPINIRole...............    CBC.........  6 months...  clopidogreL..............  Not Found    Overdue    CMP.........  6 months...  benazepriL, clopidogreL..  Not Found    Overdue    Lipid Panel.  12 months..  atorvastatin.............  Not Found    Overdue    [employer] Embedded Care Due Messages. Reference number: [id number]. 7/08/2023 2:08:11 AM AMARI

## 2023-07-10 RX ORDER — ATORVASTATIN CALCIUM 40 MG/1
TABLET, FILM COATED ORAL
Qty: 90 TABLET | Refills: 0 | OUTPATIENT
Start: 2023-07-10

## 2023-08-14 NOTE — PROGRESS NOTES
11/30/20     Medication: Cosentyx    Most Recent TB:      Lab Results   Component Value Date    TBGOLD Negative 08/06/2018       [x]Rheum   [x]Dueñas []Ashleigher []Steven    Patient Status:  []Uninsured [x]Part D with high copay  []Unable to obtain requested medication from their pharmacy benefit   [] Other:    PAP paperwork prepared     Pt completed the Pt portion, waiting on MD portion. 11.30.2020    PAP submitted to  date:    Company name:    Company phone number:  Company fax number:      Submitted by:            [][employer] fax []Emailed  []Submitted by Patient     PAP paperwork uploaded to patient's chart   []Yes    []Prescriber signed  []Patient Signed    []Unsigned  []No    Follow up with  (Include dates)   []Yes  Date:    []No      Approval:     Dates of approval    Denial:    []Over Income   []Other:   []Patient is LIS/Extra Help eligible    Patient notified of outcome  (Include dates)   []Yes   Date:  []No       Patient due for [facility] up visit [facility] provider [facility]. [facility] message sent to patient encouraging her to follow up in the clinic and inquiring if she has started Suboxone.    Vangei Zavaleta RN on 8/14/2023 at 10:11 AM

## 2023-09-21 ENCOUNTER — APPOINTMENT (RX ONLY)
Dept: URBAN - METROPOLITAN AREA CLINIC 158 | Facility: CLINIC | Age: 78
Setting detail: DERMATOLOGY
End: 2023-09-21

## 2023-09-21 DIAGNOSIS — L65.8 OTHER SPECIFIED NONSCARRING HAIR LOSS: ICD-10-CM | Status: IMPROVED

## 2023-09-21 DIAGNOSIS — L40.0 PSORIASIS VULGARIS: ICD-10-CM | Status: WELL CONTROLLED

## 2023-09-21 DIAGNOSIS — Z79.899 OTHER LONG TERM (CURRENT) DRUG THERAPY: ICD-10-CM

## 2023-09-21 DIAGNOSIS — L40.59 OTHER PSORIATIC ARTHROPATHY: ICD-10-CM

## 2023-09-21 DIAGNOSIS — L82.1 OTHER SEBORRHEIC KERATOSIS: ICD-10-CM

## 2023-09-21 PROCEDURE — ? TREATMENT REGIMEN

## 2023-09-21 PROCEDURE — ? COSENTYX MONITORING

## 2023-09-21 PROCEDURE — ? ADDITIONAL NOTES

## 2023-09-21 PROCEDURE — 99214 OFFICE O/P EST MOD 30 MIN: CPT

## 2023-09-21 PROCEDURE — ? HIGH RISK MEDICATION MONITORING

## 2023-09-21 PROCEDURE — ? PRESCRIPTION

## 2023-09-21 PROCEDURE — ? COUNSELING

## 2023-09-21 RX ORDER — DUTASTERIDE 0.5 MG/1
CAPSULE, LIQUID FILLED ORAL ONCE A DAY
Qty: 30 | Refills: 5 | Status: ERX

## 2023-09-21 ASSESSMENT — LOCATION ZONE DERM
LOCATION ZONE: LEG
LOCATION ZONE: HAND
LOCATION ZONE: ARM
LOCATION ZONE: SCALP

## 2023-09-21 ASSESSMENT — LOCATION SIMPLE DESCRIPTION DERM
LOCATION SIMPLE: RIGHT HAND
LOCATION SIMPLE: LEFT HAND
LOCATION SIMPLE: LEFT POSTERIOR THIGH
LOCATION SIMPLE: RIGHT PRETIBIAL REGION
LOCATION SIMPLE: LEFT POSTERIOR UPPER ARM
LOCATION SIMPLE: LEFT FOREARM
LOCATION SIMPLE: LEFT PRETIBIAL REGION
LOCATION SIMPLE: RIGHT ELBOW
LOCATION SIMPLE: SCALP
LOCATION SIMPLE: RIGHT SCALP
LOCATION SIMPLE: RIGHT POSTERIOR UPPER ARM
LOCATION SIMPLE: RIGHT POSTERIOR THIGH

## 2023-09-21 ASSESSMENT — LOCATION DETAILED DESCRIPTION DERM
LOCATION DETAILED: LEFT DISTAL POSTERIOR UPPER ARM
LOCATION DETAILED: LEFT PROXIMAL DORSAL FOREARM
LOCATION DETAILED: RIGHT MEDIAL FRONTAL SCALP
LOCATION DETAILED: RIGHT DISTAL POSTERIOR UPPER ARM
LOCATION DETAILED: LEFT SUPERIOR PARIETAL SCALP
LOCATION DETAILED: RIGHT HAND
LOCATION DETAILED: LEFT PROXIMAL PRETIBIAL REGION
LOCATION DETAILED: LEFT DISTAL POSTERIOR THIGH
LOCATION DETAILED: RIGHT ELBOW
LOCATION DETAILED: RIGHT DISTAL POSTERIOR THIGH
LOCATION DETAILED: RIGHT PROXIMAL PRETIBIAL REGION
LOCATION DETAILED: LEFT HAND

## 2023-09-21 ASSESSMENT — PGA PSORIASIS: PGA PSORIASIS 2020: MILD

## 2023-09-21 ASSESSMENT — BSA PSORIASIS: % BODY COVERED IN PSORIASIS: 1

## 2023-09-21 NOTE — PROCEDURE: ADDITIONAL NOTES
Render Risk Assessment In Note?: no
Additional Notes: Reviewed recent CBC and CMP via [employer]
Detail Level: Simple

## 2023-09-21 NOTE — PROCEDURE: COSENTYX MONITORING
Date Of Last Negative Ppd (Optional): 01/2023
Detail Level: Zone
Add High Risk Medication Management Associated Diagnosis?: No

## 2023-09-22 DIAGNOSIS — I10 ESSENTIAL HYPERTENSION: ICD-10-CM

## 2023-09-22 NOTE — TELEPHONE ENCOUNTER
Care Due:                  Date            Visit Type   Department     Provider  --------------------------------------------------------------------------------  Last Visit: None Found      None         None Found  Next Visit: None Scheduled  None         None Found                                                            Last  Test          Frequency    Reason                     Performed    Due Date  --------------------------------------------------------------------------------  Office Visit  6 months...  atorvastatin, benazepriL,   Not Found    Overdue                             clopidogreL, isosorbide,                             omeprazole, rOPINIRole...    CBC.........  6 months...  clopidogreL..............  Not Found    Overdue    CMP.........  6 months...  benazepriL, clopidogreL..  Not Found    Overdue    Lipid Panel.  12 months..  atorvastatin.............  Not Found    Overdue    [employer] Embedded Care Due Messages. Reference number: [id number]. 9/22/2023 12:39:02 AM AMARI

## 2023-09-25 RX ORDER — ISOSORBIDE MONONITRATE 30 MG/1
TABLET, EXTENDED RELEASE ORAL
Qty: 90 TABLET | Refills: 3 | Status: SHIPPED | OUTPATIENT
Start: 2023-09-25

## 2023-10-18 DIAGNOSIS — K21.9 GASTROESOPHAGEAL REFLUX DISEASE: ICD-10-CM

## 2023-10-18 DIAGNOSIS — Z95.0 CARDIAC PACEMAKER IN SITU: ICD-10-CM

## 2023-10-18 DIAGNOSIS — I10 ESSENTIAL HYPERTENSION: ICD-10-CM

## 2023-10-18 RX ORDER — OMEPRAZOLE 20 MG/1
CAPSULE, DELAYED RELEASE ORAL
Qty: 90 CAPSULE | Refills: 10 | OUTPATIENT
Start: 2023-10-18

## 2023-10-18 RX ORDER — BENAZEPRIL HYDROCHLORIDE 40 MG/1
TABLET ORAL
Qty: 90 TABLET | Refills: 10 | OUTPATIENT
Start: 2023-10-18

## 2023-10-18 RX ORDER — CLOPIDOGREL BISULFATE 75 MG/1
TABLET ORAL
Qty: 90 TABLET | Refills: 10 | OUTPATIENT
Start: 2023-10-18

## 2023-10-18 NOTE — TELEPHONE ENCOUNTER
No care due was identified.  [facility] Embedded Care Due Messages. Reference number: [id number]. 10/18/2023 1:55:09 AM AMARI

## 2024-03-21 ENCOUNTER — RX ONLY (OUTPATIENT)
Age: 79
Setting detail: RX ONLY
End: 2024-03-21

## 2024-03-21 ENCOUNTER — APPOINTMENT (RX ONLY)
Dept: URBAN - METROPOLITAN AREA CLINIC 158 | Facility: CLINIC | Age: 79
Setting detail: DERMATOLOGY
End: 2024-03-21

## 2024-03-21 DIAGNOSIS — Z79.899 OTHER LONG TERM (CURRENT) DRUG THERAPY: ICD-10-CM

## 2024-03-21 DIAGNOSIS — L65.8 OTHER SPECIFIED NONSCARRING HAIR LOSS: ICD-10-CM

## 2024-03-21 DIAGNOSIS — L81.4 OTHER MELANIN HYPERPIGMENTATION: ICD-10-CM

## 2024-03-21 DIAGNOSIS — L40.0 PSORIASIS VULGARIS: ICD-10-CM | Status: WELL CONTROLLED

## 2024-03-21 DIAGNOSIS — L28.0 LICHEN SIMPLEX CHRONICUS: ICD-10-CM

## 2024-03-21 DIAGNOSIS — L40.59 OTHER PSORIATIC ARTHROPATHY: ICD-10-CM

## 2024-03-21 DIAGNOSIS — L82.1 OTHER SEBORRHEIC KERATOSIS: ICD-10-CM

## 2024-03-21 DIAGNOSIS — L82.0 INFLAMED SEBORRHEIC KERATOSIS: ICD-10-CM | Status: INADEQUATELY CONTROLLED

## 2024-03-21 PROCEDURE — ? OTHER

## 2024-03-21 PROCEDURE — ? HIGH RISK MEDICATION MONITORING

## 2024-03-21 PROCEDURE — 17110 DESTRUCTION B9 LES UP TO 14: CPT

## 2024-03-21 PROCEDURE — ? LIQUID NITROGEN

## 2024-03-21 PROCEDURE — 99214 OFFICE O/P EST MOD 30 MIN: CPT | Mod: 25

## 2024-03-21 PROCEDURE — 36415 COLL VENOUS BLD VENIPUNCTURE: CPT

## 2024-03-21 PROCEDURE — ? ORDER TESTS

## 2024-03-21 PROCEDURE — ? PRESCRIPTION

## 2024-03-21 PROCEDURE — ? COSENTYX MONITORING

## 2024-03-21 PROCEDURE — ? VENIPUNCTURE

## 2024-03-21 PROCEDURE — ? COUNSELING

## 2024-03-21 RX ORDER — DUTASTERIDE 0.5 MG/1
CAPSULE, LIQUID FILLED ORAL ONCE A DAY
Qty: 30 | Refills: 5 | Status: ERX

## 2024-03-21 RX ORDER — SECUKINUMAB 150 MG/ML
INJECTION SUBCUTANEOUS
Qty: 2 | Refills: 5 | Status: ERX

## 2024-03-21 ASSESSMENT — LOCATION DETAILED DESCRIPTION DERM
LOCATION DETAILED: LEFT ANTECUBITAL SKIN
LOCATION DETAILED: RIGHT POSTERIOR SHOULDER
LOCATION DETAILED: LEFT PROXIMAL PRETIBIAL REGION
LOCATION DETAILED: RIGHT HAND
LOCATION DETAILED: RIGHT PROXIMAL PRETIBIAL REGION
LOCATION DETAILED: LEFT CENTRAL MALAR CHEEK
LOCATION DETAILED: LEFT HAND
LOCATION DETAILED: LEFT POSTERIOR SHOULDER
LOCATION DETAILED: LEFT PROXIMAL DORSAL FOREARM
LOCATION DETAILED: RIGHT MEDIAL FRONTAL SCALP
LOCATION DETAILED: LEFT DISTAL PRETIBIAL REGION
LOCATION DETAILED: RIGHT ELBOW
LOCATION DETAILED: LEFT MEDIAL BUTTOCK
LOCATION DETAILED: LEFT SUPERIOR PARIETAL SCALP

## 2024-03-21 ASSESSMENT — LOCATION SIMPLE DESCRIPTION DERM
LOCATION SIMPLE: LEFT UPPER ARM
LOCATION SIMPLE: LEFT SHOULDER
LOCATION SIMPLE: LEFT FOREARM
LOCATION SIMPLE: RIGHT SCALP
LOCATION SIMPLE: RIGHT ELBOW
LOCATION SIMPLE: SCALP
LOCATION SIMPLE: RIGHT HAND
LOCATION SIMPLE: LEFT CHEEK
LOCATION SIMPLE: RIGHT PRETIBIAL REGION
LOCATION SIMPLE: LEFT BUTTOCK
LOCATION SIMPLE: LEFT HAND
LOCATION SIMPLE: RIGHT SHOULDER
LOCATION SIMPLE: LEFT PRETIBIAL REGION

## 2024-03-21 ASSESSMENT — BSA PSORIASIS: % BODY COVERED IN PSORIASIS: 0

## 2024-03-21 ASSESSMENT — LOCATION ZONE DERM
LOCATION ZONE: SCALP
LOCATION ZONE: ARM
LOCATION ZONE: HAND
LOCATION ZONE: FACE
LOCATION ZONE: LEG
LOCATION ZONE: TRUNK

## 2024-03-21 ASSESSMENT — PGA PSORIASIS: PGA PSORIASIS 2020: CLEAR

## 2024-03-21 ASSESSMENT — ITCH NUMERIC RATING SCALE: HOW SEVERE IS YOUR ITCHING?: 0

## 2024-03-21 NOTE — PROCEDURE: LIQUID NITROGEN
Post-Care Instructions: I reviewed with the patient in detail post-care instructions. Patient is to wear sunprotection, and avoid picking at any of the treated lesions. Pt may apply Vaseline to crusted or scabbing areas.
Detail Level: Detailed
Show Aperture Variable?: Yes
Consent: The patient's consent was obtained including but not limited to risks of crusting, scabbing, blistering, scarring, darker or lighter pigmentary change, recurrence, incomplete removal and infection.
Spray Paint Technique: No
Medical Necessity Clause: This procedure was medically necessary because the lesions that were treated were:
Number Of Freeze-Thaw Cycles: 2 freeze-thaw cycles
Medical Necessity Information: It is in your best interest to select a reason for this procedure from the list below. All of these items fulfill various CMS LCD requirements except the new and changing color options.
Spray Paint Text: The liquid nitrogen was applied to the skin utilizing a spray paint frosting technique.

## 2024-03-21 NOTE — PROCEDURE: ORDER TESTS
Lab Facility: [id number]
Expected Date Of Service: 03/21/2024
Performing Laboratory: [id number]
Bill For Surgical Tray: no
Billing Type: Third-Party Bill

## 2024-03-21 NOTE — PROCEDURE: VENIPUNCTURE
Venipuncture Paragraph: An alcohol pad was applied to the venipuncture site. Venipuncture was performed using a 21 gauge. Pressure and a bandaid was applied to the site. No complications were noted.
Detail Level: Simple
Bill 85274 For Specimen Handling/Conveyance To Laboratory?: no
Number Of Tubes Drawn: 2

## 2024-03-21 NOTE — PROCEDURE: HIGH RISK MEDICATION MONITORING
Tremfya Counseling: I discussed with the patient the risks of guselkumab including but not limited to immunosuppression, serious infections, worsening of inflammatory bowel disease and drug reactions.  The patient understands that monitoring is required including a PPD at baseline and must alert us or the primary physician if symptoms of infection or other concerning signs are noted.
Bimzelx Counseling:  I discussed with the patient the risks of Bimzelx including but not limited to depression, immunosuppression, allergic reactions and infections.  The patient understands that monitoring is required including a PPD at baseline and must alert us or the primary physician if symptoms of infection or other concerning signs are noted.
Drysol Counseling:  I discussed with the patient the risks of drysol/aluminum chloride including but not limited to skin rash, itching, irritation, burning.
Otezla Pregnancy And Lactation Text: This medication is Pregnancy Category C and it isn't known if it is safe during pregnancy. It is unknown if it is excreted in breast milk.
Tranexamic Acid Counseling:  Patient advised of the small risk of bleeding problems with tranexamic acid. They were also instructed to call if they developed any nausea, vomiting or diarrhea. All of the patient's questions and concerns were addressed.
Cimetidine Pregnancy And Lactation Text: This medication is Pregnancy Category B and is considered safe during pregnancy. It is also excreted in breast milk and breast feeding isn't recommended.
Dapsone Counseling: I discussed with the patient the risks of dapsone including but not limited to hemolytic anemia, agranulocytosis, rashes, methemoglobinemia, kidney failure, peripheral neuropathy, headaches, GI upset, and liver toxicity.  Patients who start dapsone require monitoring including baseline LFTs and weekly CBCs for the first month, then every month thereafter.  The patient verbalized understanding of the proper use and possible adverse effects of dapsone.  All of the patient's questions and concerns were addressed.
Cyclophosphamide Counseling:  I discussed with the patient the risks of cyclophosphamide including but not limited to hair loss, hormonal abnormalities, decreased fertility, abdominal pain, diarrhea, nausea and vomiting, bone marrow suppression and infection. The patient understands that monitoring is required while taking this medication.
Doxycycline Counseling:  Patient counseled regarding possible photosensitivity and increased risk for sunburn.  Patient instructed to avoid sunlight, if possible.  When exposed to sunlight, patients should wear protective clothing, sunglasses, and sunscreen.  The patient was instructed to call the office immediately if the following severe adverse effects occur:  hearing changes, easy bruising/bleeding, severe headache, or vision changes.  The patient verbalized understanding of the proper use and possible adverse effects of doxycycline.  All of the patient's questions and concerns were addressed.
Odomzo Pregnancy And Lactation Text: This medication is Pregnancy Category X and is absolutely contraindicated during pregnancy. It is unknown if it is excreted in breast milk.
Acitretin Counseling:  I discussed with the patient the risks of acitretin including but not limited to hair loss, dry lips/skin/eyes, liver damage, hyperlipidemia, depression/suicidal ideation, photosensitivity.  Serious rare side effects can include but are not limited to pancreatitis, pseudotumor cerebri, bony changes, clot formation/stroke/heart attack.  Patient understands that alcohol is contraindicated since it can result in liver toxicity and significantly prolong the elimination of the drug by many years.
Protopic Pregnancy And Lactation Text: This medication is Pregnancy Category C. It is unknown if this medication is excreted in breast milk when applied topically.
Azelaic Acid Pregnancy And Lactation Text: This medication is considered safe during pregnancy and breast feeding.
Siliq Pregnancy And Lactation Text: The risk during pregnancy and breastfeeding is uncertain with this medication.
Cibinqo Pregnancy And Lactation Text: It is unknown if this medication will adversely affect pregnancy or breast feeding.  You should not take this medication if you are currently pregnant or planning a pregnancy or while breastfeeding.
Spironolactone Counseling: Patient advised regarding risks of diarrhea, abdominal pain, hyperkalemia, birth defects (for female patients), liver toxicity and renal toxicity. The patient may need blood work to monitor liver and kidney function and potassium levels while on therapy. The patient verbalized understanding of the proper use and possible adverse effects of spironolactone.  All of the patient's questions and concerns were addressed.
Hydroxyzine Counseling: Patient advised that the medication is sedating and not to drive a car after taking this medication.  Patient informed of potential adverse effects including but not limited to dry mouth, urinary retention, and blurry vision.  The patient verbalized understanding of the proper use and possible adverse effects of hydroxyzine.  All of the patient's questions and concerns were addressed.
Niacinamide Counseling: I recommended taking niacin or niacinamide, also know as vitamin B3, twice daily. Recent evidence suggests that taking vitamin B3 (500 mg twice daily) can reduce the risk of actinic keratoses and non-melanoma skin cancers. Side effects of vitamin B3 include flushing and headache.
Griseofulvin Pregnancy And Lactation Text: This medication is Pregnancy Category X and is known to cause serious birth defects. It is unknown if this medication is excreted in breast milk but breast feeding should be avoided.
Topical Ketoconazole Pregnancy And Lactation Text: This medication is Pregnancy Category B and is considered safe during pregnancy. It is unknown if it is excreted in breast milk.
Rifampin Counseling: I discussed with the patient the risks of rifampin including but not limited to liver damage, kidney damage, red-orange body fluids, nausea/vomiting and severe allergy.
Dapsone Pregnancy And Lactation Text: This medication is Pregnancy Category C and is not considered safe during pregnancy or breast feeding.
Doxycycline Pregnancy And Lactation Text: This medication is Pregnancy Category D and not consider safe during pregnancy. It is also excreted in breast milk but is considered safe for shorter treatment courses.
Bimzelx Pregnancy And Lactation Text: This medication crosses the placenta and the safety is uncertain during pregnancy. It is unknown if this medication is present in breast milk.
Humira Counseling:  I discussed with the patient the risks of adalimumab including but not limited to myelosuppression, immunosuppression, autoimmune hepatitis, demyelinating diseases, lymphoma, and serious infections.  The patient understands that monitoring is required including a PPD at baseline and must alert us or the primary physician if symptoms of infection or other concerning signs are noted.
Opioid Counseling: I discussed with the patient the potential side effects of opioids including but not limited to addiction, altered mental status, and depression. I stressed avoiding alcohol, benzodiazepines, muscle relaxants and sleep aids unless specifically okayed by a physician. The patient verbalized understanding of the proper use and possible adverse effects of opioids. All of the patient's questions and concerns were addressed. They were instructed to flush the remaining pills down the toilet if they did not need them for pain.
Tranexamic Acid Pregnancy And Lactation Text: It is unknown if this medication is safe during pregnancy or breast feeding.
Minoxidil Counseling: Minoxidil is a topical medication which can increase blood flow where it is applied. It is uncertain how this medication increases hair growth. Side effects are uncommon and include stinging and allergic reactions.
Olumiant Counseling: I discussed with the patient the risks of Olumiant therapy including but not limited to upper respiratory tract infections, shingles, cold sores, and nausea. Live vaccines should be avoided.  This medication has been linked to serious infections; higher rate of mortality; malignancy and lymphoproliferative disorders; major adverse cardiovascular events; thrombosis; gastrointestinal perforations; neutropenia; lymphopenia; anemia; liver enzyme elevations; and lipid elevations.
Azithromycin Counseling:  I discussed with the patient the risks of azithromycin including but not limited to GI upset, allergic reaction, drug rash, diarrhea, and yeast infections.
Acitretin Pregnancy And Lactation Text: This medication is Pregnancy Category X and should not be given to women who are pregnant or may become pregnant in the future. This medication is excreted in breast milk.
Oxybutynin Counseling:  I discussed with the patient the risks of oxybutynin including but not limited to skin rash, drowsiness, dry mouth, difficulty urinating, and blurred vision.
Cyclophosphamide Pregnancy And Lactation Text: This medication is Pregnancy Category D and it isn't considered safe during pregnancy. This medication is excreted in breast milk.
Niacinamide Pregnancy And Lactation Text: These medications are considered safe during pregnancy.
Hydroxyzine Pregnancy And Lactation Text: This medication is not safe during pregnancy and should not be taken. It is also excreted in breast milk and breast feeding isn't recommended.
Spironolactone Pregnancy And Lactation Text: This medication can cause feminization of the male fetus and should be avoided during pregnancy. The active metabolite is also found in breast milk.
Itraconazole Counseling:  I discussed with the patient the risks of itraconazole including but not limited to liver damage, nausea/vomiting, neuropathy, and severe allergy.  The patient understands that this medication is best absorbed when taken with acidic beverages such as non-diet cola or ginger ale.  The patient understands that monitoring is required including baseline LFTs and repeat LFTs at intervals.  The patient understands that they are to contact us or the primary physician if concerning signs are noted.
Simponi Counseling:  I discussed with the patient the risks of golimumab including but not limited to myelosuppression, immunosuppression, autoimmune hepatitis, demyelinating diseases, lymphoma, and serious infections.  The patient understands that monitoring is required including a PPD at baseline and must alert us or the primary physician if symptoms of infection or other concerning signs are noted.
Rifampin Pregnancy And Lactation Text: This medication is Pregnancy Category C and it isn't know if it is safe during pregnancy. It is also excreted in breast milk and should not be used if you are breast feeding.
Rhofade Counseling: Rhofade is a topical medication which can decrease superficial blood flow where applied. Side effects are uncommon and include stinging, redness and allergic reactions.
Benzoyl Peroxide Counseling: Patient counseled that medicine may cause skin irritation and bleach clothing.  In the event of skin irritation, the patient was advised to reduce the amount of the drug applied or use it less frequently.   The patient verbalized understanding of the proper use and possible adverse effects of benzoyl peroxide.  All of the patient's questions and concerns were addressed.
Erythromycin Counseling:  I discussed with the patient the risks of erythromycin including but not limited to GI upset, allergic reaction, drug rash, diarrhea, increase in liver enzymes, and yeast infections.
Dutasteride Male Counseling: Dustasteride Counseling:  I discussed with the patient the risks of use of dutasteride including but not limited to decreased libido, decreased ejaculate volume, and gynecomastia. Women who can become pregnant should not handle medication.  All of the patient's questions and concerns were addressed.
Opioid Pregnancy And Lactation Text: These medications can lead to premature delivery and should be avoided during pregnancy. These medications are also present in breast milk in small amounts.
Humira Pregnancy And Lactation Text: This medication is Pregnancy Category B and is considered safe during pregnancy. It is unknown if this medication is excreted in breast milk.
Minoxidil Pregnancy And Lactation Text: This medication has not been assigned a Pregnancy Risk Category but animal studies failed to show danger with the topical medication. It is unknown if the medication is excreted in breast milk.
Gabapentin Counseling: I discussed with the patient the risks of gabapentin including but not limited to dizziness, somnolence, fatigue and ataxia.
Valtrex Counseling: I discussed with the patient the risks of valacyclovir including but not limited to kidney damage, nausea, vomiting and severe allergy.  The patient understands that if the infection seems to be worsening or is not improving, they are to call.
Topical Sulfur Applications Counseling: Topical Sulfur Counseling: Patient counseled that this medication may cause skin irritation or allergic reactions.  In the event of skin irritation, the patient was advised to reduce the amount of the drug applied or use it less frequently.   The patient verbalized understanding of the proper use and possible adverse effects of topical sulfur application.  All of the patient's questions and concerns were addressed.
Elidel Counseling: Patient may experience a mild burning sensation during topical application. Elidel is not approved in children less than 2 years of age. There have been case reports of hematologic and skin malignancies in patients using topical calcineurin inhibitors although causality is questionable.
Cyclosporine Counseling:  I discussed with the patient the risks of cyclosporine including but not limited to hypertension, gingival hyperplasia,myelosuppression, immunosuppression, liver damage, kidney damage, neurotoxicity, lymphoma, and serious infections. The patient understands that monitoring is required including baseline blood pressure, CBC, CMP, lipid panel and uric acid, and then 1-2 times monthly CMP and blood pressure.
Xolair Counseling:  Patient informed of potential adverse effects including but not limited to fever, muscle aches, rash and allergic reactions.  The patient verbalized understanding of the proper use and possible adverse effects of Xolair.  All of the patient's questions and concerns were addressed.
Sarecycline Counseling: Patient advised regarding possible photosensitivity and discoloration of the teeth, skin, lips, tongue and gums.  Patient instructed to avoid sunlight, if possible.  When exposed to sunlight, patients should wear protective clothing, sunglasses, and sunscreen.  The patient was instructed to call the office immediately if the following severe adverse effects occur:  hearing changes, easy bruising/bleeding, severe headache, or vision changes.  The patient verbalized understanding of the proper use and possible adverse effects of sarecycline.  All of the patient's questions and concerns were addressed.
Nsaids Counseling: NSAID Counseling: I discussed with the patient that NSAIDs should be taken with food. Prolonged use of NSAIDs can result in the development of stomach ulcers.  Patient advised to stop taking NSAIDs if abdominal pain occurs.  The patient verbalized understanding of the proper use and possible adverse effects of NSAIDs.  All of the patient's questions and concerns were addressed.
Benzoyl Peroxide Pregnancy And Lactation Text: This medication is Pregnancy Category C. It is unknown if benzoyl peroxide is excreted in breast milk.
Azithromycin Pregnancy And Lactation Text: This medication is considered safe during pregnancy and is also secreted in breast milk.
Cimzia Counseling:  I discussed with the patient the risks of Cimzia including but not limited to immunosuppression, allergic reactions and infections.  The patient understands that monitoring is required including a PPD at baseline and must alert us or the primary physician if symptoms of infection or other concerning signs are noted.
Rhofade Pregnancy And Lactation Text: This medication has not been assigned a Pregnancy Risk Category. It is unknown if the medication is excreted in breast milk.
Olumiant Pregnancy And Lactation Text: Based on animal studies, Olumiant may cause embryo-fetal harm when administered to pregnant women.  The medication should not be used in pregnancy.  Breastfeeding is not recommended during treatment.
Detail Level: Zone
Bexarotene Counseling:  I discussed with the patient the risks of bexarotene including but not limited to hair loss, dry lips/skin/eyes, liver abnormalities, hyperlipidemia, pancreatitis, depression/suicidal ideation, photosensitivity, drug rash/allergic reactions, hypothyroidism, anemia, leukopenia, infection, cataracts, and teratogenicity.  Patient understands that they will need regular blood tests to check lipid profile, liver function tests, white blood cell count, thyroid function tests and pregnancy test if applicable.
Mirvaso Counseling: Mirvaso is a topical medication which can decrease superficial blood flow where applied. Side effects are uncommon and include stinging, redness and allergic reactions.
Itraconazole Pregnancy And Lactation Text: This medication is Pregnancy Category C and it isn't know if it is safe during pregnancy. It is also excreted in breast milk.
Topical Sulfur Applications Pregnancy And Lactation Text: This medication is Pregnancy Category C and has an unknown safety profile during pregnancy. It is unknown if this topical medication is excreted in breast milk.
Dutasteride Female Counseling: Dutasteride Counseling:  I discussed with the patient the risks of use of dutasteride including but not limited to decreased libido and sexual dysfunction. Explained the teratogenic nature of the medication and stressed the importance of not getting pregnant during treatment. All of the patient's questions and concerns were addressed.
Ilumya Counseling: I discussed with the patient the risks of tildrakizumab including but not limited to immunosuppression, malignancy, posterior leukoencephalopathy syndrome, and serious infections.  The patient understands that monitoring is required including a PPD at baseline and must alert us or the primary physician if symptoms of infection or other concerning signs are noted.
Albendazole Counseling:  I discussed with the patient the risks of albendazole including but not limited to cytopenia, kidney damage, nausea/vomiting and severe allergy.  The patient understands that this medication is being used in an off-label manner.
Azathioprine Counseling:  I discussed with the patient the risks of azathioprine including but not limited to myelosuppression, immunosuppression, hepatotoxicity, lymphoma, and infections.  The patient understands that monitoring is required including baseline LFTs, Creatinine, possible TPMP genotyping and weekly CBCs for the first month and then every 2 weeks thereafter.  The patient verbalized understanding of the proper use and possible adverse effects of azathioprine.  All of the patient's questions and concerns were addressed.
Gabapentin Pregnancy And Lactation Text: This medication is Pregnancy Category C and isn't considered safe during pregnancy. It is excreted in breast milk.
Valtrex Pregnancy And Lactation Text: this medication is Pregnancy Category B and is considered safe during pregnancy. This medication is not directly found in breast milk but it's metabolite acyclovir is present.
Erythromycin Pregnancy And Lactation Text: This medication is Pregnancy Category B and is considered safe during pregnancy. It is also excreted in breast milk.
Xolair Pregnancy And Lactation Text: This medication is Pregnancy Category B and is considered safe during pregnancy. This medication is excreted in breast milk.
Propranolol Counseling:  I discussed with the patient the risks of propranolol including but not limited to low heart rate, low blood pressure, low blood sugar, restlessness and increased cold sensitivity. They should call the office if they experience any of these side effects.
Bexarotene Pregnancy And Lactation Text: This medication is Pregnancy Category X and should not be given to women who are pregnant or may become pregnant. This medication should not be used if you are breast feeding.
Arava Counseling:  Patient counseled regarding adverse effects of Arava including but not limited to nausea, vomiting, abnormalities in liver function tests. Patients may develop mouth sores, rash, diarrhea, and abnormalities in blood counts. The patient understands that monitoring is required including LFTs and blood counts.  There is a rare possibility of scarring of the liver and lung problems that can occur when taking methotrexate. Persistent nausea, loss of appetite, pale stools, dark urine, cough, and shortness of breath should be reported immediately. Patient advised to discontinue Arava treatment and consult with a physician prior to attempting conception. The patient will have to undergo a treatment to eliminate Arava from the body prior to conception.
Skyrizi Counseling: I discussed with the patient the risks of risankizumab-rzaa including but not limited to immunosuppression, and serious infections.  The patient understands that monitoring is required including a PPD at baseline and must alert us or the primary physician if symptoms of infection or other concerning signs are noted.
Bactrim Counseling:  I discussed with the patient the risks of sulfa antibiotics including but not limited to GI upset, allergic reaction, drug rash, diarrhea, dizziness, photosensitivity, and yeast infections.  Rarely, more serious reactions can occur including but not limited to aplastic anemia, agranulocytosis, methemoglobinemia, blood dyscrasias, liver or kidney failure, lung infiltrates or desquamative/blistering drug rashes.
Solaraze Counseling:  I discussed with the patient the risks of Solaraze including but not limited to erythema, scaling, itching, weeping, crusting, and pain.
Cyclosporine Pregnancy And Lactation Text: This medication is Pregnancy Category C and it isn't know if it is safe during pregnancy. This medication is excreted in breast milk.
Cimzia Pregnancy And Lactation Text: This medication crosses the placenta but can be considered safe in certain situations. Cimzia may be excreted in breast milk.
Rinvoq Counseling: I discussed with the patient the risks of Rinvoq therapy including but not limited to upper respiratory tract infections, shingles, cold sores, bronchitis, nausea, cough, fever, acne, and headache. Live vaccines should be avoided.  This medication has been linked to serious infections; higher rate of mortality; malignancy and lymphoproliferative disorders; major adverse cardiovascular events; thrombosis; thrombocytopenia, anemia, and neutropenia; lipid elevations; liver enzyme elevations; and gastrointestinal perforations.
Elidel Pregnancy And Lactation Text: This medication is Pregnancy Category C. It is unknown if this medication is excreted in breast milk.
Carac Counseling:  I discussed with the patient the risks of Carac including but not limited to erythema, scaling, itching, weeping, crusting, and pain.
Nsaids Pregnancy And Lactation Text: These medications are considered safe up to 30 weeks gestation. It is excreted in breast milk.
Sarecycline Pregnancy And Lactation Text: This medication is Pregnancy Category D and not consider safe during pregnancy. It is also excreted in breast milk.
Glycopyrrolate Counseling:  I discussed with the patient the risks of glycopyrrolate including but not limited to skin rash, drowsiness, dry mouth, difficulty urinating, and blurred vision.
Dutasteride Pregnancy And Lactation Text: This medication is absolutely contraindicated in women, especially during pregnancy and breast feeding. Feminization of male fetuses is possible if taking while pregnant.
Wartpeel Counseling:  I discussed with the patient the risks of Wartpeel including but not limited to erythema, scaling, itching, weeping, crusting, and pain.
Metronidazole Counseling:  I discussed with the patient the risks of metronidazole including but not limited to seizures, nausea/vomiting, a metallic taste in the mouth, nausea/vomiting and severe allergy.
Ketoconazole Counseling:   Patient counseled regarding improving absorption with orange juice.  Adverse effects include but are not limited to breast enlargement, headache, diarrhea, nausea, upset stomach, liver function test abnormalities, taste disturbance, and stomach pain.  There is a rare possibility of liver failure that can occur when taking ketoconazole. The patient understands that monitoring of LFTs may be required, especially at baseline. The patient verbalized understanding of the proper use and possible adverse effects of ketoconazole.  All of the patient's questions and concerns were addressed.
Albendazole Pregnancy And Lactation Text: This medication is Pregnancy Category C and it isn't known if it is safe during pregnancy. It is also excreted in breast milk.
Rinvoq Pregnancy And Lactation Text: Based on animal studies, Rinvoq may cause embryo-fetal harm when administered to pregnant women.  The medication should not be used in pregnancy.  Breastfeeding is not recommended during treatment and for 6 days after the last dose.
Bactrim Pregnancy And Lactation Text: This medication is Pregnancy Category D and is known to cause fetal risk.  It is also excreted in breast milk.
Cosentyx Counseling:  I discussed with the patient the risks of Cosentyx including but not limited to worsening of Crohn's disease, immunosuppression, allergic reactions and infections.  The patient understands that monitoring is required including a PPD at baseline and must alert us or the primary physician if symptoms of infection or other concerning signs are noted.
Eucrisa Counseling: Patient may experience a mild burning sensation during topical application. Eucrisa is not approved in children less than 2 years of age.
Propranolol Pregnancy And Lactation Text: This medication is Pregnancy Category C and it isn't known if it is safe during pregnancy. It is excreted in breast milk.
Isotretinoin Counseling: Patient should get monthly blood tests, not donate blood, not drive at night if vision affected, not share medication, and not undergo elective surgery for 6 months after tx completed. Side effects reviewed, pt to contact office should one occur.
Azathioprine Pregnancy And Lactation Text: This medication is Pregnancy Category D and isn't considered safe during pregnancy. It is unknown if this medication is excreted in breast milk.
Methotrexate Counseling:  Patient counseled regarding adverse effects of methotrexate including but not limited to nausea, vomiting, abnormalities in liver function tests. Patients may develop mouth sores, rash, diarrhea, and abnormalities in blood counts. The patient understands that monitoring is required including LFT's and blood counts.  There is a rare possibility of scarring of the liver and lung problems that can occur when taking methotrexate. Persistent nausea, loss of appetite, pale stools, dark urine, cough, and shortness of breath should be reported immediately. Patient advised to discontinue methotrexate treatment at least three months before attempting to become pregnant.  I discussed the need for folate supplements while taking methotrexate.  These supplements can decrease side effects during methotrexate treatment. The patient verbalized understanding of the proper use and possible adverse effects of methotrexate.  All of the patient's questions and concerns were addressed.
Include Pregnancy/Lactation Warning?: No
Carac Pregnancy And Lactation Text: This medication is Pregnancy Category X and contraindicated in pregnancy and in women who may become pregnant. It is unknown if this medication is excreted in breast milk.
Solaraze Pregnancy And Lactation Text: This medication is Pregnancy Category B and is considered safe. There is some data to suggest avoiding during the third trimester. It is unknown if this medication is excreted in breast milk.
Metronidazole Pregnancy And Lactation Text: This medication is Pregnancy Category B and considered safe during pregnancy.  It is also excreted in breast milk.
Infliximab Counseling:  I discussed with the patient the risks of infliximab including but not limited to myelosuppression, immunosuppression, autoimmune hepatitis, demyelinating diseases, lymphoma, and serious infections.  The patient understands that monitoring is required including a PPD at baseline and must alert us or the primary physician if symptoms of infection or other concerning signs are noted.
Opzelura Counseling:  I discussed with the patient the risks of Opzelura including but not limited to nasopharngitis, bronchitis, ear infection, eosinophila, hives, diarrhea, folliculitis, tonsillitis, and rhinorrhea.  Taken orally, this medication has been linked to serious infections; higher rate of mortality; malignancy and lymphoproliferative disorders; major adverse cardiovascular events; thrombosis; thrombocytopenia, anemia, and neutropenia; and lipid elevations.
Olanzapine Counseling- I discussed with the patient the common side effects of olanzapine including but are not limited to: lack of energy, dry mouth, increased appetite, sleepiness, tremor, constipation, dizziness, changes in behavior, or restlessness.  Explained that teenagers are more likely to experience headaches, abdominal pain, pain in the arms or legs, tiredness, and sleepiness.  Serious side effects include but are not limited: increased risk of death in elderly patients who are confused, have memory loss, or dementia-related psychosis; hyperglycemia; increased cholesterol and triglycerides; and weight gain.
Erivedge Counseling- I discussed with the patient the risks of Erivedge including but not limited to nausea, vomiting, diarrhea, constipation, weight loss, changes in the sense of taste, decreased appetite, muscle spasms, and hair loss.  The patient verbalized understanding of the proper use and possible adverse effects of Erivedge.  All of the patient's questions and concerns were addressed.
Ketoconazole Pregnancy And Lactation Text: This medication is Pregnancy Category C and it isn't know if it is safe during pregnancy. It is also excreted in breast milk and breast feeding isn't recommended.
Tetracycline Counseling: Patient counseled regarding possible photosensitivity and increased risk for sunburn.  Patient instructed to avoid sunlight, if possible.  When exposed to sunlight, patients should wear protective clothing, sunglasses, and sunscreen.  The patient was instructed to call the office immediately if the following severe adverse effects occur:  hearing changes, easy bruising/bleeding, severe headache, or vision changes.  The patient verbalized understanding of the proper use and possible adverse effects of tetracycline.  All of the patient's questions and concerns were addressed. Patient understands to avoid pregnancy while on therapy due to potential birth defects.
Methotrexate Pregnancy And Lactation Text: This medication is Pregnancy Category X and is known to cause fetal harm. This medication is excreted in breast milk.
Glycopyrrolate Pregnancy And Lactation Text: This medication is Pregnancy Category B and is considered safe during pregnancy. It is unknown if it is excreted breast milk.
Sotyktu Counseling:  I discussed the most common side effects of Sotyktu including: common cold, sore throat, sinus infections, cold sores, canker sores, folliculitis, and acne.? I also discussed more serious side effects of Sotyktu including but not limited to: serious allergic reactions; increased risk for infections such as TB; cancers such as lymphomas; rhabdomyolysis and elevated CPK; and elevated triglycerides and liver enzymes.?
Finasteride Male Counseling: Finasteride Counseling:  I discussed with the patient the risks of use of finasteride including but not limited to decreased libido, decreased ejaculate volume, gynecomastia, and depression. Women should not handle medication.  All of the patient's questions and concerns were addressed.
Tazorac Counseling:  Patient advised that medication is irritating and drying.  Patient may need to apply sparingly and wash off after an hour before eventually leaving it on overnight.  The patient verbalized understanding of the proper use and possible adverse effects of tazorac.  All of the patient's questions and concerns were addressed.
Ivermectin Counseling:  Patient instructed to take medication on an empty stomach with a full glass of water.  Patient informed of potential adverse effects including but not limited to nausea, diarrhea, dizziness, itching, and swelling of the extremities or lymph nodes.  The patient verbalized understanding of the proper use and possible adverse effects of ivermectin.  All of the patient's questions and concerns were addressed.
Cellcept Counseling:  I discussed with the patient the risks of mycophenolate mofetil including but not limited to infection/immunosuppression, GI upset, hypokalemia, hypercholesterolemia, bone marrow suppression, lymphoproliferative disorders, malignancy, GI ulceration/bleed/perforation, colitis, interstitial lung disease, kidney failure, progressive multifocal leukoencephalopathy, and birth defects.  The patient understands that monitoring is required including a baseline creatinine and regular CBC testing. In addition, patient must alert us immediately if symptoms of infection or other concerning signs are noted.
Topical Retinoid counseling:  Patient advised to apply a pea-sized amount only at bedtime and wait 30 minutes after washing their face before applying.  If too drying, patient may add a non-comedogenic moisturizer. The patient verbalized understanding of the proper use and possible adverse effects of retinoids.  All of the patient's questions and concerns were addressed.
SSKI Counseling:  I discussed with the patient the risks of SSKI including but not limited to thyroid abnormalities, metallic taste, GI upset, fever, headache, acne, arthralgias, paraesthesias, lymphadenopathy, easy bleeding, arrhythmias, and allergic reaction.
Clofazimine Counseling:  I discussed with the patient the risks of clofazimine including but not limited to skin and eye pigmentation, liver damage, nausea/vomiting, gastrointestinal bleeding and allergy.
Stelara Counseling:  I discussed with the patient the risks of ustekinumab including but not limited to immunosuppression, malignancy, posterior leukoencephalopathy syndrome, and serious infections.  The patient understands that monitoring is required including a PPD at baseline and must alert us or the primary physician if symptoms of infection or other concerning signs are noted.
Cephalexin Counseling: I counseled the patient regarding use of cephalexin as an antibiotic for prophylactic and/or therapeutic purposes. Cephalexin (commonly prescribed under brand name Keflex) is a cephalosporin antibiotic which is active against numerous classes of bacteria, including most skin bacteria. Side effects may include nausea, diarrhea, gastrointestinal upset, rash, hives, yeast infections, and in rare cases, hepatitis, kidney disease, seizures, fever, confusion, neurologic symptoms, and others. Patients with severe allergies to penicillin medications are cautioned that there is about a 10% incidence of cross-reactivity with cephalosporins. When possible, patients with penicillin allergies should use alternatives to cephalosporins for antibiotic therapy.
Opzelura Pregnancy And Lactation Text: There is insufficient data to evaluate drug-associated risk for major birth defects, miscarriage, or other adverse maternal or fetal outcomes.  There is a pregnancy registry that monitors pregnancy outcomes in pregnant persons exposed to the medication during pregnancy.  It is unknown if this medication is excreted in breast milk.  Do not breastfeed during treatment and for about 4 weeks after the last dose.
Olanzapine Pregnancy And Lactation Text: This medication is pregnancy category C.   There are no adequate and well controlled trials with olanzapine in pregnant females.  Olanzapine should be used during pregnancy only if the potential benefit justifies the potential risk to the fetus.   In a study in lactating healthy women, olanzapine was excreted in breast milk.  It is recommended that women taking olanzapine should not breast feed.
Winlevi Counseling:  I discussed with the patient the risks of topical clascoterone including but not limited to erythema, scaling, itching, and stinging. Patient voiced their understanding.
Terbinafine Counseling: Patient counseling regarding adverse effects of terbinafine including but not limited to headache, diarrhea, rash, upset stomach, liver function test abnormalities, itching, taste/smell disturbance, nausea, abdominal pain, and flatulence.  There is a rare possibility of liver failure that can occur when taking terbinafine.  The patient understands that a baseline LFT and kidney function test may be required. The patient verbalized understanding of the proper use and possible adverse effects of terbinafine.  All of the patient's questions and concerns were addressed.
Calcipotriene Counseling:  I discussed with the patient the risks of calcipotriene including but not limited to erythema, scaling, itching, and irritation.
Isotretinoin Pregnancy And Lactation Text: This medication is Pregnancy Category X and is considered extremely dangerous during pregnancy. It is unknown if it is excreted in breast milk.
Minocycline Counseling: Patient advised regarding possible photosensitivity and discoloration of the teeth, skin, lips, tongue and gums.  Patient instructed to avoid sunlight, if possible.  When exposed to sunlight, patients should wear protective clothing, sunglasses, and sunscreen.  The patient was instructed to call the office immediately if the following severe adverse effects occur:  hearing changes, easy bruising/bleeding, severe headache, or vision changes.  The patient verbalized understanding of the proper use and possible adverse effects of minocycline.  All of the patient's questions and concerns were addressed.
Finasteride Female Counseling: Finasteride Counseling:  I discussed with the patient the risks of use of finasteride including but not limited to decreased libido and sexual dysfunction. Explained the teratogenic nature of the medication and stressed the importance of not getting pregnant during treatment. All of the patient's questions and concerns were addressed.
Hydroxychloroquine Counseling:  I discussed with the patient that a baseline ophthalmologic exam is needed at the start of therapy and every year thereafter while on therapy. A CBC may also be warranted for monitoring.  The side effects of this medication were discussed with the patient, including but not limited to agranulocytosis, aplastic anemia, seizures, rashes, retinopathy, and liver toxicity. Patient instructed to call the office should any adverse effect occur.  The patient verbalized understanding of the proper use and possible adverse effects of Plaquenil.  All the patient's questions and concerns were addressed.
Cephalexin Pregnancy And Lactation Text: This medication is Pregnancy Category B and considered safe during pregnancy.  It is also excreted in breast milk but can be used safely for shorter doses.
Tazorac Pregnancy And Lactation Text: This medication is not safe during pregnancy. It is unknown if this medication is excreted in breast milk.
Prednisone Counseling:  I discussed with the patient the risks of prolonged use of prednisone including but not limited to weight gain, insomnia, osteoporosis, mood changes, diabetes, susceptibility to infection, glaucoma and high blood pressure.  In cases where prednisone use is prolonged, patients should be monitored with blood pressure checks, serum glucose levels and an eye exam.  Additionally, the patient may need to be placed on GI prophylaxis, PCP prophylaxis, and calcium and vitamin D supplementation and/or a bisphosphonate.  The patient verbalized understanding of the proper use and the possible adverse effects of prednisone.  All of the patient's questions and concerns were addressed.
Dupixent Counseling: I discussed with the patient the risks of dupilumab including but not limited to eye infection and irritation, cold sores, injection site reactions, worsening of asthma, allergic reactions and increased risk of parasitic infection.  Live vaccines should be avoided while taking dupilumab. Dupilumab will also interact with certain medications such as warfarin and cyclosporine. The patient understands that monitoring is required and they must alert us or the primary physician if symptoms of infection or other concerning signs are noted.
Sski Pregnancy And Lactation Text: This medication is Pregnancy Category D and isn't considered safe during pregnancy. It is excreted in breast milk.
Hydroquinone Counseling:  Patient advised that medication may result in skin irritation, lightening (hypopigmentation), dryness, and burning.  In the event of skin irritation, the patient was advised to reduce the amount of the drug applied or use it less frequently.  Rarely, spots that are treated with hydroquinone can become darker (pseudoochronosis).  Should this occur, patient instructed to stop medication and call the office. The patient verbalized understanding of the proper use and possible adverse effects of hydroquinone.  All of the patient's questions and concerns were addressed.
Sotyktu Pregnancy And Lactation Text: There is insufficient data to evaluate whether or not Sotyktu is safe to use during pregnancy.? ?It is not known if Sotyktu passes into breast milk and whether or not it is safe to use when breastfeeding.??
Oral Minoxidil Counseling- I discussed with the patient the risks of oral minoxidil including but not limited to shortness of breath, swelling of the feet or ankles, dizziness, lightheadedness, unwanted hair growth and allergic reaction.  The patient verbalized understanding of the proper use and possible adverse effects of oral minoxidil.  All of the patient's questions and concerns were addressed.
Libtayo Counseling- I discussed with the patient the risks of Libtayo including but not limited to nausea, vomiting, diarrhea, and bone or muscle pain.  The patient verbalized understanding of the proper use and possible adverse effects of Libtayo.  All of the patient's questions and concerns were addressed.
Calcipotriene Pregnancy And Lactation Text: This medication has not been proven safe during pregnancy. It is unknown if this medication is excreted in breast milk.
Winlevi Pregnancy And Lactation Text: This medication is considered safe during pregnancy and breastfeeding.
High Dose Vitamin A Counseling: Side effects reviewed, pt to contact office should one occur.
Finasteride Pregnancy And Lactation Text: This medication is absolutely contraindicated during pregnancy. It is unknown if it is excreted in breast milk.
Rituxan Counseling:  I discussed with the patient the risks of Rituxan infusions. Side effects can include infusion reactions, severe drug rashes including mucocutaneous reactions, reactivation of latent hepatitis and other infections and rarely progressive multifocal leukoencephalopathy.  All of the patient's questions and concerns were addressed.
Picato Counseling:  I discussed with the patient the risks of Picato including but not limited to erythema, scaling, itching, weeping, crusting, and pain.
Clindamycin Counseling: I counseled the patient regarding use of clindamycin as an antibiotic for prophylactic and/or therapeutic purposes. Clindamycin is active against numerous classes of bacteria, including skin bacteria. Side effects may include nausea, diarrhea, gastrointestinal upset, rash, hives, yeast infections, and in rare cases, colitis.
Dupixent Pregnancy And Lactation Text: This medication likely crosses the placenta but the risk for the fetus is uncertain. This medication is excreted in breast milk.
Topical Clindamycin Counseling: Patient counseled that this medication may cause skin irritation or allergic reactions.  In the event of skin irritation, the patient was advised to reduce the amount of the drug applied or use it less frequently.   The patient verbalized understanding of the proper use and possible adverse effects of clindamycin.  All of the patient's questions and concerns were addressed.
Thalidomide Counseling: I discussed with the patient the risks of thalidomide including but not limited to birth defects, anxiety, weakness, chest pain, dizziness, cough and severe allergy.
Fluconazole Counseling:  Patient counseled regarding adverse effects of fluconazole including but not limited to headache, diarrhea, nausea, upset stomach, liver function test abnormalities, taste disturbance, and stomach pain.  There is a rare possibility of liver failure that can occur when taking fluconazole.  The patient understands that monitoring of LFTs and kidney function test may be required, especially at baseline. The patient verbalized understanding of the proper use and possible adverse effects of fluconazole.  All of the patient's questions and concerns were addressed.
Hydroxychloroquine Pregnancy And Lactation Text: This medication has been shown to cause fetal harm but it isn't assigned a Pregnancy Risk Category. There are small amounts excreted in breast milk.
Colchicine Counseling:  Patient counseled regarding adverse effects including but not limited to stomach upset (nausea, vomiting, stomach pain, or diarrhea).  Patient instructed to limit alcohol consumption while taking this medication.  Colchicine may reduce blood counts especially with prolonged use.  The patient understands that monitoring of kidney function and blood counts may be required, especially at baseline. The patient verbalized understanding of the proper use and possible adverse effects of colchicine.  All of the patient's questions and concerns were addressed.
5-Fu Counseling: 5-Fluorouracil Counseling:  I discussed with the patient the risks of 5-fluorouracil including but not limited to erythema, scaling, itching, weeping, crusting, and pain.
High Dose Vitamin A Pregnancy And Lactation Text: High dose vitamin A therapy is contraindicated during pregnancy and breast feeding.
Taltz Counseling: I discussed with the patient the risks of ixekizumab including but not limited to immunosuppression, serious infections, worsening of inflammatory bowel disease and drug reactions.  The patient understands that monitoring is required including a PPD at baseline and must alert us or the primary physician if symptoms of infection or other concerning signs are noted.
Xeljanz Counseling: I discussed with the patient the risks of Xeljanz therapy including increased risk of infection, liver issues, headache, diarrhea, or cold symptoms. Live vaccines should be avoided. They were instructed to call if they have any problems.
Rituxan Pregnancy And Lactation Text: This medication is Pregnancy Category C and it isn't know if it is safe during pregnancy. It is unknown if this medication is excreted in breast milk but similar antibodies are known to be excreted.
Adbry Counseling: I discussed with the patient the risks of tralokinumab including but not limited to eye infection and irritation, cold sores, injection site reactions, worsening of asthma, allergic reactions and increased risk of parasitic infection.  Live vaccines should be avoided while taking tralokinumab. The patient understands that monitoring is required and they must alert us or the primary physician if symptoms of infection or other concerning signs are noted.
Oral Minoxidil Pregnancy And Lactation Text: This medication should only be used when clearly needed if you are pregnant, attempting to become pregnant or breast feeding.
Libtayo Pregnancy And Lactation Text: This medication is contraindicated in pregnancy and when breast feeding.
Zyclara Counseling:  I discussed with the patient the risks of imiquimod including but not limited to erythema, scaling, itching, weeping, crusting, and pain.  Patient understands that the inflammatory response to imiquimod is variable from person to person and was educated regarded proper titration schedule.  If flu-like symptoms develop, patient knows to discontinue the medication and contact us.
Low Dose Naltrexone Counseling- I discussed with the patient the potential risks and side effects of low dose naltrexone including but not limited to: more vivid dreams, headaches, nausea, vomiting, abdominal pain, fatigue, dizziness, and anxiety.
Doxepin Counseling:  Patient advised that the medication is sedating and not to drive a car after taking this medication. Patient informed of potential adverse effects including but not limited to dry mouth, urinary retention, and blurry vision.  The patient verbalized understanding of the proper use and possible adverse effects of doxepin.  All of the patient's questions and concerns were addressed.
Imiquimod Counseling:  I discussed with the patient the risks of imiquimod including but not limited to erythema, scaling, itching, weeping, crusting, and pain.  Patient understands that the inflammatory response to imiquimod is variable from person to person and was educated regarded proper titration schedule.  If flu-like symptoms develop, patient knows to discontinue the medication and contact us.
Quinolones Counseling:  I discussed with the patient the risks of fluoroquinolones including but not limited to GI upset, allergic reaction, drug rash, diarrhea, dizziness, photosensitivity, yeast infections, liver function test abnormalities, tendonitis/tendon rupture.
Enbrel Counseling:  I discussed with the patient the risks of etanercept including but not limited to myelosuppression, immunosuppression, autoimmune hepatitis, demyelinating diseases, lymphoma, and infections.  The patient understands that monitoring is required including a PPD at baseline and must alert us or the primary physician if symptoms of infection or other concerning signs are noted.
Birth Control Pills Counseling: Birth Control Pill Counseling: I discussed with the patient the potential side effects of OCPs including but not limited to increased risk of stroke, heart attack, thrombophlebitis, deep venous thrombosis, hepatic adenomas, breast changes, GI upset, headaches, and depression.  The patient verbalized understanding of the proper use and possible adverse effects of OCPs. All of the patient's questions and concerns were addressed.
Xeljasenz Pregnancy And Lactation Text: This medication is Pregnancy Category D and is not considered safe during pregnancy.  The risk during breast feeding is also uncertain.
Clindamycin Pregnancy And Lactation Text: This medication can be used in pregnancy if certain situations. Clindamycin is also present in breast milk.
Cimetidine Counseling:  I discussed with the patient the risks of Cimetidine including but not limited to gynecomastia, headache, diarrhea, nausea, drowsiness, arrhythmias, pancreatitis, skin rashes, psychosis, bone marrow suppression and kidney toxicity.
Adbry Pregnancy And Lactation Text: It is unknown if this medication will adversely affect pregnancy or breast feeding.
Otezla Counseling: The side effects of Otezla were discussed with the patient, including but not limited to worsening or new depression, weight loss, diarrhea, nausea, upper respiratory tract infection, and headache. Patient instructed to call the office should any adverse effect occur.  The patient verbalized understanding of the proper use and possible adverse effects of Otezla.  All the patient's questions and concerns were addressed.
Odomzo Counseling- I discussed with the patient the risks of Odomzo including but not limited to nausea, vomiting, diarrhea, constipation, weight loss, changes in the sense of taste, decreased appetite, muscle spasms, and hair loss.  The patient verbalized understanding of the proper use and possible adverse effects of Odomzo.  All of the patient's questions and concerns were addressed.
Siliq Counseling:  I discussed with the patient the risks of Siliq including but not limited to new or worsening depression, suicidal thoughts and behavior, immunosuppression, malignancy, posterior leukoencephalopathy syndrome, and serious infections.  The patient understands that monitoring is required including a PPD at baseline and must alert us or the primary physician if symptoms of infection or other concerning signs are noted. There is also a special program designed to monitor depression which is required with Siliq.
Azelaic Acid Counseling: Patient counseled that medicine may cause skin irritation and to avoid applying near the eyes.  In the event of skin irritation, the patient was advised to reduce the amount of the drug applied or use it less frequently.   The patient verbalized understanding of the proper use and possible adverse effects of azelaic acid.  All of the patient's questions and concerns were addressed.
Low Dose Naltrexone Pregnancy And Lactation Text: Naltrexone is pregnancy category C.  There have been no adequate and well-controlled studies in pregnant women.  It should be used in pregnancy only if the potential benefit justifies the potential risk to the fetus.   Limited data indicates that naltrexone is minimally excreted into breastmilk.
Protopic Counseling: Patient may experience a mild burning sensation during topical application. Protopic is not approved in children less than 2 years of age. There have been case reports of hematologic and skin malignancies in patients using topical calcineurin inhibitors although causality is questionable.
Cibinqo Counseling: I discussed with the patient the risks of Cibinqo therapy including but not limited to common cold, nausea, headache, cold sores, increased blood CPK levels, dizziness, UTIs, fatigue, acne, and vomitting. Live vaccines should be avoided.  This medication has been linked to serious infections; higher rate of mortality; malignancy and lymphoproliferative disorders; major adverse cardiovascular events; thrombosis; thrombocytopenia and lymphopenia; lipid elevations; and retinal detachment.
Doxepin Pregnancy And Lactation Text: This medication is Pregnancy Category C and it isn't known if it is safe during pregnancy. It is also excreted in breast milk and breast feeding isn't recommended.
Birth Control Pills Pregnancy And Lactation Text: This medication should be avoided if pregnant and for the first 30 days post-partum.
Topical Ketoconazole Counseling: Patient counseled that this medication may cause skin irritation or allergic reactions.  In the event of skin irritation, the patient was advised to reduce the amount of the drug applied or use it less frequently.   The patient verbalized understanding of the proper use and possible adverse effects of ketoconazole.  All of the patient's questions and concerns were addressed.
Griseofulvin Counseling:  I discussed with the patient the risks of griseofulvin including but not limited to photosensitivity, cytopenia, liver damage, nausea/vomiting and severe allergy.  The patient understands that this medication is best absorbed when taken with a fatty meal (e.g., ice cream or french fries).

## 2024-03-21 NOTE — PROCEDURE: OTHER
Render Risk Assessment In Note?: no
Note Text (......Xxx Chief Complaint.): This diagnosis correlates with the
Detail Level: Simple
Other (Free Text): T spot obtained and sent to [facility]

## 2024-09-19 ENCOUNTER — APPOINTMENT (RX ONLY)
Dept: URBAN - METROPOLITAN AREA CLINIC 158 | Facility: CLINIC | Age: 79
Setting detail: DERMATOLOGY
End: 2024-09-19

## 2024-09-19 ENCOUNTER — RX ONLY (OUTPATIENT)
Age: 79
Setting detail: RX ONLY
End: 2024-09-19

## 2024-09-19 DIAGNOSIS — L40.0 PSORIASIS VULGARIS: ICD-10-CM | Status: WELL CONTROLLED

## 2024-09-19 DIAGNOSIS — L40.59 OTHER PSORIATIC ARTHROPATHY: ICD-10-CM | Status: WELL CONTROLLED

## 2024-09-19 DIAGNOSIS — L65.8 OTHER SPECIFIED NONSCARRING HAIR LOSS: ICD-10-CM

## 2024-09-19 DIAGNOSIS — L85.3 XEROSIS CUTIS: ICD-10-CM

## 2024-09-19 DIAGNOSIS — Z79.899 OTHER LONG TERM (CURRENT) DRUG THERAPY: ICD-10-CM

## 2024-09-19 DIAGNOSIS — B07.8 OTHER VIRAL WARTS: ICD-10-CM | Status: INADEQUATELY CONTROLLED

## 2024-09-19 DIAGNOSIS — L57.0 ACTINIC KERATOSIS: ICD-10-CM | Status: INADEQUATELY CONTROLLED

## 2024-09-19 PROCEDURE — 17110 DESTRUCTION B9 LES UP TO 14: CPT

## 2024-09-19 PROCEDURE — ? HIGH RISK MEDICATION MONITORING

## 2024-09-19 PROCEDURE — ? COSENTYX MONITORING

## 2024-09-19 PROCEDURE — 99214 OFFICE O/P EST MOD 30 MIN: CPT | Mod: 25

## 2024-09-19 PROCEDURE — ? TREATMENT REGIMEN

## 2024-09-19 PROCEDURE — ? LIQUID NITROGEN

## 2024-09-19 PROCEDURE — ? COUNSELING

## 2024-09-19 PROCEDURE — 17000 DESTRUCT PREMALG LESION: CPT | Mod: 59

## 2024-09-19 PROCEDURE — 17003 DESTRUCT PREMALG LES 2-14: CPT | Mod: 59

## 2024-09-19 RX ORDER — SECUKINUMAB 150 MG/ML
INJECTION SUBCUTANEOUS
Qty: 2 | Refills: 5 | Status: ERX

## 2024-09-19 ASSESSMENT — LOCATION DETAILED DESCRIPTION DERM
LOCATION DETAILED: LEFT SUPERIOR PARIETAL SCALP
LOCATION DETAILED: RIGHT MID DORSAL INDEX FINGER
LOCATION DETAILED: NASAL DORSUM
LOCATION DETAILED: LEFT HAND
LOCATION DETAILED: LEFT MEDIAL UPPER BACK
LOCATION DETAILED: RIGHT PROXIMAL DORSAL THUMB
LOCATION DETAILED: RIGHT MEDIAL FRONTAL SCALP
LOCATION DETAILED: RIGHT HAND
LOCATION DETAILED: LEFT PROXIMAL PRETIBIAL REGION
LOCATION DETAILED: RIGHT SUPERIOR LATERAL MALAR CHEEK
LOCATION DETAILED: RIGHT ELBOW
LOCATION DETAILED: LEFT PROXIMAL DORSAL FOREARM
LOCATION DETAILED: RIGHT PROXIMAL PRETIBIAL REGION

## 2024-09-19 ASSESSMENT — LOCATION SIMPLE DESCRIPTION DERM
LOCATION SIMPLE: RIGHT HAND
LOCATION SIMPLE: LEFT PRETIBIAL REGION
LOCATION SIMPLE: RIGHT SCALP
LOCATION SIMPLE: SCALP
LOCATION SIMPLE: LEFT UPPER BACK
LOCATION SIMPLE: RIGHT CHEEK
LOCATION SIMPLE: RIGHT THUMB
LOCATION SIMPLE: LEFT FOREARM
LOCATION SIMPLE: RIGHT ELBOW
LOCATION SIMPLE: LEFT HAND
LOCATION SIMPLE: RIGHT INDEX FINGER
LOCATION SIMPLE: NOSE
LOCATION SIMPLE: RIGHT PRETIBIAL REGION

## 2024-09-19 ASSESSMENT — LOCATION ZONE DERM
LOCATION ZONE: FACE
LOCATION ZONE: SCALP
LOCATION ZONE: LEG
LOCATION ZONE: HAND
LOCATION ZONE: FINGER
LOCATION ZONE: TRUNK
LOCATION ZONE: NOSE
LOCATION ZONE: ARM

## 2024-09-19 ASSESSMENT — PGA PSORIASIS: PGA PSORIASIS 2020: CLEAR

## 2024-09-19 ASSESSMENT — ITCH NUMERIC RATING SCALE: HOW SEVERE IS YOUR ITCHING?: 0

## 2024-09-19 ASSESSMENT — BSA PSORIASIS: % BODY COVERED IN PSORIASIS: 0

## 2024-09-19 NOTE — PROCEDURE: HIGH RISK MEDICATION MONITORING
Tremfya Counseling: I discussed with the patient the risks of guselkumab including but not limited to immunosuppression, serious infections, worsening of inflammatory bowel disease and drug reactions.  The patient understands that monitoring is required including a PPD at baseline and must alert us or the primary physician if symptoms of infection or other concerning signs are noted.
Bimzelx Counseling:  I discussed with the patient the risks of Bimzelx including but not limited to depression, immunosuppression, allergic reactions and infections.  The patient understands that monitoring is required including a PPD at baseline and must alert us or the primary physician if symptoms of infection or other concerning signs are noted.
Drysol Counseling:  I discussed with the patient the risks of drysol/aluminum chloride including but not limited to skin rash, itching, irritation, burning.
Otezla Pregnancy And Lactation Text: This medication is Pregnancy Category C and it isn't known if it is safe during pregnancy. It is unknown if it is excreted in breast milk.
Tranexamic Acid Counseling:  Patient advised of the small risk of bleeding problems with tranexamic acid. They were also instructed to call if they developed any nausea, vomiting or diarrhea. All of the patient's questions and concerns were addressed.
Cimetidine Pregnancy And Lactation Text: This medication is Pregnancy Category B and is considered safe during pregnancy. It is also excreted in breast milk and breast feeding isn't recommended.
Dapsone Counseling: I discussed with the patient the risks of dapsone including but not limited to hemolytic anemia, agranulocytosis, rashes, methemoglobinemia, kidney failure, peripheral neuropathy, headaches, GI upset, and liver toxicity.  Patients who start dapsone require monitoring including baseline LFTs and weekly CBCs for the first month, then every month thereafter.  The patient verbalized understanding of the proper use and possible adverse effects of dapsone.  All of the patient's questions and concerns were addressed.
Cyclophosphamide Counseling:  I discussed with the patient the risks of cyclophosphamide including but not limited to hair loss, hormonal abnormalities, decreased fertility, abdominal pain, diarrhea, nausea and vomiting, bone marrow suppression and infection. The patient understands that monitoring is required while taking this medication.
Doxycycline Counseling:  Patient counseled regarding possible photosensitivity and increased risk for sunburn.  Patient instructed to avoid sunlight, if possible.  When exposed to sunlight, patients should wear protective clothing, sunglasses, and sunscreen.  The patient was instructed to call the office immediately if the following severe adverse effects occur:  hearing changes, easy bruising/bleeding, severe headache, or vision changes.  The patient verbalized understanding of the proper use and possible adverse effects of doxycycline.  All of the patient's questions and concerns were addressed.
Odomzo Pregnancy And Lactation Text: This medication is Pregnancy Category X and is absolutely contraindicated during pregnancy. It is unknown if it is excreted in breast milk.
Acitretin Counseling:  I discussed with the patient the risks of acitretin including but not limited to hair loss, dry lips/skin/eyes, liver damage, hyperlipidemia, depression/suicidal ideation, photosensitivity.  Serious rare side effects can include but are not limited to pancreatitis, pseudotumor cerebri, bony changes, clot formation/stroke/heart attack.  Patient understands that alcohol is contraindicated since it can result in liver toxicity and significantly prolong the elimination of the drug by many years.
Protopic Pregnancy And Lactation Text: This medication is Pregnancy Category C. It is unknown if this medication is excreted in breast milk when applied topically.
Azelaic Acid Pregnancy And Lactation Text: This medication is considered safe during pregnancy and breast feeding.
Siliq Pregnancy And Lactation Text: The risk during pregnancy and breastfeeding is uncertain with this medication.
Cibinqo Pregnancy And Lactation Text: It is unknown if this medication will adversely affect pregnancy or breast feeding.  You should not take this medication if you are currently pregnant or planning a pregnancy or while breastfeeding.
Spironolactone Counseling: Patient advised regarding risks of diarrhea, abdominal pain, hyperkalemia, birth defects (for female patients), liver toxicity and renal toxicity. The patient may need blood work to monitor liver and kidney function and potassium levels while on therapy. The patient verbalized understanding of the proper use and possible adverse effects of spironolactone.  All of the patient's questions and concerns were addressed.
Hydroxyzine Counseling: Patient advised that the medication is sedating and not to drive a car after taking this medication.  Patient informed of potential adverse effects including but not limited to dry mouth, urinary retention, and blurry vision.  The patient verbalized understanding of the proper use and possible adverse effects of hydroxyzine.  All of the patient's questions and concerns were addressed.
Niacinamide Counseling: I recommended taking niacin or niacinamide, also know as vitamin B3, twice daily. Recent evidence suggests that taking vitamin B3 (500 mg twice daily) can reduce the risk of actinic keratoses and non-melanoma skin cancers. Side effects of vitamin B3 include flushing and headache.
Griseofulvin Pregnancy And Lactation Text: This medication is Pregnancy Category X and is known to cause serious birth defects. It is unknown if this medication is excreted in breast milk but breast feeding should be avoided.
Topical Ketoconazole Pregnancy And Lactation Text: This medication is Pregnancy Category B and is considered safe during pregnancy. It is unknown if it is excreted in breast milk.
Rifampin Counseling: I discussed with the patient the risks of rifampin including but not limited to liver damage, kidney damage, red-orange body fluids, nausea/vomiting and severe allergy.
Dapsone Pregnancy And Lactation Text: This medication is Pregnancy Category C and is not considered safe during pregnancy or breast feeding.
Doxycycline Pregnancy And Lactation Text: This medication is Pregnancy Category D and not consider safe during pregnancy. It is also excreted in breast milk but is considered safe for shorter treatment courses.
Bimzelx Pregnancy And Lactation Text: This medication crosses the placenta and the safety is uncertain during pregnancy. It is unknown if this medication is present in breast milk.
Humira Counseling:  I discussed with the patient the risks of adalimumab including but not limited to myelosuppression, immunosuppression, autoimmune hepatitis, demyelinating diseases, lymphoma, and serious infections.  The patient understands that monitoring is required including a PPD at baseline and must alert us or the primary physician if symptoms of infection or other concerning signs are noted.
Opioid Counseling: I discussed with the patient the potential side effects of opioids including but not limited to addiction, altered mental status, and depression. I stressed avoiding alcohol, benzodiazepines, muscle relaxants and sleep aids unless specifically okayed by a physician. The patient verbalized understanding of the proper use and possible adverse effects of opioids. All of the patient's questions and concerns were addressed. They were instructed to flush the remaining pills down the toilet if they did not need them for pain.
Tranexamic Acid Pregnancy And Lactation Text: It is unknown if this medication is safe during pregnancy or breast feeding.
Minoxidil Counseling: Minoxidil is a topical medication which can increase blood flow where it is applied. It is uncertain how this medication increases hair growth. Side effects are uncommon and include stinging and allergic reactions.
Olumiant Counseling: I discussed with the patient the risks of Olumiant therapy including but not limited to upper respiratory tract infections, shingles, cold sores, and nausea. Live vaccines should be avoided.  This medication has been linked to serious infections; higher rate of mortality; malignancy and lymphoproliferative disorders; major adverse cardiovascular events; thrombosis; gastrointestinal perforations; neutropenia; lymphopenia; anemia; liver enzyme elevations; and lipid elevations.
Azithromycin Counseling:  I discussed with the patient the risks of azithromycin including but not limited to GI upset, allergic reaction, drug rash, diarrhea, and yeast infections.
Acitretin Pregnancy And Lactation Text: This medication is Pregnancy Category X and should not be given to women who are pregnant or may become pregnant in the future. This medication is excreted in breast milk.
Oxybutynin Counseling:  I discussed with the patient the risks of oxybutynin including but not limited to skin rash, drowsiness, dry mouth, difficulty urinating, and blurred vision.
Cyclophosphamide Pregnancy And Lactation Text: This medication is Pregnancy Category D and it isn't considered safe during pregnancy. This medication is excreted in breast milk.
Niacinamide Pregnancy And Lactation Text: These medications are considered safe during pregnancy.
Hydroxyzine Pregnancy And Lactation Text: This medication is not safe during pregnancy and should not be taken. It is also excreted in breast milk and breast feeding isn't recommended.
Spironolactone Pregnancy And Lactation Text: This medication can cause feminization of the male fetus and should be avoided during pregnancy. The active metabolite is also found in breast milk.
Itraconazole Counseling:  I discussed with the patient the risks of itraconazole including but not limited to liver damage, nausea/vomiting, neuropathy, and severe allergy.  The patient understands that this medication is best absorbed when taken with acidic beverages such as non-diet cola or ginger ale.  The patient understands that monitoring is required including baseline LFTs and repeat LFTs at intervals.  The patient understands that they are to contact us or the primary physician if concerning signs are noted.
Simponi Counseling:  I discussed with the patient the risks of golimumab including but not limited to myelosuppression, immunosuppression, autoimmune hepatitis, demyelinating diseases, lymphoma, and serious infections.  The patient understands that monitoring is required including a PPD at baseline and must alert us or the primary physician if symptoms of infection or other concerning signs are noted.
Rifampin Pregnancy And Lactation Text: This medication is Pregnancy Category C and it isn't know if it is safe during pregnancy. It is also excreted in breast milk and should not be used if you are breast feeding.
Rhofade Counseling: Rhofade is a topical medication which can decrease superficial blood flow where applied. Side effects are uncommon and include stinging, redness and allergic reactions.
Benzoyl Peroxide Counseling: Patient counseled that medicine may cause skin irritation and bleach clothing.  In the event of skin irritation, the patient was advised to reduce the amount of the drug applied or use it less frequently.   The patient verbalized understanding of the proper use and possible adverse effects of benzoyl peroxide.  All of the patient's questions and concerns were addressed.
Erythromycin Counseling:  I discussed with the patient the risks of erythromycin including but not limited to GI upset, allergic reaction, drug rash, diarrhea, increase in liver enzymes, and yeast infections.
Dutasteride Male Counseling: Dustasteride Counseling:  I discussed with the patient the risks of use of dutasteride including but not limited to decreased libido, decreased ejaculate volume, and gynecomastia. Women who can become pregnant should not handle medication.  All of the patient's questions and concerns were addressed.
Opioid Pregnancy And Lactation Text: These medications can lead to premature delivery and should be avoided during pregnancy. These medications are also present in breast milk in small amounts.
Humira Pregnancy And Lactation Text: This medication is Pregnancy Category B and is considered safe during pregnancy. It is unknown if this medication is excreted in breast milk.
Minoxidil Pregnancy And Lactation Text: This medication has not been assigned a Pregnancy Risk Category but animal studies failed to show danger with the topical medication. It is unknown if the medication is excreted in breast milk.
Gabapentin Counseling: I discussed with the patient the risks of gabapentin including but not limited to dizziness, somnolence, fatigue and ataxia.
Valtrex Counseling: I discussed with the patient the risks of valacyclovir including but not limited to kidney damage, nausea, vomiting and severe allergy.  The patient understands that if the infection seems to be worsening or is not improving, they are to call.
Topical Sulfur Applications Counseling: Topical Sulfur Counseling: Patient counseled that this medication may cause skin irritation or allergic reactions.  In the event of skin irritation, the patient was advised to reduce the amount of the drug applied or use it less frequently.   The patient verbalized understanding of the proper use and possible adverse effects of topical sulfur application.  All of the patient's questions and concerns were addressed.
Elidel Counseling: Patient may experience a mild burning sensation during topical application. Elidel is not approved in children less than 2 years of age. There have been case reports of hematologic and skin malignancies in patients using topical calcineurin inhibitors although causality is questionable.
Cyclosporine Counseling:  I discussed with the patient the risks of cyclosporine including but not limited to hypertension, gingival hyperplasia,myelosuppression, immunosuppression, liver damage, kidney damage, neurotoxicity, lymphoma, and serious infections. The patient understands that monitoring is required including baseline blood pressure, CBC, CMP, lipid panel and uric acid, and then 1-2 times monthly CMP and blood pressure.
Xolair Counseling:  Patient informed of potential adverse effects including but not limited to fever, muscle aches, rash and allergic reactions.  The patient verbalized understanding of the proper use and possible adverse effects of Xolair.  All of the patient's questions and concerns were addressed.
Sarecycline Counseling: Patient advised regarding possible photosensitivity and discoloration of the teeth, skin, lips, tongue and gums.  Patient instructed to avoid sunlight, if possible.  When exposed to sunlight, patients should wear protective clothing, sunglasses, and sunscreen.  The patient was instructed to call the office immediately if the following severe adverse effects occur:  hearing changes, easy bruising/bleeding, severe headache, or vision changes.  The patient verbalized understanding of the proper use and possible adverse effects of sarecycline.  All of the patient's questions and concerns were addressed.
Nsaids Counseling: NSAID Counseling: I discussed with the patient that NSAIDs should be taken with food. Prolonged use of NSAIDs can result in the development of stomach ulcers.  Patient advised to stop taking NSAIDs if abdominal pain occurs.  The patient verbalized understanding of the proper use and possible adverse effects of NSAIDs.  All of the patient's questions and concerns were addressed.
Benzoyl Peroxide Pregnancy And Lactation Text: This medication is Pregnancy Category C. It is unknown if benzoyl peroxide is excreted in breast milk.
Azithromycin Pregnancy And Lactation Text: This medication is considered safe during pregnancy and is also secreted in breast milk.
Cimzia Counseling:  I discussed with the patient the risks of Cimzia including but not limited to immunosuppression, allergic reactions and infections.  The patient understands that monitoring is required including a PPD at baseline and must alert us or the primary physician if symptoms of infection or other concerning signs are noted.
Rhofade Pregnancy And Lactation Text: This medication has not been assigned a Pregnancy Risk Category. It is unknown if the medication is excreted in breast milk.
Olumiant Pregnancy And Lactation Text: Based on animal studies, Olumiant may cause embryo-fetal harm when administered to pregnant women.  The medication should not be used in pregnancy.  Breastfeeding is not recommended during treatment.
Detail Level: Zone
Bexarotene Counseling:  I discussed with the patient the risks of bexarotene including but not limited to hair loss, dry lips/skin/eyes, liver abnormalities, hyperlipidemia, pancreatitis, depression/suicidal ideation, photosensitivity, drug rash/allergic reactions, hypothyroidism, anemia, leukopenia, infection, cataracts, and teratogenicity.  Patient understands that they will need regular blood tests to check lipid profile, liver function tests, white blood cell count, thyroid function tests and pregnancy test if applicable.
Mirvaso Counseling: Mirvaso is a topical medication which can decrease superficial blood flow where applied. Side effects are uncommon and include stinging, redness and allergic reactions.
Itraconazole Pregnancy And Lactation Text: This medication is Pregnancy Category C and it isn't know if it is safe during pregnancy. It is also excreted in breast milk.
Topical Sulfur Applications Pregnancy And Lactation Text: This medication is Pregnancy Category C and has an unknown safety profile during pregnancy. It is unknown if this topical medication is excreted in breast milk.
Dutasteride Female Counseling: Dutasteride Counseling:  I discussed with the patient the risks of use of dutasteride including but not limited to decreased libido and sexual dysfunction. Explained the teratogenic nature of the medication and stressed the importance of not getting pregnant during treatment. All of the patient's questions and concerns were addressed.
Ilumya Counseling: I discussed with the patient the risks of tildrakizumab including but not limited to immunosuppression, malignancy, posterior leukoencephalopathy syndrome, and serious infections.  The patient understands that monitoring is required including a PPD at baseline and must alert us or the primary physician if symptoms of infection or other concerning signs are noted.
Albendazole Counseling:  I discussed with the patient the risks of albendazole including but not limited to cytopenia, kidney damage, nausea/vomiting and severe allergy.  The patient understands that this medication is being used in an off-label manner.
Azathioprine Counseling:  I discussed with the patient the risks of azathioprine including but not limited to myelosuppression, immunosuppression, hepatotoxicity, lymphoma, and infections.  The patient understands that monitoring is required including baseline LFTs, Creatinine, possible TPMP genotyping and weekly CBCs for the first month and then every 2 weeks thereafter.  The patient verbalized understanding of the proper use and possible adverse effects of azathioprine.  All of the patient's questions and concerns were addressed.
Gabapentin Pregnancy And Lactation Text: This medication is Pregnancy Category C and isn't considered safe during pregnancy. It is excreted in breast milk.
Valtrex Pregnancy And Lactation Text: this medication is Pregnancy Category B and is considered safe during pregnancy. This medication is not directly found in breast milk but it's metabolite acyclovir is present.
Erythromycin Pregnancy And Lactation Text: This medication is Pregnancy Category B and is considered safe during pregnancy. It is also excreted in breast milk.
Xolair Pregnancy And Lactation Text: This medication is Pregnancy Category B and is considered safe during pregnancy. This medication is excreted in breast milk.
Propranolol Counseling:  I discussed with the patient the risks of propranolol including but not limited to low heart rate, low blood pressure, low blood sugar, restlessness and increased cold sensitivity. They should call the office if they experience any of these side effects.
Bexarotene Pregnancy And Lactation Text: This medication is Pregnancy Category X and should not be given to women who are pregnant or may become pregnant. This medication should not be used if you are breast feeding.
Arava Counseling:  Patient counseled regarding adverse effects of Arava including but not limited to nausea, vomiting, abnormalities in liver function tests. Patients may develop mouth sores, rash, diarrhea, and abnormalities in blood counts. The patient understands that monitoring is required including LFTs and blood counts.  There is a rare possibility of scarring of the liver and lung problems that can occur when taking methotrexate. Persistent nausea, loss of appetite, pale stools, dark urine, cough, and shortness of breath should be reported immediately. Patient advised to discontinue Arava treatment and consult with a physician prior to attempting conception. The patient will have to undergo a treatment to eliminate Arava from the body prior to conception.
Skyrizi Counseling: I discussed with the patient the risks of risankizumab-rzaa including but not limited to immunosuppression, and serious infections.  The patient understands that monitoring is required including a PPD at baseline and must alert us or the primary physician if symptoms of infection or other concerning signs are noted.
Bactrim Counseling:  I discussed with the patient the risks of sulfa antibiotics including but not limited to GI upset, allergic reaction, drug rash, diarrhea, dizziness, photosensitivity, and yeast infections.  Rarely, more serious reactions can occur including but not limited to aplastic anemia, agranulocytosis, methemoglobinemia, blood dyscrasias, liver or kidney failure, lung infiltrates or desquamative/blistering drug rashes.
Solaraze Counseling:  I discussed with the patient the risks of Solaraze including but not limited to erythema, scaling, itching, weeping, crusting, and pain.
Cyclosporine Pregnancy And Lactation Text: This medication is Pregnancy Category C and it isn't know if it is safe during pregnancy. This medication is excreted in breast milk.
Cimzia Pregnancy And Lactation Text: This medication crosses the placenta but can be considered safe in certain situations. Cimzia may be excreted in breast milk.
Rinvoq Counseling: I discussed with the patient the risks of Rinvoq therapy including but not limited to upper respiratory tract infections, shingles, cold sores, bronchitis, nausea, cough, fever, acne, and headache. Live vaccines should be avoided.  This medication has been linked to serious infections; higher rate of mortality; malignancy and lymphoproliferative disorders; major adverse cardiovascular events; thrombosis; thrombocytopenia, anemia, and neutropenia; lipid elevations; liver enzyme elevations; and gastrointestinal perforations.
Elidel Pregnancy And Lactation Text: This medication is Pregnancy Category C. It is unknown if this medication is excreted in breast milk.
Carac Counseling:  I discussed with the patient the risks of Carac including but not limited to erythema, scaling, itching, weeping, crusting, and pain.
Nsaids Pregnancy And Lactation Text: These medications are considered safe up to 30 weeks gestation. It is excreted in breast milk.
Sarecycline Pregnancy And Lactation Text: This medication is Pregnancy Category D and not consider safe during pregnancy. It is also excreted in breast milk.
Glycopyrrolate Counseling:  I discussed with the patient the risks of glycopyrrolate including but not limited to skin rash, drowsiness, dry mouth, difficulty urinating, and blurred vision.
Dutasteride Pregnancy And Lactation Text: This medication is absolutely contraindicated in women, especially during pregnancy and breast feeding. Feminization of male fetuses is possible if taking while pregnant.
Wartpeel Counseling:  I discussed with the patient the risks of Wartpeel including but not limited to erythema, scaling, itching, weeping, crusting, and pain.
Metronidazole Counseling:  I discussed with the patient the risks of metronidazole including but not limited to seizures, nausea/vomiting, a metallic taste in the mouth, nausea/vomiting and severe allergy.
Ketoconazole Counseling:   Patient counseled regarding improving absorption with orange juice.  Adverse effects include but are not limited to breast enlargement, headache, diarrhea, nausea, upset stomach, liver function test abnormalities, taste disturbance, and stomach pain.  There is a rare possibility of liver failure that can occur when taking ketoconazole. The patient understands that monitoring of LFTs may be required, especially at baseline. The patient verbalized understanding of the proper use and possible adverse effects of ketoconazole.  All of the patient's questions and concerns were addressed.
Albendazole Pregnancy And Lactation Text: This medication is Pregnancy Category C and it isn't known if it is safe during pregnancy. It is also excreted in breast milk.
Rinvoq Pregnancy And Lactation Text: Based on animal studies, Rinvoq may cause embryo-fetal harm when administered to pregnant women.  The medication should not be used in pregnancy.  Breastfeeding is not recommended during treatment and for 6 days after the last dose.
Bactrim Pregnancy And Lactation Text: This medication is Pregnancy Category D and is known to cause fetal risk.  It is also excreted in breast milk.
Cosentyx Counseling:  I discussed with the patient the risks of Cosentyx including but not limited to worsening of Crohn's disease, immunosuppression, allergic reactions and infections.  The patient understands that monitoring is required including a PPD at baseline and must alert us or the primary physician if symptoms of infection or other concerning signs are noted.
Eucrisa Counseling: Patient may experience a mild burning sensation during topical application. Eucrisa is not approved in children less than 2 years of age.
Propranolol Pregnancy And Lactation Text: This medication is Pregnancy Category C and it isn't known if it is safe during pregnancy. It is excreted in breast milk.
Isotretinoin Counseling: Patient should get monthly blood tests, not donate blood, not drive at night if vision affected, not share medication, and not undergo elective surgery for 6 months after tx completed. Side effects reviewed, pt to contact office should one occur.
Azathioprine Pregnancy And Lactation Text: This medication is Pregnancy Category D and isn't considered safe during pregnancy. It is unknown if this medication is excreted in breast milk.
Methotrexate Counseling:  Patient counseled regarding adverse effects of methotrexate including but not limited to nausea, vomiting, abnormalities in liver function tests. Patients may develop mouth sores, rash, diarrhea, and abnormalities in blood counts. The patient understands that monitoring is required including LFT's and blood counts.  There is a rare possibility of scarring of the liver and lung problems that can occur when taking methotrexate. Persistent nausea, loss of appetite, pale stools, dark urine, cough, and shortness of breath should be reported immediately. Patient advised to discontinue methotrexate treatment at least three months before attempting to become pregnant.  I discussed the need for folate supplements while taking methotrexate.  These supplements can decrease side effects during methotrexate treatment. The patient verbalized understanding of the proper use and possible adverse effects of methotrexate.  All of the patient's questions and concerns were addressed.
Include Pregnancy/Lactation Warning?: No
Carac Pregnancy And Lactation Text: This medication is Pregnancy Category X and contraindicated in pregnancy and in women who may become pregnant. It is unknown if this medication is excreted in breast milk.
Solaraze Pregnancy And Lactation Text: This medication is Pregnancy Category B and is considered safe. There is some data to suggest avoiding during the third trimester. It is unknown if this medication is excreted in breast milk.
Metronidazole Pregnancy And Lactation Text: This medication is Pregnancy Category B and considered safe during pregnancy.  It is also excreted in breast milk.
Infliximab Counseling:  I discussed with the patient the risks of infliximab including but not limited to myelosuppression, immunosuppression, autoimmune hepatitis, demyelinating diseases, lymphoma, and serious infections.  The patient understands that monitoring is required including a PPD at baseline and must alert us or the primary physician if symptoms of infection or other concerning signs are noted.
Opzelura Counseling:  I discussed with the patient the risks of Opzelura including but not limited to nasopharngitis, bronchitis, ear infection, eosinophila, hives, diarrhea, folliculitis, tonsillitis, and rhinorrhea.  Taken orally, this medication has been linked to serious infections; higher rate of mortality; malignancy and lymphoproliferative disorders; major adverse cardiovascular events; thrombosis; thrombocytopenia, anemia, and neutropenia; and lipid elevations.
Olanzapine Counseling- I discussed with the patient the common side effects of olanzapine including but are not limited to: lack of energy, dry mouth, increased appetite, sleepiness, tremor, constipation, dizziness, changes in behavior, or restlessness.  Explained that teenagers are more likely to experience headaches, abdominal pain, pain in the arms or legs, tiredness, and sleepiness.  Serious side effects include but are not limited: increased risk of death in elderly patients who are confused, have memory loss, or dementia-related psychosis; hyperglycemia; increased cholesterol and triglycerides; and weight gain.
Erivedge Counseling- I discussed with the patient the risks of Erivedge including but not limited to nausea, vomiting, diarrhea, constipation, weight loss, changes in the sense of taste, decreased appetite, muscle spasms, and hair loss.  The patient verbalized understanding of the proper use and possible adverse effects of Erivedge.  All of the patient's questions and concerns were addressed.
Ketoconazole Pregnancy And Lactation Text: This medication is Pregnancy Category C and it isn't know if it is safe during pregnancy. It is also excreted in breast milk and breast feeding isn't recommended.
Tetracycline Counseling: Patient counseled regarding possible photosensitivity and increased risk for sunburn.  Patient instructed to avoid sunlight, if possible.  When exposed to sunlight, patients should wear protective clothing, sunglasses, and sunscreen.  The patient was instructed to call the office immediately if the following severe adverse effects occur:  hearing changes, easy bruising/bleeding, severe headache, or vision changes.  The patient verbalized understanding of the proper use and possible adverse effects of tetracycline.  All of the patient's questions and concerns were addressed. Patient understands to avoid pregnancy while on therapy due to potential birth defects.
Methotrexate Pregnancy And Lactation Text: This medication is Pregnancy Category X and is known to cause fetal harm. This medication is excreted in breast milk.
Glycopyrrolate Pregnancy And Lactation Text: This medication is Pregnancy Category B and is considered safe during pregnancy. It is unknown if it is excreted breast milk.
Sotyktu Counseling:  I discussed the most common side effects of Sotyktu including: common cold, sore throat, sinus infections, cold sores, canker sores, folliculitis, and acne.? I also discussed more serious side effects of Sotyktu including but not limited to: serious allergic reactions; increased risk for infections such as TB; cancers such as lymphomas; rhabdomyolysis and elevated CPK; and elevated triglycerides and liver enzymes.?
Finasteride Male Counseling: Finasteride Counseling:  I discussed with the patient the risks of use of finasteride including but not limited to decreased libido, decreased ejaculate volume, gynecomastia, and depression. Women should not handle medication.  All of the patient's questions and concerns were addressed.
Tazorac Counseling:  Patient advised that medication is irritating and drying.  Patient may need to apply sparingly and wash off after an hour before eventually leaving it on overnight.  The patient verbalized understanding of the proper use and possible adverse effects of tazorac.  All of the patient's questions and concerns were addressed.
Ivermectin Counseling:  Patient instructed to take medication on an empty stomach with a full glass of water.  Patient informed of potential adverse effects including but not limited to nausea, diarrhea, dizziness, itching, and swelling of the extremities or lymph nodes.  The patient verbalized understanding of the proper use and possible adverse effects of ivermectin.  All of the patient's questions and concerns were addressed.
Cellcept Counseling:  I discussed with the patient the risks of mycophenolate mofetil including but not limited to infection/immunosuppression, GI upset, hypokalemia, hypercholesterolemia, bone marrow suppression, lymphoproliferative disorders, malignancy, GI ulceration/bleed/perforation, colitis, interstitial lung disease, kidney failure, progressive multifocal leukoencephalopathy, and birth defects.  The patient understands that monitoring is required including a baseline creatinine and regular CBC testing. In addition, patient must alert us immediately if symptoms of infection or other concerning signs are noted.
Topical Retinoid counseling:  Patient advised to apply a pea-sized amount only at bedtime and wait 30 minutes after washing their face before applying.  If too drying, patient may add a non-comedogenic moisturizer. The patient verbalized understanding of the proper use and possible adverse effects of retinoids.  All of the patient's questions and concerns were addressed.
SSKI Counseling:  I discussed with the patient the risks of SSKI including but not limited to thyroid abnormalities, metallic taste, GI upset, fever, headache, acne, arthralgias, paraesthesias, lymphadenopathy, easy bleeding, arrhythmias, and allergic reaction.
Clofazimine Counseling:  I discussed with the patient the risks of clofazimine including but not limited to skin and eye pigmentation, liver damage, nausea/vomiting, gastrointestinal bleeding and allergy.
Stelara Counseling:  I discussed with the patient the risks of ustekinumab including but not limited to immunosuppression, malignancy, posterior leukoencephalopathy syndrome, and serious infections.  The patient understands that monitoring is required including a PPD at baseline and must alert us or the primary physician if symptoms of infection or other concerning signs are noted.
Cephalexin Counseling: I counseled the patient regarding use of cephalexin as an antibiotic for prophylactic and/or therapeutic purposes. Cephalexin (commonly prescribed under brand name Keflex) is a cephalosporin antibiotic which is active against numerous classes of bacteria, including most skin bacteria. Side effects may include nausea, diarrhea, gastrointestinal upset, rash, hives, yeast infections, and in rare cases, hepatitis, kidney disease, seizures, fever, confusion, neurologic symptoms, and others. Patients with severe allergies to penicillin medications are cautioned that there is about a 10% incidence of cross-reactivity with cephalosporins. When possible, patients with penicillin allergies should use alternatives to cephalosporins for antibiotic therapy.
Opzelura Pregnancy And Lactation Text: There is insufficient data to evaluate drug-associated risk for major birth defects, miscarriage, or other adverse maternal or fetal outcomes.  There is a pregnancy registry that monitors pregnancy outcomes in pregnant persons exposed to the medication during pregnancy.  It is unknown if this medication is excreted in breast milk.  Do not breastfeed during treatment and for about 4 weeks after the last dose.
Olanzapine Pregnancy And Lactation Text: This medication is pregnancy category C.   There are no adequate and well controlled trials with olanzapine in pregnant females.  Olanzapine should be used during pregnancy only if the potential benefit justifies the potential risk to the fetus.   In a study in lactating healthy women, olanzapine was excreted in breast milk.  It is recommended that women taking olanzapine should not breast feed.
Winlevi Counseling:  I discussed with the patient the risks of topical clascoterone including but not limited to erythema, scaling, itching, and stinging. Patient voiced their understanding.
Terbinafine Counseling: Patient counseling regarding adverse effects of terbinafine including but not limited to headache, diarrhea, rash, upset stomach, liver function test abnormalities, itching, taste/smell disturbance, nausea, abdominal pain, and flatulence.  There is a rare possibility of liver failure that can occur when taking terbinafine.  The patient understands that a baseline LFT and kidney function test may be required. The patient verbalized understanding of the proper use and possible adverse effects of terbinafine.  All of the patient's questions and concerns were addressed.
Calcipotriene Counseling:  I discussed with the patient the risks of calcipotriene including but not limited to erythema, scaling, itching, and irritation.
Isotretinoin Pregnancy And Lactation Text: This medication is Pregnancy Category X and is considered extremely dangerous during pregnancy. It is unknown if it is excreted in breast milk.
Minocycline Counseling: Patient advised regarding possible photosensitivity and discoloration of the teeth, skin, lips, tongue and gums.  Patient instructed to avoid sunlight, if possible.  When exposed to sunlight, patients should wear protective clothing, sunglasses, and sunscreen.  The patient was instructed to call the office immediately if the following severe adverse effects occur:  hearing changes, easy bruising/bleeding, severe headache, or vision changes.  The patient verbalized understanding of the proper use and possible adverse effects of minocycline.  All of the patient's questions and concerns were addressed.
Finasteride Female Counseling: Finasteride Counseling:  I discussed with the patient the risks of use of finasteride including but not limited to decreased libido and sexual dysfunction. Explained the teratogenic nature of the medication and stressed the importance of not getting pregnant during treatment. All of the patient's questions and concerns were addressed.
Hydroxychloroquine Counseling:  I discussed with the patient that a baseline ophthalmologic exam is needed at the start of therapy and every year thereafter while on therapy. A CBC may also be warranted for monitoring.  The side effects of this medication were discussed with the patient, including but not limited to agranulocytosis, aplastic anemia, seizures, rashes, retinopathy, and liver toxicity. Patient instructed to call the office should any adverse effect occur.  The patient verbalized understanding of the proper use and possible adverse effects of Plaquenil.  All the patient's questions and concerns were addressed.
Cephalexin Pregnancy And Lactation Text: This medication is Pregnancy Category B and considered safe during pregnancy.  It is also excreted in breast milk but can be used safely for shorter doses.
Tazorac Pregnancy And Lactation Text: This medication is not safe during pregnancy. It is unknown if this medication is excreted in breast milk.
Prednisone Counseling:  I discussed with the patient the risks of prolonged use of prednisone including but not limited to weight gain, insomnia, osteoporosis, mood changes, diabetes, susceptibility to infection, glaucoma and high blood pressure.  In cases where prednisone use is prolonged, patients should be monitored with blood pressure checks, serum glucose levels and an eye exam.  Additionally, the patient may need to be placed on GI prophylaxis, PCP prophylaxis, and calcium and vitamin D supplementation and/or a bisphosphonate.  The patient verbalized understanding of the proper use and the possible adverse effects of prednisone.  All of the patient's questions and concerns were addressed.
Dupixent Counseling: I discussed with the patient the risks of dupilumab including but not limited to eye infection and irritation, cold sores, injection site reactions, worsening of asthma, allergic reactions and increased risk of parasitic infection.  Live vaccines should be avoided while taking dupilumab. Dupilumab will also interact with certain medications such as warfarin and cyclosporine. The patient understands that monitoring is required and they must alert us or the primary physician if symptoms of infection or other concerning signs are noted.
Sski Pregnancy And Lactation Text: This medication is Pregnancy Category D and isn't considered safe during pregnancy. It is excreted in breast milk.
Hydroquinone Counseling:  Patient advised that medication may result in skin irritation, lightening (hypopigmentation), dryness, and burning.  In the event of skin irritation, the patient was advised to reduce the amount of the drug applied or use it less frequently.  Rarely, spots that are treated with hydroquinone can become darker (pseudoochronosis).  Should this occur, patient instructed to stop medication and call the office. The patient verbalized understanding of the proper use and possible adverse effects of hydroquinone.  All of the patient's questions and concerns were addressed.
Sotyktu Pregnancy And Lactation Text: There is insufficient data to evaluate whether or not Sotyktu is safe to use during pregnancy.? ?It is not known if Sotyktu passes into breast milk and whether or not it is safe to use when breastfeeding.??
Oral Minoxidil Counseling- I discussed with the patient the risks of oral minoxidil including but not limited to shortness of breath, swelling of the feet or ankles, dizziness, lightheadedness, unwanted hair growth and allergic reaction.  The patient verbalized understanding of the proper use and possible adverse effects of oral minoxidil.  All of the patient's questions and concerns were addressed.
Libtayo Counseling- I discussed with the patient the risks of Libtayo including but not limited to nausea, vomiting, diarrhea, and bone or muscle pain.  The patient verbalized understanding of the proper use and possible adverse effects of Libtayo.  All of the patient's questions and concerns were addressed.
Calcipotriene Pregnancy And Lactation Text: This medication has not been proven safe during pregnancy. It is unknown if this medication is excreted in breast milk.
Winlevi Pregnancy And Lactation Text: This medication is considered safe during pregnancy and breastfeeding.
High Dose Vitamin A Counseling: Side effects reviewed, pt to contact office should one occur.
Finasteride Pregnancy And Lactation Text: This medication is absolutely contraindicated during pregnancy. It is unknown if it is excreted in breast milk.
Rituxan Counseling:  I discussed with the patient the risks of Rituxan infusions. Side effects can include infusion reactions, severe drug rashes including mucocutaneous reactions, reactivation of latent hepatitis and other infections and rarely progressive multifocal leukoencephalopathy.  All of the patient's questions and concerns were addressed.
Picato Counseling:  I discussed with the patient the risks of Picato including but not limited to erythema, scaling, itching, weeping, crusting, and pain.
Clindamycin Counseling: I counseled the patient regarding use of clindamycin as an antibiotic for prophylactic and/or therapeutic purposes. Clindamycin is active against numerous classes of bacteria, including skin bacteria. Side effects may include nausea, diarrhea, gastrointestinal upset, rash, hives, yeast infections, and in rare cases, colitis.
Dupixent Pregnancy And Lactation Text: This medication likely crosses the placenta but the risk for the fetus is uncertain. This medication is excreted in breast milk.
Topical Clindamycin Counseling: Patient counseled that this medication may cause skin irritation or allergic reactions.  In the event of skin irritation, the patient was advised to reduce the amount of the drug applied or use it less frequently.   The patient verbalized understanding of the proper use and possible adverse effects of clindamycin.  All of the patient's questions and concerns were addressed.
Thalidomide Counseling: I discussed with the patient the risks of thalidomide including but not limited to birth defects, anxiety, weakness, chest pain, dizziness, cough and severe allergy.
Fluconazole Counseling:  Patient counseled regarding adverse effects of fluconazole including but not limited to headache, diarrhea, nausea, upset stomach, liver function test abnormalities, taste disturbance, and stomach pain.  There is a rare possibility of liver failure that can occur when taking fluconazole.  The patient understands that monitoring of LFTs and kidney function test may be required, especially at baseline. The patient verbalized understanding of the proper use and possible adverse effects of fluconazole.  All of the patient's questions and concerns were addressed.
Hydroxychloroquine Pregnancy And Lactation Text: This medication has been shown to cause fetal harm but it isn't assigned a Pregnancy Risk Category. There are small amounts excreted in breast milk.
Colchicine Counseling:  Patient counseled regarding adverse effects including but not limited to stomach upset (nausea, vomiting, stomach pain, or diarrhea).  Patient instructed to limit alcohol consumption while taking this medication.  Colchicine may reduce blood counts especially with prolonged use.  The patient understands that monitoring of kidney function and blood counts may be required, especially at baseline. The patient verbalized understanding of the proper use and possible adverse effects of colchicine.  All of the patient's questions and concerns were addressed.
5-Fu Counseling: 5-Fluorouracil Counseling:  I discussed with the patient the risks of 5-fluorouracil including but not limited to erythema, scaling, itching, weeping, crusting, and pain.
High Dose Vitamin A Pregnancy And Lactation Text: High dose vitamin A therapy is contraindicated during pregnancy and breast feeding.
Taltz Counseling: I discussed with the patient the risks of ixekizumab including but not limited to immunosuppression, serious infections, worsening of inflammatory bowel disease and drug reactions.  The patient understands that monitoring is required including a PPD at baseline and must alert us or the primary physician if symptoms of infection or other concerning signs are noted.
Xeljanz Counseling: I discussed with the patient the risks of Xeljanz therapy including increased risk of infection, liver issues, headache, diarrhea, or cold symptoms. Live vaccines should be avoided. They were instructed to call if they have any problems.
Rituxan Pregnancy And Lactation Text: This medication is Pregnancy Category C and it isn't know if it is safe during pregnancy. It is unknown if this medication is excreted in breast milk but similar antibodies are known to be excreted.
Adbry Counseling: I discussed with the patient the risks of tralokinumab including but not limited to eye infection and irritation, cold sores, injection site reactions, worsening of asthma, allergic reactions and increased risk of parasitic infection.  Live vaccines should be avoided while taking tralokinumab. The patient understands that monitoring is required and they must alert us or the primary physician if symptoms of infection or other concerning signs are noted.
Oral Minoxidil Pregnancy And Lactation Text: This medication should only be used when clearly needed if you are pregnant, attempting to become pregnant or breast feeding.
Libtayo Pregnancy And Lactation Text: This medication is contraindicated in pregnancy and when breast feeding.
Zyclara Counseling:  I discussed with the patient the risks of imiquimod including but not limited to erythema, scaling, itching, weeping, crusting, and pain.  Patient understands that the inflammatory response to imiquimod is variable from person to person and was educated regarded proper titration schedule.  If flu-like symptoms develop, patient knows to discontinue the medication and contact us.
Low Dose Naltrexone Counseling- I discussed with the patient the potential risks and side effects of low dose naltrexone including but not limited to: more vivid dreams, headaches, nausea, vomiting, abdominal pain, fatigue, dizziness, and anxiety.
Doxepin Counseling:  Patient advised that the medication is sedating and not to drive a car after taking this medication. Patient informed of potential adverse effects including but not limited to dry mouth, urinary retention, and blurry vision.  The patient verbalized understanding of the proper use and possible adverse effects of doxepin.  All of the patient's questions and concerns were addressed.
Imiquimod Counseling:  I discussed with the patient the risks of imiquimod including but not limited to erythema, scaling, itching, weeping, crusting, and pain.  Patient understands that the inflammatory response to imiquimod is variable from person to person and was educated regarded proper titration schedule.  If flu-like symptoms develop, patient knows to discontinue the medication and contact us.
Quinolones Counseling:  I discussed with the patient the risks of fluoroquinolones including but not limited to GI upset, allergic reaction, drug rash, diarrhea, dizziness, photosensitivity, yeast infections, liver function test abnormalities, tendonitis/tendon rupture.
Enbrel Counseling:  I discussed with the patient the risks of etanercept including but not limited to myelosuppression, immunosuppression, autoimmune hepatitis, demyelinating diseases, lymphoma, and infections.  The patient understands that monitoring is required including a PPD at baseline and must alert us or the primary physician if symptoms of infection or other concerning signs are noted.
Birth Control Pills Counseling: Birth Control Pill Counseling: I discussed with the patient the potential side effects of OCPs including but not limited to increased risk of stroke, heart attack, thrombophlebitis, deep venous thrombosis, hepatic adenomas, breast changes, GI upset, headaches, and depression.  The patient verbalized understanding of the proper use and possible adverse effects of OCPs. All of the patient's questions and concerns were addressed.
Xeljasenz Pregnancy And Lactation Text: This medication is Pregnancy Category D and is not considered safe during pregnancy.  The risk during breast feeding is also uncertain.
Clindamycin Pregnancy And Lactation Text: This medication can be used in pregnancy if certain situations. Clindamycin is also present in breast milk.
Cimetidine Counseling:  I discussed with the patient the risks of Cimetidine including but not limited to gynecomastia, headache, diarrhea, nausea, drowsiness, arrhythmias, pancreatitis, skin rashes, psychosis, bone marrow suppression and kidney toxicity.
Adbry Pregnancy And Lactation Text: It is unknown if this medication will adversely affect pregnancy or breast feeding.
Otezla Counseling: The side effects of Otezla were discussed with the patient, including but not limited to worsening or new depression, weight loss, diarrhea, nausea, upper respiratory tract infection, and headache. Patient instructed to call the office should any adverse effect occur.  The patient verbalized understanding of the proper use and possible adverse effects of Otezla.  All the patient's questions and concerns were addressed.
Odomzo Counseling- I discussed with the patient the risks of Odomzo including but not limited to nausea, vomiting, diarrhea, constipation, weight loss, changes in the sense of taste, decreased appetite, muscle spasms, and hair loss.  The patient verbalized understanding of the proper use and possible adverse effects of Odomzo.  All of the patient's questions and concerns were addressed.
Siliq Counseling:  I discussed with the patient the risks of Siliq including but not limited to new or worsening depression, suicidal thoughts and behavior, immunosuppression, malignancy, posterior leukoencephalopathy syndrome, and serious infections.  The patient understands that monitoring is required including a PPD at baseline and must alert us or the primary physician if symptoms of infection or other concerning signs are noted. There is also a special program designed to monitor depression which is required with Siliq.
Azelaic Acid Counseling: Patient counseled that medicine may cause skin irritation and to avoid applying near the eyes.  In the event of skin irritation, the patient was advised to reduce the amount of the drug applied or use it less frequently.   The patient verbalized understanding of the proper use and possible adverse effects of azelaic acid.  All of the patient's questions and concerns were addressed.
Low Dose Naltrexone Pregnancy And Lactation Text: Naltrexone is pregnancy category C.  There have been no adequate and well-controlled studies in pregnant women.  It should be used in pregnancy only if the potential benefit justifies the potential risk to the fetus.   Limited data indicates that naltrexone is minimally excreted into breastmilk.
Protopic Counseling: Patient may experience a mild burning sensation during topical application. Protopic is not approved in children less than 2 years of age. There have been case reports of hematologic and skin malignancies in patients using topical calcineurin inhibitors although causality is questionable.
Cibinqo Counseling: I discussed with the patient the risks of Cibinqo therapy including but not limited to common cold, nausea, headache, cold sores, increased blood CPK levels, dizziness, UTIs, fatigue, acne, and vomitting. Live vaccines should be avoided.  This medication has been linked to serious infections; higher rate of mortality; malignancy and lymphoproliferative disorders; major adverse cardiovascular events; thrombosis; thrombocytopenia and lymphopenia; lipid elevations; and retinal detachment.
Doxepin Pregnancy And Lactation Text: This medication is Pregnancy Category C and it isn't known if it is safe during pregnancy. It is also excreted in breast milk and breast feeding isn't recommended.
Birth Control Pills Pregnancy And Lactation Text: This medication should be avoided if pregnant and for the first 30 days post-partum.
Topical Ketoconazole Counseling: Patient counseled that this medication may cause skin irritation or allergic reactions.  In the event of skin irritation, the patient was advised to reduce the amount of the drug applied or use it less frequently.   The patient verbalized understanding of the proper use and possible adverse effects of ketoconazole.  All of the patient's questions and concerns were addressed.
Griseofulvin Counseling:  I discussed with the patient the risks of griseofulvin including but not limited to photosensitivity, cytopenia, liver damage, nausea/vomiting and severe allergy.  The patient understands that this medication is best absorbed when taken with a fatty meal (e.g., ice cream or french fries).
Spevigo Counseling: I discussed with the patient the risks of Spevigo including but not limited to fatigue, nasuea, vomiting, headache, pruritus, urinary tract infection, an infusion related reactions.  The patient understands that monitoring is required including screening for tuberculosis at baseline and yearly screening thereafter while continuing Spevigo therapy. They should contact us if symptoms of infection or other concerning signs are noted.
Spevigo Pregnancy And Lactation Text: The risk during pregnancy and breastfeeding is uncertain with this medication. This medication does cross the placenta. It is unknown if this medication is found in breast milk.
Litfulo Counseling: Litfulo (Ritlecitinib) Counseling: I discussed with the patient the risks of Litfulo therapy including but not limited to headache, upper respiratory infections, nausea, diarrhea, acne, and urticaria. Live vaccines should be avoided.  This medication has been linked to serious infections; higher rate of mortality; malignancy and lymphoproliferative disorders; major adverse cardiovascular events; thrombosis; decreases in lymphocytes and platelets; liver enzyme elevations; and CPK elevations.
Hyrimoz Counseling:  I discussed with the patient the risks of adalimumab including but not limited to myelosuppression, immunosuppression, autoimmune hepatitis, demyelinating diseases, lymphoma, and serious infections.  The patient understands that monitoring is required including a PPD at baseline and must alert us or the primary physician if symptoms of infection or other concerning signs are noted.
Litfulo Pregnancy And Lactation Text: There is insufficient data to evaluate whether or not Litfulo is safe to use during pregnancy.  Breastfeeding is not recommended during treatment.

## 2024-09-19 NOTE — PROCEDURE: COSENTYX MONITORING
Date Of Last Negative Ppd (Optional): 3/2024
Detail Level: Zone
Add High Risk Medication Management Associated Diagnosis?: No

## 2024-09-19 NOTE — PROCEDURE: LIQUID NITROGEN
Consent: The patient's consent was obtained including but not limited to risks of crusting, scabbing, blistering, scarring, darker or lighter pigmentary change, recurrence, incomplete removal and infection.
Post-Care Instructions: I reviewed with the patient in detail post-care instructions. Patient is to wear sunprotection, and avoid picking at any of the treated lesions. Pt may apply Vaseline to crusted or scabbing areas.
Render Post-Care Instructions In Note?: yes
Number Of Freeze-Thaw Cycles: 1 freeze-thaw cycle
Detail Level: Detailed
Duration Of Freeze Thaw-Cycle (Seconds): 6
Render Note In Bullet Format When Appropriate: No
Spray Paint Text: The liquid nitrogen was applied to the skin utilizing a spray paint frosting technique.
Medical Necessity Clause: This procedure was medically necessary because the lesions that were treated were:
Number Of Freeze-Thaw Cycles: 2 freeze-thaw cycles
Medical Necessity Information: It is in your best interest to select a reason for this procedure from the list below. All of these items fulfill various CMS LCD requirements except the new and changing color options.

## 2025-01-27 ENCOUNTER — RX ONLY (RX ONLY)
Age: 80
End: 2025-01-27

## 2025-01-27 RX ORDER — DUTASTERIDE 0.5 MG/1
CAPSULE, LIQUID FILLED ORAL ONCE A DAY
Qty: 30 | Refills: 5 | Status: ERX

## 2025-01-30 ENCOUNTER — RX ONLY (RX ONLY)
Age: 80
End: 2025-01-30

## 2025-01-30 RX ORDER — DUTASTERIDE 0.5 MG/1
CAPSULE, LIQUID FILLED ORAL ONCE A DAY
Qty: 90 | Refills: 5 | Status: ERX

## 2025-02-11 ENCOUNTER — RX ONLY (RX ONLY)
Age: 80
End: 2025-02-11

## 2025-02-11 RX ORDER — DUTASTERIDE 0.5 MG/1
CAPSULE, LIQUID FILLED ORAL ONCE A DAY
Qty: 90 | Refills: 5 | Status: ERX

## 2025-08-27 ENCOUNTER — APPOINTMENT (OUTPATIENT)
Dept: URBAN - METROPOLITAN AREA CLINIC 158 | Facility: CLINIC | Age: 80
Setting detail: DERMATOLOGY
End: 2025-08-27

## 2025-08-27 DIAGNOSIS — D22 MELANOCYTIC NEVI: ICD-10-CM

## 2025-08-27 DIAGNOSIS — L57.0 ACTINIC KERATOSIS: ICD-10-CM | Status: INADEQUATELY CONTROLLED

## 2025-08-27 DIAGNOSIS — Z79.899 OTHER LONG TERM (CURRENT) DRUG THERAPY: ICD-10-CM

## 2025-08-27 DIAGNOSIS — L40.0 PSORIASIS VULGARIS: ICD-10-CM | Status: WELL CONTROLLED

## 2025-08-27 DIAGNOSIS — L40.59 OTHER PSORIATIC ARTHROPATHY: ICD-10-CM

## 2025-08-27 PROBLEM — D22.5 MELANOCYTIC NEVI OF TRUNK: Status: ACTIVE | Noted: 2025-08-27

## 2025-08-27 PROCEDURE — ? ORDER TESTS

## 2025-08-27 PROCEDURE — ? COUNSELING

## 2025-08-27 PROCEDURE — ? LIQUID NITROGEN

## 2025-08-27 PROCEDURE — ? HIGH RISK MEDICATION MONITORING

## 2025-08-27 PROCEDURE — ? COSENTYX MONITORING

## 2025-08-27 PROCEDURE — ? PRESCRIPTION

## 2025-08-27 RX ORDER — TRIAMCINOLONE ACETONIDE 1 MG/G
CREAM TOPICAL TWICE A DAY
Qty: 454 | Refills: 3 | Status: ERX

## 2025-08-27 ASSESSMENT — LOCATION DETAILED DESCRIPTION DERM
LOCATION DETAILED: RIGHT ELBOW
LOCATION DETAILED: SUPERIOR THORACIC SPINE
LOCATION DETAILED: RIGHT SUPERIOR LATERAL MALAR CHEEK
LOCATION DETAILED: RIGHT PROXIMAL PRETIBIAL REGION
LOCATION DETAILED: RIGHT HAND
LOCATION DETAILED: LEFT HAND
LOCATION DETAILED: LEFT PROXIMAL DORSAL FOREARM
LOCATION DETAILED: LEFT PROXIMAL PRETIBIAL REGION
LOCATION DETAILED: RIGHT INFERIOR MEDIAL UPPER BACK

## 2025-08-27 ASSESSMENT — LOCATION SIMPLE DESCRIPTION DERM
LOCATION SIMPLE: LEFT FOREARM
LOCATION SIMPLE: LEFT HAND
LOCATION SIMPLE: RIGHT ELBOW
LOCATION SIMPLE: RIGHT CHEEK
LOCATION SIMPLE: UPPER BACK
LOCATION SIMPLE: RIGHT PRETIBIAL REGION
LOCATION SIMPLE: RIGHT UPPER BACK
LOCATION SIMPLE: LEFT PRETIBIAL REGION
LOCATION SIMPLE: RIGHT HAND

## 2025-08-27 ASSESSMENT — BSA PSORIASIS: % BODY COVERED IN PSORIASIS: 0

## 2025-08-27 ASSESSMENT — LOCATION ZONE DERM
LOCATION ZONE: FACE
LOCATION ZONE: HAND
LOCATION ZONE: LEG
LOCATION ZONE: ARM
LOCATION ZONE: TRUNK

## 2025-08-27 ASSESSMENT — PGA PSORIASIS: PGA PSORIASIS 2020: CLEAR

## 2025-08-29 ENCOUNTER — RX ONLY (RX ONLY)
Age: 80
End: 2025-08-29

## 2025-08-29 RX ORDER — SECUKINUMAB 300 MG/2ML
INJECTION SUBCUTANEOUS
Qty: 1 | Refills: 11 | Status: ERX

## 2025-08-29 RX ORDER — SECUKINUMAB 300 MG/2ML
INJECTION SUBCUTANEOUS
Qty: 1 | Refills: 11 | Status: ERX | COMMUNITY
Start: 2025-08-29